# Patient Record
Sex: FEMALE | Race: WHITE | Employment: OTHER | URBAN - METROPOLITAN AREA
[De-identification: names, ages, dates, MRNs, and addresses within clinical notes are randomized per-mention and may not be internally consistent; named-entity substitution may affect disease eponyms.]

---

## 2017-03-31 ENCOUNTER — OP HISTORICAL/CONVERTED ENCOUNTER (OUTPATIENT)
Dept: OTHER | Age: 72
End: 2017-03-31

## 2019-02-07 ENCOUNTER — OP HISTORICAL/CONVERTED ENCOUNTER (OUTPATIENT)
Dept: OTHER | Age: 74
End: 2019-02-07

## 2019-05-29 ENCOUNTER — OP HISTORICAL/CONVERTED ENCOUNTER (OUTPATIENT)
Dept: OTHER | Age: 74
End: 2019-05-29

## 2021-06-30 ENCOUNTER — TRANSCRIBE ORDER (OUTPATIENT)
Dept: SCHEDULING | Age: 76
End: 2021-06-30

## 2021-06-30 DIAGNOSIS — J32.8 OTHER CHRONIC SINUSITIS: Primary | ICD-10-CM

## 2021-07-08 ENCOUNTER — HOSPITAL ENCOUNTER (OUTPATIENT)
Dept: CT IMAGING | Age: 76
Discharge: HOME | End: 2021-07-08
Payer: MEDICARE

## 2021-07-08 DIAGNOSIS — J32.8 OTHER CHRONIC SINUSITIS: ICD-10-CM

## 2021-07-08 PROCEDURE — 70486 CT MAXILLOFACIAL W/O DYE: CPT

## 2022-03-18 ENCOUNTER — HOSPITAL ENCOUNTER (OUTPATIENT)
Dept: PREADMISSION TESTING | Age: 77
Discharge: HOME | End: 2022-03-18
Payer: MEDICARE

## 2022-03-18 LAB
SARS-COV-2, XPLCVT: NOT DETECTED
SOURCE, COVRS: NORMAL

## 2022-03-18 PROCEDURE — U0005 INFEC AGEN DETEC AMPLI PROBE: HCPCS

## 2022-03-23 ENCOUNTER — ANESTHESIA (OUTPATIENT)
Dept: ENDOSCOPY | Age: 77
End: 2022-03-23
Payer: MEDICARE

## 2022-03-23 ENCOUNTER — HOSPITAL ENCOUNTER (OUTPATIENT)
Age: 77
Setting detail: OUTPATIENT SURGERY
Discharge: HOME | End: 2022-03-23
Attending: SPECIALIST | Admitting: SPECIALIST
Payer: MEDICARE

## 2022-03-23 ENCOUNTER — ANESTHESIA EVENT (OUTPATIENT)
Dept: ENDOSCOPY | Age: 77
End: 2022-03-23
Payer: MEDICARE

## 2022-03-23 VITALS
DIASTOLIC BLOOD PRESSURE: 51 MMHG | RESPIRATION RATE: 15 BRPM | BODY MASS INDEX: 33.5 KG/M2 | HEIGHT: 65 IN | WEIGHT: 201.06 LBS | SYSTOLIC BLOOD PRESSURE: 138 MMHG | OXYGEN SATURATION: 99 % | HEART RATE: 58 BPM | TEMPERATURE: 97.7 F

## 2022-03-23 LAB
GLUCOSE BLD STRIP.AUTO-MCNC: 100 MG/DL (ref 65–117)
SERVICE CMNT-IMP: NORMAL

## 2022-03-23 PROCEDURE — 2709999900 HC NON-CHARGEABLE SUPPLY: Performed by: SPECIALIST

## 2022-03-23 PROCEDURE — 88305 TISSUE EXAM BY PATHOLOGIST: CPT

## 2022-03-23 PROCEDURE — 74011250636 HC RX REV CODE- 250/636: Performed by: NURSE ANESTHETIST, CERTIFIED REGISTERED

## 2022-03-23 PROCEDURE — 77030018712 HC DEV BLLN INFL BSC -B: Performed by: SPECIALIST

## 2022-03-23 PROCEDURE — 82962 GLUCOSE BLOOD TEST: CPT

## 2022-03-23 PROCEDURE — 77030021593 HC FCPS BIOP ENDOSC BSC -A: Performed by: SPECIALIST

## 2022-03-23 PROCEDURE — C1726 CATH, BAL DIL, NON-VASCULAR: HCPCS | Performed by: SPECIALIST

## 2022-03-23 PROCEDURE — 76060000031 HC ANESTHESIA FIRST 0.5 HR: Performed by: SPECIALIST

## 2022-03-23 PROCEDURE — 74011250636 HC RX REV CODE- 250/636: Performed by: SPECIALIST

## 2022-03-23 PROCEDURE — 74011000250 HC RX REV CODE- 250: Performed by: NURSE ANESTHETIST, CERTIFIED REGISTERED

## 2022-03-23 PROCEDURE — 76040000019: Performed by: SPECIALIST

## 2022-03-23 RX ORDER — PROPOFOL 10 MG/ML
INJECTION, EMULSION INTRAVENOUS
Status: DISCONTINUED | OUTPATIENT
Start: 2022-03-23 | End: 2022-03-23 | Stop reason: HOSPADM

## 2022-03-23 RX ORDER — LIDOCAINE HYDROCHLORIDE 20 MG/ML
INJECTION, SOLUTION EPIDURAL; INFILTRATION; INTRACAUDAL; PERINEURAL AS NEEDED
Status: DISCONTINUED | OUTPATIENT
Start: 2022-03-23 | End: 2022-03-23 | Stop reason: HOSPADM

## 2022-03-23 RX ORDER — CLONIDINE HYDROCHLORIDE 0.1 MG/1
0.1 TABLET ORAL 2 TIMES DAILY
COMMUNITY

## 2022-03-23 RX ORDER — FLUMAZENIL 0.1 MG/ML
0.2 INJECTION INTRAVENOUS
Status: DISCONTINUED | OUTPATIENT
Start: 2022-03-23 | End: 2022-03-23 | Stop reason: HOSPADM

## 2022-03-23 RX ORDER — EVOLOCUMAB 140 MG/ML
140 INJECTION, SOLUTION SUBCUTANEOUS
COMMUNITY

## 2022-03-23 RX ORDER — HYDROCHLOROTHIAZIDE 25 MG/1
25 TABLET ORAL DAILY
COMMUNITY

## 2022-03-23 RX ORDER — PROPOFOL 10 MG/ML
INJECTION, EMULSION INTRAVENOUS AS NEEDED
Status: DISCONTINUED | OUTPATIENT
Start: 2022-03-23 | End: 2022-03-23 | Stop reason: HOSPADM

## 2022-03-23 RX ORDER — FLUTICASONE PROPIONATE AND SALMETEROL 500; 50 UG/1; UG/1
2 POWDER RESPIRATORY (INHALATION) EVERY 12 HOURS
COMMUNITY

## 2022-03-23 RX ORDER — MELATONIN 3 MG
6 CAPSULE ORAL
COMMUNITY

## 2022-03-23 RX ORDER — SODIUM CHLORIDE 9 MG/ML
50 INJECTION, SOLUTION INTRAVENOUS CONTINUOUS
Status: DISCONTINUED | OUTPATIENT
Start: 2022-03-23 | End: 2022-03-23 | Stop reason: HOSPADM

## 2022-03-23 RX ORDER — DEXTROMETHORPHAN/PSEUDOEPHED 2.5-7.5/.8
1.2 DROPS ORAL
Status: DISCONTINUED | OUTPATIENT
Start: 2022-03-23 | End: 2022-03-23 | Stop reason: HOSPADM

## 2022-03-23 RX ORDER — DILTIAZEM HYDROCHLORIDE 240 MG/1
240 CAPSULE, COATED, EXTENDED RELEASE ORAL DAILY
COMMUNITY

## 2022-03-23 RX ORDER — MIDAZOLAM HYDROCHLORIDE 1 MG/ML
.25-5 INJECTION, SOLUTION INTRAMUSCULAR; INTRAVENOUS AS NEEDED
Status: DISCONTINUED | OUTPATIENT
Start: 2022-03-23 | End: 2022-03-23 | Stop reason: HOSPADM

## 2022-03-23 RX ORDER — OMEPRAZOLE 20 MG/1
20 TABLET, DELAYED RELEASE ORAL 2 TIMES DAILY
COMMUNITY

## 2022-03-23 RX ORDER — FENTANYL CITRATE 50 UG/ML
25 INJECTION, SOLUTION INTRAMUSCULAR; INTRAVENOUS AS NEEDED
Status: DISCONTINUED | OUTPATIENT
Start: 2022-03-23 | End: 2022-03-23 | Stop reason: HOSPADM

## 2022-03-23 RX ORDER — NALOXONE HYDROCHLORIDE 0.4 MG/ML
0.4 INJECTION, SOLUTION INTRAMUSCULAR; INTRAVENOUS; SUBCUTANEOUS
Status: DISCONTINUED | OUTPATIENT
Start: 2022-03-23 | End: 2022-03-23 | Stop reason: HOSPADM

## 2022-03-23 RX ADMIN — LIDOCAINE HYDROCHLORIDE 40 MG: 20 INJECTION, SOLUTION INTRAVENOUS at 10:22

## 2022-03-23 RX ADMIN — PROPOFOL 140 MCG/KG/MIN: 10 INJECTION, EMULSION INTRAVENOUS at 10:23

## 2022-03-23 RX ADMIN — SODIUM CHLORIDE 50 ML/HR: 9 INJECTION, SOLUTION INTRAVENOUS at 10:13

## 2022-03-23 RX ADMIN — PROPOFOL INJECTABLE EMULSION 80 MG: 10 INJECTION, EMULSION INTRAVENOUS at 10:23

## 2022-03-23 NOTE — PROGRESS NOTES
Donte Espinoza  1945  [id number]    Situation:  Verbal report received from:   Cristóbal Gatica RN   Procedure: Procedure(s):  ESOPHAGOGASTRODUODENOSCOPY (EGD)  ESOPHAGOGASTRODUODENAL (EGD) BIOPSY  ESOPHAGEAL DILATION    Background:    Preoperative diagnosis: Dysphagia, unspecified type [R13.10]  Postoperative diagnosis: esophaheal ring. :  Dr. Moni Klein   Assistant(s): Endoscopy Technician-1: Trang Stephenson RN-1: Claudine Yanez RN    Specimens:   ID Type Source Tests Collected by Time Destination   1 : eg junction biopsy Preservative   Kera Torres MD 3/23/2022 1027 Pathology     H. Pylori  no    Assessment:      Anesthesia gave intra-procedure sedation and medications, see anesthesia flow sheet yes    Intravenous fluids: NS@ KVO     Vital signs stable   yes    Abdominal assessment: round and soft   yes    Recommendation:  Discharge patient per MD order  yes.   Return to floor  outpatient  Family or Friend   Son   Permission to share finding with family or friend yes
Endoscopy discharge instructions have been reviewed and given to patient. The patient verbalized understanding and acceptance of instructions. Dr. Lavonne Menchaca  discussed with patient procedure findings and next steps.
Unknown if ever smoked

## 2022-03-23 NOTE — INTERVAL H&P NOTE
Pre-Endoscopy H&P Update  Chief complaint/HPI/ROS:  The indication for the procedure, the patient's history and the patient's current medications are reviewed prior to the procedure and that data is reported on the H&P to which this document is attached. Any significant complaints with regard to organ systems will be noted, and if not mentioned then a review of systems is not contributory. Past Medical History:   Diagnosis Date    Asthma     Diabetes ([address]     type 2    GERD (gastroesophageal reflux disease)     Hypertension     Ill-defined condition     high cholesterol    Ill-defined condition     seasonal allergies    Ill-defined condition     gout      Past Surgical History:   Procedure Laterality Date    HX APPENDECTOMY      HX HEENT  2010    cataract     HX HYSTERECTOMY      HX LAP CHOLECYSTECTOMY      HX OOPHORECTOMY      benign ovarian tumor    HX OTHER SURGICAL      bladder tack    HX ROTATOR CUFF REPAIR  2015    right    HX TONSILLECTOMY  1964     Social   Social History     Tobacco Use    Smoking status: Former Smoker     Packs/day: 0.25     Quit date:      Years since quittin.2    Smokeless tobacco: Never Used    Tobacco comment: smoked x 1 year   Substance Use Topics    Alcohol use: No      Family History   Problem Relation Age of Onset    Cancer Mother         uterine    Heart Disease Mother         afib    Heart Disease Father 48        MI    Hypertension Father       Allergies   Allergen Reactions    Aspirin Cough    Beta Blocker [Beta-Blockers (Beta-Adrenergic Blocking Agts)] Other (comments)     Was told by her PCP to report that she has a mutation- she does not recall a reaction    Codeine Rash      Prior to Admission Medications   Prescriptions Last Dose Informant Patient Reported? Taking? Hxakchgu-Pawj-Raxdwf-Hyalur Ac [id number] mg cap 3/22/2022 at Unknown time  Yes Yes   Sig: Take  by mouth two (2) times a day.    MAGNESIUM CHLORIDE PO 3/22/2022 at Unknown time Yes Yes   Sig: Take 225 mg by mouth two (2) times a day. albuterol sulfate (PROVENTIL;VENTOLIN) 2.5 mg/0.5 mL nebu nebulizer solution 3/23/2022 at 0700  Yes Yes   Sig: by Nebulization route every four (4) hours as needed for Wheezing. atorvastatin (LIPITOR) 80 mg tablet 3/22/2022 at Unknown time  Yes Yes   Sig: Take 40 mg by mouth every evening. azilsartan medoxomiL (EDARBI) 80 mg tab tablet   Yes Yes   Sig: Take 40 mg by mouth daily. cloNIDine HCL (CATAPRES) 0.1 mg tablet 3/23/2022 at Unknown time  Yes Yes   Sig: Take 0.1 mg by mouth two (2) times a day. dilTIAZem ER (CARDIZEM CD) 240 mg capsule 3/23/2022 at Unknown time  Yes Yes   Sig: Take 240 mg by mouth daily. evolocumab (Repatha SureClick) pen injection   Yes Yes   Si mg by SubCUTAneous route every fourteen (14) days. ezetimibe (ZETIA) 10 mg tablet 3/22/2022 at Unknown time  Yes Yes   Sig: Take 10 mg by mouth every evening. fluticasone (FLONASE) 50 mcg/actuation nasal spray 3/23/2022 at Unknown time  Yes Yes   Si Sprays by Both Nostrils route daily. fluticasone propion-salmeteroL (Wixela Inhub) 500-50 mcg/dose diskus inhaler 3/23/2022 at Unknown time  Yes Yes   Sig: Take 2 Puffs by inhalation every twelve (12) hours. folic acid/multivit-min/lutein (CENTRUM SILVER PO) 3/22/2022 at Unknown time  Yes Yes   Sig: Take 1 Tablet by mouth daily. glucosamine HCl/chondroitin rico (GLUCOSAMINE-CHONDROITIN PO) 3/22/2022 at Unknown time  Yes Yes   Sig: Take 1 Capsule by mouth two (2) times a day. 1500/1000   hydroCHLOROthiazide (HYDRODIURIL) 25 mg tablet 3/22/2022 at Unknown time  Yes Yes   Sig: Take 25 mg by mouth daily. levocetirizine dihydrochloride (XYZAL PO) 3/22/2022 at Unknown time  Yes Yes   Sig: Take 90 mg by mouth daily. melatonin 3 mg cap capsule 3/22/2022 at Unknown time  Yes Yes   Sig: Take 6 mg by mouth nightly.    montelukast (SINGULAIR) 10 mg tablet 3/22/2022 at Unknown time  Yes Yes   Sig: Take 10 mg by mouth daily.   omeprazole (PriLOSEC OTC) 20 mg tablet 3/22/2022 at Unknown time  Yes Yes   Sig: Take 20 mg by mouth two (2) times a day. sitaGLIPtin-metFORMIN (JANUMET)  mg per tablet 3/22/2022 at Unknown time  Yes Yes   Sig: Take 1 Tab by mouth daily. therapeutic multivitamin (THERAGRAN) tablet 3/22/2022 at Unknown time  Yes Yes   Sig: Take 1 Tab by mouth daily. tiotropium bromide (SPIRIVA RESPIMAT) 1.25 mcg/actuation inhaler 3/23/2022 at Unknown time  Yes Yes   Sig: Take 2 Puffs by inhalation daily. Facility-Administered Medications: None       PHYSICAL EXAM:  The patient is examined immediately prior to the procedure. Visit Vitals  BP (!) 134/53   Pulse (!) 59   Temp 98.1 °F (36.7 °C)   Resp 19   Ht 5' 5\" (1.651 m)   Wt 91.2 kg (201 lb 1 oz)   SpO2 98%   Breastfeeding No   BMI 33.46 kg/m²     Gen: Appears comfortable, no distress. Pulm: comfortable respirations with no abnormal audible breath sounds  HEART: well perfused, no abnormal audible heart sounds  GI: abdomen flat. PLAN:  Informed consent discussion held, patient afforded an opportunity to ask questions and all questions answered. After being advised of the risks, benefits, and alternatives, the patient requested that we proceed and indicated so on a written consent form. Will proceed with procedure as planned.   Kathie Ovalles MD

## 2022-03-23 NOTE — PERIOP NOTES
1022  Timeout performed. Anesthesia staff at patient's bedside administering anesthesia and monitoring patients vital signs throughout procedure. See anesthesia note. Post procedure, report received from Chuck RAE. 0  Endoscope was pre-cleaned at bedside immediately following procedure by endo Jose macario. 1030  Patient tolerated procedure. Abdomen soft and patient arousable and voices no complaints. Patient transported to endoscopy recovery area. Report given to post procedure Marcia ARGUELLO.

## 2022-03-23 NOTE — DISCHARGE INSTRUCTIONS
[address] [facility] LUANA Toledo MD  [phone]      March 23, 2022     Cary Murray  YOB: 1945    ENDOSCOPY DISCHARGE INSTRUCTIONS    If there is redness at IV site you should apply warm compress to area. If redness or soreness persist contact Dr. Zena Toledo' or your primary care doctor. Gaseous discomfort may develop, but walking, belching will help relieve this. You may not operate a vehicle for 12 hours  You may not operate machinery or dangerous appliances for rest of today  You may not drink alcoholic beverages for 12 hours  Avoid making any critical decisions for 24 hours    DIET:  You may resume your normal diet, but some patients find that heavy or large meals may lead to indigestion or vomiting. I suggest a light meal as first food intake. MEDICATIONS:  The use of some over-the-counter pain medication may lead to bleeding after biopsies or other procedures you may have had done. Tylenol (also called acetaminophen) is safe to take and will not lead to bleeding. Based on the procedure you had today you may not safely take aspirin or aspirin-like products for the next ten (10) days. ACTIVITY:  You may resume your normal household activities, but it is recommended that you spend the remainder of the day resting -  avoid any strenuous activity. CALL DR. Ama Carrasquillo' OFFICE IF:  Increasing pain, nausea, vomiting  Abdominal distension (swelling)  Significant new or increased bleeding (oral or rectal)  Fever/Chills  Chest pain/shortness of breath                   Additional instructions: We found a small area of narrowing of the lower esophagus which appears due to acid reflux. We obtained biopsies to confirm my visual diagnosis and dilated/stretched that area to see if that helps improve your swallowing. It was an honor to be your doctor today.   Please let me or my office staff know if you have any feedback about today's procedure.     Yassine Perry MD

## 2022-03-23 NOTE — ANESTHESIA POSTPROCEDURE EVALUATION
Procedure(s):  ESOPHAGOGASTRODUODENOSCOPY (EGD)  ESOPHAGOGASTRODUODENAL (EGD) BIOPSY  ESOPHAGEAL DILATION. MAC    Anesthesia Post Evaluation      Multimodal analgesia: multimodal analgesia not used between 6 hours prior to anesthesia start to PACU discharge  Patient location during evaluation: PACU  Patient participation: complete - patient participated  Level of consciousness: awake  Pain management: adequate  Airway patency: patent  Anesthetic complications: no  Cardiovascular status: acceptable, blood pressure returned to baseline and hemodynamically stable  Respiratory status: acceptable  Hydration status: acceptable  Post anesthesia nausea and vomiting:  controlled      INITIAL Post-op Vital signs:   Vitals Value Taken Time   /53 03/23/22 1045   Temp     Pulse 58 03/23/22 1048   Resp 15 03/23/22 1048   SpO2 99 % 03/23/22 1048   Vitals shown include unvalidated device data.

## 2022-03-23 NOTE — PERIOP NOTES
CRE balloon dilatation of the esophagus   18 mm Balloon inflated to 3 ATMs and held for 3 seconds. 19 mm Balloon inflated to 4.5 ATMs and held for 3 seconds. 20 mm Balloon inflated to 6 ATMs and held for 20 seconds. No subcutaneous crepitus of the chest or cervical region was noted post dilatation.

## 2022-03-23 NOTE — ANESTHESIA PREPROCEDURE EVALUATION
Anesthetic History   No history of anesthetic complications            Review of Systems / Medical History  Patient summary reviewed, nursing notes reviewed and pertinent labs reviewed    Pulmonary            Asthma        Neuro/Psych             Comments: Chronic insomnia - Maintaining sleep Cardiovascular    Hypertension: well controlled              Exercise tolerance: >4 METS     GI/Hepatic/Renal     GERD: well controlled           Endo/Other    Diabetes: well controlled, type 2    Obesity     Other Findings              Physical Exam    Airway  Mallampati: II    Neck ROM: normal range of motion   Mouth opening: Normal     Cardiovascular    Rhythm: regular  Rate: normal         Dental    Dentition: Caps/crowns     Pulmonary  Breath sounds clear to auscultation               Abdominal         Other Findings            Anesthetic Plan    ASA: 2  Anesthesia type: MAC          Induction: Intravenous  Anesthetic plan and risks discussed with: Patient

## 2022-03-23 NOTE — H&P
2022 1:30 PM  Patient Name: Driss Machuca [facility]  Account #: [id number]  Gender: Female   (age): 1945 (77)  Provider:    Tammy Ireland NP     Referring Physician:    Torey Rosa. Sally Downing, [address], [place] [facility]  [phone] (phone)  [phone] (fax)    [facility]  [address] [place], [address]  [phone] (phone)  [phone] (fax)     Chief Complaint:    dysphagia, globus sensation     History of Present Illness:  76 y/o female with c/o dysphagia. She was diagnosed with asthma years ago and at that same time developed reflux. She reports having a really difficult time with her asthma over the past year and has been on steroids frequently. She takes Prilosec OTC bid which she is not sure it has helped. She feels like her food gets stuck in her throat, she does not have any trouble with liquids. She denies regurgitation. She does endorse frequent throat clearing. She does not take any blood thinners. She denies any N/V or weight loss. No nocturnal symptoms.   Past Medical History  Medical Conditions:   Asthma  Surgical Procedures:   Ovarian Cyst removed  Hysterectomy 1996  Gallbladder removed  Dx Studies:   No Prior Diagnostic Studies  Medications:   atorvastatin 80 mg Take 1 tablet by mouth once a day  clonidine HCl 0.1 mg Take 1 tablet by mouth once a day  diltiazem HCl 240 mg Take 1 capsule by mouth once a day  ezetimibe 10 mg Take 1 tablet by mouth once a day  hydrochlorothiazide 25 mg Take 1 tablet by mouth once a day  Janumet  mg Take 1 tablet by mouth once a day  montelukast 10 mg Take 1 tablet by mouth once a day  olmesartan 40 mg Take 1 tablet by mouth once a day  Prilosec OTC 20 mg Take 1 tablet by mouth once a day  Allergies:   Beta-Blockers (Beta-Adrenergic Blocking Agts)  Codeine Sulfate  morphine  Immunizations:   COVID Vaccine, 2021  Influenza, seasonal, injectable, 10/17/2021  Flu vaccine, 2020  Social History  Alcohol:   None  Tobacco:   Former smoker  Drugs:   None  Exercise:   Exercise less than 3 times a week. Caffeine:   Daily. Marital Status:         Family History   No Knowledge Of Family History  Review of Systems:  Cardiovascular: Denies chest pain, irregular heart beat, palpitations, peripheral edema, syncope, Sweats. Constitutional: Denies fatigue, fever, loss of appetite, weight gain, weight loss. ENMT: Denies nose bleeds, sore throat, hearing loss. Endocrine: Denies excessive thirst, heat intolerance. Eyes: Denies loss of vision. Gastrointestinal: Presents suffers from heartburn, dysphagia. Denies abdominal pain, abdominal swelling, change in bowel habits, constipation, diarrhea, Bloating/gas, jaundice, nausea, rectal bleeding, stomach cramps, vomiting, rectal pain, Stool incontinence, hematemesis. Genitourinary: Denies dark urine, dysuria, frequent urination, hematuria, incontinence. Hematologic/Lymphatic: Denies easy bruising, prolonged bleeding. Integumentary: Denies itching, rashes, sun sensitivity. Musculoskeletal: Denies arthritis, back pain, gout, joint pain, muscle weakness, stiffness. Neurological: Denies dizziness, fainting, frequent headaches, memory loss. Psychiatric: Denies anxiety, depression, difficulty sleeping, hallucinations, nervousness, panic attacks, paranoia. Respiratory: Denies cough, dyspnea, wheezing. Vital Signs:  BP  (mmHg)  Pulse  (bpm) Weight (lbs/oz) Height (ft/in) BMI Temp  142/67 83 197 / 6 5 / 4 33.88 98.3 (F)  Physical Exam:  Constitutional:  Appearance: No distress, appears comfortable. Communication: Understands/receives spoken information. Head/face: Inspection: Normacephalic, atraumatic. Facial strength: appears normal.  Eyes:  Conjunctivae/lids: Normal.  Pupils/irises: Pupils equal, round and normal.  Respiratory:  Effort: Normal respiratory effort, comfortable, speaks in complete sentences.   Auscultation: normal breath sounds, no rubs, wheezes or rhonchi. Cardiovascular: Auscultation: normal, S1 and S2,no gallops,no rubs or murmurs. Gastrointestinal/Abdomen:  Liver/Spleen: normal,normal size,Liver size and consistency normal, spleen is non-palpable. Abdomen Exam: normal bowel sounds,non distended, non tender. Psychiatric:  Judgment/insight: Normal,normal judgement, normal insight. Orientation: oriented to time, space and person. Lab Results:   Test 10/10/2017 Units Limits  POC GLUCOSE PROFILE     POC GLUCOSE 95 mg/dL   Impressions:   Dysphagia  Assessment:   76 y/o female with a year long history of intermittent dysphagia and globus sensation. Will schedule EGD to evaluate for reflux/stricture. Start Protonix bid until procedure. Plan:   Education handout on BMI Healthy Weight  Education handout on Hypertension  Upper Endoscopy  Protonix 40 mg Take 1 tablet by mouth twice a day  Risk & Medical Necessity:    The level of medical decision making for this visit is moderate. Beatriz Ziegler NP    Electronically signed on 2022 1:46:26 PM by Erminio Sarah, NP Althea Fling Lorre Snellen, MRN [id number],  1945 [facility] First Visit, 2022

## 2022-03-23 NOTE — PROCEDURES
[address], [place]  [phone]      2022    Esophagogastroduodenoscopy (EGD) Procedure Note  Francis Killian  : 1945  [facility] Medical Record Number: [id number]      Indications:    Dysphagia/odynophagia  Referring Physician:  Bill Horner MD  Anesthesia/Sedation:  Conscious sedation/deep sedation/monitored anesthesia -- see notes. Endoscopist:  Dr. Kathie Ovalles  Complications:  None  Estimated Blood Loss:  None    Permit:  The indications, risks, benefits and alternatives were reviewed with the patient or their decision maker who was provided an opportunity to ask questions and all questions were answered. The specific risks of esophagogastroduodenoscopy with conscious sedation were reviewed, including but not limited to anesthetic complication, bleeding, adverse drug reaction, missed lesion, infection, IV site reactions, and intestinal perforation which would lead to the need for surgical repair. Alternatives to EGD including radiographic imaging, observation without testing, or laboratory testing were reviewed as well as the limitations of those alternatives discussed. After considering the options and having all their questions answered, the patient or their decision maker provided both verbal and written consent to proceed. Procedure in Detail:  After obtaining informed consent, positioning of the patient in the left lateral decubitus position, and conduction of a pre-procedure pause or \"time out\" the endoscope was introduced into the mouth and advanced to the duodenum. A careful inspection was made, and findings or interventions are described below. Findings:   Esophagus: There is an acquired esophageal ring at the EG junction, which is biopsied with cold forceps and then dilated with 20mm balloon.   Stomach: normal   Duodenum/jejunum: normal      Specimens: See above    Impression: Esophageal ring           Recommendations:  -Await pathology. Thank you for entrusting me with this patient's care. Please do not hesitate to contact me with any questions or if I can be of assistance with any of your other patients' GI needs. Signed By: Kat Rubio MD                        March 23, 2022     Surgical assistant none. Implants none unless specified.

## 2023-03-15 ENCOUNTER — APPOINTMENT (OUTPATIENT)
Dept: GENERAL RADIOLOGY | Age: 78
DRG: 177 | End: 2023-03-15
Attending: STUDENT IN AN ORGANIZED HEALTH CARE EDUCATION/TRAINING PROGRAM
Payer: MEDICARE

## 2023-03-15 ENCOUNTER — HOSPITAL ENCOUNTER (INPATIENT)
Age: 78
LOS: 15 days | Discharge: NURSING, REHAB OR LONG-TERM CARE | DRG: 177 | End: 2023-03-31
Attending: STUDENT IN AN ORGANIZED HEALTH CARE EDUCATION/TRAINING PROGRAM | Admitting: INTERNAL MEDICINE
Payer: MEDICARE

## 2023-03-15 ENCOUNTER — APPOINTMENT (OUTPATIENT)
Dept: CT IMAGING | Age: 78
DRG: 177 | End: 2023-03-15
Attending: STUDENT IN AN ORGANIZED HEALTH CARE EDUCATION/TRAINING PROGRAM
Payer: MEDICARE

## 2023-03-15 DIAGNOSIS — I48.0 PAF (PAROXYSMAL ATRIAL FIBRILLATION) (HCC): ICD-10-CM

## 2023-03-15 DIAGNOSIS — J18.9 PNEUMONIA OF LEFT LOWER LOBE DUE TO INFECTIOUS ORGANISM: Primary | ICD-10-CM

## 2023-03-15 DIAGNOSIS — R55 SYNCOPE AND COLLAPSE: ICD-10-CM

## 2023-03-15 LAB
ALBUMIN SERPL-MCNC: 3 G/DL (ref 3.5–5)
ALBUMIN/GLOB SERPL: 0.9 (ref 1.1–2.2)
ALP SERPL-CCNC: 86 U/L (ref 45–117)
ALT SERPL-CCNC: 27 U/L (ref 12–78)
ANION GAP SERPL CALC-SCNC: 7 MMOL/L (ref 5–15)
AST SERPL-CCNC: 25 U/L (ref 15–37)
BASOPHILS # BLD: 0 K/UL (ref 0–0.1)
BASOPHILS NFR BLD: 0 % (ref 0–1)
BILIRUB SERPL-MCNC: 0.9 MG/DL (ref 0.2–1)
BUN SERPL-MCNC: 23 MG/DL (ref 6–20)
BUN/CREAT SERPL: 14 (ref 12–20)
CALCIUM SERPL-MCNC: 9.1 MG/DL (ref 8.5–10.1)
CHLORIDE SERPL-SCNC: 108 MMOL/L (ref 97–108)
CO2 SERPL-SCNC: 24 MMOL/L (ref 21–32)
COMMENT, HOLDF: NORMAL
CREAT SERPL-MCNC: 1.6 MG/DL (ref 0.55–1.02)
DIFFERENTIAL METHOD BLD: ABNORMAL
EOSINOPHIL # BLD: 0 K/UL (ref 0–0.4)
EOSINOPHIL NFR BLD: 0 % (ref 0–7)
ERYTHROCYTE [DISTWIDTH] IN BLOOD BY AUTOMATED COUNT: 14.3 % (ref 11.5–14.5)
FLUAV AG NPH QL IA: NEGATIVE
FLUBV AG NOSE QL IA: NEGATIVE
GLOBULIN SER CALC-MCNC: 3.5 G/DL (ref 2–4)
GLUCOSE BLD STRIP.AUTO-MCNC: 77 MG/DL (ref 65–117)
GLUCOSE SERPL-MCNC: 70 MG/DL (ref 65–100)
HCT VFR BLD AUTO: 40.5 % (ref 35–47)
HGB BLD-MCNC: 13.4 G/DL (ref 11.5–16)
IMM GRANULOCYTES # BLD AUTO: 0.1 K/UL (ref 0–0.04)
IMM GRANULOCYTES NFR BLD AUTO: 1 % (ref 0–0.5)
LACTATE SERPL-SCNC: 1.2 MMOL/L (ref 0.4–2)
LYMPHOCYTES # BLD: 1.6 K/UL (ref 0.8–3.5)
LYMPHOCYTES NFR BLD: 9 % (ref 12–49)
MCH RBC QN AUTO: 32.4 PG (ref 26–34)
MCHC RBC AUTO-ENTMCNC: 33.1 G/DL (ref 30–36.5)
MCV RBC AUTO: 98.1 FL (ref 80–99)
MONOCYTES # BLD: 0.7 K/UL (ref 0–1)
MONOCYTES NFR BLD: 4 % (ref 5–13)
NEUTS SEG # BLD: 15.7 K/UL (ref 1.8–8)
NEUTS SEG NFR BLD: 86 % (ref 32–75)
NRBC # BLD: 0 K/UL (ref 0–0.01)
NRBC BLD-RTO: 0 PER 100 WBC
PLATELET # BLD AUTO: 222 K/UL (ref 150–400)
PMV BLD AUTO: 8.7 FL (ref 8.9–12.9)
POTASSIUM SERPL-SCNC: 4.2 MMOL/L (ref 3.5–5.1)
PROT SERPL-MCNC: 6.5 G/DL (ref 6.4–8.2)
RBC # BLD AUTO: 4.13 M/UL (ref 3.8–5.2)
SAMPLES BEING HELD,HOLD: NORMAL
SARS-COV-2 RDRP RESP QL NAA+PROBE: NOT DETECTED
SERVICE CMNT-IMP: NORMAL
SODIUM SERPL-SCNC: 139 MMOL/L (ref 136–145)
SOURCE, COVRS: NORMAL
TROPONIN I SERPL HS-MCNC: 12 NG/L (ref 0–51)
WBC # BLD AUTO: 18.3 K/UL (ref 3.6–11)

## 2023-03-15 PROCEDURE — 74011000250 HC RX REV CODE- 250: Performed by: STUDENT IN AN ORGANIZED HEALTH CARE EDUCATION/TRAINING PROGRAM

## 2023-03-15 PROCEDURE — 71045 X-RAY EXAM CHEST 1 VIEW: CPT

## 2023-03-15 PROCEDURE — 93005 ELECTROCARDIOGRAM TRACING: CPT

## 2023-03-15 PROCEDURE — 70450 CT HEAD/BRAIN W/O DYE: CPT

## 2023-03-15 PROCEDURE — 96361 HYDRATE IV INFUSION ADD-ON: CPT

## 2023-03-15 PROCEDURE — 74011000250 HC RX REV CODE- 250: Performed by: INTERNAL MEDICINE

## 2023-03-15 PROCEDURE — 87804 INFLUENZA ASSAY W/OPTIC: CPT

## 2023-03-15 PROCEDURE — 82962 GLUCOSE BLOOD TEST: CPT

## 2023-03-15 PROCEDURE — 96365 THER/PROPH/DIAG IV INF INIT: CPT

## 2023-03-15 PROCEDURE — 74011250636 HC RX REV CODE- 250/636: Performed by: STUDENT IN AN ORGANIZED HEALTH CARE EDUCATION/TRAINING PROGRAM

## 2023-03-15 PROCEDURE — 96366 THER/PROPH/DIAG IV INF ADDON: CPT

## 2023-03-15 PROCEDURE — 80053 COMPREHEN METABOLIC PANEL: CPT

## 2023-03-15 PROCEDURE — 94640 AIRWAY INHALATION TREATMENT: CPT

## 2023-03-15 PROCEDURE — 87040 BLOOD CULTURE FOR BACTERIA: CPT

## 2023-03-15 PROCEDURE — G0378 HOSPITAL OBSERVATION PER HR: HCPCS

## 2023-03-15 PROCEDURE — 87635 SARS-COV-2 COVID-19 AMP PRB: CPT

## 2023-03-15 PROCEDURE — 83605 ASSAY OF LACTIC ACID: CPT

## 2023-03-15 PROCEDURE — 96375 TX/PRO/DX INJ NEW DRUG ADDON: CPT

## 2023-03-15 PROCEDURE — 84484 ASSAY OF TROPONIN QUANT: CPT

## 2023-03-15 PROCEDURE — 99285 EMERGENCY DEPT VISIT HI MDM: CPT

## 2023-03-15 PROCEDURE — 85025 COMPLETE CBC W/AUTO DIFF WBC: CPT

## 2023-03-15 PROCEDURE — 87150 DNA/RNA AMPLIFIED PROBE: CPT

## 2023-03-15 PROCEDURE — 74011250637 HC RX REV CODE- 250/637: Performed by: INTERNAL MEDICINE

## 2023-03-15 PROCEDURE — 36415 COLL VENOUS BLD VENIPUNCTURE: CPT

## 2023-03-15 RX ORDER — SODIUM CHLORIDE 0.9 % (FLUSH) 0.9 %
5-40 SYRINGE (ML) INJECTION AS NEEDED
Status: DISCONTINUED | OUTPATIENT
Start: 2023-03-15 | End: 2023-03-31 | Stop reason: HOSPADM

## 2023-03-15 RX ORDER — PANTOPRAZOLE SODIUM 40 MG/1
40 TABLET, DELAYED RELEASE ORAL
Status: DISCONTINUED | OUTPATIENT
Start: 2023-03-16 | End: 2023-03-31 | Stop reason: HOSPADM

## 2023-03-15 RX ORDER — LANOLIN ALCOHOL/MO/W.PET/CERES
3 CREAM (GRAM) TOPICAL
Status: DISCONTINUED | OUTPATIENT
Start: 2023-03-15 | End: 2023-03-31 | Stop reason: HOSPADM

## 2023-03-15 RX ORDER — HYDROCHLOROTHIAZIDE 25 MG/1
25 TABLET ORAL DAILY
Status: DISCONTINUED | OUTPATIENT
Start: 2023-03-16 | End: 2023-03-31 | Stop reason: HOSPADM

## 2023-03-15 RX ORDER — DEXTROSE MONOHYDRATE 100 MG/ML
0-250 INJECTION, SOLUTION INTRAVENOUS AS NEEDED
Status: DISCONTINUED | OUTPATIENT
Start: 2023-03-15 | End: 2023-03-31 | Stop reason: HOSPADM

## 2023-03-15 RX ORDER — ONDANSETRON 2 MG/ML
4 INJECTION INTRAMUSCULAR; INTRAVENOUS
Status: DISCONTINUED | OUTPATIENT
Start: 2023-03-15 | End: 2023-03-31 | Stop reason: HOSPADM

## 2023-03-15 RX ORDER — CLONIDINE HYDROCHLORIDE 0.1 MG/1
0.1 TABLET ORAL 2 TIMES DAILY
Status: DISCONTINUED | OUTPATIENT
Start: 2023-03-16 | End: 2023-03-18

## 2023-03-15 RX ORDER — IBUPROFEN 200 MG
4 TABLET ORAL AS NEEDED
Status: DISCONTINUED | OUTPATIENT
Start: 2023-03-15 | End: 2023-03-31 | Stop reason: HOSPADM

## 2023-03-15 RX ORDER — MONTELUKAST SODIUM 10 MG/1
10 TABLET ORAL DAILY
Status: DISCONTINUED | OUTPATIENT
Start: 2023-03-16 | End: 2023-03-31 | Stop reason: HOSPADM

## 2023-03-15 RX ORDER — EZETIMIBE 10 MG/1
10 TABLET ORAL EVERY EVENING
Status: DISCONTINUED | OUTPATIENT
Start: 2023-03-16 | End: 2023-03-31 | Stop reason: HOSPADM

## 2023-03-15 RX ORDER — ARFORMOTEROL TARTRATE 15 UG/2ML
15 SOLUTION RESPIRATORY (INHALATION)
Status: DISCONTINUED | OUTPATIENT
Start: 2023-03-15 | End: 2023-03-31 | Stop reason: HOSPADM

## 2023-03-15 RX ORDER — ACETAMINOPHEN 325 MG/1
650 TABLET ORAL
Status: DISCONTINUED | OUTPATIENT
Start: 2023-03-15 | End: 2023-03-31 | Stop reason: HOSPADM

## 2023-03-15 RX ORDER — IPRATROPIUM BROMIDE 0.5 MG/2.5ML
0.5 SOLUTION RESPIRATORY (INHALATION)
Status: DISCONTINUED | OUTPATIENT
Start: 2023-03-16 | End: 2023-03-16

## 2023-03-15 RX ORDER — SODIUM CHLORIDE 0.9 % (FLUSH) 0.9 %
5-40 SYRINGE (ML) INJECTION EVERY 8 HOURS
Status: DISCONTINUED | OUTPATIENT
Start: 2023-03-15 | End: 2023-03-31 | Stop reason: HOSPADM

## 2023-03-15 RX ORDER — ENOXAPARIN SODIUM 100 MG/ML
40 INJECTION SUBCUTANEOUS DAILY
Status: DISCONTINUED | OUTPATIENT
Start: 2023-03-16 | End: 2023-03-31 | Stop reason: HOSPADM

## 2023-03-15 RX ORDER — HYDRALAZINE HYDROCHLORIDE 20 MG/ML
20 INJECTION INTRAMUSCULAR; INTRAVENOUS
Status: DISCONTINUED | OUTPATIENT
Start: 2023-03-15 | End: 2023-03-19

## 2023-03-15 RX ORDER — LEVOFLOXACIN 750 MG/1
750 TABLET ORAL
Status: DISCONTINUED | OUTPATIENT
Start: 2023-03-16 | End: 2023-03-16

## 2023-03-15 RX ORDER — ATORVASTATIN CALCIUM 20 MG/1
40 TABLET, FILM COATED ORAL EVERY EVENING
Status: DISCONTINUED | OUTPATIENT
Start: 2023-03-16 | End: 2023-03-31 | Stop reason: HOSPADM

## 2023-03-15 RX ORDER — INSULIN LISPRO 100 [IU]/ML
INJECTION, SOLUTION INTRAVENOUS; SUBCUTANEOUS
Status: DISCONTINUED | OUTPATIENT
Start: 2023-03-15 | End: 2023-03-31 | Stop reason: HOSPADM

## 2023-03-15 RX ORDER — ONDANSETRON 4 MG/1
4 TABLET, ORALLY DISINTEGRATING ORAL
Status: DISCONTINUED | OUTPATIENT
Start: 2023-03-15 | End: 2023-03-31 | Stop reason: HOSPADM

## 2023-03-15 RX ORDER — ACETAMINOPHEN 650 MG/1
650 SUPPOSITORY RECTAL
Status: DISCONTINUED | OUTPATIENT
Start: 2023-03-15 | End: 2023-03-31 | Stop reason: HOSPADM

## 2023-03-15 RX ORDER — POLYETHYLENE GLYCOL 3350 17 G/17G
17 POWDER, FOR SOLUTION ORAL DAILY PRN
Status: DISCONTINUED | OUTPATIENT
Start: 2023-03-15 | End: 2023-03-31 | Stop reason: HOSPADM

## 2023-03-15 RX ORDER — IPRATROPIUM BROMIDE AND ALBUTEROL SULFATE 2.5; .5 MG/3ML; MG/3ML
3 SOLUTION RESPIRATORY (INHALATION)
Status: DISCONTINUED | OUTPATIENT
Start: 2023-03-15 | End: 2023-03-19

## 2023-03-15 RX ORDER — DILTIAZEM HYDROCHLORIDE 120 MG/1
240 CAPSULE, COATED, EXTENDED RELEASE ORAL DAILY
Status: DISCONTINUED | OUTPATIENT
Start: 2023-03-16 | End: 2023-03-22

## 2023-03-15 RX ORDER — VALSARTAN 80 MG/1
40 TABLET ORAL DAILY
Status: DISCONTINUED | OUTPATIENT
Start: 2023-03-16 | End: 2023-03-31 | Stop reason: HOSPADM

## 2023-03-15 RX ORDER — BUDESONIDE 0.5 MG/2ML
500 INHALANT ORAL
Status: DISCONTINUED | OUTPATIENT
Start: 2023-03-15 | End: 2023-03-31 | Stop reason: HOSPADM

## 2023-03-15 RX ADMIN — ARFORMOTEROL TARTRATE 15 MCG: 15 SOLUTION RESPIRATORY (INHALATION) at 22:13

## 2023-03-15 RX ADMIN — SODIUM CHLORIDE 1000 ML: 9 INJECTION, SOLUTION INTRAVENOUS at 17:38

## 2023-03-15 RX ADMIN — BUDESONIDE 500 MCG: 0.5 INHALANT RESPIRATORY (INHALATION) at 22:13

## 2023-03-15 RX ADMIN — ACETAMINOPHEN 650 MG: 325 TABLET ORAL at 22:12

## 2023-03-15 RX ADMIN — AZITHROMYCIN MONOHYDRATE 500 MG: 500 INJECTION, POWDER, LYOPHILIZED, FOR SOLUTION INTRAVENOUS at 18:25

## 2023-03-15 RX ADMIN — Medication 10 ML: at 23:35

## 2023-03-15 RX ADMIN — CEFTRIAXONE 2 G: 2 INJECTION, POWDER, FOR SOLUTION INTRAMUSCULAR; INTRAVENOUS at 18:25

## 2023-03-15 NOTE — Clinical Note
Patient Class[de-identified] OBSERVATION [104]   Type of Bed: Remote Telemetry [29]   Cardiac Monitoring Required?: Yes   Reason for Observation: vasovagal syncope   Admitting Diagnosis: Vasovagal syncope [466932]   Admitting Physician: Theron Levin   Attending Physician: Theron Marrero [[id number]]

## 2023-03-15 NOTE — ED TRIAGE NOTES
Pt arrives by ems with the c.c. of having a syncopal episode today, pt reports was seen by pcp for cough and diagnosed with pneumonia, pt reports went to bathroom and when walking into room \"everything went black\" and passed out; +LOC; does take blood thinner plavix, pt denies hitting head, pt reports left arm pain.

## 2023-03-15 NOTE — ED PROVIDER NOTES
HPI     Date of Service:  3/15/2023    Patient:  Renato Braun    Chief Complaint:  Syncope and Cough       HPI:  Renato Braun is a 69 y.o.  female with a past medical history of HTN, DM, asthma who presents for evaluation of syncope. Patient reports for the last several days she has been having URI symptoms of cough and congestion. Notes her primary care doctor started her on antibiotics yesterday. Patient reports today she was feeling worse, notes she was having lightheadedness today. She had 1 episode of emesis today. No diarrhea. No known fever. Patient reports while ambulating to the restroom earlier today she felt lightheaded and subsequently passed out. Patient is unsure if she struck her head but has had headache since the fall. She does endorse a skin tear to the left arm. Denies neck or back pain. Denies chest pain or shortness of breath. No abdominal pain. Patient endorses being up to date on tetanus.          Past Medical History:   Diagnosis Date    Asthma     Diabetes ([facility] [address]     type 2    GERD (gastroesophageal reflux disease)     Hypertension     Ill-defined condition     high cholesterol    Ill-defined condition     seasonal allergies    Ill-defined condition     gout       Past Surgical History:   Procedure Laterality Date    HX APPENDECTOMY      HX HEENT  2010    cataract     HX HYSTERECTOMY  1975    HX LAP CHOLECYSTECTOMY  1985    HX OOPHORECTOMY  1996    benign ovarian tumor    HX OTHER SURGICAL  1993    bladder tack    HX ROTATOR CUFF REPAIR  2015    right    HX TONSILLECTOMY  1964         Family History:   Problem Relation Age of Onset    Cancer Mother         uterine    Heart Disease Mother         afib    Heart Disease Father 48        MI    Hypertension Father        Social History     Socioeconomic History    Marital status:      Spouse name: Not on file    Number of children: Not on file    Years of education: Not on file    Highest education level: Not on file Occupational History    Not on file   Tobacco Use    Smoking status: Former     Packs/day: 0.25     Types: Cigarettes     Quit date: 1964     Years since quittin.2    Smokeless tobacco: Never    Tobacco comments:     smoked x 1 year   Vaping Use    Vaping Use: Never used   Substance and Sexual Activity    Alcohol use: No    Drug use: No    Sexual activity: Not on file   Other Topics Concern    Not on file   Social History Narrative    Not on file     Social Determinants of Health     Financial Resource Strain: Not on file   Food Insecurity: Not on file   Transportation Needs: Not on file   Physical Activity: Not on file   Stress: Not on file   Social Connections: Not on file   Intimate Partner Violence: Not on file   Housing Stability: Not on file         ALLERGIES: Aspirin, Beta blocker [beta-blockers (beta-adrenergic blocking agts)], and Codeine    Review of Systems   Constitutional:  Negative for chills and fever. HENT:  Negative for congestion and rhinorrhea. Eyes:  Negative for discharge and redness. Respiratory:  Positive for cough. Negative for shortness of breath. Cardiovascular:  Negative for chest pain. Gastrointestinal:  Positive for nausea and vomiting. Negative for abdominal pain and diarrhea. Neurological:  Positive for syncope, light-headedness and headaches. Negative for speech difficulty. Psychiatric/Behavioral:  Negative for agitation and confusion. Vitals:    03/15/23 1635 03/15/23 1649   BP: (!) 120/50    Pulse: (!) 107 (!) 102   Resp: 18    Temp: 99 °F (37.2 °C)    SpO2: 97%    Weight: 81.6 kg (180 lb)    Height: 5' 5\" (1.651 m)             Physical Exam  Vitals and nursing note reviewed. Constitutional:       General: She is in acute distress. Appearance: She is not toxic-appearing. HENT:      Head: Normocephalic and atraumatic. Eyes:      General: No scleral icterus. Right eye: No discharge. Left eye: No discharge.       Conjunctiva/sclera: Conjunctivae normal.   Cardiovascular:      Rate and Rhythm: Regular rhythm. Tachycardia present. Pulses: Normal pulses. Pulmonary:      Effort: Pulmonary effort is normal. No respiratory distress. Breath sounds: Wheezing and rhonchi present. Abdominal:      General: Abdomen is flat. Palpations: Abdomen is soft. Tenderness: There is no abdominal tenderness. There is no guarding or rebound. Musculoskeletal:         General: No tenderness. Normal range of motion. Comments: + left forearm skin tear, no bony tenderness. Full ROM of the shoulder, elbow and wrist    Skin:     General: Skin is warm and dry. Capillary Refill: Capillary refill takes less than 2 seconds. Neurological:      General: No focal deficit present. Mental Status: She is alert and oriented to person, place, and time. Psychiatric:         Mood and Affect: Mood normal.         Behavior: Behavior normal.        Medical Decision Making    DECISION MAKING:  Jeremias Vaughn is a 69 y.o. female who comes in as above. On arrival patient is afebrile. Vital signs notable for tachycardia with a heart rate of 107 bpm.  Oxygen saturation is normal.  On my examination she does have some scattered wheezing throughout the lung fields and rhonchi intermittently throughout that clears with cough. Differential diagnosis includes, but not limited to, orthostatic hypotension secondary to dehydration, anemia, pneumonia, COVID-19, influenza, ACS.  1 L IV fluids for hydration. Will obtain CT of the head to rule out intracranial trauma given syncope was unwitnessed, she is unsure if she hit her head and is having a headache since the syncope. ECG is negative for acute ischemic changes. Laboratory work is notable for leukocytosis with a WBC of 18.3 k. With a neutrophil predominance. No anemia. Electrolytes within normal limits.   BUN/creatinine elevated at 23 and 1.6, respectively; Creat comparison in our system from 2015 was normal at 0.8. Patient was hydrated with 1 L IV fluids. High-sensitivity troponin within normal limits at 12. Lactic acid is not elevated. COVID-19 and influenza negative. CT of the head was performed and negative for acute intracranial trauma. Chest x-ray does show left basilar airspace disease. Given her symptoms, leukocytosis and chest x-ray findings will cover for suspected community acquired pneumonia. I discussed results with patient. Discussed plan for admission for continued IV antibiotics and IV fluids for hydration. Patient is in agreement. Patient's case was discussed with the hospitalist for admission. Perfect Serve Consult for Admission  7:11 PM    ED Room Number: ER09/09  Patient Name and age:  Audrey Pulido 69 y.o.  female  Working Diagnosis: Pneumonia of left lower lobe due to infectious organism  (primary encounter diagnosis)  Syncope and collapse    COVID-19 Suspicion:  no  Sepsis present:  yes  Reassessment needed: no  Code Status:  Full Code  Readmission: no  Isolation Requirements:  no  Recommended Level of Care:  telemetry  Department: [facility] ED - [phone]  Admitting Provider: Romana Hesselbach    Other: 69 y.o.  female with a past medical history of HTN, DM, asthma presented for syncope. Sounds like hypovolemic as she has had URI symptoms for several days, just started on abx yesterday for suspected pna. Labs here notable for leukocytosis at 18.3. BUN/creatinine elevated at 23 and 1.6, respectively, IV fluids being given. High-sensitivity troponin within normal limits. COVID and flu negative. Chest x-ray does show left basilar airspace disease, she is covered for community-acquired pneumonia with IV antibiotics. Amount and/or Complexity of Data Reviewed  Labs: ordered. Radiology: ordered. ECG/medicine tests: ordered. Decision-making details documented in ED Course. Risk  Decision regarding hospitalization.       ED Course as of 03/15/23 1908   Wed Mar 15, 2023   6344 EKG 12 LEAD INITIAL  ECG at 1638, interpreted by me: Sinus tachycardia, rate 107 bpm.  Occasional PVCs. Normal axis. Normal intervals. No ST elevations. Q waves in the inferior leads. [SH]      ED Course User Index  [SH] Selam No DO       Procedures      LABS:  Recent Results (from the past 6 hour(s))   CBC WITH AUTOMATED DIFF    Collection Time: 03/15/23  4:47 PM   Result Value Ref Range    WBC 18.3 (H) 3.6 - 11.0 K/uL    RBC 4.13 3.80 - 5.20 M/uL    HGB 13.4 11.5 - 16.0 g/dL    HCT 40.5 35.0 - 47.0 %    MCV 98.1 80.0 - 99.0 FL    MCH 32.4 26.0 - 34.0 PG    MCHC 33.1 30.0 - 36.5 g/dL    RDW 14.3 11.5 - 14.5 %    PLATELET 586 917 - 733 K/uL    MPV 8.7 (L) 8.9 - 12.9 FL    NRBC 0.0 0  WBC    ABSOLUTE NRBC 0.00 0.00 - 0.01 K/uL    NEUTROPHILS 86 (H) 32 - 75 %    LYMPHOCYTES 9 (L) 12 - 49 %    MONOCYTES 4 (L) 5 - 13 %    EOSINOPHILS 0 0 - 7 %    BASOPHILS 0 0 - 1 %    IMMATURE GRANULOCYTES 1 (H) 0.0 - 0.5 %    ABS. NEUTROPHILS 15.7 (H) 1.8 - 8.0 K/UL    ABS. LYMPHOCYTES 1.6 0.8 - 3.5 K/UL    ABS. MONOCYTES 0.7 0.0 - 1.0 K/UL    ABS. EOSINOPHILS 0.0 0.0 - 0.4 K/UL    ABS. BASOPHILS 0.0 0.0 - 0.1 K/UL    ABS. IMM. GRANS. 0.1 (H) 0.00 - 0.04 K/UL    DF AUTOMATED     METABOLIC PANEL, COMPREHENSIVE    Collection Time: 03/15/23  4:47 PM   Result Value Ref Range    Sodium 139 136 - 145 mmol/L    Potassium 4.2 3.5 - 5.1 mmol/L    Chloride 108 97 - 108 mmol/L    CO2 24 21 - 32 mmol/L    Anion gap 7 5 - 15 mmol/L    Glucose 70 65 - 100 mg/dL    BUN 23 (H) 6 - 20 MG/DL    Creatinine 1.60 (H) 0.55 - 1.02 MG/DL    BUN/Creatinine ratio 14 12 - 20      eGFR 33 (L) >60 ml/min/1.73m2    Calcium 9.1 8.5 - 10.1 MG/DL    Bilirubin, total 0.9 0.2 - 1.0 MG/DL    ALT (SGPT) 27 12 - 78 U/L    AST (SGOT) 25 15 - 37 U/L    Alk.  phosphatase 86 45 - 117 U/L    Protein, total 6.5 6.4 - 8.2 g/dL    Albumin 3.0 (L) 3.5 - 5.0 g/dL    Globulin 3.5 2.0 - 4.0 g/dL    A-G Ratio 0.9 (L) 1.1 - 2.2     TROPONIN-HIGH SENSITIVITY Collection Time: 03/15/23  4:47 PM   Result Value Ref Range    Troponin-High Sensitivity 12 0 - 51 ng/L   SAMPLES BEING HELD    Collection Time: 03/15/23  4:47 PM   Result Value Ref Range    SAMPLES BEING HELD 1RED,1BLUE     COMMENT        Add-on orders for these samples will be processed based on acceptable specimen integrity and analyte stability, which may vary by analyte. INFLUENZA A+B VIRAL AGS    Collection Time: 03/15/23  5:39 PM   Result Value Ref Range    Influenza A Antigen Negative NEG      Influenza B Antigen Negative NEG     COVID-19 RAPID TEST    Collection Time: 03/15/23  5:39 PM   Result Value Ref Range    Specimen source Nasopharyngeal      COVID-19 rapid test Not detected NOTD     LACTIC ACID    Collection Time: 03/15/23  5:39 PM   Result Value Ref Range    Lactic acid 1.2 0.4 - 2.0 MMOL/L        IMAGING:  CT HEAD WO CONT   Final Result   No acute intracranial abnormalities. XR CHEST PORT   Final Result   Left basilar airspace disease. Medications During Visit:  Medications   cefTRIAXone (ROCEPHIN) 2 g in 0.9% sodium chloride 20 mL IV syringe (2 g IntraVENous Given 3/15/23 1825)   azithromycin (ZITHROMAX) 500 mg in 0.9% sodium chloride 250 mL ([id number]) (500 mg IntraVENous New Bag 3/15/23 1825)   sodium chloride 0.9 % bolus infusion 1,000 mL (1,000 mL IntraVENous New Bag 3/15/23 1738)         IMPRESSION:  1. Pneumonia of left lower lobe due to infectious organism    2.  Syncope and collapse        DISPOSITION:  Admitted      Mehreen Serrano DO

## 2023-03-15 NOTE — ED NOTES
Bedside and Verbal shift change report given to Tucker (oncoming nurse) by Ruddy Avila (offgoing nurse). Report included the following information SBAR, ED Summary, MAR and Recent Results.

## 2023-03-16 PROBLEM — A41.9 SEPSIS (HCC): Status: ACTIVE | Noted: 2023-03-16

## 2023-03-16 LAB
BASOPHILS # BLD: 0 K/UL (ref 0–0.1)
BASOPHILS NFR BLD: 0 % (ref 0–1)
DIFFERENTIAL METHOD BLD: ABNORMAL
EOSINOPHIL # BLD: 0 K/UL (ref 0–0.4)
EOSINOPHIL NFR BLD: 0 % (ref 0–7)
ERYTHROCYTE [DISTWIDTH] IN BLOOD BY AUTOMATED COUNT: 14.3 % (ref 11.5–14.5)
GLUCOSE BLD STRIP.AUTO-MCNC: 100 MG/DL (ref 65–117)
GLUCOSE BLD STRIP.AUTO-MCNC: 67 MG/DL (ref 65–117)
GLUCOSE BLD STRIP.AUTO-MCNC: 90 MG/DL (ref 65–117)
HCT VFR BLD AUTO: 41.4 % (ref 35–47)
HGB BLD-MCNC: 13.4 G/DL (ref 11.5–16)
IMM GRANULOCYTES # BLD AUTO: 0.4 K/UL (ref 0–0.04)
IMM GRANULOCYTES NFR BLD AUTO: 2 % (ref 0–0.5)
LYMPHOCYTES # BLD: 1.3 K/UL (ref 0.8–3.5)
LYMPHOCYTES NFR BLD: 6 % (ref 12–49)
MCH RBC QN AUTO: 32.1 PG (ref 26–34)
MCHC RBC AUTO-ENTMCNC: 32.4 G/DL (ref 30–36.5)
MCV RBC AUTO: 99 FL (ref 80–99)
MONOCYTES # BLD: 1 K/UL (ref 0–1)
MONOCYTES NFR BLD: 5 % (ref 5–13)
NEUTS SEG # BLD: 18.2 K/UL (ref 1.8–8)
NEUTS SEG NFR BLD: 87 % (ref 32–75)
NRBC # BLD: 0 K/UL (ref 0–0.01)
NRBC BLD-RTO: 0 PER 100 WBC
PLATELET # BLD AUTO: 199 K/UL (ref 150–400)
PMV BLD AUTO: 8.9 FL (ref 8.9–12.9)
RBC # BLD AUTO: 4.18 M/UL (ref 3.8–5.2)
SERVICE CMNT-IMP: NORMAL
WBC # BLD AUTO: 20.9 K/UL (ref 3.6–11)

## 2023-03-16 PROCEDURE — 96376 TX/PRO/DX INJ SAME DRUG ADON: CPT

## 2023-03-16 PROCEDURE — 74011250637 HC RX REV CODE- 250/637: Performed by: INTERNAL MEDICINE

## 2023-03-16 PROCEDURE — 82962 GLUCOSE BLOOD TEST: CPT

## 2023-03-16 PROCEDURE — 94640 AIRWAY INHALATION TREATMENT: CPT

## 2023-03-16 PROCEDURE — G0378 HOSPITAL OBSERVATION PER HR: HCPCS

## 2023-03-16 PROCEDURE — 74011000250 HC RX REV CODE- 250: Performed by: INTERNAL MEDICINE

## 2023-03-16 PROCEDURE — 36415 COLL VENOUS BLD VENIPUNCTURE: CPT

## 2023-03-16 PROCEDURE — 74011250636 HC RX REV CODE- 250/636: Performed by: INTERNAL MEDICINE

## 2023-03-16 PROCEDURE — 96372 THER/PROPH/DIAG INJ SC/IM: CPT

## 2023-03-16 PROCEDURE — 65270000029 HC RM PRIVATE

## 2023-03-16 PROCEDURE — 96375 TX/PRO/DX INJ NEW DRUG ADDON: CPT

## 2023-03-16 PROCEDURE — 94664 DEMO&/EVAL PT USE INHALER: CPT

## 2023-03-16 PROCEDURE — 85025 COMPLETE CBC W/AUTO DIFF WBC: CPT

## 2023-03-16 RX ORDER — IPRATROPIUM BROMIDE 0.5 MG/2.5ML
0.5 SOLUTION RESPIRATORY (INHALATION)
Status: DISCONTINUED | OUTPATIENT
Start: 2023-03-17 | End: 2023-03-19

## 2023-03-16 RX ADMIN — CLONIDINE HYDROCHLORIDE 0.1 MG: 0.1 TABLET ORAL at 09:14

## 2023-03-16 RX ADMIN — Medication 10 ML: at 23:43

## 2023-03-16 RX ADMIN — ATORVASTATIN CALCIUM 40 MG: 20 TABLET, FILM COATED ORAL at 20:23

## 2023-03-16 RX ADMIN — ACETAMINOPHEN 650 MG: 325 TABLET ORAL at 05:08

## 2023-03-16 RX ADMIN — ACETAMINOPHEN 650 MG: 325 TABLET ORAL at 14:15

## 2023-03-16 RX ADMIN — ONDANSETRON 4 MG: 2 INJECTION INTRAMUSCULAR; INTRAVENOUS at 04:56

## 2023-03-16 RX ADMIN — Medication 10 ML: at 05:32

## 2023-03-16 RX ADMIN — ARFORMOTEROL TARTRATE 15 MCG: 15 SOLUTION RESPIRATORY (INHALATION) at 20:39

## 2023-03-16 RX ADMIN — ACETAMINOPHEN 650 MG: 325 TABLET ORAL at 20:52

## 2023-03-16 RX ADMIN — BUDESONIDE 500 MCG: 0.5 INHALANT RESPIRATORY (INHALATION) at 07:11

## 2023-03-16 RX ADMIN — EZETIMIBE 10 MG: 10 TABLET ORAL at 20:52

## 2023-03-16 RX ADMIN — IPRATROPIUM BROMIDE 0.5 MG: 0.5 SOLUTION RESPIRATORY (INHALATION) at 07:00

## 2023-03-16 RX ADMIN — CLONIDINE HYDROCHLORIDE 0.1 MG: 0.1 TABLET ORAL at 20:24

## 2023-03-16 RX ADMIN — AZITHROMYCIN MONOHYDRATE 500 MG: 500 INJECTION, POWDER, LYOPHILIZED, FOR SOLUTION INTRAVENOUS at 20:24

## 2023-03-16 RX ADMIN — CEFTRIAXONE SODIUM 2 G: 2 INJECTION, POWDER, FOR SOLUTION INTRAMUSCULAR; INTRAVENOUS at 20:24

## 2023-03-16 RX ADMIN — BUDESONIDE 500 MCG: 0.5 INHALANT RESPIRATORY (INHALATION) at 20:39

## 2023-03-16 RX ADMIN — DILTIAZEM HYDROCHLORIDE 240 MG: 120 CAPSULE, COATED, EXTENDED RELEASE ORAL at 09:16

## 2023-03-16 RX ADMIN — ENOXAPARIN SODIUM 40 MG: 100 INJECTION SUBCUTANEOUS at 09:17

## 2023-03-16 RX ADMIN — Medication 10 ML: at 14:00

## 2023-03-16 RX ADMIN — HYDROCHLOROTHIAZIDE 25 MG: 25 TABLET ORAL at 09:14

## 2023-03-16 RX ADMIN — PANTOPRAZOLE SODIUM 40 MG: 40 TABLET, DELAYED RELEASE ORAL at 09:15

## 2023-03-16 RX ADMIN — IPRATROPIUM BROMIDE 0.5 MG: 0.5 SOLUTION RESPIRATORY (INHALATION) at 13:35

## 2023-03-16 RX ADMIN — ARFORMOTEROL TARTRATE 15 MCG: 15 SOLUTION RESPIRATORY (INHALATION) at 07:11

## 2023-03-16 RX ADMIN — MONTELUKAST 10 MG: 10 TABLET, FILM COATED ORAL at 09:14

## 2023-03-16 RX ADMIN — VALSARTAN 40 MG: 80 TABLET ORAL at 09:15

## 2023-03-16 RX ADMIN — IPRATROPIUM BROMIDE 0.5 MG: 0.5 SOLUTION RESPIRATORY (INHALATION) at 20:39

## 2023-03-16 RX ADMIN — AZITHROMYCIN DIHYDRATE 500 MG: 500 INJECTION, POWDER, LYOPHILIZED, FOR SOLUTION INTRAVENOUS at 08:29

## 2023-03-16 NOTE — H&P
Hospitalist Admission Note    NAME:  Yamile Ordoñez   :  1945   MRN:  [id number]     Date/Time:  3/15/2023 8:59 PM    Patient PCP: Rhianna Ellison MD  ________________________________________________________________________    Given the patient's current clinical presentation, I have a high level of concern for decompensation if discharged from the emergency department. Complex decision making was performed, which includes reviewing the patient's available past medical records, laboratory results, and x-ray films. My assessment of this patient's clinical condition and my plan of care is as follows. Assessment / Plan:    Vasovagal Syncope: The patient comes in w/ suspected brief syncopal episode which occurred after she got up from the toilet and was walking to her room    VS -   WBC 18.3, Hg 13.4  , Cr 1.6  CXR - L basilar airspace diseae  CT Head - no acute abnormality    Obs patient and cont to monitor  No further workup at this time. 2.  Suspected PNA:    The patient has leukocytosis w/o any recent Prednisone use and has  a small L basilar airspace diseae    Cont w/ PO LVQ x 5 days    3. HTN:    Cont w/ Diltiazem 240mg daily  Cont w/ Clonidine 0.1mg bid  Cont w/ HCTZ 25mg daily    4. HLD:    Cont w/ Lipitor 80mg daily  Cont w/ Zetia 10mg daily    5. DM 2:    ISS w/ BG checks    6. Asthma/COPD:    Cont w/ Duonebs prn  Cont w/ Brovana and Pulmicort  Cont w/ Spiriva    Body mass index is 29.95 kg/m².:  25.0 - 29.9:  Overweight    I have personally reviewed the radiographs, laboratory data in Epic and decisions and statements above are based partially on this personal interpretation.     Code Status: Full Code  Surrogate Decision Maker  Merline Brigham (son)  [phone]    Prophylaxis:  Lovenox SQ     Subjective:   CHIEF COMPLAINT: I passed out    HISTORY OF PRESENT ILLNESS:       The patient is a 70 y/o C F w/ PMH DM 2 who comes in w/ brief syncopal episode today after she got up from the toilet and was going to her bedroom. This syncope was unwitnessed but she did not have any post-ictal confusion. She has no chest pain, palpitations, , wheezing, dyspnea, weight gain or lower extremity edema. She has no fever or night sweats but did report of chills  On ROS she notes of non-productive cough and wheezing. The patient was recently treated for bronchitis. Past Medical History:   Diagnosis Date    Asthma     Diabetes ([address]     type 2    GERD (gastroesophageal reflux disease)     Hypertension     Ill-defined condition     high cholesterol    Ill-defined condition     seasonal allergies    Ill-defined condition     gout      Past Surgical History:   Procedure Laterality Date    HX APPENDECTOMY      HX HEENT      cataract     HX HYSTERECTOMY      HX LAP CHOLECYSTECTOMY      HX OOPHORECTOMY      benign ovarian tumor    HX OTHER SURGICAL      bladder tack    HX ROTATOR CUFF REPAIR  2015    right    HX TONSILLECTOMY  1964     Social History     Tobacco Use    Smoking status: Former     Packs/day: 0.25     Types: Cigarettes     Quit date:      Years since quittin.2    Smokeless tobacco: Never    Tobacco comments:     smoked x 1 year   Substance Use Topics    Alcohol use: No      Family History   Problem Relation Age of Onset    Cancer Mother         uterine    Heart Disease Mother         afib    Heart Disease Father 48        MI    Hypertension Father         Allergies   Allergen Reactions    Aspirin Cough    Beta Blocker [Beta-Blockers (Beta-Adrenergic Blocking Agts)] Other (comments)     Was told by her PCP to report that she has a mutation- she does not recall a reaction    Codeine Rash        Prior to Admission medications    Medication Sig Start Date End Date Taking? Authorizing Provider   omeprazole (PriLOSEC OTC) 20 mg tablet Take 20 mg by mouth two (2) times a day.     Provider, Historical   levocetirizine dihydrochloride (XYZAL PO) Take 90 mg by mouth daily. Provider, Historical   dilTIAZem ER (CARDIZEM CD) 240 mg capsule Take 240 mg by mouth daily. Provider, Historical   azilsartan medoxomiL (EDARBI) 80 mg tab tablet Take 40 mg by mouth daily. Provider, Historical   cloNIDine HCL (CATAPRES) 0.1 mg tablet Take 0.1 mg by mouth two (2) times a day. Provider, Historical   hydroCHLOROthiazide (HYDRODIURIL) 25 mg tablet Take 25 mg by mouth daily. Provider, Historical   evolocumab (Repatha SureClick) pen injection 589 mg by SubCUTAneous route every fourteen (14) days. Provider, Historical   folic acid/multivit-min/lutein (CENTRUM SILVER PO) Take 1 Tablet by mouth daily. Provider, Historical   glucosamine HCl/chondroitin rico (GLUCOSAMINE-CHONDROITIN PO) Take 1 Capsule by mouth two (2) times a day. 1500/1000    Provider, Historical   MAGNESIUM CHLORIDE PO Take 225 mg by mouth two (2) times a day. Provider, Historical   tiotropium bromide (SPIRIVA RESPIMAT) 1.25 mcg/actuation inhaler Take 2 Puffs by inhalation daily. Provider, Historical   fluticasone propion-salmeteroL (Wixela Inhub) 500-50 mcg/dose diskus inhaler Take 2 Puffs by inhalation every twelve (12) hours. Provider, Historical   melatonin 3 mg cap capsule Take 6 mg by mouth nightly. Provider, Historical   Pzlmtxiq-Wmyp-Qlauzb-Hyalur Ac 386-190-99-2 mg cap Take  by mouth two (2) times a day. Provider, Historical   therapeutic multivitamin (THERAGRAN) tablet Take 1 Tab by mouth daily. Provider, Historical   albuterol sulfate (PROVENTIL;VENTOLIN) 2.5 mg/0.5 mL nebu nebulizer solution by Nebulization route every four (4) hours as needed for Wheezing. Provider, Historical   sitaGLIPtin-metFORMIN (JANUMET)  mg per tablet Take 1 Tab by mouth daily. Provider, Historical   fluticasone (FLONASE) 50 mcg/actuation nasal spray 2 Sprays by Both Nostrils route daily. Provider, Historical   ezetimibe (ZETIA) 10 mg tablet Take 10 mg by mouth every evening.     Provider, Historical   atorvastatin (LIPITOR) 80 mg tablet Take 40 mg by mouth every evening. Provider, Historical   montelukast (SINGULAIR) 10 mg tablet Take 10 mg by mouth daily.     Provider, Historical       REVIEW OF SYSTEMS:  See HPI for details  General: negative for fever, +chills, sweats, weakness, weight loss  Eyes: negative for blurred vision, eye pain, loss of vision, diplopia  Ear Nose and Throat: negative for rhinorrhea, pharyngitis, otalgia, tinnitus, speech or swallowing difficulties  Respiratory:  negative for pleuritic pain, +cough w/o sputum production, +wheezing, SOB, CHURCH  Cardiology:  negative for chest pain, palpitations, orthopnea, PND, edema, syncope   Gastrointestinal: negative for abdominal pain, N/V, dysphagia, change in bowel habits, bleeding  Genitourinary: negative for frequency, urgency, dysuria, hematuria, incontinence  Muskuloskeletal : negative for arthralgia, myalgia  Hematology: negative for easy bruising, bleeding, lymphadenopathy  Dermatological: negative for rash, ulceration, mole change, new lesion  Endocrine: negative for hot flashes or polydipsia  Neurological: negative for headache, dizziness, confusion, focal weakness, paresthesia, memory loss, gait disturbance  Psychological: negative for anxiety, depression, agitation    Objective:   VITALS:    Visit Vitals  BP (!) 123/35   Pulse (!) 106   Temp 98.9 °F (37.2 °C)   Resp 26   Ht 5' 5\" (1.651 m)   Wt 81.6 kg (180 lb)   SpO2 93%   BMI 29.95 kg/m²     PHYSICAL EXAM:    Physical Exam:    Gen: Well-developed, well-nourished, in no acute distress  HEENT:  Pink conjunctivae, PERRL, hearing intact to voice, moist mucous membranes  Neck: Supple, without masses, thyroid non-tender  Resp: No accessory muscle use, clear breath sounds without wheezes rales or rhonchi  Card: No murmurs, normal S1, S2 without thrills, bruits or peripheral edema  Abd:  Soft, non-tender, non-distended, normoactive bowel sounds are present, no palpable organomegaly and no detectable hernias  Lymph:  No cervical or inguinal adenopathy  Musc: No cyanosis or clubbing  Skin: No rashes or ulcers, skin turgor is good  Neuro:  Cranial nerves are grossly intact, no focal motor weakness, follows commands appropriately  Psych:  Good insight, oriented to person, place and time, alert  _______________________________________________________________________  Care Plan discussed with:  Pt's condition, Imaging findings, Lab findings, Assessment, and Care Plan discussed with: Patient  _______________________________________________________________________    Probable disposition:  Home  ________________________________________________________________________    Comments   >50% of visit spent in counseling and coordination of care  Chart reviewed  Discussion with patient and/or family and questions answered     ________________________________________________________________________  Signed: Chandra Closs, MD        Procedures: see electronic medical records for all procedures/Xrays and details which were not copied into this note but were reviewed prior to creation of Plan. LAB DATA REVIEWED:    Recent Results (from the past 24 hour(s))   CBC WITH AUTOMATED DIFF    Collection Time: 03/15/23  4:47 PM   Result Value Ref Range    WBC 18.3 (H) 3.6 - 11.0 K/uL    RBC 4.13 3.80 - 5.20 M/uL    HGB 13.4 11.5 - 16.0 g/dL    HCT 40.5 35.0 - 47.0 %    MCV 98.1 80.0 - 99.0 FL    MCH 32.4 26.0 - 34.0 PG    MCHC 33.1 30.0 - 36.5 g/dL    RDW 14.3 11.5 - 14.5 %    PLATELET 091 297 - 862 K/uL    MPV 8.7 (L) 8.9 - 12.9 FL    NRBC 0.0 0  WBC    ABSOLUTE NRBC 0.00 0.00 - 0.01 K/uL    NEUTROPHILS 86 (H) 32 - 75 %    LYMPHOCYTES 9 (L) 12 - 49 %    MONOCYTES 4 (L) 5 - 13 %    EOSINOPHILS 0 0 - 7 %    BASOPHILS 0 0 - 1 %    IMMATURE GRANULOCYTES 1 (H) 0.0 - 0.5 %    ABS. NEUTROPHILS 15.7 (H) 1.8 - 8.0 K/UL    ABS. LYMPHOCYTES 1.6 0.8 - 3.5 K/UL    ABS. MONOCYTES 0.7 0.0 - 1.0 K/UL    ABS.  EOSINOPHILS 0.0 0.0 - 0.4 K/UL    ABS. BASOPHILS 0.0 0.0 - 0.1 K/UL    ABS. IMM. GRANS. 0.1 (H) 0.00 - 0.04 K/UL    DF AUTOMATED     METABOLIC PANEL, COMPREHENSIVE    Collection Time: 03/15/23  4:47 PM   Result Value Ref Range    Sodium 139 136 - 145 mmol/L    Potassium 4.2 3.5 - 5.1 mmol/L    Chloride 108 97 - 108 mmol/L    CO2 24 21 - 32 mmol/L    Anion gap 7 5 - 15 mmol/L    Glucose 70 65 - 100 mg/dL    BUN 23 (H) 6 - 20 MG/DL    Creatinine 1.60 (H) 0.55 - 1.02 MG/DL    BUN/Creatinine ratio 14 12 - 20      eGFR 33 (L) >60 ml/min/1.73m2    Calcium 9.1 8.5 - 10.1 MG/DL    Bilirubin, total 0.9 0.2 - 1.0 MG/DL    ALT (SGPT) 27 12 - 78 U/L    AST (SGOT) 25 15 - 37 U/L    Alk. phosphatase 86 45 - 117 U/L    Protein, total 6.5 6.4 - 8.2 g/dL    Albumin 3.0 (L) 3.5 - 5.0 g/dL    Globulin 3.5 2.0 - 4.0 g/dL    A-G Ratio 0.9 (L) 1.1 - 2.2     TROPONIN-HIGH SENSITIVITY    Collection Time: 03/15/23  4:47 PM   Result Value Ref Range    Troponin-High Sensitivity 12 0 - 51 ng/L   SAMPLES BEING HELD    Collection Time: 03/15/23  4:47 PM   Result Value Ref Range    SAMPLES BEING HELD 1RED,1BLUE     COMMENT        Add-on orders for these samples will be processed based on acceptable specimen integrity and analyte stability, which may vary by analyte.    INFLUENZA A+B VIRAL AGS    Collection Time: 03/15/23  5:39 PM   Result Value Ref Range    Influenza A Antigen Negative NEG      Influenza B Antigen Negative NEG     COVID-19 RAPID TEST    Collection Time: 03/15/23  5:39 PM   Result Value Ref Range    Specimen source Nasopharyngeal      COVID-19 rapid test Not detected NOTD     LACTIC ACID    Collection Time: 03/15/23  5:39 PM   Result Value Ref Range    Lactic acid 1.2 0.4 - 2.0 MMOL/L

## 2023-03-16 NOTE — ED NOTES
Pt up to bedside commode, gripper socks provided, wound care provided to left elbow-- spray cleanser/non-adherent dressing and bulky debbie applied, call bell within reach

## 2023-03-16 NOTE — PROGRESS NOTES
Medicare Outpatient Observation Notice (MOON)/ [place] Outpatient Observation Notice ([place]) provided to patient/representative with verbal explanation of the notice. Time allotted for questions regarding the notice. Patient /representative provided a completed copy of the MOON/VOON notice. Copy placed on bedside chart.   William Elliott CMS

## 2023-03-16 NOTE — PROGRESS NOTES
1915-Bedside and Verbal shift change report given to [facility], RN (oncoming nurse) by Trey RN (offgoing nurse). Report included the following information SBAR, Kardex, and ED Summary. 0- Introduced self as primary RN. Pt resting in bed with family bedside. Call bell and belongings within reach. 1950- Bedside and Verbal shift change report given to Lisa Gama (oncoming nurse) by [facility], RN (offgoing nurse). Report included the following information SBAR, Kardex, and ED Summary.

## 2023-03-16 NOTE — PROGRESS NOTES
Hospitalist Progress Note  Devang Castro MD  Answering service: [phone] from in house phone        Date of Service:  3/16/2023  NAME:  Berhane Daniel  :  1945  MRN:  [id number]      Admission Summary/HPI:   The patient is a 70 y/o C F w/ PMH DM 2 who comes in w/ brief syncopal episode today after she got up from the toilet and was going to her bedroom. This syncope was unwitnessed but she did not have any post-ictal confusion. She has no chest pain, palpitations, , wheezing, dyspnea, weight gain or lower extremity edema. She has no fever or night sweats but did report of chills  On ROS she notes of non-productive cough and wheezing. The patient was recently treated for bronchitis. Interval history / Subjective:   Patient is a 76-year-old woman admitted for vasovagal syncope thought to be due to to toileting. However, overnight her vitals were significant for tachycardia, tachypnea, fever, and worsening leukocytosis. She received p.o. Levaquin and IV fluids, but with tachycardia greater than 100, to take tachypnea greater than 20, fever greater than 100.4, and leukocytosis of 21,000 with a left basilar airspace infiltrate I am concerned for sepsis due to a left lower lobe pneumonia. I have placed patient on IV antibiotics but will be judicious with fluids due to history and currently stable blood pressure. Patient states she only feels mildly better than when she first arrived. She is fatigued, dyspneic, has wheezing, and states she feels generally poorly. Assessment & Plan:        Sepsis 2/2 left lower lobe community-acquired pneumonia, mild  Left lower lobe community-acquired pneumonia  --Exacerbated by asthma  --Worsen despite p.o.  Levaquin  --Start ceftriaxone and azithromycin  --Judicious IV fluids  --Cultures pending  --Trend CBC  --Nebulizers as needed  --No wheezing on exam, will start steroids if needed    History of asthma  --Inhalers at home  --Sees allergy  --DuoNebs and treatment of above  --Cont w/ Duonebs prn  --Cont w/ Brovana and Pulmicort  --Cont w/ Spiriva    Syncope  --Likely due to tachycardia in the setting of sepsis due to pneumonia  --Continuous telemetry  --CT head negative  --Echo pending  --No prodrome  --Orthostatics once improved from pneumonia perspective    Hypertension, POA  --Currently normotensive  --Cont w/ Diltiazem 240mg daily  --Cont w/ Clonidine 0.1mg bid  --Cont w/ HCTZ 25mg daily  --Vital signs as per unit routine    Hyperlipidemia, POA  --Check lipid panel  --Continue statin    Diabetes with hyperglycemia, POA  --Hold home meds  --Start SSI  --Long acting as needed  --diabetes nurse consult     I have independently reviewed and interpreted patient's lab and other diagnostic data. External notes were reviewed  Critical Care Time: Not applicable    Code status: Full  Prophylaxis: Heparin, SCD  Care Plan discussed with: Patient, RN, Multidisciplinary Rounds  Anticipated Disposition:      Hospital Problems  Never Reviewed            Codes Class Noted POA    Sepsis ([facility] [address] ICD-10-CM: A41.9  ICD-9-CM: 038.9, 995.91  3/16/2023 Unknown        Vasovagal syncope ICD-10-CM: R55  ICD-9-CM: 780.2  3/15/2023 Unknown               Review of Systems:   A comprehensive review of systems was negative except for that written in the HPI. Vital Signs:    Last 24hrs VS reviewed since prior progress note.  Most recent are:    Patient Vitals for the past 24 hrs:   Temp Pulse Resp BP SpO2   03/16/23 1602 99.7 °F (37.6 °C) 81 28 (!) 120/50 94 %   03/16/23 1500 -- 90 -- -- --   03/16/23 1456 (!) 101.7 °F (38.7 °C) 87 20 122/60 96 %   03/16/23 1407 (!) 102.1 °F (38.9 °C) 90 22 (!) 122/40 96 %   03/16/23 0913 99.1 °F (37.3 °C) (!) 101 24 (!) 145/42 96 %   03/16/23 0707 100 °F (37.8 °C) 100 18 (!) 112/48 94 %   03/16/23 0701 -- -- -- -- 95 %   03/16/23 0700 -- (!) 104 -- -- --   03/16/23 0634 (!) 101.2 °F (38.4 °C) -- -- -- --   03/16/23 0602 -- (!) 106 25 (!) 144/41 93 %   03/16/23 0502 (!) 101.7 °F (38.7 °C) (!) 113 18 (!) 154/57 99 %   03/16/23 0452 -- 82 20 -- 96 %   03/16/23 0329 -- 99 25 -- 97 %   03/16/23 0314 -- 98 24 -- 96 %   03/16/23 0259 -- 94 23 (!) 143/41 98 %   03/16/23 0242 -- 91 25 -- 98 %   03/16/23 0227 -- 88 26 -- 97 %   03/16/23 0212 -- 92 21 -- 98 %   03/16/23 0157 -- 92 25 (!) 130/37 97 %   03/16/23 0014 -- 97 8 -- 91 %   03/15/23 2359 100.3 °F (37.9 °C) 98 18 (!) 119/26 93 %   03/15/23 2344 -- (!) 110 18 -- 93 %   03/15/23 2329 -- (!) 101 21 -- 93 %   03/15/23 2314 -- (!) 103 20 -- 94 %   03/15/23 2259 -- (!) 102 25 (!) 106/35 93 %   03/15/23 2256 -- (!) 104 -- -- --   03/15/23 2212 (!) 102.4 °F (39.1 °C) -- -- -- --   03/15/23 2159 -- (!) 101 25 (!) 116/46 94 %   03/15/23 2144 -- (!) 102 12 (!) 117/29 93 %   03/15/23 2129 -- -- -- -- 96 %   03/15/23 2114 -- (!) 105 13 (!) 115/46 92 %   03/15/23 2059 -- (!) 107 30 (!) 125/38 93 %   03/15/23 2044 -- (!) 103 29 (!) 110/27 94 %   03/15/23 2029 -- (!) 109 21 (!) 134/35 95 %   03/15/23 2014 -- (!) 107 18 (!) 138/38 96 %   03/15/23 1959 -- (!) 107 22 (!) 114/39 94 %   03/15/23 1944 -- (!) 107 16 (!) 121/28 93 %   03/15/23 1935 -- (!) 106 26 (!) 123/35 93 %   03/15/23 1920 -- (!) 107 30 (!) 128/36 96 %   03/15/23 1905 -- (!) 106 24 (!) 120/37 95 %   03/15/23 1850 -- (!) 107 27 (!) 143/46 97 %   03/15/23 1835 -- (!) 106 21 (!) 144/36 96 %   03/15/23 1829 98.9 °F (37.2 °C) (!) 105 22 (!) 126/39 96 %   03/15/23 1825 -- (!) 107 25 -- 95 %   03/15/23 1748 -- (!) 108 21 (!) 127/38 95 %        No intake or output data in the 24 hours ending 03/16/23 1703       Physical Examination:     I had a face to face encounter with this patient and independently examined them on 3/16/2023 as outlined below:          General : alert x 3, awake, no acute distress,   HEENT: PEERL, EOMI, moist mucus membrane, TM clear  Neck: supple, no JVD, no meningeal signs  Chest: Clear to auscultation bilaterally   CVS: S1 S2 heard, Capillary refill less than 2 seconds  Abd: soft/ non tender, non distended, BS physiological,   Ext: no clubbing, no cyanosis, no edema, brisk 2+ DP pulses  Neuro/Psych: pleasant mood and affect, CN 2-12 grossly intact, sensory grossly within normal limit, Strength 5/5 in all extremities, DTR 1+ x 4  Skin: warm            Data Review:    Review and/or order of clinical lab test  Review and/or order of tests in the radiology section of CPT  Review and/or order of tests in the medicine section of CPT    I have independently reviewed and interpreted patient's lab and all other diagnostic data    Notes reviewed from all clinical/nonclinical/nursing services involved in patient's clinical care. Care coordination discussions were held with appropriate clinical/nonclinical/ nursing providers based on care coordination needs. CT HEAD WO CONT    Result Date: 3/15/2023  No acute intracranial abnormalities. XR CHEST PORT    Result Date: 3/15/2023  Left basilar airspace disease. Labs:     Recent Labs     03/16/23  0516 03/15/23  1647   WBC 20.9* 18.3*   HGB 13.4 13.4   HCT 41.4 40.5    222     Recent Labs     03/15/23  1647      K 4.2      CO2 24   BUN 23*   CREA 1.60*   GLU 70   CA 9.1     Recent Labs     03/15/23  1647   ALT 27   AP 86   TBILI 0.9   TP 6.5   ALB 3.0*   GLOB 3.5     No results for input(s): INR, PTP, APTT, INREXT in the last 72 hours. No results for input(s): FE, TIBC, PSAT, FERR in the last 72 hours. No results found for: FOL, RBCF   No results for input(s): PH, PCO2, PO2 in the last 72 hours. No results for input(s): CPK, CKNDX, TROIQ in the last 72 hours.     No lab exists for component: CPKMB  No results found for: CHOL, CHOLX, CHLST, CHOLV, HDL, HDLP, LDL, LDLC, DLDLP, TGLX, TRIGL, TRIGP, CHHD, CHHDX  Lab Results   Component Value Date/Time    Glucose (POC) 90 03/16/2023 11:53 AM Glucose (POC) 67 03/16/2023 07:24 AM    Glucose (POC) 77 03/15/2023 10:07 PM    Glucose (POC) 100 03/23/2022 10:12 AM    Glucose (POC) 122 (H) 05/19/2015 10:40 AM     Lab Results   Component Value Date/Time    Color YELLOW/STRAW 07/19/2016 12:08 PM    Appearance CLEAR 07/19/2016 12:08 PM    Specific gravity 1.020 07/19/2016 12:08 PM    pH (UA) 5.5 07/19/2016 12:08 PM    Protein NEGATIVE 07/19/2016 12:08 PM    Glucose NEGATIVE 07/19/2016 12:08 PM    Ketone NEGATIVE 07/19/2016 12:08 PM    Bilirubin NEGATIVE 07/19/2016 12:08 PM    Urobilinogen 0.2 07/19/2016 12:08 PM    Nitrites NEGATIVE 07/19/2016 12:08 PM    Leukocyte Esterase NEGATIVE 07/19/2016 12:08 PM    Epithelial cells FEW 07/19/2016 12:08 PM    Bacteria NEGATIVE 07/19/2016 12:08 PM    WBC 0-4 07/19/2016 12:08 PM    RBC 5-10 07/19/2016 12:08 PM         Medications Reviewed:     Current Facility-Administered Medications   Medication Dose Route Frequency    cefTRIAXone (ROCEPHIN) 2 g in 0.9% sodium chloride 20 mL IV syringe  2 g IntraVENous Q24H    azithromycin (ZITHROMAX) 500 mg in 0.9% sodium chloride 250 mL ([id number])  500 mg IntraVENous Q24H    atorvastatin (LIPITOR) tablet 40 mg  40 mg Oral QPM    valsartan (DIOVAN) tablet 40 mg  40 mg Oral DAILY    cloNIDine HCL (CATAPRES) tablet 0.1 mg  0.1 mg Oral BID    dilTIAZem ER (CARDIZEM CD) capsule 240 mg  240 mg Oral DAILY    ezetimibe (ZETIA) tablet 10 mg  10 mg Oral QPM    arformoteroL (BROVANA) neb solution 15 mcg  15 mcg Nebulization BID RT    And    budesonide (PULMICORT) 500 mcg/2 ml nebulizer suspension  500 mcg Nebulization BID RT    hydroCHLOROthiazide (HYDRODIURIL) tablet 25 mg  25 mg Oral DAILY    melatonin tablet 3 mg  3 mg Oral QHS PRN    montelukast (SINGULAIR) tablet 10 mg  10 mg Oral DAILY    pantoprazole (PROTONIX) tablet 40 mg  40 mg Oral ACB    albuterol-ipratropium (DUO-NEB) 2.5 MG-0.5 MG/3 ML  3 mL Nebulization Q4H PRN    insulin lispro (HUMALOG) injection   SubCUTAneous AC&HS    glucose chewable tablet 16 g  4 Tablet Oral PRN    glucagon (GLUCAGEN) injection 1 mg  1 mg IntraMUSCular PRN    dextrose 10% infusion 0-250 mL  0-250 mL IntraVENous PRN    hydrALAZINE (APRESOLINE) 20 mg/mL injection 20 mg  20 mg IntraVENous Q4H PRN    sodium chloride (NS) flush 5-40 mL  5-40 mL IntraVENous Q8H    sodium chloride (NS) flush 5-40 mL  5-40 mL IntraVENous PRN    acetaminophen (TYLENOL) tablet 650 mg  650 mg Oral Q6H PRN    Or    acetaminophen (TYLENOL) suppository 650 mg  650 mg Rectal Q6H PRN    polyethylene glycol (MIRALAX) packet 17 g  17 g Oral DAILY PRN    ondansetron (ZOFRAN ODT) tablet 4 mg  4 mg Oral Q8H PRN    Or    ondansetron (ZOFRAN) injection 4 mg  4 mg IntraVENous Q6H PRN    enoxaparin (LOVENOX) injection 40 mg  40 mg SubCUTAneous DAILY    ipratropium (ATROVENT) 0.02 % nebulizer solution 0.5 mg  0.5 mg Nebulization Q6H RT     Current Outpatient Medications   Medication Sig    omeprazole (PriLOSEC OTC) 20 mg tablet Take 20 mg by mouth two (2) times a day. levocetirizine dihydrochloride (XYZAL PO) Take 90 mg by mouth daily. dilTIAZem ER (CARDIZEM CD) 240 mg capsule Take 240 mg by mouth daily. azilsartan medoxomiL (EDARBI) 80 mg tab tablet Take 40 mg by mouth daily. cloNIDine HCL (CATAPRES) 0.1 mg tablet Take 0.1 mg by mouth two (2) times a day. hydroCHLOROthiazide (HYDRODIURIL) 25 mg tablet Take 25 mg by mouth daily. evolocumab (Repatha SureClick) pen injection 828 mg by SubCUTAneous route every fourteen (14) days. folic acid/multivit-min/lutein (CENTRUM SILVER PO) Take 1 Tablet by mouth daily. glucosamine HCl/chondroitin rico (GLUCOSAMINE-CHONDROITIN PO) Take 1 Capsule by mouth two (2) times a day. 1500/1000    MAGNESIUM CHLORIDE PO Take 225 mg by mouth two (2) times a day. tiotropium bromide (SPIRIVA RESPIMAT) 1.25 mcg/actuation inhaler Take 2 Puffs by inhalation daily.     fluticasone propion-salmeteroL (Wixela Inhub) 500-50 mcg/dose diskus inhaler Take 2 Puffs by inhalation every twelve (12) hours. melatonin 3 mg cap capsule Take 6 mg by mouth nightly. Hdqifrup-Cede-Omsjvw-Hyalur Ac [id number] mg cap Take  by mouth two (2) times a day. therapeutic multivitamin (THERAGRAN) tablet Take 1 Tab by mouth daily. albuterol sulfate (PROVENTIL;VENTOLIN) 2.5 mg/0.5 mL nebu nebulizer solution by Nebulization route every four (4) hours as needed for Wheezing. sitaGLIPtin-metFORMIN (JANUMET)  mg per tablet Take 1 Tab by mouth daily. fluticasone (FLONASE) 50 mcg/actuation nasal spray 2 Sprays by Both Nostrils route daily. ezetimibe (ZETIA) 10 mg tablet Take 10 mg by mouth every evening. atorvastatin (LIPITOR) 80 mg tablet Take 40 mg by mouth every evening.    montelukast (SINGULAIR) 10 mg tablet Take 10 mg by mouth daily.      ______________________________________________________________________  EXPECTED LENGTH OF STAY: - - -  ACTUAL LENGTH OF STAY:          0                 Manuela Barrera MD

## 2023-03-17 ENCOUNTER — APPOINTMENT (OUTPATIENT)
Dept: NON INVASIVE DIAGNOSTICS | Age: 78
DRG: 177 | End: 2023-03-17
Attending: INTERNAL MEDICINE
Payer: MEDICARE

## 2023-03-17 LAB
ACC. NO. FROM MICRO ORDER, ACCP: ABNORMAL
ACINETOBACTER CALCOACETICUS-BAUMANII COMPLEX, ACBCX: NOT DETECTED
ALBUMIN SERPL-MCNC: 2.2 G/DL (ref 3.5–5)
ALBUMIN/GLOB SERPL: 0.7 (ref 1.1–2.2)
ALP SERPL-CCNC: 84 U/L (ref 45–117)
ALT SERPL-CCNC: 21 U/L (ref 12–78)
ANION GAP SERPL CALC-SCNC: 5 MMOL/L (ref 5–15)
AST SERPL-CCNC: 26 U/L (ref 15–37)
B FRAGILIS DNA BLD POS QL NAA+NON-PROBE: NOT DETECTED
BASOPHILS # BLD: 0 K/UL (ref 0–0.1)
BASOPHILS NFR BLD: 0 % (ref 0–1)
BILIRUB SERPL-MCNC: 0.3 MG/DL (ref 0.2–1)
BIOFIRE COMMENT, BCIDPF: ABNORMAL
BUN SERPL-MCNC: 25 MG/DL (ref 6–20)
BUN/CREAT SERPL: 20 (ref 12–20)
C ALBICANS DNA BLD POS QL NAA+NON-PROBE: NOT DETECTED
C AURIS DNA BLD POS QL NAA+NON-PROBE: NOT DETECTED
C GATTII+NEOFOR DNA BLD POS QL NAA+N-PRB: NOT DETECTED
C GLABRATA DNA BLD POS QL NAA+NON-PROBE: NOT DETECTED
C KRUSEI DNA BLD POS QL NAA+NON-PROBE: NOT DETECTED
C PARAP DNA BLD POS QL NAA+NON-PROBE: NOT DETECTED
C TROPICLS DNA BLD POS QL NAA+NON-PROBE: NOT DETECTED
CALCIUM SERPL-MCNC: 8.5 MG/DL (ref 8.5–10.1)
CHLORIDE SERPL-SCNC: 109 MMOL/L (ref 97–108)
CO2 SERPL-SCNC: 22 MMOL/L (ref 21–32)
CREAT SERPL-MCNC: 1.27 MG/DL (ref 0.55–1.02)
DIFFERENTIAL METHOD BLD: ABNORMAL
E CLOAC COMP DNA BLD POS NAA+NON-PROBE: NOT DETECTED
E COLI DNA BLD POS QL NAA+NON-PROBE: NOT DETECTED
E FAECALIS DNA BLD POS QL NAA+NON-PROBE: NOT DETECTED
E FAECIUM DNA BLD POS QL NAA+NON-PROBE: NOT DETECTED
ECHO AV AREA PEAK VELOCITY: 2 CM2
ECHO AV AREA VTI: 2.1 CM2
ECHO AV AREA/BSA PEAK VELOCITY: 1.1 CM2/M2
ECHO AV AREA/BSA VTI: 1.1 CM2/M2
ECHO AV MEAN GRADIENT: 8 MMHG
ECHO AV MEAN VELOCITY: 1.4 M/S
ECHO AV PEAK GRADIENT: 15 MMHG
ECHO AV PEAK VELOCITY: 1.9 M/S
ECHO AV VELOCITY RATIO: 0.68
ECHO AV VTI: 35.8 CM
ECHO LA DIAMETER INDEX: 2.12 CM/M2
ECHO LA DIAMETER: 4 CM
ECHO LA VOL 2C: 111 ML (ref 22–52)
ECHO LA VOL 4C: 65 ML (ref 22–52)
ECHO LA VOL BP: 78 ML (ref 22–52)
ECHO LA VOL/BSA BIPLANE: 41 ML/M2 (ref 16–34)
ECHO LA VOLUME AREA LENGTH: 92 ML
ECHO LA VOLUME INDEX A2C: 59 ML/M2 (ref 16–34)
ECHO LA VOLUME INDEX A4C: 34 ML/M2 (ref 16–34)
ECHO LA VOLUME INDEX AREA LENGTH: 49 ML/M2 (ref 16–34)
ECHO LV E' LATERAL VELOCITY: 8 CM/S
ECHO LV E' SEPTAL VELOCITY: 8 CM/S
ECHO LV EDV A2C: 136 ML
ECHO LV EDV A4C: 117 ML
ECHO LV EDV BP: 132 ML (ref 56–104)
ECHO LV EDV INDEX A4C: 62 ML/M2
ECHO LV EDV INDEX BP: 70 ML/M2
ECHO LV EDV NDEX A2C: 72 ML/M2
ECHO LV EJECTION FRACTION A2C: 53 %
ECHO LV EJECTION FRACTION A4C: 54 %
ECHO LV EJECTION FRACTION BIPLANE: 55 % (ref 55–100)
ECHO LV ESV A2C: 65 ML
ECHO LV ESV A4C: 53 ML
ECHO LV ESV BP: 59 ML (ref 19–49)
ECHO LV ESV INDEX A2C: 34 ML/M2
ECHO LV ESV INDEX A4C: 28 ML/M2
ECHO LV ESV INDEX BP: 31 ML/M2
ECHO LV FRACTIONAL SHORTENING: 33 % (ref 28–44)
ECHO LV INTERNAL DIMENSION DIASTOLE INDEX: 3.02 CM/M2
ECHO LV INTERNAL DIMENSION DIASTOLIC: 5.7 CM (ref 3.9–5.3)
ECHO LV INTERNAL DIMENSION SYSTOLIC INDEX: 2.01 CM/M2
ECHO LV INTERNAL DIMENSION SYSTOLIC: 3.8 CM
ECHO LV IVSD: 0.8 CM (ref 0.6–0.9)
ECHO LV MASS 2D: 183.7 G (ref 67–162)
ECHO LV MASS INDEX 2D: 97.2 G/M2 (ref 43–95)
ECHO LV POSTERIOR WALL DIASTOLIC: 0.9 CM (ref 0.6–0.9)
ECHO LV RELATIVE WALL THICKNESS RATIO: 0.32
ECHO LVOT AREA: 3.1 CM2
ECHO LVOT AV VTI INDEX: 0.69
ECHO LVOT DIAM: 2 CM
ECHO LVOT MEAN GRADIENT: 3 MMHG
ECHO LVOT PEAK GRADIENT: 6 MMHG
ECHO LVOT PEAK VELOCITY: 1.3 M/S
ECHO LVOT STROKE VOLUME INDEX: 41 ML/M2
ECHO LVOT SV: 77.6 ML
ECHO LVOT VTI: 24.7 CM
ECHO MV A VELOCITY: 1.31 M/S
ECHO MV E DECELERATION TIME (DT): 168.6 MS
ECHO MV E VELOCITY: 1.03 M/S
ECHO MV E/A RATIO: 0.79
ECHO MV E/E' LATERAL: 12.88
ECHO MV E/E' RATIO (AVERAGED): 12.88
ECHO MV E/E' SEPTAL: 12.88
ECHO PV MAX VELOCITY: 1.2 M/S
ECHO PV PEAK GRADIENT: 6 MMHG
ECHO RV INTERNAL DIMENSION: 3.6 CM
ECHO RV TAPSE: 2.1 CM (ref 1.7–?)
ECHO RVOT MEAN GRADIENT: 2 MMHG
ECHO RVOT PEAK GRADIENT: 3 MMHG
ECHO RVOT PEAK VELOCITY: 0.9 M/S
ECHO RVOT VTI: 17 CM
ECHO TV REGURGITANT MAX VELOCITY: 2.17 M/S
ECHO TV REGURGITANT PEAK GRADIENT: 19 MMHG
ENTEROBACTERALES DNA BLD POS NAA+N-PRB: NOT DETECTED
EOSINOPHIL # BLD: 0.1 K/UL (ref 0–0.4)
EOSINOPHIL NFR BLD: 1 % (ref 0–7)
ERYTHROCYTE [DISTWIDTH] IN BLOOD BY AUTOMATED COUNT: 14.1 % (ref 11.5–14.5)
GLOBULIN SER CALC-MCNC: 3.1 G/DL (ref 2–4)
GLUCOSE BLD STRIP.AUTO-MCNC: 105 MG/DL (ref 65–117)
GLUCOSE BLD STRIP.AUTO-MCNC: 107 MG/DL (ref 65–117)
GLUCOSE BLD STRIP.AUTO-MCNC: 107 MG/DL (ref 65–117)
GLUCOSE BLD STRIP.AUTO-MCNC: 151 MG/DL (ref 65–117)
GLUCOSE SERPL-MCNC: 104 MG/DL (ref 65–100)
GP B STREP DNA BLD POS QL NAA+NON-PROBE: NOT DETECTED
HAEM INFLU DNA BLD POS QL NAA+NON-PROBE: NOT DETECTED
HCT VFR BLD AUTO: 34.4 % (ref 35–47)
HGB BLD-MCNC: 11.2 G/DL (ref 11.5–16)
IMM GRANULOCYTES # BLD AUTO: 0.1 K/UL (ref 0–0.04)
IMM GRANULOCYTES NFR BLD AUTO: 1 % (ref 0–0.5)
K OXYTOCA DNA BLD POS QL NAA+NON-PROBE: NOT DETECTED
KLEBSIELLA SP DNA BLD POS QL NAA+NON-PRB: NOT DETECTED
KLEBSIELLA SP DNA BLD POS QL NAA+NON-PRB: NOT DETECTED
L MONOCYTOG DNA BLD POS QL NAA+NON-PROBE: NOT DETECTED
LYMPHOCYTES # BLD: 0.9 K/UL (ref 0.8–3.5)
LYMPHOCYTES NFR BLD: 7 % (ref 12–49)
MCH RBC QN AUTO: 31.8 PG (ref 26–34)
MCHC RBC AUTO-ENTMCNC: 32.6 G/DL (ref 30–36.5)
MCV RBC AUTO: 97.7 FL (ref 80–99)
MONOCYTES # BLD: 0.7 K/UL (ref 0–1)
MONOCYTES NFR BLD: 6 % (ref 5–13)
N MEN DNA BLD POS QL NAA+NON-PROBE: NOT DETECTED
NEUTS SEG # BLD: 11 K/UL (ref 1.8–8)
NEUTS SEG NFR BLD: 85 % (ref 32–75)
NRBC # BLD: 0 K/UL (ref 0–0.01)
NRBC BLD-RTO: 0 PER 100 WBC
P AERUGINOSA DNA BLD POS NAA+NON-PROBE: NOT DETECTED
PLATELET # BLD AUTO: 153 K/UL (ref 150–400)
PMV BLD AUTO: 9.2 FL (ref 8.9–12.9)
POTASSIUM SERPL-SCNC: 4 MMOL/L (ref 3.5–5.1)
PROT SERPL-MCNC: 5.3 G/DL (ref 6.4–8.2)
PROTEUS SP DNA BLD POS QL NAA+NON-PROBE: NOT DETECTED
RBC # BLD AUTO: 3.52 M/UL (ref 3.8–5.2)
RESISTANT GENE SPACE, REGENE: ABNORMAL
S AUREUS DNA BLD POS QL NAA+NON-PROBE: NOT DETECTED
S AUREUS+CONS DNA BLD POS NAA+NON-PROBE: DETECTED
S EPIDERMIDIS DNA BLD POS QL NAA+NON-PRB: NOT DETECTED
S LUGDUNENSIS DNA BLD POS QL NAA+NON-PRB: NOT DETECTED
S MALTOPHILIA DNA BLD POS QL NAA+NON-PRB: NOT DETECTED
S MARCESCENS DNA BLD POS NAA+NON-PROBE: NOT DETECTED
S PNEUM DNA BLD POS QL NAA+NON-PROBE: NOT DETECTED
S PYO DNA BLD POS QL NAA+NON-PROBE: NOT DETECTED
SALMONELLA DNA BLD POS QL NAA+NON-PROBE: NOT DETECTED
SERVICE CMNT-IMP: ABNORMAL
SERVICE CMNT-IMP: NORMAL
SODIUM SERPL-SCNC: 136 MMOL/L (ref 136–145)
STREPTOCOCCUS DNA BLD POS NAA+NON-PROBE: NOT DETECTED
WBC # BLD AUTO: 12.9 K/UL (ref 3.6–11)

## 2023-03-17 PROCEDURE — 97161 PT EVAL LOW COMPLEX 20 MIN: CPT

## 2023-03-17 PROCEDURE — 80053 COMPREHEN METABOLIC PANEL: CPT

## 2023-03-17 PROCEDURE — 82962 GLUCOSE BLOOD TEST: CPT

## 2023-03-17 PROCEDURE — 74011250637 HC RX REV CODE- 250/637: Performed by: INTERNAL MEDICINE

## 2023-03-17 PROCEDURE — 93306 TTE W/DOPPLER COMPLETE: CPT | Performed by: STUDENT IN AN ORGANIZED HEALTH CARE EDUCATION/TRAINING PROGRAM

## 2023-03-17 PROCEDURE — 65270000029 HC RM PRIVATE

## 2023-03-17 PROCEDURE — 74011250636 HC RX REV CODE- 250/636: Performed by: NURSE PRACTITIONER

## 2023-03-17 PROCEDURE — 85025 COMPLETE CBC W/AUTO DIFF WBC: CPT

## 2023-03-17 PROCEDURE — 94640 AIRWAY INHALATION TREATMENT: CPT

## 2023-03-17 PROCEDURE — 74011000250 HC RX REV CODE- 250: Performed by: INTERNAL MEDICINE

## 2023-03-17 PROCEDURE — 97116 GAIT TRAINING THERAPY: CPT

## 2023-03-17 PROCEDURE — 74011250636 HC RX REV CODE- 250/636: Performed by: INTERNAL MEDICINE

## 2023-03-17 PROCEDURE — 36415 COLL VENOUS BLD VENIPUNCTURE: CPT

## 2023-03-17 PROCEDURE — 93306 TTE W/DOPPLER COMPLETE: CPT

## 2023-03-17 RX ADMIN — VALSARTAN 40 MG: 80 TABLET ORAL at 09:18

## 2023-03-17 RX ADMIN — ARFORMOTEROL TARTRATE 15 MCG: 15 SOLUTION RESPIRATORY (INHALATION) at 19:43

## 2023-03-17 RX ADMIN — BUDESONIDE 500 MCG: 0.5 INHALANT RESPIRATORY (INHALATION) at 19:43

## 2023-03-17 RX ADMIN — EZETIMIBE 10 MG: 10 TABLET ORAL at 17:39

## 2023-03-17 RX ADMIN — AZITHROMYCIN MONOHYDRATE 500 MG: 500 INJECTION, POWDER, LYOPHILIZED, FOR SOLUTION INTRAVENOUS at 17:41

## 2023-03-17 RX ADMIN — MONTELUKAST 10 MG: 10 TABLET, FILM COATED ORAL at 09:18

## 2023-03-17 RX ADMIN — PANTOPRAZOLE SODIUM 40 MG: 40 TABLET, DELAYED RELEASE ORAL at 07:02

## 2023-03-17 RX ADMIN — ENOXAPARIN SODIUM 40 MG: 100 INJECTION SUBCUTANEOUS at 09:18

## 2023-03-17 RX ADMIN — IPRATROPIUM BROMIDE 0.5 MG: 0.5 SOLUTION RESPIRATORY (INHALATION) at 08:08

## 2023-03-17 RX ADMIN — DILTIAZEM HYDROCHLORIDE 240 MG: 120 CAPSULE, COATED, EXTENDED RELEASE ORAL at 09:18

## 2023-03-17 RX ADMIN — ATORVASTATIN CALCIUM 40 MG: 20 TABLET, FILM COATED ORAL at 17:40

## 2023-03-17 RX ADMIN — BUDESONIDE 500 MCG: 0.5 INHALANT RESPIRATORY (INHALATION) at 08:14

## 2023-03-17 RX ADMIN — Medication 10 ML: at 21:32

## 2023-03-17 RX ADMIN — IPRATROPIUM BROMIDE 0.5 MG: 0.5 SOLUTION RESPIRATORY (INHALATION) at 19:43

## 2023-03-17 RX ADMIN — SODIUM CHLORIDE 500 ML: 9 INJECTION, SOLUTION INTRAVENOUS at 00:28

## 2023-03-17 RX ADMIN — Medication 10 ML: at 07:02

## 2023-03-17 RX ADMIN — ARFORMOTEROL TARTRATE 15 MCG: 15 SOLUTION RESPIRATORY (INHALATION) at 08:14

## 2023-03-17 RX ADMIN — METHYLPREDNISOLONE SODIUM SUCCINATE 40 MG: 40 INJECTION, POWDER, FOR SOLUTION INTRAMUSCULAR; INTRAVENOUS at 18:53

## 2023-03-17 RX ADMIN — CEFTRIAXONE SODIUM 2 G: 2 INJECTION, POWDER, FOR SOLUTION INTRAMUSCULAR; INTRAVENOUS at 17:41

## 2023-03-17 NOTE — PROGRESS NOTES
Hospitalist Progress Note  Josué DO Matthew  Answering service: [phone] from in house phone        Date of Service:  3/17/2023  NAME:  Antonieta Garcia  :  1945  MRN:  [id number]      Admission Summary/HPI:   The patient is a 68 y/o C F w/ PMH DM 2 who comes in w/ brief syncopal episode today after she got up from the toilet and was going to her bedroom. This syncope was unwitnessed but she did not have any post-ictal confusion. She has no chest pain, palpitations, , wheezing, dyspnea, weight gain or lower extremity edema. She has no fever or night sweats but did report of chills  On ROS she notes of non-productive cough and wheezing. The patient was recently treated for bronchitis. Interval history / Subjective: Fu pna, syncope. Has some dyspnea, f/c improving, feels fatigue. Assessment & Plan:        Sepsis 2/2 left lower lobe community-acquired pneumonia, mild  Left lower lobe community-acquired pneumonia  worsened despite p.o.  Levaquin; continue ceftriaxone and azithromycin  -s/p IV fluids  -obtain pna cx and RVP  -monitor     History of asthma  --does appear in mild exacerbation  --start IV steroids    --Inhalers at home  --Cont w/ Duonebs prn  --Cont w/ Brovana and Pulmicort  --Cont w/ Spiriva    Syncope  --Likely due to tachycardia in the setting of sepsis due to pneumonia  --Continuous telemetry  --CT head negative  --Echo pending  ---check d-dimer   --No prodrome  --Orthostatics once improved from pneumonia perspective    Hypertension, POA  --Currently normotensive  --Cont w/ Diltiazem 240mg daily  --Cont w/ Clonidine 0.1mg bid  --hold w/ HCTZ 25mg daily  --Vital signs as per unit routine    Hyperlipidemia, POA  --Check lipid panel  --Continue statin    Diabetes with hyperglycemia, POA  --Hold home meds  --Start SSI  --Long acting as needed  --diabetes nurse consult     I have independently reviewed and interpreted patient's lab and other diagnostic data. External notes were reviewed  Critical Care Time: Not applicable    Code status: Full  Prophylaxis: Heparin, SCD  Care Plan discussed with: Patient, RN, Multidisciplinary Rounds  Anticipated Disposition:      Hospital Problems  Never Reviewed            Codes Class Noted POA    Sepsis ([facility] [address] ICD-10-CM: A41.9  ICD-9-CM: 038.9, 995.91  3/16/2023 Unknown        Vasovagal syncope ICD-10-CM: R55  ICD-9-CM: 780.2  3/15/2023 Unknown             Review of Systems:   A comprehensive review of systems was negative except for that written in the HPI. Vital Signs:    Last 24hrs VS reviewed since prior progress note.  Most recent are:    Patient Vitals for the past 24 hrs:   Temp Pulse Resp BP SpO2   03/17/23 1201 -- -- -- 136/82 --   03/17/23 1146 98.7 °F (37.1 °C) 76 16 136/62 94 %   03/17/23 0814 -- -- -- -- 94 %   03/17/23 0808 -- -- -- -- 94 %   03/17/23 0800 97.9 °F (36.6 °C) 66 18 134/60 95 %   03/17/23 0700 -- 67 -- -- --   03/17/23 0354 98.2 °F (36.8 °C) 69 21 130/60 97 %   03/16/23 2348 97.6 °F (36.4 °C) 67 22 (!) 81/61 94 %   03/16/23 2150 99.7 °F (37.6 °C) 83 20 (!) 108/52 92 %   03/16/23 2055 (!) 101 °F (38.3 °C) 89 22 (!) 131/40 95 %   03/16/23 2039 -- -- -- -- 96 %   03/16/23 2027 (!) 101 °F (38.3 °C) -- -- -- --   03/16/23 2002 -- 82 20 (!) 128/25 95 %   03/16/23 1602 99.7 °F (37.6 °C) 81 28 (!) 120/50 94 %   03/16/23 1500 -- 90 -- -- --   03/16/23 1456 (!) 101.7 °F (38.7 °C) 87 20 122/60 96 %   03/16/23 1407 (!) 102.1 °F (38.9 °C) 90 22 (!) 122/40 96 %            Intake/Output Summary (Last 24 hours) at 3/17/2023 1241  Last data filed at 3/17/2023 0354  Gross per 24 hour   Intake 680 ml   Output --   Net 680 ml            Physical Examination:     I had a face to face encounter with this patient and independently examined them on 3/17/2023 as outlined below:          General : alert x 3, awake, no acute distress,   HEENT: PEERL, EOMI, moist mucus membrane, TM clear  Neck: supple, no JVD, no meningeal signs  Chest: wheeze b/l   CVS: S1 S2 heard, Capillary refill less than 2 seconds  Abd: soft/ non tender, non distended, BS physiological,   Ext: no clubbing, no cyanosis, no edema, brisk 2+ DP pulses  Neuro/Psych: pleasant mood and affect, CN 2-12 grossly intact, sensory grossly within normal limit, diffuse weakness  Skin: warm            Data Review:    Review and/or order of clinical lab test  Review and/or order of tests in the radiology section of CPT  Review and/or order of tests in the medicine section of CPT    I have independently reviewed and interpreted patient's lab and all other diagnostic data    Notes reviewed from all clinical/nonclinical/nursing services involved in patient's clinical care. Care coordination discussions were held with appropriate clinical/nonclinical/ nursing providers based on care coordination needs. No results found. Labs:     Recent Labs     03/17/23  0150 03/16/23  0516   WBC 12.9* 20.9*   HGB 11.2* 13.4   HCT 34.4* 41.4    199       Recent Labs     03/17/23  0150 03/15/23  1647    139   K 4.0 4.2   * 108   CO2 22 24   BUN 25* 23*   CREA 1.27* 1.60*   * 70   CA 8.5 9.1       Recent Labs     03/17/23  0150 03/15/23  1647   ALT 21 27   AP 84 86   TBILI 0.3 0.9   TP 5.3* 6.5   ALB 2.2* 3.0*   GLOB 3.1 3.5       No results for input(s): INR, PTP, APTT, INREXT, INREXT in the last 72 hours. No results for input(s): FE, TIBC, PSAT, FERR in the last 72 hours. No results found for: FOL, RBCF   No results for input(s): PH, PCO2, PO2 in the last 72 hours. No results for input(s): CPK, CKNDX, TROIQ in the last 72 hours.     No lab exists for component: CPKMB  No results found for: CHOL, CHOLX, CHLST, CHOLV, HDL, HDLP, LDL, LDLC, DLDLP, TGLX, TRIGL, TRIGP, CHHD, CHHDX  Lab Results   Component Value Date/Time    Glucose (POC) 105 03/17/2023 11:49 AM    Glucose (POC) 107 03/17/2023 09:25 AM    Glucose (POC) 100 03/16/2023 11:48 PM    Glucose (POC) 90 03/16/2023 11:53 AM    Glucose (POC) 67 03/16/2023 07:24 AM     Lab Results   Component Value Date/Time    Color YELLOW/STRAW 07/19/2016 12:08 PM    Appearance CLEAR 07/19/2016 12:08 PM    Specific gravity 1.020 07/19/2016 12:08 PM    pH (UA) 5.5 07/19/2016 12:08 PM    Protein NEGATIVE 07/19/2016 12:08 PM    Glucose NEGATIVE 07/19/2016 12:08 PM    Ketone NEGATIVE 07/19/2016 12:08 PM    Bilirubin NEGATIVE 07/19/2016 12:08 PM    Urobilinogen 0.2 07/19/2016 12:08 PM    Nitrites NEGATIVE 07/19/2016 12:08 PM    Leukocyte Esterase NEGATIVE 07/19/2016 12:08 PM    Epithelial cells FEW 07/19/2016 12:08 PM    Bacteria NEGATIVE 07/19/2016 12:08 PM    WBC 0-4 07/19/2016 12:08 PM    RBC 5-10 07/19/2016 12:08 PM         Medications Reviewed:     Current Facility-Administered Medications   Medication Dose Route Frequency    methylPREDNISolone (PF) (SOLU-MEDROL) injection 30 mg  30 mg IntraVENous Q12H    cefTRIAXone (ROCEPHIN) 2 g in 0.9% sodium chloride 20 mL IV syringe  2 g IntraVENous Q24H    azithromycin (ZITHROMAX) 500 mg in 0.9% sodium chloride 250 mL ([id number])  500 mg IntraVENous Q24H    ipratropium (ATROVENT) 0.02 % nebulizer solution 0.5 mg  0.5 mg Nebulization Q6HWA RT    atorvastatin (LIPITOR) tablet 40 mg  40 mg Oral QPM    valsartan (DIOVAN) tablet 40 mg  40 mg Oral DAILY    [Held by provider] cloNIDine HCL (CATAPRES) tablet 0.1 mg  0.1 mg Oral BID    dilTIAZem ER (CARDIZEM CD) capsule 240 mg  240 mg Oral DAILY    ezetimibe (ZETIA) tablet 10 mg  10 mg Oral QPM    arformoteroL (BROVANA) neb solution 15 mcg  15 mcg Nebulization BID RT    And    budesonide (PULMICORT) 500 mcg/2 ml nebulizer suspension  500 mcg Nebulization BID RT    [Held by provider] hydroCHLOROthiazide (HYDRODIURIL) tablet 25 mg  25 mg Oral DAILY    melatonin tablet 3 mg  3 mg Oral QHS PRN    montelukast (SINGULAIR) tablet 10 mg  10 mg Oral DAILY    pantoprazole (PROTONIX) tablet 40 mg  40 mg Oral ACB    albuterol-ipratropium (DUO-NEB) 2.5 MG-0.5 MG/3 ML  3 mL Nebulization Q4H PRN    insulin lispro (HUMALOG) injection   SubCUTAneous AC&HS    glucose chewable tablet 16 g  4 Tablet Oral PRN    glucagon (GLUCAGEN) injection 1 mg  1 mg IntraMUSCular PRN    dextrose 10% infusion 0-250 mL  0-250 mL IntraVENous PRN    hydrALAZINE (APRESOLINE) 20 mg/mL injection 20 mg  20 mg IntraVENous Q4H PRN    sodium chloride (NS) flush 5-40 mL  5-40 mL IntraVENous Q8H    sodium chloride (NS) flush 5-40 mL  5-40 mL IntraVENous PRN    acetaminophen (TYLENOL) tablet 650 mg  650 mg Oral Q6H PRN    Or    acetaminophen (TYLENOL) suppository 650 mg  650 mg Rectal Q6H PRN    polyethylene glycol (MIRALAX) packet 17 g  17 g Oral DAILY PRN    ondansetron (ZOFRAN ODT) tablet 4 mg  4 mg Oral Q8H PRN    Or    ondansetron (ZOFRAN) injection 4 mg  4 mg IntraVENous Q6H PRN    enoxaparin (LOVENOX) injection 40 mg  40 mg SubCUTAneous DAILY     ______________________________________________________________________  EXPECTED LENGTH OF STAY: - - -  ACTUAL LENGTH OF STAY:          [address], DO

## 2023-03-17 NOTE — PROGRESS NOTES
RRT evaluation:pt is resting, no CP, no HA, no SOB, no dizziness. Talked with primary RN, keep current POC. MEWS 3  Sepsis Score 9  Deterioration Index 48    Spoke with primary RN regarding pt status. Pt resting comfortably in bed with no s/s of distress. Denies CP or SOB. No further interventions at this time.      Ever Vasquez, RN

## 2023-03-17 NOTE — PROGRESS NOTES
Problem: Patient Education: Go to Patient Education Activity  Goal: Patient/Family Education  Outcome: Progressing Towards Goal     Problem: Falls - Risk of  Goal: *Absence of Falls  Description: Document Anjelica Kennedy Fall Risk and appropriate interventions in the flowsheet.   Outcome: Progressing Towards Goal  Note: Fall Risk Interventions:

## 2023-03-17 NOTE — ED NOTES
TRANSFER - OUT REPORT:    Verbal report given to Rula BOLAND(name) on Willowbeverly Young  being transferred to Med Surg(unit) for routine progression of care       Report consisted of patients Situation, Background, Assessment and   Recommendations(SBAR). Information from the following report(s) SBAR, Kardex, ED Summary, and MAR was reviewed with the receiving nurse. Lines:   Peripheral IV 03/16/23 Left Antecubital (Active)        Opportunity for questions and clarification was provided.       Patient transported with:   [email or web address]

## 2023-03-17 NOTE — PROGRESS NOTES
Problem: Mobility Impaired (Adult and Pediatric)  Goal: *Acute Goals and Plan of Care (Insert Text)  Description: FUNCTIONAL STATUS PRIOR TO ADMISSION: Patient was independent and active without use of DME. Assists with laundry and cooking for son and his family. HOME SUPPORT PRIOR TO ADMISSION: The patient lived with son and his family; . Physical Therapy Goals  Initiated 3/17/2023  1. Patient will move from supine to sit and sit to supine  in bed with independence within 7 day(s). 2.  Patient will transfer from bed to chair and chair to bed with independence using the least restrictive device within 7 day(s). 3.  Patient will perform sit to stand with independence within 7 day(s). 4.  Patient will ambulate with independence for 150 feet with the least restrictive device within 7 day(s). 5.  Patient will ascend/descend 13 stairs with single handrail(s) with modified independence within 7 day(s). Outcome: Not Met   PHYSICAL THERAPY EVALUATION  Patient: Yasmine Sierra (9 y.o. female)  Date: 3/17/2023  Primary Diagnosis: Vasovagal syncope [R55]  Sepsis ([address] [A41.9]       Precautions:        ASSESSMENT  Based on the objective data described below, the patient presents with decreased activity tolerance, mild balance/ gait deficits following admit with sepsis/ PNA and syncopal episode at home. Pt received with supportive son present. Reports up to restroom with assist, c/o general weakness as compared to baseline. Orthostatic check completed with positive findings, no associated symptoms, BP recovery with ambulation (see below). Pt tolerated amb to/ from restroom with CGA, no device, mild instability. Transferred sit to stand from bed and toilet height with supervision. Declined up to chair, but returned to bed with HOB elevated. Discussed benefit of continued mobilization as tolerated with assist. Will benefit from gait progression with acute PT to include stair training.  Will con't to assess follow up need for [facility] PT v none. Vitals:    03/17/23 1408 03/17/23 1412 03/17/23 1417 03/17/23 1421   BP: 139/61 128/72 115/61 131/68   BP 1 Location: Right upper arm Right upper arm Right upper arm Right upper arm   BP Patient Position: Semi fowlers Sitting Standing Standing  Comment: after amb   Pulse: 74 74 79 74   Temp:       Resp:       Height:       Weight:       SpO2:            Current Level of Function Impacting Discharge (mobility/balance): CGA short distance amb without device    Other factors to consider for discharge: 2 level home environment     Patient will benefit from skilled therapy intervention to address the above noted impairments. PLAN :  Recommendations and Planned Interventions: bed mobility training, transfer training, gait training, therapeutic exercises, patient and family training/education, and therapeutic activities      Frequency/Duration: Patient will be followed by physical therapy:  5 times a week to address goals. Recommendation for discharge: (in order for the patient to meet his/her long term goals)  To be determined: likely no follow up needs    This discharge recommendation:  Has not yet been discussed the attending provider and/or case management    IF patient discharges home will need the following DME: to be determined (TBD)         SUBJECTIVE:   Patient stated I'm feeling better than I was, but I'm still weak.     OBJECTIVE DATA SUMMARY:   HISTORY:    Past Medical History:   Diagnosis Date    Asthma     Diabetes ([address]     type 2    GERD (gastroesophageal reflux disease)     Hypertension     Ill-defined condition     high cholesterol    Ill-defined condition     seasonal allergies    Ill-defined condition     gout     Past Surgical History:   Procedure Laterality Date    HX APPENDECTOMY      HX HEENT  2010    cataract     HX HYSTERECTOMY  1975    HX LAP CHOLECYSTECTOMY  1985    HX OOPHORECTOMY  1996    benign ovarian tumor    Rosalio bladder tack    HX ROTATOR CUFF REPAIR  2015    right    HX TONSILLECTOMY  1964       Personal factors and/or comorbidities impacting plan of care: h/o asthma    Home Situation  Home Environment: Private residence  One/Two Story Residence: Two story  # of Interior Steps: 13  Living Alone: No  Support Systems: Child(dwain) (lives with son)  Patient Expects to be Discharged to[de-identified] Home  Current DME Used/Available at Home: Grab bars, Shower chair  Tub or Shower Type: Shower    EXAMINATION/PRESENTATION/DECISION MAKING:   Critical Behavior:  Neurologic State: Alert  Orientation Level: Oriented X4  Cognition: Follows commands     Hearing: Auditory  Auditory Impairment: None  Range Of Motion:  AROM: Within functional limits                       Strength:    Strength:  Within functional limits                    Tone & Sensation:   Tone: Normal              Sensation: Intact               Coordination:  Coordination: Within functional limits       Functional Mobility:  Bed Mobility:     Supine to Sit: Supervision  Sit to Supine: Supervision     Transfers:  Sit to Stand: Supervision  Stand to Sit: Supervision                       Balance:   Sitting: Intact  Standing: Intact  Ambulation/Gait Training:  Distance (ft): 15 Feet (ft) (x 2)     Ambulation - Level of Assistance: Contact guard assistance        Gait Abnormalities: Decreased step clearance              Speed/Prema: Pace decreased (<100 feet/min)        Physical Therapy Evaluation Charge Determination   History Examination Presentation Decision-Making   MEDIUM  Complexity : 1-2 comorbidities / personal factors will impact the outcome/ POC  MEDIUM Complexity : 3 Standardized tests and measures addressing body structure, function, activity limitation and / or participation in recreation  LOW Complexity : Stable, uncomplicated  LOW Complexity : FOTO score of       Based on the above components, the patient evaluation is determined to be of the following complexity level: LOW     Pain Rating:  No c/o pain    Activity Tolerance:   Good and SpO2 stable on RA    After treatment patient left in no apparent distress:   Supine in bed, Call bell within reach, Caregiver / family present, and Side rails x 3    COMMUNICATION/EDUCATION:   The patients plan of care was discussed with: Registered nurse. Fall prevention education was provided and the patient/caregiver indicated understanding., Patient/family have participated as able in goal setting and plan of care. , and Patient/family agree to work toward stated goals and plan of care.     Thank you for this referral.  Rashid Villarreal, PT   Time Calculation: 28 mins

## 2023-03-17 NOTE — PROGRESS NOTES
Spiritual Care Partner Volunteer visited patient at [facility] in [address] [place] [address] 1 on 3/17/2023   Documented by:  Jarrett Briggs., MS., [facility]  Page a  [phone]- [phone] ([phone])

## 2023-03-17 NOTE — PROGRESS NOTES
3/17/2023  3:03 PM  CM completed assessment w/ pt and son in person. Charted demographics verified, pt lives w/ her son Brittany Norris and his family in a 2 story home, there is a ramp in, she has a 1st floor bedroom. At baseline pt is ambulatory, independent w/ her ADLs and drives.   Pt fell at home leading to this admission but denies frequent falls    Reason for Admission:  Emergent for Sepsis, Vasovagal syncope  Pt is a 70 yo female who presents to [facility] c/o syncopal episode getting up from toilet       Past Medical History:   Diagnosis Date    Asthma      Diabetes [address] [address]       type 2    GERD (gastroesophageal reflux disease)      Hypertension      Ill-defined condition       high cholesterol    Ill-defined condition       seasonal allergies    Ill-defined condition       gout                      RUR Score:     9 % Low Risk of readmission/Green                  Plan for utilizing home health:    PT eval completed, likely no skilled services ar DC , pt as not had [facility] or Rehab in the past  Rx: [facility], pt uses [employer] and is usually covered for her medications  DME: None, No Home O2     PCP: First and Last name:  Rosa Narayan MD     Name of Practice:    Are you a current patient: Yes/No: yes    Approximate date of last visit: 3/14/23   Can you participate in a virtual visit with your PCP: NO                    Current Advanced Directive/Advance Care Plan: Full Code  HCDM/NOK regis Lundy (CHANO) [place]:   Click here to complete [address] including selection of the Healthcare Decision Maker Relationship (ie \"Primary\")                             Transition of Care Plan:      RUR 9 % Low Risk of Readmission/Green  LOS 1 Day           Hospital admission for medical management  DM management consult  PT eval completed likely no skilled needs at DC   CM to follow through for treatment/response  DC when stable to home w/ family assistance  Outpatient follow up PCP, specialists  Family will transport at [facility] 12 Management Interventions  PCP Verified by CM:  Yes Agustin Rosales MD)  Palliative Care Criteria Met (RRAT>21 & CHF Dx)?: No  Mode of Transport at Discharge: Self (Family)  Physical Therapy Consult: Yes  Support Systems: Child(dwain)  Confirm Follow Up Transport: Family  Discharge Location  Patient Expects to be Discharged to[de-identified] Home with family assistance  TALON Watkins

## 2023-03-18 ENCOUNTER — APPOINTMENT (OUTPATIENT)
Dept: VASCULAR SURGERY | Age: 78
DRG: 177 | End: 2023-03-18
Attending: INTERNAL MEDICINE
Payer: MEDICARE

## 2023-03-18 LAB
ALBUMIN SERPL-MCNC: 2.4 G/DL (ref 3.5–5)
ALBUMIN/GLOB SERPL: 0.6 (ref 1.1–2.2)
ALP SERPL-CCNC: 113 U/L (ref 45–117)
ALT SERPL-CCNC: 34 U/L (ref 12–78)
ANION GAP SERPL CALC-SCNC: 5 MMOL/L (ref 5–15)
AST SERPL-CCNC: 43 U/L (ref 15–37)
B PERT DNA SPEC QL NAA+PROBE: NOT DETECTED
BACTERIA SPEC CULT: ABNORMAL
BACTERIA SPEC CULT: ABNORMAL
BASOPHILS # BLD: 0 K/UL (ref 0–0.1)
BASOPHILS NFR BLD: 0 % (ref 0–1)
BILIRUB SERPL-MCNC: 0.7 MG/DL (ref 0.2–1)
BORDETELLA PARAPERTUSSIS PCR, BORPAR: NOT DETECTED
BUN SERPL-MCNC: 22 MG/DL (ref 6–20)
BUN/CREAT SERPL: 21 (ref 12–20)
C PNEUM DNA SPEC QL NAA+PROBE: NOT DETECTED
CALCIUM SERPL-MCNC: 9.2 MG/DL (ref 8.5–10.1)
CHLORIDE SERPL-SCNC: 108 MMOL/L (ref 97–108)
CO2 SERPL-SCNC: 22 MMOL/L (ref 21–32)
CREAT SERPL-MCNC: 1.05 MG/DL (ref 0.55–1.02)
D DIMER PPP FEU-MCNC: 4.08 MG/L FEU (ref 0–0.65)
DIFFERENTIAL METHOD BLD: ABNORMAL
EOSINOPHIL # BLD: 0 K/UL (ref 0–0.4)
EOSINOPHIL NFR BLD: 0 % (ref 0–7)
ERYTHROCYTE [DISTWIDTH] IN BLOOD BY AUTOMATED COUNT: 13.4 % (ref 11.5–14.5)
FLUAV SUBTYP SPEC NAA+PROBE: NOT DETECTED
FLUBV RNA SPEC QL NAA+PROBE: NOT DETECTED
GLOBULIN SER CALC-MCNC: 3.8 G/DL (ref 2–4)
GLUCOSE BLD STRIP.AUTO-MCNC: 121 MG/DL (ref 65–117)
GLUCOSE BLD STRIP.AUTO-MCNC: 143 MG/DL (ref 65–117)
GLUCOSE BLD STRIP.AUTO-MCNC: 143 MG/DL (ref 65–117)
GLUCOSE BLD STRIP.AUTO-MCNC: 145 MG/DL (ref 65–117)
GLUCOSE SERPL-MCNC: 191 MG/DL (ref 65–100)
HADV DNA SPEC QL NAA+PROBE: NOT DETECTED
HCOV 229E RNA SPEC QL NAA+PROBE: NOT DETECTED
HCOV HKU1 RNA SPEC QL NAA+PROBE: NOT DETECTED
HCOV NL63 RNA SPEC QL NAA+PROBE: NOT DETECTED
HCOV OC43 RNA SPEC QL NAA+PROBE: NOT DETECTED
HCT VFR BLD AUTO: 34.9 % (ref 35–47)
HGB BLD-MCNC: 11.9 G/DL (ref 11.5–16)
HMPV RNA SPEC QL NAA+PROBE: NOT DETECTED
HPIV1 RNA SPEC QL NAA+PROBE: NOT DETECTED
HPIV2 RNA SPEC QL NAA+PROBE: NOT DETECTED
HPIV3 RNA SPEC QL NAA+PROBE: NOT DETECTED
HPIV4 RNA SPEC QL NAA+PROBE: NOT DETECTED
IMM GRANULOCYTES # BLD AUTO: 0.1 K/UL (ref 0–0.04)
IMM GRANULOCYTES NFR BLD AUTO: 1 % (ref 0–0.5)
LYMPHOCYTES # BLD: 0.3 K/UL (ref 0.8–3.5)
LYMPHOCYTES NFR BLD: 5 % (ref 12–49)
M PNEUMO DNA SPEC QL NAA+PROBE: NOT DETECTED
MCH RBC QN AUTO: 32 PG (ref 26–34)
MCHC RBC AUTO-ENTMCNC: 34.1 G/DL (ref 30–36.5)
MCV RBC AUTO: 93.8 FL (ref 80–99)
MONOCYTES # BLD: 0.1 K/UL (ref 0–1)
MONOCYTES NFR BLD: 2 % (ref 5–13)
NEUTS SEG # BLD: 4.6 K/UL (ref 1.8–8)
NEUTS SEG NFR BLD: 92 % (ref 32–75)
NRBC # BLD: 0 K/UL (ref 0–0.01)
NRBC BLD-RTO: 0 PER 100 WBC
PLATELET # BLD AUTO: 167 K/UL (ref 150–400)
PMV BLD AUTO: 9.6 FL (ref 8.9–12.9)
POTASSIUM SERPL-SCNC: 4 MMOL/L (ref 3.5–5.1)
PROT SERPL-MCNC: 6.2 G/DL (ref 6.4–8.2)
RBC # BLD AUTO: 3.72 M/UL (ref 3.8–5.2)
RBC MORPH BLD: ABNORMAL
RSV RNA SPEC QL NAA+PROBE: NOT DETECTED
RV+EV RNA SPEC QL NAA+PROBE: NOT DETECTED
SARS-COV-2 RNA RESP QL NAA+PROBE: NOT DETECTED
SERVICE CMNT-IMP: ABNORMAL
SODIUM SERPL-SCNC: 135 MMOL/L (ref 136–145)
WBC # BLD AUTO: 5.1 K/UL (ref 3.6–11)

## 2023-03-18 PROCEDURE — 87040 BLOOD CULTURE FOR BACTERIA: CPT

## 2023-03-18 PROCEDURE — 74011250636 HC RX REV CODE- 250/636: Performed by: INTERNAL MEDICINE

## 2023-03-18 PROCEDURE — 74011250637 HC RX REV CODE- 250/637: Performed by: INTERNAL MEDICINE

## 2023-03-18 PROCEDURE — 74011000250 HC RX REV CODE- 250: Performed by: INTERNAL MEDICINE

## 2023-03-18 PROCEDURE — 0202U NFCT DS 22 TRGT SARS-COV-2: CPT

## 2023-03-18 PROCEDURE — 36415 COLL VENOUS BLD VENIPUNCTURE: CPT

## 2023-03-18 PROCEDURE — 86738 MYCOPLASMA ANTIBODY: CPT

## 2023-03-18 PROCEDURE — 82962 GLUCOSE BLOOD TEST: CPT

## 2023-03-18 PROCEDURE — 85025 COMPLETE CBC W/AUTO DIFF WBC: CPT

## 2023-03-18 PROCEDURE — 74011250637 HC RX REV CODE- 250/637

## 2023-03-18 PROCEDURE — 85379 FIBRIN DEGRADATION QUANT: CPT

## 2023-03-18 PROCEDURE — 80053 COMPREHEN METABOLIC PANEL: CPT

## 2023-03-18 PROCEDURE — 74011636637 HC RX REV CODE- 636/637: Performed by: INTERNAL MEDICINE

## 2023-03-18 PROCEDURE — 94761 N-INVAS EAR/PLS OXIMETRY MLT: CPT

## 2023-03-18 PROCEDURE — 65270000029 HC RM PRIVATE

## 2023-03-18 PROCEDURE — 94640 AIRWAY INHALATION TREATMENT: CPT

## 2023-03-18 RX ORDER — CLONIDINE HYDROCHLORIDE 0.1 MG/1
0.1 TABLET ORAL 2 TIMES DAILY
Status: DISCONTINUED | OUTPATIENT
Start: 2023-03-18 | End: 2023-03-31 | Stop reason: HOSPADM

## 2023-03-18 RX ORDER — GUAIFENESIN 600 MG/1
600 TABLET, EXTENDED RELEASE ORAL ONCE
Status: COMPLETED | OUTPATIENT
Start: 2023-03-18 | End: 2023-03-18

## 2023-03-18 RX ADMIN — Medication 10 ML: at 15:27

## 2023-03-18 RX ADMIN — AZITHROMYCIN DIHYDRATE 500 MG: 500 INJECTION, POWDER, LYOPHILIZED, FOR SOLUTION INTRAVENOUS at 17:36

## 2023-03-18 RX ADMIN — Medication 2 UNITS: at 17:33

## 2023-03-18 RX ADMIN — CLONIDINE HYDROCHLORIDE 0.1 MG: 0.1 TABLET ORAL at 17:37

## 2023-03-18 RX ADMIN — ARFORMOTEROL TARTRATE 15 MCG: 15 SOLUTION RESPIRATORY (INHALATION) at 19:29

## 2023-03-18 RX ADMIN — CLONIDINE HYDROCHLORIDE 0.1 MG: 0.1 TABLET ORAL at 10:28

## 2023-03-18 RX ADMIN — EZETIMIBE 10 MG: 10 TABLET ORAL at 17:36

## 2023-03-18 RX ADMIN — ENOXAPARIN SODIUM 40 MG: 100 INJECTION SUBCUTANEOUS at 08:28

## 2023-03-18 RX ADMIN — METHYLPREDNISOLONE SODIUM SUCCINATE 30 MG: 40 INJECTION, POWDER, FOR SOLUTION INTRAMUSCULAR; INTRAVENOUS at 22:43

## 2023-03-18 RX ADMIN — VALSARTAN 40 MG: 80 TABLET ORAL at 09:09

## 2023-03-18 RX ADMIN — ATORVASTATIN CALCIUM 40 MG: 20 TABLET, FILM COATED ORAL at 17:37

## 2023-03-18 RX ADMIN — BUDESONIDE 500 MCG: 0.5 INHALANT RESPIRATORY (INHALATION) at 19:29

## 2023-03-18 RX ADMIN — Medication 10 ML: at 22:45

## 2023-03-18 RX ADMIN — PANTOPRAZOLE SODIUM 40 MG: 40 TABLET, DELAYED RELEASE ORAL at 06:18

## 2023-03-18 RX ADMIN — BUDESONIDE 500 MCG: 0.5 INHALANT RESPIRATORY (INHALATION) at 07:21

## 2023-03-18 RX ADMIN — Medication 2 UNITS: at 08:28

## 2023-03-18 RX ADMIN — IPRATROPIUM BROMIDE 0.5 MG: 0.5 SOLUTION RESPIRATORY (INHALATION) at 19:24

## 2023-03-18 RX ADMIN — PANTOPRAZOLE SODIUM 40 MG: 40 TABLET, DELAYED RELEASE ORAL at 09:09

## 2023-03-18 RX ADMIN — METHYLPREDNISOLONE SODIUM SUCCINATE 40 MG: 40 INJECTION, POWDER, FOR SOLUTION INTRAMUSCULAR; INTRAVENOUS at 08:28

## 2023-03-18 RX ADMIN — CEFTRIAXONE SODIUM 2 G: 2 INJECTION, POWDER, FOR SOLUTION INTRAMUSCULAR; INTRAVENOUS at 17:36

## 2023-03-18 RX ADMIN — Medication 10 ML: at 06:18

## 2023-03-18 RX ADMIN — IPRATROPIUM BROMIDE 0.5 MG: 0.5 SOLUTION RESPIRATORY (INHALATION) at 07:15

## 2023-03-18 RX ADMIN — GUAIFENESIN 600 MG: 600 TABLET ORAL at 22:45

## 2023-03-18 RX ADMIN — MONTELUKAST 10 MG: 10 TABLET, FILM COATED ORAL at 08:28

## 2023-03-18 RX ADMIN — DILTIAZEM HYDROCHLORIDE 240 MG: 120 CAPSULE, COATED, EXTENDED RELEASE ORAL at 08:28

## 2023-03-18 RX ADMIN — ARFORMOTEROL TARTRATE 15 MCG: 15 SOLUTION RESPIRATORY (INHALATION) at 07:21

## 2023-03-18 NOTE — PROGRESS NOTES
Bedside and Verbal shift change report given to Beka Mitchell RN (oncoming nurse) by Octavio Rodriguez RN (offgoing nurse). Report included the following information SBAR, Kardex, Intake/Output, MAR, and Recent Results.

## 2023-03-18 NOTE — PROGRESS NOTES
Hospitalist Progress Note  Jesse Rivera DO  Answering service: [phone] OR [phone] from in house phone        Date of Service:  3/18/2023  NAME:  Celeste Escoto  :  1945  MRN:  [id number]      Admission Summary/HPI:   The patient is a 70 y/o C F w/ PMH DM 2 who comes in w/ brief syncopal episode today after she got up from the toilet and was going to her bedroom. This syncope was unwitnessed but she did not have any post-ictal confusion. She has no chest pain, palpitations, , wheezing, dyspnea, weight gain or lower extremity edema. She has no fever or night sweats but did report of chills  On ROS she notes of non-productive cough and wheezing. The patient was recently treated for bronchitis. Interval history / Subjective: Fu pna, syncope. Improved dyspnea, cough, fatigue. Discussed with family at bedside. Assessment & Plan:        Sepsis 2/2 left lower lobe community-acquired pneumonia, mild/ Staph Bacteremia/ Left lower lobe community-acquired pneumonia  -worsened despite p.o.  Levaquin; continue ceftriaxone and azithromycin  -blood cx 3/15 with staph bacteremia - suspect contamination; repeat cx    -follow pna cx and RVP  --Continue high dose IV ceftriaxone; low threshold to escalate   -s/p IV fluids  -monitor     History of asthma  --does appear in mild exacerbation  - wean IV steroids    --Cont w/ Duonebs prn  --Cont w/ Brovana and Pulmicort  --Cont w/ Spiriva  -continue singulair     Syncope  --Likely due to tachycardia in the setting of sepsis due to pneumonia  --Continuous telemetry  --CT head negative  --Echo w/ some diastolic dysfunction   ---check d-dimer   --No prodrome  --Orthostatics once improved from pneumonia perspective    Hypertension, POA  --Currently slight hypertensive; likely due to steroids  --Cont w/ Diltiazem 240mg daily  --Cont w/ Clonidine 0.1mg bid  --hold w/ HCTZ 25mg daily  --Vital signs as per unit routine    Hyperlipidemia, POA  --Continue statin and zeita     Diabetes with hyperglycemia, POA  --Hold home meds  --SSI  --Long acting as needed  --diabetes nurse consult     I have independently reviewed and interpreted patient's lab and other diagnostic data. External notes were reviewed  Critical Care Time: Not applicable    Code status: Full  Prophylaxis: lovenox, SCD  Care Plan discussed with: Patient, RN, Family   Anticipated Disposition:      Hospital Problems  Never Reviewed            Codes Class Noted POA    Sepsis ([facility] [address] ICD-10-CM: A41.9  ICD-9-CM: 038.9, 995.91  3/16/2023 Unknown        Vasovagal syncope ICD-10-CM: R55  ICD-9-CM: 780.2  3/15/2023 Unknown           Review of Systems:   A comprehensive review of systems was negative except for that written above         Vital Signs:    Last 24hrs VS reviewed since prior progress note.  Most recent are:    Patient Vitals for the past 24 hrs:   Temp Pulse Resp BP SpO2   03/18/23 0807 -- -- -- (!) 159/71 --   03/18/23 0804 -- -- -- (!) 188/83 --   03/18/23 0756 98.1 °F (36.7 °C) (!) 102 19 (!) 184/102 94 %   03/18/23 0715 -- -- -- -- 95 %   03/18/23 0400 98.8 °F (37.1 °C) 82 -- 118/61 96 %   03/18/23 0014 99.1 °F (37.3 °C) 79 -- 122/70 95 %   03/17/23 2241 -- 90 -- -- --   03/17/23 2141 -- -- -- 130/71 --   03/17/23 2000 -- -- -- -- 94 %   03/17/23 1952 98.5 °F (36.9 °C) 79 18 (!) 148/67 94 %   03/17/23 1944 -- -- -- -- 93 %   03/17/23 1524 98.8 °F (37.1 °C) 73 18 (!) 133/55 96 %   03/17/23 1500 -- 79 -- -- --   03/17/23 1421 -- 74 -- 131/68 --   03/17/23 1417 -- 79 -- 115/61 --   03/17/23 1412 -- 74 -- 128/72 --   03/17/23 1408 -- 74 -- 139/61 --   03/17/23 1201 -- -- -- 136/82 --   03/17/23 1146 98.7 °F (37.1 °C) 76 16 136/62 94 %            Intake/Output Summary (Last 24 hours) at 3/18/2023 1052  Last data filed at 3/18/2023 0801  Gross per 24 hour   Intake 330 ml   Output --   Net 330 ml            Physical Examination: I had a face to face encounter with this patient and independently examined them on 3/18/2023 as outlined below:          General : alert x 3, awake, no acute distress,   HEENT: PEERL, EOMI, moist mucus membrane, TM clear  Neck: supple, no JVD, no meningeal signs  Chest: wheeze b/l   CVS: S1 S2 heard, Capillary refill less than 2 seconds  Abd: soft/ non tender, non distended, BS physiological,   Ext: no clubbing, no cyanosis, no edema, brisk 2+ DP pulses  Neuro/Psych: pleasant mood and affect, CN 2-12 grossly intact, sensory grossly within normal limit, diffuse weakness  Skin: warm            Data Review:    Review and/or order of clinical lab test  Review and/or order of tests in the radiology section of CPT  Review and/or order of tests in the medicine section of CPT    I have independently reviewed and interpreted patient's lab and all other diagnostic data    Notes reviewed from all clinical/nonclinical/nursing services involved in patient's clinical care. Care coordination discussions were held with appropriate clinical/nonclinical/ nursing providers based on care coordination needs. No results found. Labs:     Recent Labs     03/18/23 0156 03/17/23 0150   WBC 5.1 12.9*   HGB 11.9 11.2*   HCT 34.9* 34.4*    153       Recent Labs     03/18/23  0156 03/17/23  0150 03/15/23  1647   * 136 139   K 4.0 4.0 4.2    109* 108   CO2 22 22 24   BUN 22* 25* 23*   CREA 1.05* 1.27* 1.60*   * 104* 70   CA 9.2 8.5 9.1       Recent Labs     03/18/23  0156 03/17/23  0150 03/15/23  1647   ALT 34 21 27    84 86   TBILI 0.7 0.3 0.9   TP 6.2* 5.3* 6.5   ALB 2.4* 2.2* 3.0*   GLOB 3.8 3.1 3.5       No results for input(s): INR, PTP, APTT, INREXT, INREXT in the last 72 hours. No results for input(s): FE, TIBC, PSAT, FERR in the last 72 hours. No results found for: FOL, RBCF   No results for input(s): PH, PCO2, PO2 in the last 72 hours.   No results for input(s): CPK, CKNDX, TROIQ in the last 72 hours.     No lab exists for component: CPKMB  No results found for: CHOL, CHOLX, CHLST, CHOLV, HDL, HDLP, LDL, LDLC, DLDLP, TGLX, TRIGL, TRIGP, CHHD, CHHDX  Lab Results   Component Value Date/Time    Glucose (POC) 145 (H) 03/18/2023 08:00 AM    Glucose (POC) 151 (H) 03/17/2023 09:35 PM    Glucose (POC) 107 03/17/2023 04:21 PM    Glucose (POC) 105 03/17/2023 11:49 AM    Glucose (POC) 107 03/17/2023 09:25 AM     Lab Results   Component Value Date/Time    Color YELLOW/STRAW 07/19/2016 12:08 PM    Appearance CLEAR 07/19/2016 12:08 PM    Specific gravity 1.020 07/19/2016 12:08 PM    pH (UA) 5.5 07/19/2016 12:08 PM    Protein NEGATIVE 07/19/2016 12:08 PM    Glucose NEGATIVE 07/19/2016 12:08 PM    Ketone NEGATIVE 07/19/2016 12:08 PM    Bilirubin NEGATIVE 07/19/2016 12:08 PM    Urobilinogen 0.2 07/19/2016 12:08 PM    Nitrites NEGATIVE 07/19/2016 12:08 PM    Leukocyte Esterase NEGATIVE 07/19/2016 12:08 PM    Epithelial cells FEW 07/19/2016 12:08 PM    Bacteria NEGATIVE 07/19/2016 12:08 PM    WBC 0-4 07/19/2016 12:08 PM    RBC 5-10 07/19/2016 12:08 PM         Medications Reviewed:     Current Facility-Administered Medications   Medication Dose Route Frequency    methylPREDNISolone (PF) (SOLU-MEDROL) injection 30 mg  30 mg IntraVENous Q12H    cloNIDine HCL (CATAPRES) tablet 0.1 mg  0.1 mg Oral BID    azithromycin (ZITHROMAX) 500 mg in 0.9% sodium chloride 250 mL ([id number])  500 mg IntraVENous Q24H    cefTRIAXone (ROCEPHIN) 2 g in 0.9% sodium chloride 20 mL IV syringe  2 g IntraVENous Q24H    ipratropium (ATROVENT) 0.02 % nebulizer solution 0.5 mg  0.5 mg Nebulization Q6HWA RT    atorvastatin (LIPITOR) tablet 40 mg  40 mg Oral QPM    valsartan (DIOVAN) tablet 40 mg  40 mg Oral DAILY    dilTIAZem ER (CARDIZEM CD) capsule 240 mg  240 mg Oral DAILY    ezetimibe (ZETIA) tablet 10 mg  10 mg Oral QPM    arformoteroL (BROVANA) neb solution 15 mcg  15 mcg Nebulization BID RT    And    budesonide (PULMICORT) 500 mcg/2 ml nebulizer suspension  500 mcg Nebulization BID RT    [Held by provider] hydroCHLOROthiazide (HYDRODIURIL) tablet 25 mg  25 mg Oral DAILY    melatonin tablet 3 mg  3 mg Oral QHS PRN    montelukast (SINGULAIR) tablet 10 mg  10 mg Oral DAILY    pantoprazole (PROTONIX) tablet 40 mg  40 mg Oral ACB    albuterol-ipratropium (DUO-NEB) 2.5 MG-0.5 MG/3 ML  3 mL Nebulization Q4H PRN    insulin lispro (HUMALOG) injection   SubCUTAneous AC&HS    glucose chewable tablet 16 g  4 Tablet Oral PRN    glucagon (GLUCAGEN) injection 1 mg  1 mg IntraMUSCular PRN    dextrose 10% infusion 0-250 mL  0-250 mL IntraVENous PRN    hydrALAZINE (APRESOLINE) 20 mg/mL injection 20 mg  20 mg IntraVENous Q4H PRN    sodium chloride (NS) flush 5-40 mL  5-40 mL IntraVENous Q8H    sodium chloride (NS) flush 5-40 mL  5-40 mL IntraVENous PRN    acetaminophen (TYLENOL) tablet 650 mg  650 mg Oral Q6H PRN    Or    acetaminophen (TYLENOL) suppository 650 mg  650 mg Rectal Q6H PRN    polyethylene glycol (MIRALAX) packet 17 g  17 g Oral DAILY PRN    ondansetron (ZOFRAN ODT) tablet 4 mg  4 mg Oral Q8H PRN    Or    ondansetron (ZOFRAN) injection 4 mg  4 mg IntraVENous Q6H PRN    enoxaparin (LOVENOX) injection 40 mg  40 mg SubCUTAneous DAILY     ______________________________________________________________________  EXPECTED LENGTH OF STAY: 5d 0h  ACTUAL LENGTH OF STAY:          2                 Raza Montero DO

## 2023-03-18 NOTE — PROGRESS NOTES
[facility]'s lab called to report gram + cocci and clusters from blood culture drawn 3-15. On call MD notified.  Continue to monitor per MD

## 2023-03-18 NOTE — PROGRESS NOTES
Problem: Falls - Risk of  Goal: *Absence of Falls  Description: Document Bessie Altamirano Fall Risk and appropriate interventions in the flowsheet.   Outcome: Progressing Towards Goal  Note: Fall Risk Interventions:

## 2023-03-19 ENCOUNTER — APPOINTMENT (OUTPATIENT)
Dept: GENERAL RADIOLOGY | Age: 78
DRG: 177 | End: 2023-03-19
Attending: INTERNAL MEDICINE
Payer: MEDICARE

## 2023-03-19 ENCOUNTER — APPOINTMENT (OUTPATIENT)
Dept: VASCULAR SURGERY | Age: 78
DRG: 177 | End: 2023-03-19
Attending: INTERNAL MEDICINE
Payer: MEDICARE

## 2023-03-19 LAB
ALBUMIN SERPL-MCNC: 2.5 G/DL (ref 3.5–5)
ALBUMIN/GLOB SERPL: 0.7 (ref 1.1–2.2)
ALP SERPL-CCNC: 78 U/L (ref 45–117)
ALT SERPL-CCNC: 71 U/L (ref 12–78)
ANION GAP SERPL CALC-SCNC: 5 MMOL/L (ref 5–15)
AST SERPL-CCNC: 109 U/L (ref 15–37)
ATRIAL RATE: 107 BPM
BACTERIA SPEC CULT: NORMAL
BACTERIA SPEC CULT: NORMAL
BASOPHILS # BLD: 0 K/UL (ref 0–0.1)
BASOPHILS NFR BLD: 0 % (ref 0–1)
BILIRUB SERPL-MCNC: 0.4 MG/DL (ref 0.2–1)
BUN SERPL-MCNC: 33 MG/DL (ref 6–20)
BUN/CREAT SERPL: 27 (ref 12–20)
CALCIUM SERPL-MCNC: 9.2 MG/DL (ref 8.5–10.1)
CALCULATED P AXIS, ECG09: 21 DEGREES
CALCULATED R AXIS, ECG10: -28 DEGREES
CALCULATED T AXIS, ECG11: 45 DEGREES
CHLORIDE SERPL-SCNC: 109 MMOL/L (ref 97–108)
CO2 SERPL-SCNC: 22 MMOL/L (ref 21–32)
CREAT SERPL-MCNC: 1.23 MG/DL (ref 0.55–1.02)
DIAGNOSIS, 93000: NORMAL
DIFFERENTIAL METHOD BLD: ABNORMAL
EOSINOPHIL # BLD: 0 K/UL (ref 0–0.4)
EOSINOPHIL NFR BLD: 0 % (ref 0–7)
ERYTHROCYTE [DISTWIDTH] IN BLOOD BY AUTOMATED COUNT: 13.5 % (ref 11.5–14.5)
GLOBULIN SER CALC-MCNC: 3.7 G/DL (ref 2–4)
GLUCOSE BLD STRIP.AUTO-MCNC: 130 MG/DL (ref 65–117)
GLUCOSE BLD STRIP.AUTO-MCNC: 133 MG/DL (ref 65–117)
GLUCOSE BLD STRIP.AUTO-MCNC: 134 MG/DL (ref 65–117)
GLUCOSE BLD STRIP.AUTO-MCNC: 179 MG/DL (ref 65–117)
GLUCOSE SERPL-MCNC: 163 MG/DL (ref 65–100)
HCT VFR BLD AUTO: 32.9 % (ref 35–47)
HGB BLD-MCNC: 11.1 G/DL (ref 11.5–16)
IMM GRANULOCYTES # BLD AUTO: 0.1 K/UL (ref 0–0.04)
IMM GRANULOCYTES NFR BLD AUTO: 1 % (ref 0–0.5)
LYMPHOCYTES # BLD: 0.4 K/UL (ref 0.8–3.5)
LYMPHOCYTES NFR BLD: 5 % (ref 12–49)
MCH RBC QN AUTO: 32.1 PG (ref 26–34)
MCHC RBC AUTO-ENTMCNC: 33.7 G/DL (ref 30–36.5)
MCV RBC AUTO: 95.1 FL (ref 80–99)
MONOCYTES # BLD: 0.4 K/UL (ref 0–1)
MONOCYTES NFR BLD: 5 % (ref 5–13)
NEUTS SEG # BLD: 7.8 K/UL (ref 1.8–8)
NEUTS SEG NFR BLD: 90 % (ref 32–75)
NRBC # BLD: 0 K/UL (ref 0–0.01)
NRBC BLD-RTO: 0 PER 100 WBC
P-R INTERVAL, ECG05: 190 MS
PLATELET # BLD AUTO: 204 K/UL (ref 150–400)
PMV BLD AUTO: 9.4 FL (ref 8.9–12.9)
POTASSIUM SERPL-SCNC: 3.9 MMOL/L (ref 3.5–5.1)
PROT SERPL-MCNC: 6.2 G/DL (ref 6.4–8.2)
Q-T INTERVAL, ECG07: 326 MS
QRS DURATION, ECG06: 94 MS
QTC CALCULATION (BEZET), ECG08: 435 MS
RBC # BLD AUTO: 3.46 M/UL (ref 3.8–5.2)
SERVICE CMNT-IMP: ABNORMAL
SERVICE CMNT-IMP: NORMAL
SODIUM SERPL-SCNC: 136 MMOL/L (ref 136–145)
VENTRICULAR RATE, ECG03: 107 BPM
WBC # BLD AUTO: 8.7 K/UL (ref 3.6–11)

## 2023-03-19 PROCEDURE — 74011250636 HC RX REV CODE- 250/636: Performed by: INTERNAL MEDICINE

## 2023-03-19 PROCEDURE — 74011250637 HC RX REV CODE- 250/637: Performed by: INTERNAL MEDICINE

## 2023-03-19 PROCEDURE — 85025 COMPLETE CBC W/AUTO DIFF WBC: CPT

## 2023-03-19 PROCEDURE — 93970 EXTREMITY STUDY: CPT

## 2023-03-19 PROCEDURE — 71045 X-RAY EXAM CHEST 1 VIEW: CPT

## 2023-03-19 PROCEDURE — 36415 COLL VENOUS BLD VENIPUNCTURE: CPT

## 2023-03-19 PROCEDURE — 87899 AGENT NOS ASSAY W/OPTIC: CPT

## 2023-03-19 PROCEDURE — 74011250636 HC RX REV CODE- 250/636: Performed by: NURSE PRACTITIONER

## 2023-03-19 PROCEDURE — 82962 GLUCOSE BLOOD TEST: CPT

## 2023-03-19 PROCEDURE — 80053 COMPREHEN METABOLIC PANEL: CPT

## 2023-03-19 PROCEDURE — 65270000029 HC RM PRIVATE

## 2023-03-19 PROCEDURE — 74011000250 HC RX REV CODE- 250: Performed by: INTERNAL MEDICINE

## 2023-03-19 PROCEDURE — 94761 N-INVAS EAR/PLS OXIMETRY MLT: CPT

## 2023-03-19 PROCEDURE — 87449 NOS EACH ORGANISM AG IA: CPT

## 2023-03-19 PROCEDURE — 94640 AIRWAY INHALATION TREATMENT: CPT

## 2023-03-19 RX ORDER — HYDRALAZINE HYDROCHLORIDE 20 MG/ML
10 INJECTION INTRAMUSCULAR; INTRAVENOUS
Status: DISCONTINUED | OUTPATIENT
Start: 2023-03-19 | End: 2023-03-31 | Stop reason: HOSPADM

## 2023-03-19 RX ORDER — IPRATROPIUM BROMIDE AND ALBUTEROL SULFATE 2.5; .5 MG/3ML; MG/3ML
3 SOLUTION RESPIRATORY (INHALATION) ONCE
Status: DISPENSED | OUTPATIENT
Start: 2023-03-19 | End: 2023-03-20

## 2023-03-19 RX ORDER — GUAIFENESIN 600 MG/1
1200 TABLET, EXTENDED RELEASE ORAL EVERY 12 HOURS
Status: DISCONTINUED | OUTPATIENT
Start: 2023-03-19 | End: 2023-03-31 | Stop reason: HOSPADM

## 2023-03-19 RX ORDER — IPRATROPIUM BROMIDE AND ALBUTEROL SULFATE 2.5; .5 MG/3ML; MG/3ML
3 SOLUTION RESPIRATORY (INHALATION)
Status: DISCONTINUED | OUTPATIENT
Start: 2023-03-19 | End: 2023-03-22

## 2023-03-19 RX ADMIN — ARFORMOTEROL TARTRATE 15 MCG: 15 SOLUTION RESPIRATORY (INHALATION) at 07:19

## 2023-03-19 RX ADMIN — METHYLPREDNISOLONE SODIUM SUCCINATE 60 MG: 125 INJECTION, POWDER, FOR SOLUTION INTRAMUSCULAR; INTRAVENOUS at 17:37

## 2023-03-19 RX ADMIN — ENOXAPARIN SODIUM 40 MG: 100 INJECTION SUBCUTANEOUS at 08:57

## 2023-03-19 RX ADMIN — IPRATROPIUM BROMIDE AND ALBUTEROL SULFATE 3 ML: .5; 3 SOLUTION RESPIRATORY (INHALATION) at 13:16

## 2023-03-19 RX ADMIN — CEFTRIAXONE SODIUM 2 G: 2 INJECTION, POWDER, FOR SOLUTION INTRAMUSCULAR; INTRAVENOUS at 17:38

## 2023-03-19 RX ADMIN — Medication 10 ML: at 06:23

## 2023-03-19 RX ADMIN — BUDESONIDE 500 MCG: 0.5 INHALANT RESPIRATORY (INHALATION) at 19:41

## 2023-03-19 RX ADMIN — AZITHROMYCIN DIHYDRATE 500 MG: 500 INJECTION, POWDER, LYOPHILIZED, FOR SOLUTION INTRAVENOUS at 17:38

## 2023-03-19 RX ADMIN — METHYLPREDNISOLONE SODIUM SUCCINATE 30 MG: 40 INJECTION, POWDER, FOR SOLUTION INTRAMUSCULAR; INTRAVENOUS at 08:58

## 2023-03-19 RX ADMIN — GUAIFENESIN 1200 MG: 600 TABLET ORAL at 20:42

## 2023-03-19 RX ADMIN — IPRATROPIUM BROMIDE 0.5 MG: 0.5 SOLUTION RESPIRATORY (INHALATION) at 07:14

## 2023-03-19 RX ADMIN — CLONIDINE HYDROCHLORIDE 0.1 MG: 0.1 TABLET ORAL at 17:38

## 2023-03-19 RX ADMIN — BUDESONIDE 500 MCG: 0.5 INHALANT RESPIRATORY (INHALATION) at 07:20

## 2023-03-19 RX ADMIN — DILTIAZEM HYDROCHLORIDE 240 MG: 120 CAPSULE, COATED, EXTENDED RELEASE ORAL at 08:57

## 2023-03-19 RX ADMIN — MONTELUKAST 10 MG: 10 TABLET, FILM COATED ORAL at 08:58

## 2023-03-19 RX ADMIN — CLONIDINE HYDROCHLORIDE 0.1 MG: 0.1 TABLET ORAL at 08:57

## 2023-03-19 RX ADMIN — PANTOPRAZOLE SODIUM 40 MG: 40 TABLET, DELAYED RELEASE ORAL at 06:24

## 2023-03-19 RX ADMIN — IPRATROPIUM BROMIDE AND ALBUTEROL SULFATE 3 ML: .5; 3 SOLUTION RESPIRATORY (INHALATION) at 19:36

## 2023-03-19 RX ADMIN — ARFORMOTEROL TARTRATE 15 MCG: 15 SOLUTION RESPIRATORY (INHALATION) at 19:41

## 2023-03-19 RX ADMIN — GUAIFENESIN 1200 MG: 600 TABLET ORAL at 11:59

## 2023-03-19 RX ADMIN — Medication 10 ML: at 13:40

## 2023-03-19 RX ADMIN — VALSARTAN 40 MG: 80 TABLET ORAL at 08:57

## 2023-03-19 NOTE — PROGRESS NOTES
Hospitalist Progress Note  Leopoldociera Butts   Answering service: [phone] OR [phone] from in house phone        Date of Service:  3/19/2023  NAME:  Audrey Pulido  :  1945  MRN:  [id number]      Admission Summary/HPI:   The patient is a 70 y/o C F w/ PMH DM 2 who comes in w/ brief syncopal episode today after she got up from the toilet and was going to her bedroom. This syncope was unwitnessed but she did not have any post-ictal confusion. She has no chest pain, palpitations, , wheezing, dyspnea, weight gain or lower extremity edema. She has no fever or night sweats but did report of chills  On ROS she notes of non-productive cough and wheezing. The patient was recently treated for bronchitis. Interval history / Subjective: Fu pna, syncope. Borderline hypoxia this am. Continues to report dyspnea, cough, fatigue. Assessment & Plan:        Sepsis 2/2 left lower lobe community-acquired pneumonia, mild/ Staph Bacteremia/ Left lower lobe community-acquired pneumonia  -worsened despite p.o.  Levaquin; continue ceftriaxone and azithromycin  -blood cx 3/15 with staph bacteremia - suspect contamination; blood repeat blood cx    --repeat CXR this am   -follow pna cx; RVP neg   --Continue high dose IV ceftriaxone; low threshold to escalate   -s/p IV fluids  -monitor     History of asthma  --does appear in mild exacerbation  --continue current IV steroids dose   --Change Duonebs to clay   --Cont w/ Brovana and Pulmicort  --Cont singulair, clay mucinex   --IS    --consult pulm     Syncope  --Likely due to tachycardia in the setting of sepsis due to pneumonia  --Continuous telemetry  --CT head negative  --Echo w/ some diastolic dysfunction   --- d-dimer elevated; check LE dopplers and v/q scan   --No prodrome  --Orthostatics once improved from pneumonia perspective    DIONISIO or CKD  -not sure of baseline Cr; states her PCP has mentioned Cr fluctuates   -hold ARB for now;continue to hold HCTZ   -monitor     Hypertension, POA  --Currently slight hypertensive; likely due to steroids  --Cont w/ Diltiazem 240mg daily  --Cont w/ Clonidine 0.1mg bid  --hold w/ HCTZ 25mg daily/ hold diovan for now  --IV hydralazine prn     Hyperlipidemia, POA  --Continue statin and zeita     Diabetes with hyperglycemia, POA  --Hold home meds  --SSI  --Long acting as needed  --diabetes nurse consult     I have independently reviewed and interpreted patient's lab and other diagnostic data. External notes were reviewed  Critical Care Time: Not applicable    Code status: Full  Prophylaxis: lovenox, SCD  Care Plan discussed with: Patient, RN, Family   Anticipated Disposition:      Hospital Problems  Never Reviewed            Codes Class Noted POA    Sepsis ([facility] [address] ICD-10-CM: A41.9  ICD-9-CM: 038.9, 995.91  3/16/2023 Unknown        Vasovagal syncope ICD-10-CM: R55  ICD-9-CM: 780.2  3/15/2023 Unknown         Review of Systems:   A comprehensive review of systems was negative except for that written above         Vital Signs:    Last 24hrs VS reviewed since prior progress note.  Most recent are:    Patient Vitals for the past 24 hrs:   Temp Pulse Resp BP SpO2   03/19/23 0854 97.6 °F (36.4 °C) 93 19 (!) 154/74 90 %   03/19/23 0715 -- -- -- -- 96 %   03/19/23 0700 -- 71 -- -- --   03/19/23 0427 97.9 °F (36.6 °C) 64 16 (!) 147/77 96 %   03/19/23 0021 97.6 °F (36.4 °C) 79 16 (!) 158/74 90 %   03/18/23 2226 -- 84 -- -- --   03/18/23 2029 98.4 °F (36.9 °C) 93 16 (!) 152/69 92 %   03/18/23 1929 -- -- -- -- 93 %   03/18/23 1638 97.8 °F (36.6 °C) 97 16 (!) 153/65 92 %   03/18/23 1500 -- 85 -- -- --   03/18/23 1055 97.9 °F (36.6 °C) 99 15 (!) 156/77 91 %            Intake/Output Summary (Last 24 hours) at 3/19/2023 0941  Last data filed at 3/19/2023 0427  Gross per 24 hour   Intake 1005 ml   Output 0 ml   Net 1005 ml            Physical Examination:     I had a face to face encounter with this patient and independently examined them on 3/19/2023 as outlined below:          General : alert x 3, awake, no acute distress,   HEENT: PEERL, EOMI, moist mucus membrane, TM clear  Neck: supple, no JVD, no meningeal signs  Chest: wheeze b/l, rhonchi b/l   CVS: S1 S2 heard, Capillary refill less than 2 seconds  Abd: soft/ non tender, non distended, BS physiological,   Ext: no clubbing, no cyanosis, no edema, brisk 2+ DP pulses  Neuro/Psych: pleasant mood and affect, CN 2-12 grossly intact, sensory grossly within normal limit, diffuse weakness  Skin: warm            Data Review:    Review and/or order of clinical lab test  Review and/or order of tests in the radiology section of CPT  Review and/or order of tests in the medicine section of CPT    I have independently reviewed and interpreted patient's lab and all other diagnostic data    Notes reviewed from all clinical/nonclinical/nursing services involved in patient's clinical care. Care coordination discussions were held with appropriate clinical/nonclinical/ nursing providers based on care coordination needs. No results found. Labs:     Recent Labs     03/19/23 0400 03/18/23 0156   WBC 8.7 5.1   HGB 11.1* 11.9   HCT 32.9* 34.9*    167       Recent Labs     03/19/23 0400 03/18/23 0156 03/17/23 0150    135* 136   K 3.9 4.0 4.0   * 108 109*   CO2 22 22 22   BUN 33* 22* 25*   CREA 1.23* 1.05* 1.27*   * 191* 104*   CA 9.2 9.2 8.5       Recent Labs     03/19/23 0400 03/18/23 0156 03/17/23  0150   ALT 71 34 21   AP 78 113 84   TBILI 0.4 0.7 0.3   TP 6.2* 6.2* 5.3*   ALB 2.5* 2.4* 2.2*   GLOB 3.7 3.8 3.1       No results for input(s): INR, PTP, APTT, INREXT, INREXT in the last 72 hours. No results for input(s): FE, TIBC, PSAT, FERR in the last 72 hours. No results found for: FOL, RBCF   No results for input(s): PH, PCO2, PO2 in the last 72 hours.   No results for input(s): CPK, CKNDX, TROIQ in the last 72 hours.     No lab exists for component: CPKMB  No results found for: CHOL, CHOLX, CHLST, CHOLV, HDL, HDLP, LDL, LDLC, DLDLP, TGLX, TRIGL, TRIGP, CHHD, CHHDX  Lab Results   Component Value Date/Time    Glucose (POC) 134 (H) 03/19/2023 07:12 AM    Glucose (POC) 143 (H) 03/18/2023 10:50 PM    Glucose (POC) 143 (H) 03/18/2023 04:35 PM    Glucose (POC) 121 (H) 03/18/2023 10:59 AM    Glucose (POC) 145 (H) 03/18/2023 08:00 AM     Lab Results   Component Value Date/Time    Color YELLOW/STRAW 07/19/2016 12:08 PM    Appearance CLEAR 07/19/2016 12:08 PM    Specific gravity 1.020 07/19/2016 12:08 PM    pH (UA) 5.5 07/19/2016 12:08 PM    Protein NEGATIVE 07/19/2016 12:08 PM    Glucose NEGATIVE 07/19/2016 12:08 PM    Ketone NEGATIVE 07/19/2016 12:08 PM    Bilirubin NEGATIVE 07/19/2016 12:08 PM    Urobilinogen 0.2 07/19/2016 12:08 PM    Nitrites NEGATIVE 07/19/2016 12:08 PM    Leukocyte Esterase NEGATIVE 07/19/2016 12:08 PM    Epithelial cells FEW 07/19/2016 12:08 PM    Bacteria NEGATIVE 07/19/2016 12:08 PM    WBC 0-4 07/19/2016 12:08 PM    RBC 5-10 07/19/2016 12:08 PM         Medications Reviewed:     Current Facility-Administered Medications   Medication Dose Route Frequency    hydrALAZINE (APRESOLINE) 20 mg/mL injection 10 mg  10 mg IntraVENous Q4H PRN    albuterol-ipratropium (DUO-NEB) 2.5 MG-0.5 MG/3 ML  3 mL Nebulization Q6H RT    cefTRIAXone (ROCEPHIN) 2 g in 0.9% sodium chloride 20 mL IV syringe  2 g IntraVENous Q24H    methylPREDNISolone (PF) (SOLU-MEDROL) injection 30 mg  30 mg IntraVENous Q12H    cloNIDine HCL (CATAPRES) tablet 0.1 mg  0.1 mg Oral BID    azithromycin (ZITHROMAX) 500 mg in 0.9% sodium chloride 250 mL ([id number])  500 mg IntraVENous Q24H    [Held by provider] atorvastatin (LIPITOR) tablet 40 mg  40 mg Oral QPM    [Held by provider] valsartan (DIOVAN) tablet 40 mg  40 mg Oral DAILY    dilTIAZem ER (CARDIZEM CD) capsule 240 mg  240 mg Oral DAILY    [Held by provider] ezetimibe (ZETIA) tablet 10 mg  10 mg Oral QPM    arformoteroL (BROVANA) neb solution 15 mcg  15 mcg Nebulization BID RT    And    budesonide (PULMICORT) 500 mcg/2 ml nebulizer suspension  500 mcg Nebulization BID RT    [Held by provider] hydroCHLOROthiazide (HYDRODIURIL) tablet 25 mg  25 mg Oral DAILY    melatonin tablet 3 mg  3 mg Oral QHS PRN    montelukast (SINGULAIR) tablet 10 mg  10 mg Oral DAILY    pantoprazole (PROTONIX) tablet 40 mg  40 mg Oral ACB    insulin lispro (HUMALOG) injection   SubCUTAneous AC&HS    glucose chewable tablet 16 g  4 Tablet Oral PRN    glucagon (GLUCAGEN) injection 1 mg  1 mg IntraMUSCular PRN    dextrose 10% infusion 0-250 mL  0-250 mL IntraVENous PRN    sodium chloride (NS) flush 5-40 mL  5-40 mL IntraVENous Q8H    sodium chloride (NS) flush 5-40 mL  5-40 mL IntraVENous PRN    acetaminophen (TYLENOL) tablet 650 mg  650 mg Oral Q6H PRN    Or    acetaminophen (TYLENOL) suppository 650 mg  650 mg Rectal Q6H PRN    polyethylene glycol (MIRALAX) packet 17 g  17 g Oral DAILY PRN    ondansetron (ZOFRAN ODT) tablet 4 mg  4 mg Oral Q8H PRN    Or    ondansetron (ZOFRAN) injection 4 mg  4 mg IntraVENous Q6H PRN    enoxaparin (LOVENOX) injection 40 mg  40 mg SubCUTAneous DAILY     ______________________________________________________________________  EXPECTED LENGTH OF STAY: 5d 0h  ACTUAL LENGTH OF STAY:          3                 Geovanna Eng DO

## 2023-03-19 NOTE — PROGRESS NOTES
Bedside and Verbal shift change report given to Pablo Brady RN (oncoming nurse) by Nellie Alfonso RN  (offgoing nurse). Report included the following information SBAR, Kardex, Intake/Output, MAR, and Recent Results.

## 2023-03-19 NOTE — CONSULTS
Name: June Brannon: [address]   : 1945 Admit Date: 3/15/2023   Phone:   Room: 511/01   PCP: Diego Monson MD  MRN: [id number]   Date: 3/19/2023  Code: Full Code          Chart and notes reviewed. Data reviewed. I review the patient's current medications in the medical record at each encounter. I have evaluated and examined the patient. HPI:    10:47 AM       History was obtained from patient. I was asked by Caroline Camacho MD to see Yasmine Sierra in consultation for a chief complaint of PNA. History of Present Illness:    69 y.o. F with history of asthma, seasonal/environmental allergies, GERD, HTN, pseudomonas PNA, JANET, and DM. Followed outpatient by Dr. Conor Bales. Has not been seen in about a year. She is on Advair and Spiriva as maintenance. She tends to develop bronchitis at the start of allergy season. Per daughter, she had been treated outpatient for bronchitis. She was placed on Levaquin and despite treatment, was worsening. She had a syncopal episode on the toilet and was brought to ED for further evaluation. She feels that cough is not as productive now, but more dry and painful. Cough causes some SOB now. Wheezing as well. Afebrile  BP slightly elevated  HR stable  Oxygen saturations stable on RA  WBC 8.7  D-dimer 4.08 (3/18/23)  Creatinine 1.23  BC 3/15/23: positive 1/4 bottles staph  Repeat BC 3/18/23: pending  RVP negative  Mycoplasma, legionella, strep pneumo pending    Images reviewed:    LE Dopplers 3/19/23: negative. ECHO 3/17/23: EF 55-60%; LV mildly dilated; diastolic dysfunction present with increased LAP; mild sclerosis of the aortic valve cusp; mildly thickened leaflet of mitral valve, at the posterior leaflet; RA mildly dilated. CXR 3/15/23: left basilar airspace disease. PFT's 2021: normal spirometry.      Past Medical History:   Diagnosis Date    Asthma     Diabetes ([facility] [address]     type 2    GERD (gastroesophageal reflux disease)     Hypertension     Ill-defined condition     high cholesterol    Ill-defined condition     seasonal allergies    Ill-defined condition     gout       Past Surgical History:   Procedure Laterality Date    HX APPENDECTOMY      HX HEENT  2010    cataract     HX HYSTERECTOMY      HX LAP CHOLECYSTECTOMY      HX OOPHORECTOMY      benign ovarian tumor    HX OTHER SURGICAL  1993    bladder tack    HX ROTATOR CUFF REPAIR  2015    right    HX TONSILLECTOMY  1964       Family History   Problem Relation Age of Onset    Cancer Mother         uterine    Heart Disease Mother         afib    Heart Disease Father 48        MI    Hypertension Father        Social History     Tobacco Use    Smoking status: Former     Packs/day: 0.25     Types: Cigarettes     Quit date:      Years since quittin.2    Smokeless tobacco: Never    Tobacco comments:     smoked x 1 year   Substance Use Topics    Alcohol use: No       Allergies   Allergen Reactions    Aspirin Cough    Beta Blocker [Beta-Blockers (Beta-Adrenergic Blocking Agts)] Other (comments)     Was told by her PCP to report that she has a mutation- she does not recall a reaction    Codeine Rash       Current Facility-Administered Medications   Medication Dose Route Frequency    hydrALAZINE (APRESOLINE) 20 mg/mL injection 10 mg  10 mg IntraVENous Q4H PRN    albuterol-ipratropium (DUO-NEB) 2.5 MG-0.5 MG/3 ML  3 mL Nebulization Q6H RT    cefTRIAXone (ROCEPHIN) 2 g in 0.9% sodium chloride 20 mL IV syringe  2 g IntraVENous Q24H    guaiFENesin ER (MUCINEX) tablet 1,200 mg  1,200 mg Oral Q12H    methylPREDNISolone (PF) (SOLU-MEDROL) injection 30 mg  30 mg IntraVENous Q12H    cloNIDine HCL (CATAPRES) tablet 0.1 mg  0.1 mg Oral BID    azithromycin (ZITHROMAX) 500 mg in 0.9% sodium chloride 250 mL ([id number])  500 mg IntraVENous Q24H    [Held by provider] atorvastatin (LIPITOR) tablet 40 mg  40 mg Oral QPM    [Held by provider] valsartan (DIOVAN) tablet 40 mg 40 mg Oral DAILY    dilTIAZem ER (CARDIZEM CD) capsule 240 mg  240 mg Oral DAILY    [Held by provider] ezetimibe (ZETIA) tablet 10 mg  10 mg Oral QPM    arformoteroL (BROVANA) neb solution 15 mcg  15 mcg Nebulization BID RT    And    budesonide (PULMICORT) 500 mcg/2 ml nebulizer suspension  500 mcg Nebulization BID RT    [Held by provider] hydroCHLOROthiazide (HYDRODIURIL) tablet 25 mg  25 mg Oral DAILY    melatonin tablet 3 mg  3 mg Oral QHS PRN    montelukast (SINGULAIR) tablet 10 mg  10 mg Oral DAILY    pantoprazole (PROTONIX) tablet 40 mg  40 mg Oral ACB    insulin lispro (HUMALOG) injection   SubCUTAneous AC&HS    glucose chewable tablet 16 g  4 Tablet Oral PRN    glucagon (GLUCAGEN) injection 1 mg  1 mg IntraMUSCular PRN    dextrose 10% infusion 0-250 mL  0-250 mL IntraVENous PRN    sodium chloride (NS) flush 5-40 mL  5-40 mL IntraVENous Q8H    sodium chloride (NS) flush 5-40 mL  5-40 mL IntraVENous PRN    acetaminophen (TYLENOL) tablet 650 mg  650 mg Oral Q6H PRN    Or    acetaminophen (TYLENOL) suppository 650 mg  650 mg Rectal Q6H PRN    polyethylene glycol (MIRALAX) packet 17 g  17 g Oral DAILY PRN    ondansetron (ZOFRAN ODT) tablet 4 mg  4 mg Oral Q8H PRN    Or    ondansetron (ZOFRAN) injection 4 mg  4 mg IntraVENous Q6H PRN    enoxaparin (LOVENOX) injection 40 mg  40 mg SubCUTAneous DAILY         REVIEW OF SYSTEMS   12 point ROS negative except as stated in the HPI. Physical Exam:   Visit Vitals  BP (!) 154/74   Pulse 93   Temp 97.6 °F (36.4 °C)   Resp 19   Ht 5' 5\" (1.651 m)   Wt 82 kg (180 lb 12.4 oz)   SpO2 90%   BMI 30.08 kg/m²       General:  Alert, cooperative, no distress, appears stated age. Head:  Normocephalic, without obvious abnormality, atraumatic. Eyes:  Conjunctivae/corneas clear. Nose: Nares normal. Septum midline. Mucosa normal.    Throat: Lips, mucosa, and tongue normal.    Neck: Supple, symmetrical, trachea midline, no adenopathy.    Lungs:   Clear to auscultation bilaterally. Chest wall:  No tenderness or deformity. Heart:  Regular rate and rhythm, S1, S2 normal, no murmur, click, rub or gallop. Abdomen:   Soft, non-tender. Bowel sounds normal. No masses,  No organomegaly. Extremities: Extremities normal, atraumatic, no cyanosis or edema. Pulses: 2+ and symmetric all extremities. Skin: Skin color, texture, turgor normal. No rashes or lesions   Lymph nodes: Cervical, supraclavicular nodes normal.   Neurologic: Grossly nonfocal       Lab Results   Component Value Date/Time    Sodium 136 03/19/2023 04:00 AM    Potassium 3.9 03/19/2023 04:00 AM    Chloride 109 (H) 03/19/2023 04:00 AM    CO2 22 03/19/2023 04:00 AM    BUN 33 (H) 03/19/2023 04:00 AM    Creatinine 1.23 (H) 03/19/2023 04:00 AM    Glucose 163 (H) 03/19/2023 04:00 AM    Calcium 9.2 03/19/2023 04:00 AM    Lactic acid 1.2 03/15/2023 05:39 PM       Lab Results   Component Value Date/Time    WBC 8.7 03/19/2023 04:00 AM    HGB 11.1 (L) 03/19/2023 04:00 AM    PLATELET 093 54/88/0454 04:00 AM    MCV 95.1 03/19/2023 04:00 AM       Lab Results   Component Value Date/Time    Alk.  phosphatase 78 03/19/2023 04:00 AM    Protein, total 6.2 (L) 03/19/2023 04:00 AM    Albumin 2.5 (L) 03/19/2023 04:00 AM    Globulin 3.7 03/19/2023 04:00 AM       No results found for: IRON, FE, TIBC, IBCT, PSAT, FERR    No results found for: SR, CRP, JOAQUINA, ANAIGG, RA, RPR, RPRT, VDRLT, VDRLS, TSH, TSHEXT     No results found for: PH, PHI, PCO2, PCO2I, PO2, PO2I, HCO3, HCO3I, FIO2, FIO2I    No results found for: CPK, RCK1, RCK2, RCK3, RCK4, CKNDX, CKND1, TROPT, TROIQ, BNPP, BNP     Lab Results   Component Value Date/Time    Culture result: NO GROWTH AFTER 21 HOURS 03/18/2023 10:02 AM    Culture result: (A) 03/15/2023 05:39 PM     STAPHYLOCOCCUS SPECIES, COAGULASE NEGATIVE GROWING IN 1 OF 4 BOTTLES DRAWN  SITE = LAC    Culture result:  03/15/2023 05:39 PM     (NOTE) PRELIMINARY REPORT OF [facility] CALLED TO AND READ BACK BY KRISTYN OBANDO RN AT 2047 ON 3/17/23. CH. No results found for: TOXA1, RPR, HBCM, HBSAG, HAAB, HCAB1, HAAT, G6PD, CRYAC, HIVGT, HIVR, HIV1, HIV12, HIVPC, HIVRPI    No results found for: VANCT, CPK    Lab Results   Component Value Date/Time    Color YELLOW/STRAW 07/19/2016 12:08 PM    Appearance CLEAR 07/19/2016 12:08 PM    pH (UA) 5.5 07/19/2016 12:08 PM    Protein NEGATIVE 07/19/2016 12:08 PM    Glucose NEGATIVE 07/19/2016 12:08 PM    Ketone NEGATIVE 07/19/2016 12:08 PM    Bilirubin NEGATIVE 07/19/2016 12:08 PM    Blood NEGATIVE 07/19/2016 12:08 PM    Urobilinogen 0.2 07/19/2016 12:08 PM    Nitrites NEGATIVE 07/19/2016 12:08 PM    Leukocyte Esterase NEGATIVE 07/19/2016 12:08 PM    WBC 0-4 07/19/2016 12:08 PM    RBC 5-10 07/19/2016 12:08 PM    Bacteria NEGATIVE 07/19/2016 12:08 PM       IMPRESSION  CAP  Asthma Exacerbation  Syncope  DIONISIO  HTN  DM  JANET  Hx of pseudomonas PNA (2021)    PLAN  Maintain oxygen saturations > 90%; currently on RA  PNA workup; sputum culture ordered  Pulmicort/Brovana nebs; DuoNebs Q6  Repeat CXR today  Increase IVCS today to 60 mg Q8; may be able to wean tomorrow  Azithro and Ceftriaxone; failed outpatient Levaquin  Continue Singulair and Mucinex  V/Q scan ordered and LE dopplers  PT  Encourage IS and OOB as much as possible  DVT prophylaxis: Lovenox  GI prophylaxis: Protonix    Thank you for allowing us to participate in the care of this patient. We will be happy to follow along in her progress with you. Dr. Sade Pascual immediately available for consultation.      Nohelia Spine, NP  Case reviewed with NP, 70 yo with pneumonia, asthma exacerbation, I agree with documentation and plan of care

## 2023-03-19 NOTE — PROGRESS NOTES
Problem: Falls - Risk of  Goal: *Absence of Falls  Description: Document Prentiss Bolognese Fall Risk and appropriate interventions in the flowsheet.   Outcome: Progressing Towards Goal  Note: Fall Risk Interventions:

## 2023-03-20 ENCOUNTER — APPOINTMENT (OUTPATIENT)
Dept: NUCLEAR MEDICINE | Age: 78
DRG: 177 | End: 2023-03-20
Attending: INTERNAL MEDICINE
Payer: MEDICARE

## 2023-03-20 LAB
ALBUMIN SERPL-MCNC: 2.4 G/DL (ref 3.5–5)
ALBUMIN/GLOB SERPL: 0.7 (ref 1.1–2.2)
ALP SERPL-CCNC: 70 U/L (ref 45–117)
ALT SERPL-CCNC: 98 U/L (ref 12–78)
ANION GAP SERPL CALC-SCNC: 7 MMOL/L (ref 5–15)
AST SERPL-CCNC: 116 U/L (ref 15–37)
BASOPHILS # BLD: 0 K/UL (ref 0–0.1)
BASOPHILS NFR BLD: 0 % (ref 0–1)
BILIRUB SERPL-MCNC: 0.4 MG/DL (ref 0.2–1)
BUN SERPL-MCNC: 41 MG/DL (ref 6–20)
BUN/CREAT SERPL: 33 (ref 12–20)
CALCIUM SERPL-MCNC: 9.3 MG/DL (ref 8.5–10.1)
CHLORIDE SERPL-SCNC: 108 MMOL/L (ref 97–108)
CO2 SERPL-SCNC: 21 MMOL/L (ref 21–32)
CREAT SERPL-MCNC: 1.25 MG/DL (ref 0.55–1.02)
DIFFERENTIAL METHOD BLD: ABNORMAL
EOSINOPHIL # BLD: 0 K/UL (ref 0–0.4)
EOSINOPHIL NFR BLD: 0 % (ref 0–7)
ERYTHROCYTE [DISTWIDTH] IN BLOOD BY AUTOMATED COUNT: 13.7 % (ref 11.5–14.5)
GLOBULIN SER CALC-MCNC: 3.5 G/DL (ref 2–4)
GLUCOSE BLD STRIP.AUTO-MCNC: 115 MG/DL (ref 65–117)
GLUCOSE BLD STRIP.AUTO-MCNC: 143 MG/DL (ref 65–117)
GLUCOSE BLD STRIP.AUTO-MCNC: 148 MG/DL (ref 65–117)
GLUCOSE BLD STRIP.AUTO-MCNC: 151 MG/DL (ref 65–117)
GLUCOSE SERPL-MCNC: 157 MG/DL (ref 65–100)
HCT VFR BLD AUTO: 32.1 % (ref 35–47)
HGB BLD-MCNC: 11 G/DL (ref 11.5–16)
IMM GRANULOCYTES # BLD AUTO: 0.1 K/UL (ref 0–0.04)
IMM GRANULOCYTES NFR BLD AUTO: 1 % (ref 0–0.5)
LYMPHOCYTES # BLD: 0.5 K/UL (ref 0.8–3.5)
LYMPHOCYTES NFR BLD: 6 % (ref 12–49)
MCH RBC QN AUTO: 32.5 PG (ref 26–34)
MCHC RBC AUTO-ENTMCNC: 34.3 G/DL (ref 30–36.5)
MCV RBC AUTO: 95 FL (ref 80–99)
MONOCYTES # BLD: 0.6 K/UL (ref 0–1)
MONOCYTES NFR BLD: 7 % (ref 5–13)
NEUTS SEG # BLD: 6.7 K/UL (ref 1.8–8)
NEUTS SEG NFR BLD: 86 % (ref 32–75)
NRBC # BLD: 0 K/UL (ref 0–0.01)
NRBC BLD-RTO: 0 PER 100 WBC
PLATELET # BLD AUTO: 217 K/UL (ref 150–400)
PMV BLD AUTO: 9.7 FL (ref 8.9–12.9)
POTASSIUM SERPL-SCNC: 3.8 MMOL/L (ref 3.5–5.1)
PROT SERPL-MCNC: 5.9 G/DL (ref 6.4–8.2)
RBC # BLD AUTO: 3.38 M/UL (ref 3.8–5.2)
SERVICE CMNT-IMP: ABNORMAL
SERVICE CMNT-IMP: NORMAL
SODIUM SERPL-SCNC: 136 MMOL/L (ref 136–145)
WBC # BLD AUTO: 7.8 K/UL (ref 3.6–11)

## 2023-03-20 PROCEDURE — 85025 COMPLETE CBC W/AUTO DIFF WBC: CPT

## 2023-03-20 PROCEDURE — 65270000029 HC RM PRIVATE

## 2023-03-20 PROCEDURE — 74011250637 HC RX REV CODE- 250/637: Performed by: INTERNAL MEDICINE

## 2023-03-20 PROCEDURE — 74011000250 HC RX REV CODE- 250: Performed by: INTERNAL MEDICINE

## 2023-03-20 PROCEDURE — 94640 AIRWAY INHALATION TREATMENT: CPT

## 2023-03-20 PROCEDURE — 74011250636 HC RX REV CODE- 250/636: Performed by: INTERNAL MEDICINE

## 2023-03-20 PROCEDURE — 36415 COLL VENOUS BLD VENIPUNCTURE: CPT

## 2023-03-20 PROCEDURE — 78582 LUNG VENTILAT&PERFUS IMAGING: CPT

## 2023-03-20 PROCEDURE — 94664 DEMO&/EVAL PT USE INHALER: CPT

## 2023-03-20 PROCEDURE — 74011250636 HC RX REV CODE- 250/636: Performed by: NURSE PRACTITIONER

## 2023-03-20 PROCEDURE — 97116 GAIT TRAINING THERAPY: CPT

## 2023-03-20 PROCEDURE — 82962 GLUCOSE BLOOD TEST: CPT

## 2023-03-20 PROCEDURE — 80053 COMPREHEN METABOLIC PANEL: CPT

## 2023-03-20 PROCEDURE — 77010033678 HC OXYGEN DAILY

## 2023-03-20 PROCEDURE — 94761 N-INVAS EAR/PLS OXIMETRY MLT: CPT

## 2023-03-20 PROCEDURE — 74011636637 HC RX REV CODE- 636/637: Performed by: INTERNAL MEDICINE

## 2023-03-20 RX ORDER — BENZONATATE 100 MG/1
100 CAPSULE ORAL 3 TIMES DAILY
Status: DISCONTINUED | OUTPATIENT
Start: 2023-03-20 | End: 2023-03-31 | Stop reason: HOSPADM

## 2023-03-20 RX ADMIN — METHYLPREDNISOLONE SODIUM SUCCINATE 60 MG: 125 INJECTION, POWDER, FOR SOLUTION INTRAMUSCULAR; INTRAVENOUS at 17:07

## 2023-03-20 RX ADMIN — IPRATROPIUM BROMIDE AND ALBUTEROL SULFATE 3 ML: .5; 3 SOLUTION RESPIRATORY (INHALATION) at 07:41

## 2023-03-20 RX ADMIN — GUAIFENESIN 1200 MG: 600 TABLET ORAL at 08:48

## 2023-03-20 RX ADMIN — IPRATROPIUM BROMIDE AND ALBUTEROL SULFATE 3 ML: .5; 3 SOLUTION RESPIRATORY (INHALATION) at 19:20

## 2023-03-20 RX ADMIN — IPRATROPIUM BROMIDE AND ALBUTEROL SULFATE 3 ML: .5; 3 SOLUTION RESPIRATORY (INHALATION) at 14:11

## 2023-03-20 RX ADMIN — BENZONATATE 100 MG: 100 CAPSULE ORAL at 22:02

## 2023-03-20 RX ADMIN — Medication 2 UNITS: at 17:06

## 2023-03-20 RX ADMIN — PANTOPRAZOLE SODIUM 40 MG: 40 TABLET, DELAYED RELEASE ORAL at 05:33

## 2023-03-20 RX ADMIN — Medication 10 ML: at 03:01

## 2023-03-20 RX ADMIN — Medication 10 ML: at 22:02

## 2023-03-20 RX ADMIN — CLONIDINE HYDROCHLORIDE 0.1 MG: 0.1 TABLET ORAL at 17:07

## 2023-03-20 RX ADMIN — DILTIAZEM HYDROCHLORIDE 240 MG: 120 CAPSULE, COATED, EXTENDED RELEASE ORAL at 08:48

## 2023-03-20 RX ADMIN — METHYLPREDNISOLONE SODIUM SUCCINATE 60 MG: 125 INJECTION, POWDER, FOR SOLUTION INTRAMUSCULAR; INTRAVENOUS at 08:49

## 2023-03-20 RX ADMIN — ENOXAPARIN SODIUM 40 MG: 100 INJECTION SUBCUTANEOUS at 08:48

## 2023-03-20 RX ADMIN — Medication 10 ML: at 14:40

## 2023-03-20 RX ADMIN — MONTELUKAST 10 MG: 10 TABLET, FILM COATED ORAL at 08:48

## 2023-03-20 RX ADMIN — GUAIFENESIN 1200 MG: 600 TABLET ORAL at 20:00

## 2023-03-20 RX ADMIN — BUDESONIDE 500 MCG: 0.5 INHALANT RESPIRATORY (INHALATION) at 07:36

## 2023-03-20 RX ADMIN — BENZONATATE 100 MG: 100 CAPSULE ORAL at 14:40

## 2023-03-20 RX ADMIN — AZITHROMYCIN DIHYDRATE 500 MG: 500 INJECTION, POWDER, LYOPHILIZED, FOR SOLUTION INTRAVENOUS at 17:14

## 2023-03-20 RX ADMIN — METHYLPREDNISOLONE SODIUM SUCCINATE 60 MG: 125 INJECTION, POWDER, FOR SOLUTION INTRAMUSCULAR; INTRAVENOUS at 03:00

## 2023-03-20 RX ADMIN — IPRATROPIUM BROMIDE AND ALBUTEROL SULFATE 3 ML: .5; 3 SOLUTION RESPIRATORY (INHALATION) at 01:27

## 2023-03-20 RX ADMIN — Medication 2 UNITS: at 08:48

## 2023-03-20 RX ADMIN — CLONIDINE HYDROCHLORIDE 0.1 MG: 0.1 TABLET ORAL at 08:48

## 2023-03-20 RX ADMIN — Medication 10 ML: at 05:35

## 2023-03-20 RX ADMIN — ARFORMOTEROL TARTRATE 15 MCG: 15 SOLUTION RESPIRATORY (INHALATION) at 07:36

## 2023-03-20 RX ADMIN — ARFORMOTEROL TARTRATE 15 MCG: 15 SOLUTION RESPIRATORY (INHALATION) at 19:24

## 2023-03-20 RX ADMIN — CEFTRIAXONE SODIUM 2 G: 2 INJECTION, POWDER, FOR SOLUTION INTRAMUSCULAR; INTRAVENOUS at 17:07

## 2023-03-20 RX ADMIN — BUDESONIDE 500 MCG: 0.5 INHALANT RESPIRATORY (INHALATION) at 19:24

## 2023-03-20 NOTE — PROGRESS NOTES
Problem: Falls - Risk of  Goal: *Absence of Falls  Description: Document Janina Singer Fall Risk and appropriate interventions in the flowsheet. 3/20/2023 0435 by Jeannie Sever, RN  Outcome: Progressing Towards Goal  Note: Fall Risk Interventions:                             3/20/2023 0434 by Jeannie Sever, RN  Outcome: Progressing Towards Goal  Note: Fall Risk Interventions:                                Problem:  Activity Intolerance  Goal: *Oxygen saturation during activity within specified parameters  Outcome: Progressing Towards Goal

## 2023-03-20 NOTE — WOUND CARE
Wound Consult:  new consult Visit. Chart reviewed. Consulted for left arm skin tear. Spoke with patients nurse,  Kateryna Townsend RN. Patient is resting on a corey bed with GUILLERMO mattress. .  Patient is awake, alert, oriented; Moves self in bed easily  Kee score 21. Assessment:  Left posterior elbow- skin [id number]- pink, 50% flap intact, no drainage noted, no surrounding redness. Bilateral heels, buttocks, sacrum- no redness noted  Treatment:  Left arm skin tear- cleansed with NSS, mepitle1 and small sacral foam.  Wound Recommendations:  Left arm- cleanse with NSS, mepitle1 and small sacral foam,change every 3 days    Plan:  Spoke with Dr. Khurram Gasca regarding findings and proposed orders for treatment. Please re-consult should concerns arise despite continued skin/PI prevention measures.   [address] [place], Wound / [address] [facility] Healing Office [phone]

## 2023-03-20 NOTE — PROGRESS NOTES
Bedside shift change report given to [address] [facility] Radha (oncoming nurse) by Ruperto Saldivar (offgoing nurse). Report included the following information SBAR, Kardex, and MAR.

## 2023-03-20 NOTE — PROGRESS NOTES
Problem: Falls - Risk of  Goal: *Absence of Falls  Description: Document Terri Saleem Fall Risk and appropriate interventions in the flowsheet.   Outcome: Progressing Towards Goal  Note: Fall Risk Interventions:                                Problem: Patient Education: Go to Patient Education Activity  Goal: Patient/Family Education  Outcome: Progressing Towards Goal

## 2023-03-20 NOTE — PROGRESS NOTES
Name: June Brannon: [facility]   : 1945 Admit Date: 3/15/2023   Phone:   Room: 511/01   PCP: Diego Monson MD  MRN: [id number]   Date: 3/20/2023  Code: Full Code          Chart and notes reviewed. Data reviewed. I review the patient's current medications in the medical record at each encounter. I have evaluated and examined the patient. HPI:    10:47 AM       History was obtained from patient. I was asked by Caroline Camacho MD to see Yasmine Sierra in consultation for a chief complaint of PNA. History of Present Illness:    69 y.o. F with history of asthma, seasonal/environmental allergies, GERD, HTN, pseudomonas PNA, JANET, and DM. Followed outpatient by Dr. Conor Bales. Has not been seen in about a year. She is on Advair and Spiriva as maintenance. She tends to develop bronchitis at the start of allergy season. Per daughter, she had been treated outpatient for bronchitis. She was placed on Levaquin and despite treatment, was worsening. She had a syncopal episode on the toilet and was brought to ED for further evaluation. She feels that cough is not as productive now, but more dry and painful. Cough causes some SOB now. Wheezing as well. Afebrile  BP stable  HR stable  Oxygen saturations stable on 1L NC  WBC 8.7  D-dimer 4.08 (3/18/23)  Creatinine 1.25  BC 3/15/23: positive 1/4 bottles coat negative staph  Repeat BC 3/18/23: ngtd  RVP negative  Mycoplasma, legionella, strep pneumo pending  ECHO: EF 55-60%, RA mildly dilated  Images reviewed:    LE Dopplers 3/19/23: negative. ECHO 3/17/23: EF 55-60%; LV mildly dilated; diastolic dysfunction present with increased LAP; mild sclerosis of the aortic valve cusp; mildly thickened leaflet of mitral valve, at the posterior leaflet; RA mildly dilated. CXR 3/15/23: left basilar airspace disease. PFT's 2021: normal spirometry. ROS:  Feeling about the same. Still very SOB with exertion.     Past Medical History:   Diagnosis Date    Asthma     Diabetes ([facility] [address]     type 2    GERD (gastroesophageal reflux disease)     Hypertension     Ill-defined condition     high cholesterol    Ill-defined condition     seasonal allergies    Ill-defined condition     gout       Past Surgical History:   Procedure Laterality Date    HX APPENDECTOMY      HX HEENT  2010    cataract     HX HYSTERECTOMY  1975    HX LAP CHOLECYSTECTOMY  1985    HX OOPHORECTOMY      benign ovarian tumor    HX OTHER SURGICAL  1993    bladder tack    HX ROTATOR CUFF REPAIR  2015    right    HX TONSILLECTOMY  1964       Family History   Problem Relation Age of Onset    Cancer Mother         uterine    Heart Disease Mother         afib    Heart Disease Father 48        MI    Hypertension Father        Social History     Tobacco Use    Smoking status: Former     Packs/day: 0.25     Types: Cigarettes     Quit date:      Years since quittin.2    Smokeless tobacco: Never    Tobacco comments:     smoked x 1 year   Substance Use Topics    Alcohol use: No       Allergies   Allergen Reactions    Aspirin Cough    Beta Blocker [Beta-Blockers (Beta-Adrenergic Blocking Agts)] Other (comments)     Was told by her PCP to report that she has a mutation- she does not recall a reaction    Codeine Rash       Current Facility-Administered Medications   Medication Dose Route Frequency    hydrALAZINE (APRESOLINE) 20 mg/mL injection 10 mg  10 mg IntraVENous Q4H PRN    albuterol-ipratropium (DUO-NEB) 2.5 MG-0.5 MG/3 ML  3 mL Nebulization Q6H RT    cefTRIAXone (ROCEPHIN) 2 g in 0.9% sodium chloride 20 mL IV syringe  2 g IntraVENous Q24H    guaiFENesin ER (MUCINEX) tablet 1,200 mg  1,200 mg Oral Q12H    methylPREDNISolone (PF) (SOLU-MEDROL) injection 60 mg  60 mg IntraVENous Q8H    albuterol-ipratropium (DUO-NEB) 2.5 MG-0.5 MG/3 ML  3 mL Nebulization ONCE    cloNIDine HCL (CATAPRES) tablet 0.1 mg  0.1 mg Oral BID    azithromycin (ZITHROMAX) 500 mg in 0.9% sodium chloride 250 mL ([id number])  500 mg IntraVENous Q24H    [Held by provider] atorvastatin (LIPITOR) tablet 40 mg  40 mg Oral QPM    [Held by provider] valsartan (DIOVAN) tablet 40 mg  40 mg Oral DAILY    dilTIAZem ER (CARDIZEM CD) capsule 240 mg  240 mg Oral DAILY    [Held by provider] ezetimibe (ZETIA) tablet 10 mg  10 mg Oral QPM    arformoteroL (BROVANA) neb solution 15 mcg  15 mcg Nebulization BID RT    And    budesonide (PULMICORT) 500 mcg/2 ml nebulizer suspension  500 mcg Nebulization BID RT    [Held by provider] hydroCHLOROthiazide (HYDRODIURIL) tablet 25 mg  25 mg Oral DAILY    melatonin tablet 3 mg  3 mg Oral QHS PRN    montelukast (SINGULAIR) tablet 10 mg  10 mg Oral DAILY    pantoprazole (PROTONIX) tablet 40 mg  40 mg Oral ACB    insulin lispro (HUMALOG) injection   SubCUTAneous AC&HS    glucose chewable tablet 16 g  4 Tablet Oral PRN    glucagon (GLUCAGEN) injection 1 mg  1 mg IntraMUSCular PRN    dextrose 10% infusion 0-250 mL  0-250 mL IntraVENous PRN    sodium chloride (NS) flush 5-40 mL  5-40 mL IntraVENous Q8H    sodium chloride (NS) flush 5-40 mL  5-40 mL IntraVENous PRN    acetaminophen (TYLENOL) tablet 650 mg  650 mg Oral Q6H PRN    Or    acetaminophen (TYLENOL) suppository 650 mg  650 mg Rectal Q6H PRN    polyethylene glycol (MIRALAX) packet 17 g  17 g Oral DAILY PRN    ondansetron (ZOFRAN ODT) tablet 4 mg  4 mg Oral Q8H PRN    Or    ondansetron (ZOFRAN) injection 4 mg  4 mg IntraVENous Q6H PRN    enoxaparin (LOVENOX) injection 40 mg  40 mg SubCUTAneous DAILY         REVIEW OF SYSTEMS   12 point ROS negative except as stated in the HPI. Physical Exam:   Visit Vitals  BP (!) 149/89 (BP 1 Location: Left upper arm, BP Patient Position: At rest)   Pulse 86   Temp 97.5 °F (36.4 °C)   Resp 18   Ht 5' 5\" (1.651 m)   Wt 82 kg (180 lb 12.4 oz)   SpO2 94%   BMI 30.08 kg/m²       General:  Alert, cooperative, no distress, appears stated age.    Head:  Normocephalic, without obvious abnormality, atraumatic. Eyes:  Conjunctivae/corneas clear. Nose: Nares normal. Septum midline. Mucosa normal.    Throat: Lips, mucosa, and tongue normal.    Neck: Supple, symmetrical, trachea midline, no adenopathy. Lungs:   Diminished bases   Chest wall:  No tenderness or deformity. Heart:  Regular rate and rhythm, S1, S2 normal, no murmur, click, rub or gallop. Abdomen:   Soft, non-tender. Bowel sounds normal. No masses,  No organomegaly. Extremities: Extremities normal, atraumatic, no cyanosis or edema. Pulses: 2+ and symmetric all extremities. Skin: Skin color, texture, turgor normal. No rashes or lesions   Lymph nodes: Cervical, supraclavicular nodes normal.   Neurologic: Grossly nonfocal       Lab Results   Component Value Date/Time    Sodium 136 03/20/2023 02:03 AM    Potassium 3.8 03/20/2023 02:03 AM    Chloride 108 03/20/2023 02:03 AM    CO2 21 03/20/2023 02:03 AM    BUN 41 (H) 03/20/2023 02:03 AM    Creatinine 1.25 (H) 03/20/2023 02:03 AM    Glucose 157 (H) 03/20/2023 02:03 AM    Calcium 9.3 03/20/2023 02:03 AM    Lactic acid 1.2 03/15/2023 05:39 PM       Lab Results   Component Value Date/Time    WBC 7.8 03/20/2023 02:03 AM    HGB 11.0 (L) 03/20/2023 02:03 AM    PLATELET 082 63/08/2742 02:03 AM    MCV 95.0 03/20/2023 02:03 AM       Lab Results   Component Value Date/Time    Alk.  phosphatase 70 03/20/2023 02:03 AM    Protein, total 5.9 (L) 03/20/2023 02:03 AM    Albumin 2.4 (L) 03/20/2023 02:03 AM    Globulin 3.5 03/20/2023 02:03 AM       No results found for: IRON, FE, TIBC, IBCT, PSAT, FERR    No results found for: SR, CRP, JOAQUINA, ANAIGG, RA, RPR, RPRT, VDRLT, VDRLS, TSH, TSHEXT, TSHEXT     No results found for: PH, PHI, PCO2, PCO2I, PO2, PO2I, HCO3, HCO3I, FIO2, FIO2I    No results found for: CPK, RCK1, RCK2, RCK3, RCK4, CKNDX, CKND1, TROPT, TROIQ, BNPP, BNP     Lab Results   Component Value Date/Time    Culture result: NO GROWTH 2 DAYS 03/18/2023 10:02 AM    Culture result: MRSA NOT PRESENT 03/18/2023 09:50 AM    Culture result:  03/18/2023 09:50 AM     Screening of patient nares for MRSA is for surveillance purposes and, if positive, to facilitate isolation considerations in high risk settings. It is not intended for automatic decolonization interventions per se as regimens are not sufficiently effective to warrant routine use.          No results found for: TOXA1, RPR, HBCM, HBSAG, HAAB, HCAB1, HAAT, G6PD, CRYAC, HIVGT, HIVR, HIV1, HIV12, HIVPC, HIVRPI    No results found for: VANCT, CPK    Lab Results   Component Value Date/Time    Color YELLOW/STRAW 07/19/2016 12:08 PM    Appearance CLEAR 07/19/2016 12:08 PM    pH (UA) 5.5 07/19/2016 12:08 PM    Protein NEGATIVE 07/19/2016 12:08 PM    Glucose NEGATIVE 07/19/2016 12:08 PM    Ketone NEGATIVE 07/19/2016 12:08 PM    Bilirubin NEGATIVE 07/19/2016 12:08 PM    Blood NEGATIVE 07/19/2016 12:08 PM    Urobilinogen 0.2 07/19/2016 12:08 PM    Nitrites NEGATIVE 07/19/2016 12:08 PM    Leukocyte Esterase NEGATIVE 07/19/2016 12:08 PM    WBC 0-4 07/19/2016 12:08 PM    RBC 5-10 07/19/2016 12:08 PM    Bacteria NEGATIVE 07/19/2016 12:08 PM       IMPRESSION  CAP  Asthma Exacerbation  Syncope  DIONISIO  HTN  DM  JANET  Hx of pseudomonas PNA (2021)    PLAN  Maintain oxygen saturations > 90%; currently on RA  Follow-up PNA workup; sputum culture ordered  Pulmicort/Brovana nebs; DuoNebs Q6  Continue IVCS today to 60 mg Q8; may be able to wean tomorrow  Azithro and Ceftriaxone; failed outpatient Levaquin  Continue Singulair and Mucinex  V/Q scan ordered and LE dopplers  PT  Encourage IS and OOB as much as possible  DVT prophylaxis: Lovenox  GI prophylaxis: Protonix      Tommy Conte NP

## 2023-03-20 NOTE — PROGRESS NOTES
Hospitalist Progress Note  Sherine Jonathanjessica,   Answering service: [phone] from in house phone        Date of Service:  3/20/2023  NAME:  Caitlin Ibarra  :  1945  MRN:  [id number]      Admission Summary/HPI:   The patient is a 70 y/o C F w/ PMH DM 2 who comes in w/ brief syncopal episode today after she got up from the toilet and was going to her bedroom. This syncope was unwitnessed but she did not have any post-ictal confusion. She has no chest pain, palpitations, , wheezing, dyspnea, weight gain or lower extremity edema. She has no fever or night sweats but did report of chills  On ROS she notes of non-productive cough and wheezing. The patient was recently treated for bronchitis. Interval history / Subjective: Fu pna, syncope. Now with mild hypoxia; 02 sat < 91% on RA. Continues to report dyspnea, fatigue. Family at bedside. Assessment & Plan:        Sepsis 2/2 left lower lobe community-acquired pneumonia, mild/ Staph Bacteremia/ Left lower lobe community-acquired pneumonia  -worsened despite p.o.  Levaquin; continue ceftriaxone and azithromycin  -blood cx 3/15 with staph bacteremia - suspect contamination; repeat blood cx NTD    --repeat CXR 3/19 with decreased left base pna   -follow pna cx; RVP neg   --Continue high dose IV ceftriaxone and azithro   -s/p IV fluids  -monitor     History of asthma  --does appear in exacerbation; worsening   --continue IV steroids dose   --Change Duonebs to clay   --Cont w/ Brovana and Pulmicort  --Cont singulair, clay mucinex   --IS    --pulm following     Syncope  --Likely due to tachycardia in the setting of sepsis due to pneumonia  --Continuous telemetry  --CT head negative  --Echo w/ some diastolic dysfunction   --- d-dimer elevated;  LE dopplers and v/q scan neg   --No prodrome  --Orthostatics once improved from pneumonia perspective    DIONISIO or CKD  -not sure of baseline Cr; states her PCP has mentioned Cr fluctuates   -hold ARB for now;continue to hold HCTZ   -monitor     Hypertension, POA  --Currently slight hypertensive; likely due to steroids  --Cont w/ Diltiazem 240mg daily  --Cont w/ Clonidine 0.1mg bid  --hold w/ HCTZ 25mg daily/ hold diovan for now  --IV hydralazine prn     Hyperlipidemia, POA  --Continue statin and zeita     Diabetes with hyperglycemia, POA  --Hold home meds  --SSI  --Long acting as needed  --diabetes nurse consult     I have independently reviewed and interpreted patient's lab and other diagnostic data. External notes were reviewed  Critical Care Time: Not applicable    Code status: Full  Prophylaxis: lovenox, SCD  Care Plan discussed with: Patient, RN, Family   Anticipated Disposition:      Hospital Problems  Never Reviewed            Codes Class Noted POA    Sepsis ([facility] [address] ICD-10-CM: A41.9  ICD-9-CM: 038.9, 995.91  3/16/2023 Unknown        Vasovagal syncope ICD-10-CM: R55  ICD-9-CM: 780.2  3/15/2023 Unknown         Review of Systems:   A comprehensive review of systems was negative except for that written above         Vital Signs:    Last 24hrs VS reviewed since prior progress note.  Most recent are:    Patient Vitals for the past 24 hrs:   Temp Pulse Resp BP SpO2   03/20/23 1445 -- 84 -- -- --   03/20/23 1412 -- -- -- -- 93 %   03/20/23 1133 98.4 °F (36.9 °C) 82 18 139/73 94 %   03/20/23 0818 97.5 °F (36.4 °C) 86 18 (!) 149/89 94 %   03/20/23 0740 -- -- -- -- 94 %   03/20/23 0738 -- -- -- -- 94 %   03/20/23 0700 -- 80 -- -- --   03/20/23 0452 98.3 °F (36.8 °C) 73 16 (!) 155/71 91 %   03/20/23 0127 -- -- -- -- 96 %   03/20/23 0052 97.6 °F (36.4 °C) 71 16 (!) 146/73 94 %   03/19/23 2328 -- 74 -- -- --   03/19/23 2221 -- -- -- -- 91 %   03/19/23 2038 98 °F (36.7 °C) 89 16 121/74 90 %   03/19/23 1936 -- -- -- -- 93 %   03/19/23 1548 98 °F (36.7 °C) 89 16 (!) 141/68 92 %            Intake/Output Summary (Last 24 hours) at 3/20/2023 1579 [facility] filed at 3/20/2023 3058  Gross per 24 hour   Intake 290 ml   Output 0 ml   Net 290 ml            Physical Examination:     I had a face to face encounter with this patient and independently examined them on 3/20/2023 as outlined below:          General : alert x 3, awake, no acute distress,   HEENT: PEERL, EOMI, moist mucus membrane, TM clear  Neck: supple, no JVD, no meningeal signs  Chest: diminished b/l   CVS: S1 S2 heard, Capillary refill less than 2 seconds  Abd: soft/ non tender, non distended, BS physiological,   Ext: no clubbing, no cyanosis, no edema  Neuro/Psych: pleasant mood and affect, CN 2-12 grossly intact, sensory grossly within normal limit, diffuse weakness  Skin: warm            Data Review:    Review and/or order of clinical lab test  Review and/or order of tests in the radiology section of CPT  Review and/or order of tests in the medicine section of CPT    I have independently reviewed and interpreted patient's lab and all other diagnostic data    Notes reviewed from all clinical/nonclinical/nursing services involved in patient's clinical care. Care coordination discussions were held with appropriate clinical/nonclinical/ nursing providers based on care coordination needs. NM LUNG VENT/PERF    Result Date: 3/20/2023  1. Very low probability perfusion scan. Labs:     Recent Labs     03/20/23 0203 03/19/23  0400   WBC 7.8 8.7   HGB 11.0* 11.1*   HCT 32.1* 32.9*    204       Recent Labs     03/20/23 0203 03/19/23  0400 03/18/23  0156    136 135*   K 3.8 3.9 4.0    109* 108   CO2 21 22 22   BUN 41* 33* 22*   CREA 1.25* 1.23* 1.05*   * 163* 191*   CA 9.3 9.2 9.2       Recent Labs     03/20/23 0203 03/19/23  0400 03/18/23  0156   ALT 98* 71 34   AP 70 78 113   TBILI 0.4 0.4 0.7   TP 5.9* 6.2* 6.2*   ALB 2.4* 2.5* 2.4*   GLOB 3.5 3.7 3.8       No results for input(s): INR, PTP, APTT, INREXT, INREXT in the last 72 hours.    No results for input(s): FE, TIBC, PSAT, FERR in the last 72 hours. No results found for: FOL, RBCF   No results for input(s): PH, PCO2, PO2 in the last 72 hours. No results for input(s): CPK, CKNDX, TROIQ in the last 72 hours.     No lab exists for component: CPKMB  No results found for: CHOL, CHOLX, CHLST, CHOLV, HDL, HDLP, LDL, LDLC, DLDLP, TGLX, Nelson Dress, CHHD, CHHDX  Lab Results   Component Value Date/Time    Glucose (POC) 115 03/20/2023 11:32 AM    Glucose (POC) 143 (H) 03/20/2023 08:17 AM    Glucose (POC) 179 (H) 03/19/2023 10:14 PM    Glucose (POC) 133 (H) 03/19/2023 03:58 PM    Glucose (POC) 130 (H) 03/19/2023 11:59 AM     Lab Results   Component Value Date/Time    Color YELLOW/STRAW 07/19/2016 12:08 PM    Appearance CLEAR 07/19/2016 12:08 PM    Specific gravity 1.020 07/19/2016 12:08 PM    pH (UA) 5.5 07/19/2016 12:08 PM    Protein NEGATIVE 07/19/2016 12:08 PM    Glucose NEGATIVE 07/19/2016 12:08 PM    Ketone NEGATIVE 07/19/2016 12:08 PM    Bilirubin NEGATIVE 07/19/2016 12:08 PM    Urobilinogen 0.2 07/19/2016 12:08 PM    Nitrites NEGATIVE 07/19/2016 12:08 PM    Leukocyte Esterase NEGATIVE 07/19/2016 12:08 PM    Epithelial cells FEW 07/19/2016 12:08 PM    Bacteria NEGATIVE 07/19/2016 12:08 PM    WBC 0-4 07/19/2016 12:08 PM    RBC 5-10 07/19/2016 12:08 PM         Medications Reviewed:     Current Facility-Administered Medications   Medication Dose Route Frequency    benzonatate (TESSALON) capsule 100 mg  100 mg Oral TID    hydrALAZINE (APRESOLINE) 20 mg/mL injection 10 mg  10 mg IntraVENous Q4H PRN    albuterol-ipratropium (DUO-NEB) 2.5 MG-0.5 MG/3 ML  3 mL Nebulization Q6H RT    cefTRIAXone (ROCEPHIN) 2 g in 0.9% sodium chloride 20 mL IV syringe  2 g IntraVENous Q24H    guaiFENesin ER (MUCINEX) tablet 1,200 mg  1,200 mg Oral Q12H    methylPREDNISolone (PF) (SOLU-MEDROL) injection 60 mg  60 mg IntraVENous Q8H    cloNIDine HCL (CATAPRES) tablet 0.1 mg  0.1 mg Oral BID    azithromycin (ZITHROMAX) 500 mg in 0.9% sodium chloride 250 mL ([id number])  500 mg IntraVENous Q24H    [Held by provider] atorvastatin (LIPITOR) tablet 40 mg  40 mg Oral QPM    [Held by provider] valsartan (DIOVAN) tablet 40 mg  40 mg Oral DAILY    dilTIAZem ER (CARDIZEM CD) capsule 240 mg  240 mg Oral DAILY    [Held by provider] ezetimibe (ZETIA) tablet 10 mg  10 mg Oral QPM    arformoteroL (BROVANA) neb solution 15 mcg  15 mcg Nebulization BID RT    And    budesonide (PULMICORT) 500 mcg/2 ml nebulizer suspension  500 mcg Nebulization BID RT    [Held by provider] hydroCHLOROthiazide (HYDRODIURIL) tablet 25 mg  25 mg Oral DAILY    melatonin tablet 3 mg  3 mg Oral QHS PRN    montelukast (SINGULAIR) tablet 10 mg  10 mg Oral DAILY    pantoprazole (PROTONIX) tablet 40 mg  40 mg Oral ACB    insulin lispro (HUMALOG) injection   SubCUTAneous AC&HS    glucose chewable tablet 16 g  4 Tablet Oral PRN    glucagon (GLUCAGEN) injection 1 mg  1 mg IntraMUSCular PRN    dextrose 10% infusion 0-250 mL  0-250 mL IntraVENous PRN    sodium chloride (NS) flush 5-40 mL  5-40 mL IntraVENous Q8H    sodium chloride (NS) flush 5-40 mL  5-40 mL IntraVENous PRN    acetaminophen (TYLENOL) tablet 650 mg  650 mg Oral Q6H PRN    Or    acetaminophen (TYLENOL) suppository 650 mg  650 mg Rectal Q6H PRN    polyethylene glycol (MIRALAX) packet 17 g  17 g Oral DAILY PRN    ondansetron (ZOFRAN ODT) tablet 4 mg  4 mg Oral Q8H PRN    Or    ondansetron (ZOFRAN) injection 4 mg  4 mg IntraVENous Q6H PRN    enoxaparin (LOVENOX) injection 40 mg  40 mg SubCUTAneous DAILY     ______________________________________________________________________  EXPECTED LENGTH OF STAY: 5d 0h  ACTUAL LENGTH OF STAY:          [facility] Fitzgerald DO

## 2023-03-20 NOTE — PROGRESS NOTES
Bedside shift change report given to CARMELA Stewart  (oncoming nurse) by Joaquina Byrnes  (offgoing nurse). Report included the following information SBAR, Kardex, ED Summary, MAR, Accordion, and Recent Results.

## 2023-03-20 NOTE — PROGRESS NOTES
3/20/2023  Case Management Progress Note    11:53 AM  Patient is 69year old female admitted 3/16 with vasovagal syncope  Patient's RUR is 10% green/low risk for readmission  Covid test: negative 3/18   Chart reviewed--patient discussed at interdisciplinary rounds  Per rounds patient is now having more trouble with her asthma exacerbation than anything else--steroids increased yesterday and maintained today. She is currently on 1L of oxygen and hopeful to be weaned prior to discharge--per attending will need a home O2 assessment tomorrow. PT's last note indicates likely no home needs--at baseline patient is independent and does have family support at home. Will continue to follow and assist with discharge planning as needed.      Transition of Care Plan   Continue medical management/treatment  Home with family unless otherwise indicated  Home O2 assessment prior to dc  Family will transport at discharge  Follow up outpatient  CM will continue to follow    KIRSTIN Ulloa

## 2023-03-20 NOTE — PROGRESS NOTES
Problem: Mobility Impaired (Adult and Pediatric)  Goal: *Acute Goals and Plan of Care (Insert Text)  Description: FUNCTIONAL STATUS PRIOR TO ADMISSION: Patient was independent and active without use of DME. Assists with laundry and cooking for son and his family. HOME SUPPORT PRIOR TO ADMISSION: The patient lived with son and his family; . Physical Therapy Goals  Initiated 3/17/2023  1. Patient will move from supine to sit and sit to supine  in bed with independence within 7 day(s). 2.  Patient will transfer from bed to chair and chair to bed with independence using the least restrictive device within 7 day(s). 3.  Patient will perform sit to stand with independence within 7 day(s). 4.  Patient will ambulate with independence for 150 feet with the least restrictive device within 7 day(s). 5.  Patient will ascend/descend 13 stairs with single handrail(s) with modified independence within 7 day(s). Outcome: Progressing Towards Goal  Note:   PHYSICAL THERAPY TREATMENT  Patient: Asaf Johns (45 y.o. female)  Date: 3/20/2023  Diagnosis: Vasovagal syncope [R55]  [facility] [A41.9] <principal problem not specified>      Precautions:    Chart, physical therapy assessment, plan of care and goals were reviewed. ASSESSMENT  Patient continues with skilled PT services and  progressing towards goals. Pt denies SOB at rest.  Denies dizziness, lightheadedness with positional changes. Dyspneic with limited  activity O2 sat 85% on room air. Replaced 1L with seated rest recovered to 93%. Demonstrates increased trunk sway without AD Pt fatigued and requested to return to bed. Current Level of Function Impacting Discharge (mobility/balance): CGA for OOB activity    Other factors to consider for discharge:          PLAN :  Patient continues to benefit from skilled intervention to address the above impairments. Continue treatment per established plan of care. to address goals.     Recommendation for discharge: (in order for the patient to meet his/her long term goals)  Physical therapy at least 2 days/week in the home     This discharge recommendation:  Has been made in collaboration with the attending provider and/or case management    IF patient discharges home will need the following DME: to be determined (TBD)       SUBJECTIVE:   Patient stated get winded when I get up.     OBJECTIVE DATA SUMMARY:   Critical Behavior:  Neurologic State: Alert, Appropriate for age  Orientation Level: Oriented X4  Cognition: Appropriate decision making     Functional Mobility Training:  Bed Mobility:     Supine to Sit: Modified independent  Sit to Supine: Modified independent           Transfers:  Sit to Stand: Supervision  Stand to Sit: Supervision                             Balance:  Sitting: Intact  Standing: Intact  Ambulation/Gait Training:  Distance (ft): 40 Feet (ft)     Ambulation - Level of Assistance: Contact guard assistance        Gait Abnormalities: Trunk sway increased              Speed/Prema: Pace decreased (<100 feet/min)                       Stairs: Therapeutic Exercises:     Pain Rating:  Denies pain    Activity Tolerance:   Good, desaturates with exertion and requires oxygen, and observed SOB with activity    After treatment patient left in no apparent distress:   Supine in bed, Call bell within reach, and Bed / chair alarm activated    COMMUNICATION/COLLABORATION:   The patients plan of care was discussed with: Registered nurse.      Olinda Espino   Time Calculation: 21 mins

## 2023-03-21 ENCOUNTER — APPOINTMENT (OUTPATIENT)
Dept: ULTRASOUND IMAGING | Age: 78
DRG: 177 | End: 2023-03-21
Attending: INTERNAL MEDICINE
Payer: MEDICARE

## 2023-03-21 LAB
ALBUMIN SERPL-MCNC: 2.7 G/DL (ref 3.5–5)
ALBUMIN/GLOB SERPL: 0.8 (ref 1.1–2.2)
ALP SERPL-CCNC: 69 U/L (ref 45–117)
ALT SERPL-CCNC: 121 U/L (ref 12–78)
ANION GAP SERPL CALC-SCNC: 8 MMOL/L (ref 5–15)
AST SERPL-CCNC: 92 U/L (ref 15–37)
BASOPHILS # BLD: 0 K/UL (ref 0–0.1)
BASOPHILS NFR BLD: 0 % (ref 0–1)
BILIRUB SERPL-MCNC: 0.4 MG/DL (ref 0.2–1)
BNP SERPL-MCNC: 3503 PG/ML
BUN SERPL-MCNC: 46 MG/DL (ref 6–20)
BUN/CREAT SERPL: 33 (ref 12–20)
CALCIUM SERPL-MCNC: 9.8 MG/DL (ref 8.5–10.1)
CHLORIDE SERPL-SCNC: 109 MMOL/L (ref 97–108)
CO2 SERPL-SCNC: 21 MMOL/L (ref 21–32)
CREAT SERPL-MCNC: 1.38 MG/DL (ref 0.55–1.02)
DIFFERENTIAL METHOD BLD: ABNORMAL
EOSINOPHIL # BLD: 0 K/UL (ref 0–0.4)
EOSINOPHIL NFR BLD: 0 % (ref 0–7)
ERYTHROCYTE [DISTWIDTH] IN BLOOD BY AUTOMATED COUNT: 13.8 % (ref 11.5–14.5)
GLOBULIN SER CALC-MCNC: 3.5 G/DL (ref 2–4)
GLUCOSE BLD STRIP.AUTO-MCNC: 122 MG/DL (ref 65–117)
GLUCOSE BLD STRIP.AUTO-MCNC: 136 MG/DL (ref 65–117)
GLUCOSE BLD STRIP.AUTO-MCNC: 174 MG/DL (ref 65–117)
GLUCOSE BLD STRIP.AUTO-MCNC: 96 MG/DL (ref 65–117)
GLUCOSE SERPL-MCNC: 149 MG/DL (ref 65–100)
HCT VFR BLD AUTO: 34.6 % (ref 35–47)
HGB BLD-MCNC: 11.7 G/DL (ref 11.5–16)
IMM GRANULOCYTES # BLD AUTO: 0.1 K/UL (ref 0–0.04)
IMM GRANULOCYTES NFR BLD AUTO: 1 % (ref 0–0.5)
LYMPHOCYTES # BLD: 0.7 K/UL (ref 0.8–3.5)
LYMPHOCYTES NFR BLD: 6 % (ref 12–49)
M PNEUMO IGG SER IA-ACNC: <100 U/ML (ref 0–99)
M PNEUMO IGM SER IA-ACNC: <770 U/ML (ref 0–769)
MCH RBC QN AUTO: 32.3 PG (ref 26–34)
MCHC RBC AUTO-ENTMCNC: 33.8 G/DL (ref 30–36.5)
MCV RBC AUTO: 95.6 FL (ref 80–99)
MONOCYTES # BLD: 0.8 K/UL (ref 0–1)
MONOCYTES NFR BLD: 6 % (ref 5–13)
NEUTS SEG # BLD: 10.7 K/UL (ref 1.8–8)
NEUTS SEG NFR BLD: 87 % (ref 32–75)
NRBC # BLD: 0 K/UL (ref 0–0.01)
NRBC BLD-RTO: 0 PER 100 WBC
PLATELET # BLD AUTO: 272 K/UL (ref 150–400)
PMV BLD AUTO: 9.5 FL (ref 8.9–12.9)
POTASSIUM SERPL-SCNC: 4.1 MMOL/L (ref 3.5–5.1)
PROT SERPL-MCNC: 6.2 G/DL (ref 6.4–8.2)
RBC # BLD AUTO: 3.62 M/UL (ref 3.8–5.2)
SERVICE CMNT-IMP: ABNORMAL
SERVICE CMNT-IMP: NORMAL
SODIUM SERPL-SCNC: 138 MMOL/L (ref 136–145)
WBC # BLD AUTO: 12.3 K/UL (ref 3.6–11)

## 2023-03-21 PROCEDURE — 94640 AIRWAY INHALATION TREATMENT: CPT

## 2023-03-21 PROCEDURE — 74011250636 HC RX REV CODE- 250/636: Performed by: INTERNAL MEDICINE

## 2023-03-21 PROCEDURE — 74011250637 HC RX REV CODE- 250/637: Performed by: INTERNAL MEDICINE

## 2023-03-21 PROCEDURE — 80053 COMPREHEN METABOLIC PANEL: CPT

## 2023-03-21 PROCEDURE — 74011250636 HC RX REV CODE- 250/636: Performed by: NURSE PRACTITIONER

## 2023-03-21 PROCEDURE — 97530 THERAPEUTIC ACTIVITIES: CPT

## 2023-03-21 PROCEDURE — 94664 DEMO&/EVAL PT USE INHALER: CPT

## 2023-03-21 PROCEDURE — 77010033678 HC OXYGEN DAILY

## 2023-03-21 PROCEDURE — 74011636637 HC RX REV CODE- 636/637: Performed by: INTERNAL MEDICINE

## 2023-03-21 PROCEDURE — 83880 ASSAY OF NATRIURETIC PEPTIDE: CPT

## 2023-03-21 PROCEDURE — 36415 COLL VENOUS BLD VENIPUNCTURE: CPT

## 2023-03-21 PROCEDURE — 80074 ACUTE HEPATITIS PANEL: CPT

## 2023-03-21 PROCEDURE — 85025 COMPLETE CBC W/AUTO DIFF WBC: CPT

## 2023-03-21 PROCEDURE — 94761 N-INVAS EAR/PLS OXIMETRY MLT: CPT

## 2023-03-21 PROCEDURE — 97116 GAIT TRAINING THERAPY: CPT

## 2023-03-21 PROCEDURE — 82962 GLUCOSE BLOOD TEST: CPT

## 2023-03-21 PROCEDURE — 65270000029 HC RM PRIVATE

## 2023-03-21 PROCEDURE — 76700 US EXAM ABDOM COMPLETE: CPT

## 2023-03-21 PROCEDURE — 74011000250 HC RX REV CODE- 250: Performed by: INTERNAL MEDICINE

## 2023-03-21 RX ADMIN — ENOXAPARIN SODIUM 40 MG: 100 INJECTION SUBCUTANEOUS at 09:27

## 2023-03-21 RX ADMIN — Medication 10 ML: at 13:36

## 2023-03-21 RX ADMIN — BENZONATATE 100 MG: 100 CAPSULE ORAL at 17:49

## 2023-03-21 RX ADMIN — CLONIDINE HYDROCHLORIDE 0.1 MG: 0.1 TABLET ORAL at 09:27

## 2023-03-21 RX ADMIN — IPRATROPIUM BROMIDE AND ALBUTEROL SULFATE 3 ML: .5; 3 SOLUTION RESPIRATORY (INHALATION) at 19:21

## 2023-03-21 RX ADMIN — PANTOPRAZOLE SODIUM 40 MG: 40 TABLET, DELAYED RELEASE ORAL at 06:52

## 2023-03-21 RX ADMIN — BUDESONIDE 500 MCG: 0.5 INHALANT RESPIRATORY (INHALATION) at 19:26

## 2023-03-21 RX ADMIN — IPRATROPIUM BROMIDE AND ALBUTEROL SULFATE 3 ML: .5; 3 SOLUTION RESPIRATORY (INHALATION) at 01:44

## 2023-03-21 RX ADMIN — BENZONATATE 100 MG: 100 CAPSULE ORAL at 09:27

## 2023-03-21 RX ADMIN — GUAIFENESIN 1200 MG: 600 TABLET ORAL at 09:27

## 2023-03-21 RX ADMIN — Medication 2 UNITS: at 09:26

## 2023-03-21 RX ADMIN — Medication 5 ML: at 21:21

## 2023-03-21 RX ADMIN — CEFTRIAXONE SODIUM 2 G: 2 INJECTION, POWDER, FOR SOLUTION INTRAMUSCULAR; INTRAVENOUS at 17:38

## 2023-03-21 RX ADMIN — ARFORMOTEROL TARTRATE 15 MCG: 15 SOLUTION RESPIRATORY (INHALATION) at 07:18

## 2023-03-21 RX ADMIN — DILTIAZEM HYDROCHLORIDE 240 MG: 120 CAPSULE, COATED, EXTENDED RELEASE ORAL at 09:27

## 2023-03-21 RX ADMIN — IPRATROPIUM BROMIDE AND ALBUTEROL SULFATE 3 ML: .5; 3 SOLUTION RESPIRATORY (INHALATION) at 07:26

## 2023-03-21 RX ADMIN — ARFORMOTEROL TARTRATE 15 MCG: 15 SOLUTION RESPIRATORY (INHALATION) at 19:26

## 2023-03-21 RX ADMIN — BENZONATATE 100 MG: 100 CAPSULE ORAL at 21:21

## 2023-03-21 RX ADMIN — METHYLPREDNISOLONE SODIUM SUCCINATE 40 MG: 40 INJECTION, POWDER, FOR SOLUTION INTRAMUSCULAR; INTRAVENOUS at 17:38

## 2023-03-21 RX ADMIN — METHYLPREDNISOLONE SODIUM SUCCINATE 60 MG: 125 INJECTION, POWDER, FOR SOLUTION INTRAMUSCULAR; INTRAVENOUS at 02:00

## 2023-03-21 RX ADMIN — MONTELUKAST 10 MG: 10 TABLET, FILM COATED ORAL at 09:27

## 2023-03-21 RX ADMIN — GUAIFENESIN 1200 MG: 600 TABLET ORAL at 21:21

## 2023-03-21 RX ADMIN — CLONIDINE HYDROCHLORIDE 0.1 MG: 0.1 TABLET ORAL at 17:42

## 2023-03-21 RX ADMIN — BUDESONIDE 500 MCG: 0.5 INHALANT RESPIRATORY (INHALATION) at 07:18

## 2023-03-21 RX ADMIN — IPRATROPIUM BROMIDE AND ALBUTEROL SULFATE 3 ML: .5; 3 SOLUTION RESPIRATORY (INHALATION) at 13:06

## 2023-03-21 RX ADMIN — METHYLPREDNISOLONE SODIUM SUCCINATE 60 MG: 125 INJECTION, POWDER, FOR SOLUTION INTRAMUSCULAR; INTRAVENOUS at 09:28

## 2023-03-21 RX ADMIN — Medication 10 ML: at 06:52

## 2023-03-21 NOTE — PROGRESS NOTES
Bedside and Verbal shift change report given to JOS Lambert (oncoming nurse) by Judy Koroma RN (offgoing nurse). Report included the following information SBAR, Kardex, and ED Summary.

## 2023-03-21 NOTE — PROGRESS NOTES
3/21/2023  Case Management Progress Note    3:14 PM  Spoke with patient's son Suleman De Jesus via phone. He does voice some concerns with patient going home, primarily that she herself does not feel that confident. Referrals sent to [facility] (already accepted) and [facility] (preference, still pending) and will continue to follow. KIRSTIN Braden    12:41 PM  Patient is 69year old female admitted 3/16 with vasovagal syncope  Patient's RUR is 11% green/low risk for readmission  Covid test: negative 3/18   Chart reviewed--patient discussed at interdisciplinary rounds  Per rounds patient may be ready for discharge tomorrow pending her continuing to improve. PT did work with her and reports she needs home oxygen at 2L as well as a rolling walker. We will order when all the documentation is available. She will also likely benefit from [facility], so I have sent some referrals to see who services her zip code. Her family is also concerned about her going home, so I will speak with her son this afternoon to address those and potentially send a rehab referral if needed. Will continue to follow and assist with discharge planning as needed.      Transition of Care Plan   Continue medical management/treatment  Home with family and HH vs SNF  Family will transport at discharge  CM will continue to follow    KIRSTIN Braden

## 2023-03-21 NOTE — PROGRESS NOTES
Hospitalist Progress Note  Ramya DO Echo  Answering service: [phone] OR 36 from in house phone        Date of Service:  3/21/2023  NAME:  Guadalupe Narvaez  :  1945  MRN:  [id number]      Admission Summary/HPI:   The patient is a 70 y/o C F w/ PMH DM 2 who comes in w/ brief syncopal episode today after she got up from the toilet and was going to her bedroom. This syncope was unwitnessed but she did not have any post-ictal confusion. She has no chest pain, palpitations, , wheezing, dyspnea, weight gain or lower extremity edema. She has no fever or night sweats but did report of chills  On ROS she notes of non-productive cough and wheezing. The patient was recently treated for bronchitis. Interval history / Subjective: Fu pna, syncope. Now with mild hypoxia; 02 sat < 91% on RA. Continues to report dyspnea, fatigue. Family at bedside. Assessment & Plan:        Sepsis 2/2 left lower lobe community-acquired pneumonia, mild/ Staph Bacteremia/ Left lower lobe community-acquired pneumonia  -worsened despite p.o.  Levaquin; continue ceftriaxone, s/p azithromycin  -blood cx 3/15 with staph bacteremia - suspect contamination; repeat blood cx NTD    --repeat CXR 3/19 with decreased left base pna   -follow pna cx; RVP neg   --Continue high dose IV ceftriaxone; s/p azithro   -s/p IV fluids  -monitor     History of asthma  --does appear in exacerbation; slightly improved  --continue IV steroids dose; wean as able    --Change Duonebs to clay   --Cont w/ Brovana and Pulmicort  --Cont singulair, clay mucinex   --IS    --pulm following     Syncope  --Likely due to tachycardia in the setting of sepsis due to pneumonia  --Continuous telemetry  --CT head negative  --Echo w/ some diastolic dysfunction   --- d-dimer elevated;  LE dopplers and v/q scan neg   --No prodrome  --Orthostatics once improved from pneumonia perspective    DIONISIO or CKD  -not sure of baseline Cr; states her PCP has mentioned Cr fluctuates   -hold ARB for now;continue to hold HCTZ   -monitor     Elevated LFTs   -unclear etiology  -medication induced?    -check ABD US, hep panel   -check probnp   -hold home statin and zeita   -monitor     Hypertension, POA  --Currently slight hypertensive; likely due to steroids  --Cont w/ Diltiazem 240mg daily  --Cont w/ Clonidine 0.1mg bid  --hold w/ HCTZ 25mg daily/ hold diovan for now  --IV hydralazine prn     Hyperlipidemia, POA  --Continue statin and zeita     Diabetes with hyperglycemia, POA  --Hold home meds  --SSI  --Long acting as needed  --diabetes nurse consult     I have independently reviewed and interpreted patient's lab and other diagnostic data. External notes were reviewed  Critical Care Time: Not applicable    Code status: Full  Prophylaxis: lovenox, SCD  Care Plan discussed with: Patient, RN, Family   Anticipated Disposition:      Hospital Problems  Never Reviewed            Codes Class Noted POA    Sepsis ([facility] [address] ICD-10-CM: A41.9  ICD-9-CM: 038.9, 995.91  3/16/2023 Unknown        Vasovagal syncope ICD-10-CM: R55  ICD-9-CM: 780.2  3/15/2023 Unknown       Review of Systems:   A comprehensive review of systems was negative except for that written above         Vital Signs:    Last 24hrs VS reviewed since prior progress note.  Most recent are:    Patient Vitals for the past 24 hrs:   Temp Pulse Resp BP SpO2   03/21/23 1306 -- -- -- -- 93 %   03/21/23 1124 98.1 °F (36.7 °C) 77 22 (!) 144/73 92 %   03/21/23 0838 97.7 °F (36.5 °C) 88 18 (!) 153/58 93 %   03/21/23 0726 -- -- -- -- 93 %   03/21/23 0722 -- -- -- -- 92 %   03/21/23 0505 98.4 °F (36.9 °C) 76 16 (!) 162/64 92 %   03/21/23 0106 97.7 °F (36.5 °C) 70 18 135/68 94 %   03/20/23 1924 97.7 °F (36.5 °C) 68 18 136/72 93 %   03/20/23 1920 -- -- -- -- 96 %   03/20/23 1636 98 °F (36.7 °C) 79 18 (!) 145/67 95 %            Intake/Output Summary (Last 24 hours) at 3/21/2023 1542  Last data filed at 3/21/2023 1410  Gross per 24 hour   Intake 415 ml   Output 0 ml   Net 415 ml            Physical Examination:     I had a face to face encounter with this patient and independently examined them on 3/21/2023 as outlined below:          General : alert x 3, awake, no acute distress,   HEENT: PEERL, EOMI, moist mucus membrane, TM clear  Neck: supple, no JVD, no meningeal signs  Chest: diminished b/l   CVS: S1 S2 heard, Capillary refill less than 2 seconds  Abd: soft/ non tender, non distended, BS physiological,   Ext: no clubbing, no cyanosis, no edema  Neuro/Psych: pleasant mood and affect, CN 2-12 grossly intact, sensory grossly within normal limit, diffuse weakness  Skin: warm            Data Review:    Review and/or order of clinical lab test  Review and/or order of tests in the radiology section of CPT  Review and/or order of tests in the medicine section of CPT    I have independently reviewed and interpreted patient's lab and all other diagnostic data    Notes reviewed from all clinical/nonclinical/nursing services involved in patient's clinical care. Care coordination discussions were held with appropriate clinical/nonclinical/ nursing providers based on care coordination needs. No results found. Labs:     Recent Labs     03/21/23 0205 03/20/23 0203   WBC 12.3* 7.8   HGB 11.7 11.0*   HCT 34.6* 32.1*    217       Recent Labs     03/21/23 0205 03/20/23 0203 03/19/23  0400    136 136   K 4.1 3.8 3.9   * 108 109*   CO2 21 21 22   BUN 46* 41* 33*   CREA 1.38* 1.25* 1.23*   * 157* 163*   CA 9.8 9.3 9.2       Recent Labs     03/21/23  0205 03/20/23  0203 03/19/23  0400   * 98* 71   AP 69 70 78   TBILI 0.4 0.4 0.4   TP 6.2* 5.9* 6.2*   ALB 2.7* 2.4* 2.5*   GLOB 3.5 3.5 3.7       No results for input(s): INR, PTP, APTT, INREXT, INREXT in the last 72 hours. No results for input(s): FE, TIBC, PSAT, FERR in the last 72 hours.    No results found for: FOL, RBCF   No results for input(s): PH, PCO2, PO2 in the last 72 hours. No results for input(s): CPK, CKNDX, TROIQ in the last 72 hours.     No lab exists for component: CPKMB  No results found for: CHOL, CHOLX, CHLST, CHOLV, HDL, HDLP, LDL, LDLC, DLDLP, Londell Yvonne, CHHD, CHHDX  Lab Results   Component Value Date/Time    Glucose (POC) 96 03/21/2023 11:20 AM    Glucose (POC) 174 (H) 03/21/2023 08:31 AM    Glucose (POC) 148 (H) 03/20/2023 10:02 PM    Glucose (POC) 151 (H) 03/20/2023 04:38 PM    Glucose (POC) 115 03/20/2023 11:32 AM     Lab Results   Component Value Date/Time    Color YELLOW/STRAW 07/19/2016 12:08 PM    Appearance CLEAR 07/19/2016 12:08 PM    Specific gravity 1.020 07/19/2016 12:08 PM    pH (UA) 5.5 07/19/2016 12:08 PM    Protein NEGATIVE 07/19/2016 12:08 PM    Glucose NEGATIVE 07/19/2016 12:08 PM    Ketone NEGATIVE 07/19/2016 12:08 PM    Bilirubin NEGATIVE 07/19/2016 12:08 PM    Urobilinogen 0.2 07/19/2016 12:08 PM    Nitrites NEGATIVE 07/19/2016 12:08 PM    Leukocyte Esterase NEGATIVE 07/19/2016 12:08 PM    Epithelial cells FEW 07/19/2016 12:08 PM    Bacteria NEGATIVE 07/19/2016 12:08 PM    WBC 0-4 07/19/2016 12:08 PM    RBC 5-10 07/19/2016 12:08 PM         Medications Reviewed:     Current Facility-Administered Medications   Medication Dose Route Frequency    methylPREDNISolone (PF) (Solu-MEDROL) injection 40 mg  40 mg IntraVENous Q8H    benzonatate (TESSALON) capsule 100 mg  100 mg Oral TID    hydrALAZINE (APRESOLINE) 20 mg/mL injection 10 mg  10 mg IntraVENous Q4H PRN    albuterol-ipratropium (DUO-NEB) 2.5 MG-0.5 MG/3 ML  3 mL Nebulization Q6H RT    cefTRIAXone (ROCEPHIN) 2 g in 0.9% sodium chloride 20 mL IV syringe  2 g IntraVENous Q24H    guaiFENesin ER (MUCINEX) tablet 1,200 mg  1,200 mg Oral Q12H    cloNIDine HCL (CATAPRES) tablet 0.1 mg  0.1 mg Oral BID    [Held by provider] atorvastatin (LIPITOR) tablet 40 mg  40 mg Oral QPM    [Held by provider] valsartan (DIOVAN) tablet 40 mg  40 mg Oral DAILY    dilTIAZem ER (CARDIZEM CD) capsule 240 mg  240 mg Oral DAILY    [Held by provider] ezetimibe (ZETIA) tablet 10 mg  10 mg Oral QPM    arformoteroL (BROVANA) neb solution 15 mcg  15 mcg Nebulization BID RT    And    budesonide (PULMICORT) 500 mcg/2 ml nebulizer suspension  500 mcg Nebulization BID RT    [Held by provider] hydroCHLOROthiazide (HYDRODIURIL) tablet 25 mg  25 mg Oral DAILY    melatonin tablet 3 mg  3 mg Oral QHS PRN    montelukast (SINGULAIR) tablet 10 mg  10 mg Oral DAILY    pantoprazole (PROTONIX) tablet 40 mg  40 mg Oral ACB    insulin lispro (HUMALOG) injection   SubCUTAneous AC&HS    glucose chewable tablet 16 g  4 Tablet Oral PRN    glucagon (GLUCAGEN) injection 1 mg  1 mg IntraMUSCular PRN    dextrose 10% infusion 0-250 mL  0-250 mL IntraVENous PRN    sodium chloride (NS) flush 5-40 mL  5-40 mL IntraVENous Q8H    sodium chloride (NS) flush 5-40 mL  5-40 mL IntraVENous PRN    acetaminophen (TYLENOL) tablet 650 mg  650 mg Oral Q6H PRN    Or    acetaminophen (TYLENOL) suppository 650 mg  650 mg Rectal Q6H PRN    polyethylene glycol (MIRALAX) packet 17 g  17 g Oral DAILY PRN    ondansetron (ZOFRAN ODT) tablet 4 mg  4 mg Oral Q8H PRN    Or    ondansetron (ZOFRAN) injection 4 mg  4 mg IntraVENous Q6H PRN    enoxaparin (LOVENOX) injection 40 mg  40 mg SubCUTAneous DAILY     ______________________________________________________________________  EXPECTED LENGTH OF STAY: 5d 0h  ACTUAL LENGTH OF STAY:          [address] [facility], DO

## 2023-03-21 NOTE — PROGRESS NOTES
Bedside and Verbal shift change report given to Julia Youngblood (oncoming nurse) by Brenda Haas RN (offgoing nurse). Report included the following information SBAR, Kardex, Intake/Output, MAR, and Recent Results.

## 2023-03-21 NOTE — PROGRESS NOTES
Tele notified staff that pt had very short episode of afib up to the 170s, by the time they could call pt was back in sinus rhythm. Pt asymptomatic. MD notified. New orders for cardiology consult received.

## 2023-03-21 NOTE — PROGRESS NOTES
Problem: Mobility Impaired (Adult and Pediatric)  Goal: *Acute Goals and Plan of Care (Insert Text)  Description: FUNCTIONAL STATUS PRIOR TO ADMISSION: Patient was independent and active without use of DME. Assists with laundry and cooking for son and his family. HOME SUPPORT PRIOR TO ADMISSION: The patient lived with son and his family; . Physical Therapy Goals  Initiated 3/17/2023  1. Patient will move from supine to sit and sit to supine  in bed with independence within 7 day(s). 2.  Patient will transfer from bed to chair and chair to bed with independence using the least restrictive device within 7 day(s). 3.  Patient will perform sit to stand with independence within 7 day(s). 4.  Patient will ambulate with independence for 150 feet with the least restrictive device within 7 day(s). 5.  Patient will ascend/descend 13 stairs with single handrail(s) with modified independence within 7 day(s). Note:   PHYSICAL THERAPY TREATMENT  Patient: Caroline Crabtree (57 y.o. female)  Date: 3/21/2023  Diagnosis: Vasovagal syncope [R55]  Sepsis ([facility] [address] [A41.9] <principal problem not specified>      Precautions:    Chart, physical therapy assessment, plan of care and goals were reviewed. ASSESSMENT  Patient continues with skilled PT services. Pt seen for walking oximetry. Pt SPO2 was 94% on RA siting in chair. Pt CGA sit to stand. Pt ambulated 100ft with no device CGA to min assist.Pts SPO2 dropped to 86% on RA with ambulation. Pt was given 2L of O2. Pt SPOT increased to 90% on 2L ambulating back to room. Pts SPO2 post ambulation sitting was 93% on RA. Pt will benefit from RW to stabilize gait. Continue goals. PLAN :  Patient continues to benefit from skilled intervention to address the above impairments. Continue treatment per established plan of care. to address goals.     Recommendation for discharge: (in order for the patient to meet his/her long term goals)  Physical therapy at least 2 days/week in the home     This discharge recommendation:  Has been made in collaboration with the attending provider and/or case management    IF patient discharges home will need the following DME: rolling walker       SUBJECTIVE:       OBJECTIVE DATA SUMMARY:   Critical Behavior:  Neurologic State: Alert  Orientation Level: Oriented X4  Cognition: Appropriate decision making, Appropriate for age attention/concentration, Appropriate safety awareness, Follows commands     Functional Mobility Training:          Transfers:  Sit to Stand: Contact guard assistance  Stand to Sit: Contact guard assistance                             Balance:     Ambulation/Gait Training:  Distance (ft): 100 Feet (ft)     Ambulation - Level of Assistance: Contact guard assistance;Minimal assistance no device        Gait Abnormalities: Decreased step clearance              Speed/Prema: Pace decreased (<100 feet/min)   Documentation for home O2:     ROOM AIR    AT REST   O2 SATS  94%    ROOM AIR WITH ACTIVITY 02 SATS  86%    (2    ) LITERS OF O2 WITH ACTIVITY O2 SATS  90%    (2    )LITERS OF 02 PATIENT LEFT COMFORTABLY  SITTING/SUPINE 02 SATS  93%              Activity Tolerance:   Fair    After treatment patient left in no apparent distress:   Sitting in chair    COMMUNICATION/COLLABORATION:   The patients plan of care was discussed with: Physical therapist.     Moisés Bartlett PTA   Time Calculation: 23 mins

## 2023-03-21 NOTE — PROGRESS NOTES
Name: Loly Nest: [place]   : 1945 Admit Date: 3/15/2023   Phone:   Room: 511/01   PCP: Alvin Leach MD  MRN: [id number]   Date: 3/21/2023  Code: Full Code          Chart and notes reviewed. Data reviewed. I review the patient's current medications in the medical record at each encounter. I have evaluated and examined the patient. HPI:    10:47 AM       History was obtained from patient. I was asked by Raghavendra Keller MD to see Patrick Beard in consultation for a chief complaint of PNA. History of Present Illness:    69 y.o. F with history of asthma, seasonal/environmental allergies, GERD, HTN, pseudomonas PNA, JANET, and DM. Followed outpatient by Dr. Collette Jean. Has not been seen in about a year. She is on Advair and Spiriva as maintenance. She tends to develop bronchitis at the start of allergy season. Per daughter, she had been treated outpatient for bronchitis. She was placed on Levaquin and despite treatment, was worsening. She had a syncopal episode on the toilet and was brought to ED for further evaluation. She feels that cough is not as productive now, but more dry and painful. Cough causes some SOB now. Wheezing as well. Afebrile  BP stable  Oxygen saturations 92% on 2L  WBC 12.3--increased  D-dimer 4.08 (3/18/23)  Creatinine 1.38--increased  BC 3/15/23: positive 1/4 bottles coat negative staph  Repeat BC 3/18/23: ngtd x 3 days  RVP negative  Mycoplasma, legionella, strep pneumo pending  ECHO: EF 55-60%, RA mildly dilated  VQ 3/20: very low probability of PE  Images reviewed:    LE Dopplers 3/19/23: negative. ECHO 3/17/23: EF 55-60%; LV mildly dilated; diastolic dysfunction present with increased LAP; mild sclerosis of the aortic valve cusp; mildly thickened leaflet of mitral valve, at the posterior leaflet; RA mildly dilated. CXR 3/15/23: left basilar airspace disease. PFT's 2021: normal spirometry.      ROS:  Feeling a little better. Still fatigued and SOB with exertion. Cough is dry.     Past Medical History:   Diagnosis Date    Asthma     Diabetes ([facility] [address]     type 2    GERD (gastroesophageal reflux disease)     Hypertension     Ill-defined condition     high cholesterol    Ill-defined condition     seasonal allergies    Ill-defined condition     gout       Past Surgical History:   Procedure Laterality Date    HX APPENDECTOMY      HX HEENT  2010    cataract     HX HYSTERECTOMY  1975    HX LAP CHOLECYSTECTOMY  1985    HX OOPHORECTOMY  1996    benign ovarian tumor    HX OTHER SURGICAL  1993    bladder tack    HX ROTATOR CUFF REPAIR  2015    right    HX TONSILLECTOMY  1964       Family History   Problem Relation Age of Onset    Cancer Mother         uterine    Heart Disease Mother         afib    Heart Disease Father 48        MI    Hypertension Father        Social History     Tobacco Use    Smoking status: Former     Packs/day: 0.25     Types: Cigarettes     Quit date:      Years since quittin.2    Smokeless tobacco: Never    Tobacco comments:     smoked x 1 year   Substance Use Topics    Alcohol use: No       Allergies   Allergen Reactions    Aspirin Cough    Beta Blocker [Beta-Blockers (Beta-Adrenergic Blocking Agts)] Other (comments)     Was told by her PCP to report that she has a mutation- she does not recall a reaction    Codeine Rash       Current Facility-Administered Medications   Medication Dose Route Frequency    benzonatate (TESSALON) capsule 100 mg  100 mg Oral TID    hydrALAZINE (APRESOLINE) 20 mg/mL injection 10 mg  10 mg IntraVENous Q4H PRN    albuterol-ipratropium (DUO-NEB) 2.5 MG-0.5 MG/3 ML  3 mL Nebulization Q6H RT    cefTRIAXone (ROCEPHIN) 2 g in 0.9% sodium chloride 20 mL IV syringe  2 g IntraVENous Q24H    guaiFENesin ER (MUCINEX) tablet 1,200 mg  1,200 mg Oral Q12H    methylPREDNISolone (PF) (SOLU-MEDROL) injection 60 mg  60 mg IntraVENous Q8H    cloNIDine HCL (CATAPRES) tablet 0.1 mg  0.1 mg Oral BID    [Held by provider] atorvastatin (LIPITOR) tablet 40 mg  40 mg Oral QPM    [Held by provider] valsartan (DIOVAN) tablet 40 mg  40 mg Oral DAILY    dilTIAZem ER (CARDIZEM CD) capsule 240 mg  240 mg Oral DAILY    [Held by provider] ezetimibe (ZETIA) tablet 10 mg  10 mg Oral QPM    arformoteroL (BROVANA) neb solution 15 mcg  15 mcg Nebulization BID RT    And    budesonide (PULMICORT) 500 mcg/2 ml nebulizer suspension  500 mcg Nebulization BID RT    [Held by provider] hydroCHLOROthiazide (HYDRODIURIL) tablet 25 mg  25 mg Oral DAILY    melatonin tablet 3 mg  3 mg Oral QHS PRN    montelukast (SINGULAIR) tablet 10 mg  10 mg Oral DAILY    pantoprazole (PROTONIX) tablet 40 mg  40 mg Oral ACB    insulin lispro (HUMALOG) injection   SubCUTAneous AC&HS    glucose chewable tablet 16 g  4 Tablet Oral PRN    glucagon (GLUCAGEN) injection 1 mg  1 mg IntraMUSCular PRN    dextrose 10% infusion 0-250 mL  0-250 mL IntraVENous PRN    sodium chloride (NS) flush 5-40 mL  5-40 mL IntraVENous Q8H    sodium chloride (NS) flush 5-40 mL  5-40 mL IntraVENous PRN    acetaminophen (TYLENOL) tablet 650 mg  650 mg Oral Q6H PRN    Or    acetaminophen (TYLENOL) suppository 650 mg  650 mg Rectal Q6H PRN    polyethylene glycol (MIRALAX) packet 17 g  17 g Oral DAILY PRN    ondansetron (ZOFRAN ODT) tablet 4 mg  4 mg Oral Q8H PRN    Or    ondansetron (ZOFRAN) injection 4 mg  4 mg IntraVENous Q6H PRN    enoxaparin (LOVENOX) injection 40 mg  40 mg SubCUTAneous DAILY         REVIEW OF SYSTEMS   12 point ROS negative except as stated in the HPI. Physical Exam:   Visit Vitals  BP (!) 144/73 (BP 1 Location: Left upper arm, BP Patient Position: At rest;Supine)   Pulse 77   Temp 98.1 °F (36.7 °C)   Resp 22   Ht 5' 5\" (1.651 m)   Wt 82 kg (180 lb 12.4 oz)   SpO2 92%   BMI 30.08 kg/m²       General:  Alert, cooperative, no distress, appears stated age. Head:  Normocephalic, without obvious abnormality, atraumatic. Eyes:  Conjunctivae/corneas clear. Nose: Nares normal. Septum midline. Mucosa normal.    Throat: Lips, mucosa, and tongue normal.    Neck: Supple, symmetrical, trachea midline, no adenopathy. Lungs:   Diminished bases   Chest wall:  No tenderness or deformity. Heart:  Regular rate and rhythm, S1, S2 normal, no murmur, click, rub or gallop. Abdomen:   Soft, non-tender. Bowel sounds normal. No masses,  No organomegaly. Extremities: Extremities normal, atraumatic, no cyanosis or edema. Pulses: 2+ and symmetric all extremities. Skin: Skin color, texture, turgor normal. No rashes or lesions   Lymph nodes: Cervical, supraclavicular nodes normal.   Neurologic: Grossly nonfocal       Lab Results   Component Value Date/Time    Sodium 138 03/21/2023 02:05 AM    Potassium 4.1 03/21/2023 02:05 AM    Chloride 109 (H) 03/21/2023 02:05 AM    CO2 21 03/21/2023 02:05 AM    BUN 46 (H) 03/21/2023 02:05 AM    Creatinine 1.38 (H) 03/21/2023 02:05 AM    Glucose 149 (H) 03/21/2023 02:05 AM    Calcium 9.8 03/21/2023 02:05 AM    Lactic acid 1.2 03/15/2023 05:39 PM       Lab Results   Component Value Date/Time    WBC 12.3 (H) 03/21/2023 02:05 AM    HGB 11.7 03/21/2023 02:05 AM    PLATELET 943 01/22/2319 02:05 AM    MCV 95.6 03/21/2023 02:05 AM       Lab Results   Component Value Date/Time    Alk.  phosphatase 69 03/21/2023 02:05 AM    Protein, total 6.2 (L) 03/21/2023 02:05 AM    Albumin 2.7 (L) 03/21/2023 02:05 AM    Globulin 3.5 03/21/2023 02:05 AM       No results found for: IRON, FE, TIBC, IBCT, PSAT, FERR    No results found for: SR, CRP, JOAQUINA, ANAIGG, RA, RPR, RPRT, VDRLT, VDRLS, TSH, TSHEXT, TSHEXT     No results found for: PH, PHI, PCO2, PCO2I, PO2, PO2I, HCO3, HCO3I, FIO2, FIO2I    No results found for: CPK, RCK1, RCK2, RCK3, RCK4, CKNDX, CKND1, TROPT, TROIQ, BNPP, BNP     Lab Results   Component Value Date/Time    Culture result: NO GROWTH 3 DAYS 03/18/2023 10:02 AM    Culture result: MRSA NOT PRESENT 03/18/2023 09:50 AM    Culture result:  03/18/2023 09:50 AM     Screening of patient nares for MRSA is for surveillance purposes and, if positive, to facilitate isolation considerations in high risk settings. It is not intended for automatic decolonization interventions per se as regimens are not sufficiently effective to warrant routine use.          No results found for: TOXA1, RPR, HBCM, HBSAG, HAAB, HCAB1, HAAT, G6PD, CRYAC, HIVGT, HIVR, HIV1, HIV12, HIVPC, HIVRPI    No results found for: VANCT, CPK    Lab Results   Component Value Date/Time    Color YELLOW/STRAW 07/19/2016 12:08 PM    Appearance CLEAR 07/19/2016 12:08 PM    pH (UA) 5.5 07/19/2016 12:08 PM    Protein NEGATIVE 07/19/2016 12:08 PM    Glucose NEGATIVE 07/19/2016 12:08 PM    Ketone NEGATIVE 07/19/2016 12:08 PM    Bilirubin NEGATIVE 07/19/2016 12:08 PM    Blood NEGATIVE 07/19/2016 12:08 PM    Urobilinogen 0.2 07/19/2016 12:08 PM    Nitrites NEGATIVE 07/19/2016 12:08 PM    Leukocyte Esterase NEGATIVE 07/19/2016 12:08 PM    WBC 0-4 07/19/2016 12:08 PM    RBC 5-10 07/19/2016 12:08 PM    Bacteria NEGATIVE 07/19/2016 12:08 PM       IMPRESSION  CAP  Asthma Exacerbation  Syncope  DIONISIO  HTN  DM  JANET  Hx of pseudomonas PNA (2021)    PLAN  Maintain oxygen saturations > 90%; currently on RA  Follow-up PNA workup; sputum culture ordered  Pulmicort/Brovana nebs; DuoNebs Q6  Wean steroids slightly today  Azithro and Ceftriaxone; failed outpatient Levaquin  Continue Singulair and Mucinex  PT  Add flutter  Encourage IS and OOB as much as possible  DVT prophylaxis: Lovenox  GI prophylaxis: Protonix      Chapis Enrique NP

## 2023-03-22 ENCOUNTER — APPOINTMENT (OUTPATIENT)
Dept: CT IMAGING | Age: 78
DRG: 177 | End: 2023-03-22
Attending: NURSE PRACTITIONER
Payer: MEDICARE

## 2023-03-22 LAB
ALBUMIN SERPL-MCNC: 2.6 G/DL (ref 3.5–5)
ALBUMIN/GLOB SERPL: 0.9 (ref 1.1–2.2)
ALP SERPL-CCNC: 60 U/L (ref 45–117)
ALT SERPL-CCNC: 118 U/L (ref 12–78)
ANION GAP SERPL CALC-SCNC: 6 MMOL/L (ref 5–15)
AST SERPL-CCNC: 61 U/L (ref 15–37)
BASOPHILS # BLD: 0 K/UL (ref 0–0.1)
BASOPHILS NFR BLD: 0 % (ref 0–1)
BILIRUB SERPL-MCNC: 0.4 MG/DL (ref 0.2–1)
BUN SERPL-MCNC: 47 MG/DL (ref 6–20)
BUN/CREAT SERPL: 40 (ref 12–20)
CALCIUM SERPL-MCNC: 9.2 MG/DL (ref 8.5–10.1)
CHLORIDE SERPL-SCNC: 110 MMOL/L (ref 97–108)
CO2 SERPL-SCNC: 23 MMOL/L (ref 21–32)
CREAT SERPL-MCNC: 1.18 MG/DL (ref 0.55–1.02)
DIFFERENTIAL METHOD BLD: ABNORMAL
EOSINOPHIL # BLD: 0 K/UL (ref 0–0.4)
EOSINOPHIL NFR BLD: 0 % (ref 0–7)
ERYTHROCYTE [DISTWIDTH] IN BLOOD BY AUTOMATED COUNT: 13.9 % (ref 11.5–14.5)
FLUID CULTURE, SPNG2: NORMAL
GLOBULIN SER CALC-MCNC: 3 G/DL (ref 2–4)
GLUCOSE BLD STRIP.AUTO-MCNC: 129 MG/DL (ref 65–117)
GLUCOSE BLD STRIP.AUTO-MCNC: 142 MG/DL (ref 65–117)
GLUCOSE BLD STRIP.AUTO-MCNC: 144 MG/DL (ref 65–117)
GLUCOSE BLD STRIP.AUTO-MCNC: 145 MG/DL (ref 65–117)
GLUCOSE SERPL-MCNC: 166 MG/DL (ref 65–100)
HAV IGM SER QL: NONREACTIVE
HBV CORE IGM SER QL: NONREACTIVE
HBV SURFACE AG SER QL: <0.1 INDEX
HBV SURFACE AG SER QL: NEGATIVE
HCT VFR BLD AUTO: 32.5 % (ref 35–47)
HCV AB SERPL QL IA: NONREACTIVE
HGB BLD-MCNC: 11 G/DL (ref 11.5–16)
IMM GRANULOCYTES # BLD AUTO: 0.2 K/UL (ref 0–0.04)
IMM GRANULOCYTES NFR BLD AUTO: 2 % (ref 0–0.5)
L PNEUMO1 AG UR QL IA: NEGATIVE
LYMPHOCYTES # BLD: 0.5 K/UL (ref 0.8–3.5)
LYMPHOCYTES NFR BLD: 4 % (ref 12–49)
MAGNESIUM SERPL-MCNC: 1.6 MG/DL (ref 1.6–2.4)
MCH RBC QN AUTO: 32.4 PG (ref 26–34)
MCHC RBC AUTO-ENTMCNC: 33.8 G/DL (ref 30–36.5)
MCV RBC AUTO: 95.9 FL (ref 80–99)
MONOCYTES # BLD: 0.6 K/UL (ref 0–1)
MONOCYTES NFR BLD: 5 % (ref 5–13)
NEUTS SEG # BLD: 10.4 K/UL (ref 1.8–8)
NEUTS SEG NFR BLD: 89 % (ref 32–75)
NRBC # BLD: 0 K/UL (ref 0–0.01)
NRBC BLD-RTO: 0 PER 100 WBC
ORGANISM ID, SPNG3: NORMAL
PLATELET # BLD AUTO: 265 K/UL (ref 150–400)
PLEASE NOTE, SPNG4: NORMAL
PMV BLD AUTO: 9.3 FL (ref 8.9–12.9)
POTASSIUM SERPL-SCNC: 3.5 MMOL/L (ref 3.5–5.1)
PROT SERPL-MCNC: 5.6 G/DL (ref 6.4–8.2)
RBC # BLD AUTO: 3.39 M/UL (ref 3.8–5.2)
RBC MORPH BLD: ABNORMAL
S PNEUM AG SPEC QL LA: NEGATIVE
SERVICE CMNT-IMP: ABNORMAL
SODIUM SERPL-SCNC: 139 MMOL/L (ref 136–145)
SP1: NORMAL
SP2: NORMAL
SP3: NORMAL
SPECIMEN SOURCE: NORMAL
SPECIMEN SOURCE: NORMAL
SPECIMEN, SPNG1: NORMAL
WBC # BLD AUTO: 11.7 K/UL (ref 3.6–11)

## 2023-03-22 PROCEDURE — 80053 COMPREHEN METABOLIC PANEL: CPT

## 2023-03-22 PROCEDURE — 74011000250 HC RX REV CODE- 250: Performed by: INTERNAL MEDICINE

## 2023-03-22 PROCEDURE — 74011250637 HC RX REV CODE- 250/637: Performed by: INTERNAL MEDICINE

## 2023-03-22 PROCEDURE — 65270000029 HC RM PRIVATE

## 2023-03-22 PROCEDURE — 74011250636 HC RX REV CODE- 250/636: Performed by: NURSE PRACTITIONER

## 2023-03-22 PROCEDURE — APPSS30 APP SPLIT SHARED TIME 16-30 MINUTES: Performed by: NURSE PRACTITIONER

## 2023-03-22 PROCEDURE — 83735 ASSAY OF MAGNESIUM: CPT

## 2023-03-22 PROCEDURE — 74011000250 HC RX REV CODE- 250: Performed by: NURSE PRACTITIONER

## 2023-03-22 PROCEDURE — 74011250637 HC RX REV CODE- 250/637: Performed by: NURSE PRACTITIONER

## 2023-03-22 PROCEDURE — 74011250636 HC RX REV CODE- 250/636: Performed by: INTERNAL MEDICINE

## 2023-03-22 PROCEDURE — 94664 DEMO&/EVAL PT USE INHALER: CPT

## 2023-03-22 PROCEDURE — 36415 COLL VENOUS BLD VENIPUNCTURE: CPT

## 2023-03-22 PROCEDURE — 74011636637 HC RX REV CODE- 636/637: Performed by: INTERNAL MEDICINE

## 2023-03-22 PROCEDURE — 99223 1ST HOSP IP/OBS HIGH 75: CPT | Performed by: INTERNAL MEDICINE

## 2023-03-22 PROCEDURE — 71250 CT THORAX DX C-: CPT

## 2023-03-22 PROCEDURE — 85025 COMPLETE CBC W/AUTO DIFF WBC: CPT

## 2023-03-22 PROCEDURE — 82962 GLUCOSE BLOOD TEST: CPT

## 2023-03-22 PROCEDURE — 94640 AIRWAY INHALATION TREATMENT: CPT

## 2023-03-22 PROCEDURE — 94761 N-INVAS EAR/PLS OXIMETRY MLT: CPT

## 2023-03-22 PROCEDURE — 77010033678 HC OXYGEN DAILY

## 2023-03-22 PROCEDURE — 2709999900 HC NON-CHARGEABLE SUPPLY

## 2023-03-22 PROCEDURE — 74011000258 HC RX REV CODE- 258: Performed by: NURSE PRACTITIONER

## 2023-03-22 RX ORDER — IPRATROPIUM BROMIDE AND ALBUTEROL SULFATE 2.5; .5 MG/3ML; MG/3ML
3 SOLUTION RESPIRATORY (INHALATION)
Status: DISCONTINUED | OUTPATIENT
Start: 2023-03-22 | End: 2023-03-25

## 2023-03-22 RX ORDER — MAGNESIUM SULFATE HEPTAHYDRATE 40 MG/ML
2 INJECTION, SOLUTION INTRAVENOUS ONCE
Status: COMPLETED | OUTPATIENT
Start: 2023-03-22 | End: 2023-03-22

## 2023-03-22 RX ORDER — DILTIAZEM HYDROCHLORIDE 60 MG/1
60 TABLET, FILM COATED ORAL ONCE
Status: COMPLETED | OUTPATIENT
Start: 2023-03-22 | End: 2023-03-22

## 2023-03-22 RX ORDER — POTASSIUM CHLORIDE 750 MG/1
20 TABLET, FILM COATED, EXTENDED RELEASE ORAL
Status: COMPLETED | OUTPATIENT
Start: 2023-03-22 | End: 2023-03-22

## 2023-03-22 RX ADMIN — GUAIFENESIN 1200 MG: 600 TABLET ORAL at 21:47

## 2023-03-22 RX ADMIN — DILTIAZEM HYDROCHLORIDE 240 MG: 120 CAPSULE, COATED, EXTENDED RELEASE ORAL at 08:52

## 2023-03-22 RX ADMIN — METHYLPREDNISOLONE SODIUM SUCCINATE 40 MG: 40 INJECTION, POWDER, FOR SOLUTION INTRAMUSCULAR; INTRAVENOUS at 00:17

## 2023-03-22 RX ADMIN — PANTOPRAZOLE SODIUM 40 MG: 40 TABLET, DELAYED RELEASE ORAL at 06:38

## 2023-03-22 RX ADMIN — POTASSIUM CHLORIDE 20 MEQ: 750 TABLET, FILM COATED, EXTENDED RELEASE ORAL at 11:36

## 2023-03-22 RX ADMIN — BUDESONIDE 500 MCG: 0.5 INHALANT RESPIRATORY (INHALATION) at 07:21

## 2023-03-22 RX ADMIN — Medication 5 ML: at 21:50

## 2023-03-22 RX ADMIN — ARFORMOTEROL TARTRATE 15 MCG: 15 SOLUTION RESPIRATORY (INHALATION) at 07:20

## 2023-03-22 RX ADMIN — Medication 5 ML: at 05:30

## 2023-03-22 RX ADMIN — ENOXAPARIN SODIUM 40 MG: 100 INJECTION SUBCUTANEOUS at 08:51

## 2023-03-22 RX ADMIN — METHYLPREDNISOLONE SODIUM SUCCINATE 40 MG: 40 INJECTION, POWDER, FOR SOLUTION INTRAMUSCULAR; INTRAVENOUS at 08:53

## 2023-03-22 RX ADMIN — IPRATROPIUM BROMIDE AND ALBUTEROL SULFATE 3 ML: .5; 3 SOLUTION RESPIRATORY (INHALATION) at 07:15

## 2023-03-22 RX ADMIN — BENZONATATE 100 MG: 100 CAPSULE ORAL at 21:47

## 2023-03-22 RX ADMIN — IPRATROPIUM BROMIDE AND ALBUTEROL SULFATE 3 ML: .5; 3 SOLUTION RESPIRATORY (INHALATION) at 12:00

## 2023-03-22 RX ADMIN — IPRATROPIUM BROMIDE AND ALBUTEROL SULFATE 3 ML: .5; 3 SOLUTION RESPIRATORY (INHALATION) at 15:56

## 2023-03-22 RX ADMIN — BENZONATATE 100 MG: 100 CAPSULE ORAL at 08:52

## 2023-03-22 RX ADMIN — DILTIAZEM HYDROCHLORIDE 60 MG: 60 TABLET, FILM COATED ORAL at 11:36

## 2023-03-22 RX ADMIN — MAGNESIUM SULFATE HEPTAHYDRATE 2 G: 40 INJECTION, SOLUTION INTRAVENOUS at 11:34

## 2023-03-22 RX ADMIN — IPRATROPIUM BROMIDE AND ALBUTEROL SULFATE 3 ML: .5; 3 SOLUTION RESPIRATORY (INHALATION) at 01:32

## 2023-03-22 RX ADMIN — CEFTRIAXONE SODIUM 2 G: 2 INJECTION, POWDER, FOR SOLUTION INTRAMUSCULAR; INTRAVENOUS at 17:33

## 2023-03-22 RX ADMIN — METHYLPREDNISOLONE SODIUM SUCCINATE 60 MG: 40 INJECTION, POWDER, FOR SOLUTION INTRAMUSCULAR; INTRAVENOUS at 17:34

## 2023-03-22 RX ADMIN — CLONIDINE HYDROCHLORIDE 0.1 MG: 0.1 TABLET ORAL at 08:51

## 2023-03-22 RX ADMIN — DOXYCYCLINE 100 MG: 100 INJECTION, POWDER, LYOPHILIZED, FOR SOLUTION INTRAVENOUS at 13:20

## 2023-03-22 RX ADMIN — MONTELUKAST 10 MG: 10 TABLET, FILM COATED ORAL at 08:52

## 2023-03-22 RX ADMIN — CLONIDINE HYDROCHLORIDE 0.1 MG: 0.1 TABLET ORAL at 17:34

## 2023-03-22 RX ADMIN — BENZONATATE 100 MG: 100 CAPSULE ORAL at 17:32

## 2023-03-22 RX ADMIN — BUDESONIDE 500 MCG: 0.5 INHALANT RESPIRATORY (INHALATION) at 20:59

## 2023-03-22 RX ADMIN — ARFORMOTEROL TARTRATE 15 MCG: 15 SOLUTION RESPIRATORY (INHALATION) at 20:59

## 2023-03-22 RX ADMIN — Medication 2 UNITS: at 17:32

## 2023-03-22 RX ADMIN — Medication 10 ML: at 13:22

## 2023-03-22 RX ADMIN — IPRATROPIUM BROMIDE AND ALBUTEROL SULFATE 3 ML: .5; 3 SOLUTION RESPIRATORY (INHALATION) at 20:50

## 2023-03-22 RX ADMIN — GUAIFENESIN 1200 MG: 600 TABLET ORAL at 08:52

## 2023-03-22 NOTE — PROGRESS NOTES
Comprehensive Nutrition Assessment    Type and Reason for Visit: Initial, RD nutrition re-screen/LOS    Nutrition Recommendations/Plan:   Modify diet: Regular, NCS      Malnutrition Assessment:  Malnutrition Status:  No malnutrition (03/22/23 1140)    Context:  Acute illness     Findings of the 6 clinical characteristics of malnutrition:   Energy Intake:  No significant decrease in energy intake  Weight Loss:  No significant weight loss     Body Fat Loss:  No significant body fat loss,     Muscle Mass Loss:  No significant muscle mass loss,    Fluid Accumulation:  No significant fluid accumulation,     Strength:  Not performed     Nutrition Assessment:     Pt is a 69year old female admitted with Vasovagal syncope [R55]  Sepsis ([facility] [address] [A41.9]. She  has a past medical history of Asthma, Diabetes ([facility] [address] GERD (gastroesophageal reflux disease), Hypertension, Ill-defined condition, Ill-defined condition, and Ill-defined condition. RD screen for LOS. Patient reports fine appetite but dislikes the foods available. RD provided patient with menu and walked her through ordering own meals. Patient voiced thanks. Plenty of snacks/outside foods noted in room, patient reports eating big breakfast, small lunch, and large dinner each day. Likely meeting kcal needs. No chewing/swallowing problems. NKFA. No nausea/vomiting/diarrhea. No complaints at this time. States # and does not believe she has had any weight changes recently. Per documentation, patient has lost 20# (10.4%) x 1 year, not clinically significant for timeframe. BG have been stable, no A1C on file - RD to modify diet order from 4 carb choice to NCS.       Wt Readings from Last 10 Encounters:   03/17/23 82 kg (180 lb 12.4 oz)   03/23/22 91.2 kg (201 lb 1 oz)   07/19/16 96.6 kg (213 lb)   05/11/15 97.1 kg (214 lb)       Nutrition Related Findings:      Wound Type: Skin tears (L elbow)  Last Bowel Movement Date: 03/21/23  Stool Appearance: Formed, Soft  Abdominal Assessment: Intact, Soft  Edema:No data recorded    Nutr. Labs:  Lab Results   Component Value Date/Time    GFR est AA >60 05/11/2015 11:38 AM    GFR est non-AA >60 05/11/2015 11:38 AM    Creatinine 1.18 (H) 03/22/2023 02:14 AM    BUN 47 (H) 03/22/2023 02:14 AM    Sodium 139 03/22/2023 02:14 AM    Potassium 3.5 03/22/2023 02:14 AM    Chloride 110 (H) 03/22/2023 02:14 AM    CO2 23 03/22/2023 02:14 AM       Lab Results   Component Value Date/Time    Glucose 166 (H) 03/22/2023 02:14 AM    Glucose (POC) 129 (H) 03/22/2023 10:42 AM       No results found for: HBA1C, DPU5NMIN, NKQ7AMQY, PRF3UWMX    Magnesium   Date Value Ref Range Status   03/22/2023 1.6 1.6 - 2.4 mg/dL Final       Lab Results   Component Value Date/Time    Calcium 9.2 03/22/2023 02:14 AM       No results found for: CHOL, CHOLPOCT, CHOLX, CHLST, CHOLV, TOTCHOLEXT, HDL, HDLPOC, HDLEXT, HDLP, LDL, LDLCPOC, LDLCEXT, LDLC, DLDLP, VLDLC, VLDL, TGLX, TRIGL, TRIGLYCEXT, TRIGP, TGLPOCT, CHHD, CHHDX      Nutr. Meds:  Catapres, Lipitor (held), Lovenox, glucagon PRN, hydralazine PRN, humalog, MgSulfate, zofran PRN, Protonix, miralax PRN    Current Nutrition Intake & Therapies:  Average Meal Intake: 26-50%  Average Supplement Intake: None ordered  ADULT DIET Regular; No Concentrated Sweets    Anthropometric Measures:  Height: 5' 5\" (165.1 cm)  Ideal Body Weight (IBW): 125 lbs (57 kg)     Current Body Wt:  82 kg (180 lb 12.4 oz), 144.6 % IBW. Bed scale  Current BMI (kg/m2): 30.1  Usual Body Weight: 83.9 kg (185 lb)  % Weight Change (Calculated): -2.3  Weight Adjustment: No adjustment                 BMI Category: Obese class 1 (BMI 30.0-34. 9)    Estimated Daily Nutrient Needs:  Energy Requirements Based On: Formula  Weight Used for Energy Requirements: Current  Energy (kcal/day): 1706 (MSJ x 1.3)  Weight Used for Protein Requirements: Current  Protein (g/day): 82-98 (1.0-1.2 g/kg)  Method Used for Fluid Requirements: 1 ml/kcal  Fluid (ml/day): 1706    Nutrition Diagnosis:   No nutrition diagnosis at this time     Nutrition Interventions:   Food and/or Nutrient Delivery: Continue current diet  Nutrition Education/Counseling: No recommendations at this time  Coordination of Nutrition Care: Continue to monitor while inpatient, Interdisciplinary rounds  Plan of Care discussed with: IDR team    Goals:     Goals: Meet at least 75% of estimated needs, by next RD assessment       Nutrition Monitoring and Evaluation:   Behavioral-Environmental Outcomes: None identified  Food/Nutrient Intake Outcomes: Food and nutrient intake  Physical Signs/Symptoms Outcomes: Biochemical data, Weight    Discharge Planning:    No discharge needs at this time    [address], RD  Contact: Ext: [phone], or via [employer]

## 2023-03-22 NOTE — PROGRESS NOTES
Attempted to work with the patient for Physical Therapy, chart reviewed and discussed with nurse cleared for therapy patient supine on bd when received family at bedside asked to defer therapy for now having asthma attack. Communicated with nurse who agreed to monitor patient.

## 2023-03-22 NOTE — PROGRESS NOTES
Informed next nurse sputum needs to be collected. Bedside and Verbal shift change report given to Taqueria Pablo (oncoming nurse) by Armando Garza RN (offgoing nurse). Report included the following information SBAR, Kardex, Intake/Output, MAR, Recent Results, and Med Rec Status.

## 2023-03-22 NOTE — PROGRESS NOTES
Name: Yessenia Wild: [facility]   : 1945 Admit Date: 3/15/2023   Phone:   Room: 511/01   PCP: Earle Quintero MD  MRN: [id number]   Date: 3/22/2023  Code: Full Code          Chart and notes reviewed. Data reviewed. I review the patient's current medications in the medical record at each encounter. I have evaluated and examined the patient. HPI:    10:47 AM       History was obtained from patient. I was asked by Issa Galvan MD to see Mitch Davis in consultation for a chief complaint of PNA. History of Present Illness:    69 y.o. F with history of asthma, seasonal/environmental allergies, GERD, HTN, pseudomonas PNA, JANET, and DM. Followed outpatient by Dr. Lucretia Porter. Has not been seen in about a year. She is on Advair and Spiriva as maintenance. She tends to develop bronchitis at the start of allergy season. Per daughter, she had been treated outpatient for bronchitis. She was placed on Levaquin and despite treatment, was worsening. She had a syncopal episode on the toilet and was brought to ED for further evaluation. She feels that cough is not as productive now, but more dry and painful. Cough causes some SOB now. Wheezing as well. Afebrile  BP stable  Oxygen saturations 95% on 2L  WBC 11.7--decreased  D-dimer 4.08 (3/18/23)  Creatinine 1.18--better  LFTs trending down  BC 3/15/23: positive 1/4 bottles coat negative staph  Repeat BC 3/18/23: ngtd x 4 days  RVP negative  Mycoplasma negative  legionella, strep pneumo pending  ECHO: EF 55-60%, RA mildly dilated  VQ 3/20: very low probability of PE  Images reviewed:    LE Dopplers 3/19/23: negative. CXR 3/15/23: left basilar airspace disease. PFT's 2021: normal spirometry. ROS:    Feels worse today with increased congestion/cough. No fever/chills. Feels tired.     Past Medical History:   Diagnosis Date    Asthma     Diabetes ([address]     type 2    GERD (gastroesophageal reflux disease) Hypertension     Ill-defined condition     high cholesterol    Ill-defined condition     seasonal allergies    Ill-defined condition     gout       Past Surgical History:   Procedure Laterality Date    HX APPENDECTOMY      HX HEENT  2010    cataract     HX HYSTERECTOMY      HX LAP CHOLECYSTECTOMY      HX OOPHORECTOMY      benign ovarian tumor    HX OTHER SURGICAL  1993    bladder tack    HX ROTATOR CUFF REPAIR  2015    right    HX TONSILLECTOMY  1964       Family History   Problem Relation Age of Onset    Cancer Mother         uterine    Heart Disease Mother         afib    Heart Disease Father 48        MI    Hypertension Father        Social History     Tobacco Use    Smoking status: Former     Packs/day: 0.25     Types: Cigarettes     Quit date:      Years since quittin.2    Smokeless tobacco: Never    Tobacco comments:     smoked x 1 year   Substance Use Topics    Alcohol use: No       Allergies   Allergen Reactions    Aspirin Cough    Beta Blocker [Beta-Blockers (Beta-Adrenergic Blocking Agts)] Other (comments)     Was told by her PCP to report that she has a mutation- she does not recall a reaction    Codeine Rash       Current Facility-Administered Medications   Medication Dose Route Frequency    methylPREDNISolone (PF) (Solu-MEDROL) injection 40 mg  40 mg IntraVENous Q8H    sodium chloride (OCEAN) 0.65 % nasal squeeze bottle 2 Spray  2 Spray Both Nostrils Q2H PRN    benzonatate (TESSALON) capsule 100 mg  100 mg Oral TID    hydrALAZINE (APRESOLINE) 20 mg/mL injection 10 mg  10 mg IntraVENous Q4H PRN    albuterol-ipratropium (DUO-NEB) 2.5 MG-0.5 MG/3 ML  3 mL Nebulization Q6H RT    guaiFENesin ER (MUCINEX) tablet 1,200 mg  1,200 mg Oral Q12H    cloNIDine HCL (CATAPRES) tablet 0.1 mg  0.1 mg Oral BID    [Held by provider] atorvastatin (LIPITOR) tablet 40 mg  40 mg Oral QPM    [Held by provider] valsartan (DIOVAN) tablet 40 mg  40 mg Oral DAILY    dilTIAZem ER (CARDIZEM CD) capsule 240 mg 240 mg Oral DAILY    [Held by provider] ezetimibe (ZETIA) tablet 10 mg  10 mg Oral QPM    arformoteroL (BROVANA) neb solution 15 mcg  15 mcg Nebulization BID RT    And    budesonide (PULMICORT) 500 mcg/2 ml nebulizer suspension  500 mcg Nebulization BID RT    [Held by provider] hydroCHLOROthiazide (HYDRODIURIL) tablet 25 mg  25 mg Oral DAILY    melatonin tablet 3 mg  3 mg Oral QHS PRN    montelukast (SINGULAIR) tablet 10 mg  10 mg Oral DAILY    pantoprazole (PROTONIX) tablet 40 mg  40 mg Oral ACB    insulin lispro (HUMALOG) injection   SubCUTAneous AC&HS    glucose chewable tablet 16 g  4 Tablet Oral PRN    glucagon (GLUCAGEN) injection 1 mg  1 mg IntraMUSCular PRN    dextrose 10% infusion 0-250 mL  0-250 mL IntraVENous PRN    sodium chloride (NS) flush 5-40 mL  5-40 mL IntraVENous Q8H    sodium chloride (NS) flush 5-40 mL  5-40 mL IntraVENous PRN    acetaminophen (TYLENOL) tablet 650 mg  650 mg Oral Q6H PRN    Or    acetaminophen (TYLENOL) suppository 650 mg  650 mg Rectal Q6H PRN    polyethylene glycol (MIRALAX) packet 17 g  17 g Oral DAILY PRN    ondansetron (ZOFRAN ODT) tablet 4 mg  4 mg Oral Q8H PRN    Or    ondansetron (ZOFRAN) injection 4 mg  4 mg IntraVENous Q6H PRN    enoxaparin (LOVENOX) injection 40 mg  40 mg SubCUTAneous DAILY         REVIEW OF SYSTEMS   12 point ROS negative except as stated in the HPI. Physical Exam:   Visit Vitals  /66 (BP 1 Location: Left upper arm, BP Patient Position: At rest)   Pulse 73   Temp 98.7 °F (37.1 °C)   Resp 18   Ht 5' 5\" (1.651 m)   Wt 82 kg (180 lb 12.4 oz)   SpO2 95%   BMI 30.08 kg/m²       General:  Alert, cooperative, no distress, appears stated age. Head:  Normocephalic, without obvious abnormality, atraumatic. Eyes:  Conjunctivae/corneas clear. Nose: Nares normal. Septum midline. Mucosa normal.    Throat: Lips, mucosa, and tongue normal.    Neck: Supple, symmetrical, trachea midline, no adenopathy.    Lungs:   Some scattered rhonchi and bilateral exp wheeze   Chest wall:  No tenderness or deformity. Heart:  Regular rate and rhythm, S1, S2 normal, no murmur, click, rub or gallop. Abdomen:   Soft, non-tender. Bowel sounds normal. No masses,  No organomegaly. Extremities: Extremities normal, atraumatic, no cyanosis or edema. Pulses: 2+ and symmetric all extremities. Skin: Skin color, texture, turgor normal. No rashes or lesions   Lymph nodes: Cervical, supraclavicular nodes normal.   Neurologic: Grossly nonfocal       Lab Results   Component Value Date/Time    Sodium 139 03/22/2023 02:14 AM    Potassium 3.5 03/22/2023 02:14 AM    Chloride 110 (H) 03/22/2023 02:14 AM    CO2 23 03/22/2023 02:14 AM    BUN 47 (H) 03/22/2023 02:14 AM    Creatinine 1.18 (H) 03/22/2023 02:14 AM    Glucose 166 (H) 03/22/2023 02:14 AM    Calcium 9.2 03/22/2023 02:14 AM    Lactic acid 1.2 03/15/2023 05:39 PM       Lab Results   Component Value Date/Time    WBC 11.7 (H) 03/22/2023 02:14 AM    HGB 11.0 (L) 03/22/2023 02:14 AM    PLATELET 330 99/29/8127 02:14 AM    MCV 95.9 03/22/2023 02:14 AM       Lab Results   Component Value Date/Time    Alk. phosphatase 60 03/22/2023 02:14 AM    Protein, total 5.6 (L) 03/22/2023 02:14 AM    Albumin 2.6 (L) 03/22/2023 02:14 AM    Globulin 3.0 03/22/2023 02:14 AM       No results found for: IRON, FE, TIBC, IBCT, PSAT, FERR    No results found for: SR, CRP, JOAQUINA, ANAIGG, RA, RPR, RPRT, VDRLT, VDRLS, TSH, TSHEXT, TSHEXT     No results found for: PH, PHI, PCO2, PCO2I, PO2, PO2I, HCO3, HCO3I, FIO2, FIO2I    No results found for: CPK, RCK1, RCK2, RCK3, RCK4, CKNDX, CKND1, TROPT, TROIQ, BNPP, BNP     Lab Results   Component Value Date/Time    Culture result: NO GROWTH 4 DAYS 03/18/2023 10:02 AM    Culture result: MRSA NOT PRESENT 03/18/2023 09:50 AM    Culture result:  03/18/2023 09:50 AM     Screening of patient nares for MRSA is for surveillance purposes and, if positive, to facilitate isolation considerations in high risk settings.  It is not intended for automatic decolonization interventions per se as regimens are not sufficiently effective to warrant routine use.          Lab Results   Component Value Date/Time    Hepatitis B surface Ag <0.10 03/21/2023 04:30 PM       No results found for: VANCT, CPK    Lab Results   Component Value Date/Time    Color YELLOW/STRAW 07/19/2016 12:08 PM    Appearance CLEAR 07/19/2016 12:08 PM    pH (UA) 5.5 07/19/2016 12:08 PM    Protein NEGATIVE 07/19/2016 12:08 PM    Glucose NEGATIVE 07/19/2016 12:08 PM    Ketone NEGATIVE 07/19/2016 12:08 PM    Bilirubin NEGATIVE 07/19/2016 12:08 PM    Blood NEGATIVE 07/19/2016 12:08 PM    Urobilinogen 0.2 07/19/2016 12:08 PM    Nitrites NEGATIVE 07/19/2016 12:08 PM    Leukocyte Esterase NEGATIVE 07/19/2016 12:08 PM    WBC 0-4 07/19/2016 12:08 PM    RBC 5-10 07/19/2016 12:08 PM    Bacteria NEGATIVE 07/19/2016 12:08 PM       IMPRESSION  CAP  Asthma Exacerbation  Syncope  DIONISIO  HTN  DM  JANET  Hx of pseudomonas PNA (2021)    PLAN  Maintain oxygen saturations > 90%; currently on RA  Follow-up PNA workup; sputum culture ordered  Pulmicort/Brovana nebs; DuoNebs increased to q4h  Increase IV steroids  Completed Azithro/Rocephin  CT chest today  Continue Singulair and Mucinex  PT  Flutter  Encourage IS and OOB as much as possible  DVT prophylaxis: Lovenox  GI prophylaxis: Protonix      Kelsey Martinez NP

## 2023-03-22 NOTE — PROGRESS NOTES
ATTENDING CARDIOLOGIST    The patient was personally examined and chart reviewed. All the elements of history and examination were personally performed and I agree with the plan as listed by advanced practice provider. Treatment plan was addressed with the patient. Subjective:  Brief PAF  Syncope      Blood pressure (!) 142/63, pulse 74, temperature 98.2 °F (36.8 °C), resp. rate 19, height 5' 5\" (1.651 m), weight 82 kg (180 lb 12.4 oz), SpO2 95 %. Heart: Normal rate, regular rhythm, normal S1/S2, no murmur  Lungs: Clear  Abdomen: Soft, nontender, nondistended  Extremities: no edema      A/P:   PAF - increase cardizem. Not on [address]. Rec'd outpatient monitor to evaluate for AF burden, pauses. Followed by Dr. Alexandra Narayan. PNA    Will sign off, call with questions, FU with Dr. Alexandra Narayan.        []    High complexity decision making was performed  []    Patient is at high-risk of decompensation with multiple organ involvement      Brooks Ogden. Katrine Mcburney, MD, [facility]  [facility] [address], [address]                                   Office [phone]      Fax [phone]                                                                [address] [place] [address]                    Cardiology Care Note     [x]Initial Encounter     []Follow-up    Patient Name: Artis Martinez CDD: - PU  Primary Cardiologist: Dr Evan Urban Cardiologist: [address] [place] [address] Cardiology Physicians: Mario Betancourt MD     Reason for encounter: PAF    HPI:       Jasmin Enamorado is a 69 y.o. female with PMH significant for  CAD on plavix, cath 2019 with 50% blockage, unknown artery, normal EF by ECHO in 2019 who comes to Ed with brief syncopal episode. After she got up from the toilet on her way to her bedroom. This syncope was unwitnessed but she did not have any post-ictal confusion.   She has no chest pain, palpitations, , wheezing, dyspnea, weight gain or lower extremity edema. She has no fever or night sweats but did report of chills  On ROS she notes of non-productive cough and wheezing. The patient was recently treated for bronchitis. Subjective:      Mary Leyva reports dyspnea, cough  . Assessment and Plan     1 PAF: brief run of PAF yesterday. She has frequent PVC's, PAC's on tele. No previous hx of a fib. Will increase her cardizem to 300 mg every day (PTA dose 240 mg). She has a follow up with Dr Jovanna Stephens in April. Would recommend an event monitor and if recurrent a fib then would consider DOAC.  ECHO done this admission shows normal EF, no WMA. 2 PNA: pulmonary following, completed rocephin zithromax. Chest CT today   3 HTN    Obtain vcs records. Son and dtr updated at bedside   Will see prn/pls call if needed      ____________________________________________________________    Cardiac testing  03/15/23    ECHO ADULT COMPLETE 03/17/2023 3/17/2023    Interpretation Summary    Left Ventricle: Normal left ventricular systolic function with a visually estimated EF of 55 - 60%. Left ventricle is mildly dilated. Normal wall thickness. Normal wall motion. Diastolic dysfunction present with increased LAP with normal LV EF. Aortic Valve: Tricuspid valve. Mildly calcified right and noncoronary cusps. Mild sclerosis of the aortic valve cusp. Mitral Valve: Mildly thickened leaflet, at the posterior leaflet. Right Atrium: Right atrium is mildly dilated. Signed by: Linn Dumas DO on 3/17/2023  3:27 PM              Most recent HS troponins:  No results for input(s): TROPHS in the last 72 hours.     No lab exists for component:  CKMB    ECG:   EKG Results       Procedure 720 Value Units Date/Time    EKG 12 LEAD INITIAL [[id number]] Collected: 03/15/23 1638    Order Status: Completed Updated: 03/19/23 1706     Ventricular Rate 107 BPM      Atrial Rate 107 BPM      P-R Interval 190 ms      QRS Duration 94 ms      Q-T Interval 326 ms      QTC Calculation (Bezet) 435 ms      Calculated P Axis 21 degrees      Calculated R Axis -28 degrees      Calculated T Axis 45 degrees      Diagnosis --     Sinus tachycardia with occasional premature ventricular complexes  Inferior infarct , age undetermined  Poor R-wave Progression  Abnormal ECG  When compared with ECG of 11-MAY-2015 11:08,  premature ventricular complexes are now present  Inferior infarct is now present  Confirmed by Dave Salinas MD., Dai Howe ([id number]) on 3/19/2023 5:06:31 PM                Review of Systems:    [x]All other systems reviewed and all negative except as written in HPI    [] Patient unable to provide secondary to condition    Past Medical History:   Diagnosis Date    Asthma     Diabetes ([facility] [address]     type 2    GERD (gastroesophageal reflux disease)     Hypertension     Ill-defined condition     high cholesterol    Ill-defined condition     seasonal allergies    Ill-defined condition     gout     Past Surgical History:   Procedure Laterality Date    HX APPENDECTOMY      HX HEENT  2010    cataract     HX HYSTERECTOMY  1975    HX LAP CHOLECYSTECTOMY  1985    HX OOPHORECTOMY  1996    benign ovarian tumor    HX OTHER SURGICAL  1993    bladder tack    HX ROTATOR CUFF REPAIR  2015    right    HX TONSILLECTOMY  1964     Social Hx:  reports that she quit smoking about 59 years ago. She smoked an average of .25 packs per day. She has never used smokeless tobacco. She reports that she does not drink alcohol and does not use drugs. Family Hx: family history includes Cancer in her mother; Heart Disease in her mother; Heart Disease (age of onset: 48) in her father; Hypertension in her father.   Allergies   Allergen Reactions    Aspirin Cough    Beta Blocker [Beta-Blockers (Beta-Adrenergic Blocking Agts)] Other (comments)     Was told by her PCP to report that she has a mutation- she does not recall a reaction    Codeine Rash          OBJECTIVE:  Wt Readings from Last 3 Encounters:   03/17/23 82 kg (180 lb 12.4 oz) 03/23/22 91.2 kg (201 lb 1 oz)   07/19/16 96.6 kg (213 lb)       Intake/Output Summary (Last 24 hours) at 3/22/2023 0909  Last data filed at 3/22/2023 4471  Gross per 24 hour   Intake 945 ml   Output 0 ml   Net 945 ml       Physical Exam:    Vitals:   Vitals:    03/22/23 0700 03/22/23 0716 03/22/23 0824 03/22/23 0831   BP:    (!) 153/77   Pulse: 81   90   Resp:    18   Temp:    98 °F (36.7 °C)   SpO2:  95%  94%   Weight:       Height:   5' 5\" (1.651 m)      Telemetry: PAF, ST PAC's, PVC's. Gen: Well-developed, well-nourished, in no acute distress  Neck: Supple, No JVD, No Carotid Bruit  Resp: No accessory muscle use, wheezes anteriorly   Card: Regular Rate,Rythm, Normal S1, S2, No murmurs, rubs or gallop. Abd:   obese, Soft, non-tender, non-distended, BS+   MSK: No cyanosis  Skin: No rashes    Neuro: Moving all four extremities, follows commands appropriately  Psych: Good insight, oriented to person, place, alert, Nml Affect  LE: No edema    Data Review:     Radiology:   XR Results (most recent):  Results from Hospital Encounter encounter on 03/15/23    XR CHEST PORT    Narrative  EXAM: XR CHEST PORT    INDICATION: Cough, syncope, recent pneumonia. COMPARISON: Portable chest on 3/15/2023    TECHNIQUE: Upright portable chest AP view    FINDINGS: Cardiac monitoring wires overlie the thorax. Mild cardiomegaly is  accentuated by technique. Aortic arch is atherosclerotic. The pulmonary  vasculature is within normal limits. Decreased subtle left lung base opacity. Right lung is clear. No pneumothorax. Bones are osteopenic. Impression  Decreased left lung base pneumonia versus atelectasis. No pneumothorax. Recommend followup PA and lateral chest views in 8-10 weeks to ensure  resolution.       Recent Labs     03/22/23  0214 03/21/23  0205    138   K 3.5 4.1   * 109*   CO2 23 21   BUN 47* 46*   CREA 1.18* 1.38*   * 149*   CA 9.2 9.8     Recent Labs     03/22/23  0214 03/21/23  0205   WBC 11.7* 12.3*   HGB 11.0* 11.7   HCT 32.5* 34.6*    272     Recent Labs     03/22/23  0214 03/21/23  0205   AP 60 69     No results for input(s): CHOL, LDLC in the last 72 hours.     No lab exists for component: TGL, HDLC,  HBA1C      Current meds:    Current Facility-Administered Medications:     methylPREDNISolone (PF) (Solu-MEDROL) injection 40 mg, 40 mg, IntraVENous, Q8H, Dee Dee Martinez S, NP, 40 mg at 03/22/23 0853    sodium chloride (OCEAN) 0.65 % nasal squeeze bottle 2 Spray, 2 Spray, Both Nostrils, Q2H PRN, Eddie, Cinthia, DO    benzonatate (TESSALON) capsule 100 mg, 100 mg, Oral, TID, Eddie, Cinthia, DO, 100 mg at 03/22/23 6532    hydrALAZINE (APRESOLINE) 20 mg/mL injection 10 mg, 10 mg, IntraVENous, Q4H PRN, Eddie, Cinthia, DO    albuterol-ipratropium (DUO-NEB) 2.5 MG-0.5 MG/3 ML, 3 mL, Nebulization, Q6H RT, Eddie, Cinthia, DO, 3 mL at 03/22/23 0715    guaiFENesin ER (MUCINEX) tablet 1,200 mg, 1,200 mg, Oral, Q12H, Eddie, Cinthia, DO, 1,200 mg at 03/22/23 7892    cloNIDine HCL (CATAPRES) tablet 0.1 mg, 0.1 mg, Oral, BID, Eddie, Manus Slocumb, DO, 0.1 mg at 03/22/23 0851    [Held by provider] atorvastatin (LIPITOR) tablet 40 mg, 40 mg, Oral, QPM, Micky Scott MD, 40 mg at 03/18/23 1737    [Held by provider] valsartan (DIOVAN) tablet 40 mg, 40 mg, Oral, DAILY, Miky Johnson MD, 40 mg at 03/19/23 0857    dilTIAZem ER (CARDIZEM CD) capsule 240 mg, 240 mg, Oral, DAILY, Miky Johnson MD, 240 mg at 03/22/23 2284    [Held by provider] ezetimibe (ZETIA) tablet 10 mg, 10 mg, Oral, QPM, Micky Scott MD, 10 mg at 03/18/23 1736    arformoteroL (BROVANA) neb solution 15 mcg, 15 mcg, Nebulization, BID RT, 15 mcg at 03/22/23 0720 **AND** budesonide (PULMICORT) 500 mcg/2 ml nebulizer suspension, 500 mcg, Nebulization, BID RT, Micky Scott MD, 500 mcg at 03/22/23 3433    [Held by provider] hydroCHLOROthiazide (HYDRODIURIL) tablet 25 mg, 25 mg, Oral, DAILY, Miky Johnson MD, 25 mg at 03/16/23 0914    melatonin tablet 3 mg, 3 mg, Oral, QHS PRN, Evita Edmond MD    montelukast (SINGULAIR) tablet 10 mg, 10 mg, Oral, DAILY, Jason Johnson MD, 10 mg at 03/22/23 1832    pantoprazole (PROTONIX) tablet 40 mg, 40 mg, Oral, ACB, Evita Edmond MD, 40 mg at 03/22/23 3752    insulin lispro (HUMALOG) injection, , SubCUTAneous, AC&HS, Evita Edmond MD, 2 Units at 03/21/23 0926    glucose chewable tablet 16 g, 4 Tablet, Oral, PRN, Evita Edmond MD    glucagon (GLUCAGEN) injection 1 mg, 1 mg, IntraMUSCular, PRN, Evita Edmond MD    dextrose 10% infusion 0-250 mL, 0-250 mL, IntraVENous, PRN, Evita Edmond MD    sodium chloride (NS) flush 5-40 mL, 5-40 mL, IntraVENous, Q8H, Jason Johnson MD, 5 mL at 03/22/23 0530    sodium chloride (NS) flush 5-40 mL, 5-40 mL, IntraVENous, PRN, Evita Edmond MD    acetaminophen (TYLENOL) tablet 650 mg, 650 mg, Oral, Q6H PRN, 650 mg at 03/16/23 2052 **OR** acetaminophen (TYLENOL) suppository 650 mg, 650 mg, Rectal, Q6H PRN, Evita Edmond MD    polyethylene glycol (MIRALAX) packet 17 g, 17 g, Oral, DAILY PRN, Evita Edmond MD    ondansetron (ZOFRAN ODT) tablet 4 mg, 4 mg, Oral, Q8H PRN **OR** ondansetron (ZOFRAN) injection 4 mg, 4 mg, IntraVENous, Q6H PRN, Evita Edmond MD, 4 mg at 03/16/23 0456    enoxaparin (LOVENOX) injection 40 mg, 40 mg, SubCUTAneous, DAILY, Evita Edmond MD, 40 mg at 03/22/23 RM Capellan    [address] [facility] Cardiology  Call center: [phone]) [phone]      Ezio Bynum MD

## 2023-03-22 NOTE — PROGRESS NOTES
Physician Progress Note      PATIENT:               Gwendolyn Mail  CSN #:                  [id number]  :                       1945  ADMIT DATE:       3/15/2023 4:30 PM  [address] [place] DATE:  RESPONDING  PROVIDER #:        BOWEN LOZOYA DO          QUERY TEXT:    Pt admitted with syncope and suspected pna noted on H&P. Pt noted to have sepsis 2/2 CAP on 3/16 PN.  If possible, please document in progress notes and discharge summary the present on admission status of sepsis:    The medical record reflects the following:  Risk Factors: CAP, advanced age, asthma  Clinical Indicators: pt admitted with syncope and suspected pna noted on H&P  - WBC 18.3 on admission with Temp 102.1 in ED  - XR with airspace disease  - 3/16 PN- Sepsis 2/2 left lower lobe community-acquired pneumonia, mild  Treatment: Rocephin 2 g IV, Zithromax 500 mg IV, monitoring    Thank you,    Lane Cash RN  CDI  Options provided:  -- Yes, sepsis was present at the time of the order to admit to the hospital  -- No, sepsis was not present on admission and developed during the inpatient stay  -- Other - I will add my own diagnosis  -- Disagree - Not applicable / Not valid  -- Disagree - Clinically unable to determine / Unknown  -- Refer to Clinical Documentation Reviewer    PROVIDER RESPONSE TEXT:    Yes, sepsis was present at the time of the order to admit to the hospital.    Query created by: Shona Millard on 3/21/2023 4:42 PM      Electronically signed by:  Parth Allen DO 3/22/2023 6:54 PM

## 2023-03-22 NOTE — ADVANCED PRACTICE NURSE
S records  ECHO 1/29/2022 LVEF 60%  LAE, grade 1 diastolic dysfunction, aortic sclerosis     CAD with moderate dse in LAD on cath in 12./21     Family hx cad Father MI in his 52's.

## 2023-03-22 NOTE — PROGRESS NOTES
Bedside and Verbal shift change report given to [employer] (oncoming nurse) by Diana Proctor (offgoing nurse). Report included the following information SBAR, Kardex, Intake/Output, MAR, and Recent Results.

## 2023-03-22 NOTE — PROGRESS NOTES
Problem: Falls - Risk of  Goal: *Absence of Falls  Description: Document Ankur Morocho Fall Risk and appropriate interventions in the flowsheet.   Outcome: Progressing Towards Goal  Note: Fall Risk Interventions:

## 2023-03-22 NOTE — PROGRESS NOTES
Pt instructed on how to use flutter device. Pt currently taking a breathing tx with flutter device. Pt instructed to use every hour x 10 breaths in conjunction with incentive spirometry.

## 2023-03-22 NOTE — PROGRESS NOTES
3/22/2023  Case Management Progress Note    12:21 PM  Patient is 69year old female admitted 3/16 with vasovagal syncope  Patient's RUR is 12% green/low risk for readmission  Covid test: negative 3/18   Chart reviewed--patient discussed at interdisciplinary rounds  Per rounds patient is doing well mobility wise, however her respiratory status with her asthma is worsening. At the request of family I did refer her to [facility] and [facility]un[facility]ila for SNF rehab. She has been accepted at [facility] and [address] [place] [address] was asking for some further documentation which I have sent. Will continue to follow and assist with discharge planning.      Transition of Care Plan   Continue medical management/treatment  SNF vs home with [facility]  Transportation tbd pending progress  CM will continue to follow    KIRSTIN Yuan

## 2023-03-23 ENCOUNTER — APPOINTMENT (OUTPATIENT)
Dept: GENERAL RADIOLOGY | Age: 78
DRG: 177 | End: 2023-03-23
Attending: INTERNAL MEDICINE
Payer: MEDICARE

## 2023-03-23 LAB
ALBUMIN SERPL-MCNC: 2.5 G/DL (ref 3.5–5)
ALBUMIN/GLOB SERPL: 0.8 (ref 1.1–2.2)
ALP SERPL-CCNC: 60 U/L (ref 45–117)
ALT SERPL-CCNC: 114 U/L (ref 12–78)
ANION GAP SERPL CALC-SCNC: 5 MMOL/L (ref 5–15)
AST SERPL-CCNC: 37 U/L (ref 15–37)
BACTERIA SPEC CULT: NORMAL
BASOPHILS # BLD: 0 K/UL (ref 0–0.1)
BASOPHILS NFR BLD: 0 % (ref 0–1)
BILIRUB SERPL-MCNC: 0.4 MG/DL (ref 0.2–1)
BUN SERPL-MCNC: 47 MG/DL (ref 6–20)
BUN/CREAT SERPL: 40 (ref 12–20)
CALCIUM SERPL-MCNC: 9.3 MG/DL (ref 8.5–10.1)
CHLORIDE SERPL-SCNC: 111 MMOL/L (ref 97–108)
CO2 SERPL-SCNC: 23 MMOL/L (ref 21–32)
CREAT SERPL-MCNC: 1.18 MG/DL (ref 0.55–1.02)
DIFFERENTIAL METHOD BLD: ABNORMAL
EOSINOPHIL # BLD: 0 K/UL (ref 0–0.4)
EOSINOPHIL NFR BLD: 0 % (ref 0–7)
ERYTHROCYTE [DISTWIDTH] IN BLOOD BY AUTOMATED COUNT: 14 % (ref 11.5–14.5)
GLOBULIN SER CALC-MCNC: 3 G/DL (ref 2–4)
GLUCOSE BLD STRIP.AUTO-MCNC: 113 MG/DL (ref 65–117)
GLUCOSE BLD STRIP.AUTO-MCNC: 120 MG/DL (ref 65–117)
GLUCOSE BLD STRIP.AUTO-MCNC: 129 MG/DL (ref 65–117)
GLUCOSE BLD STRIP.AUTO-MCNC: 138 MG/DL (ref 65–117)
GLUCOSE SERPL-MCNC: 177 MG/DL (ref 65–100)
HCT VFR BLD AUTO: 32.3 % (ref 35–47)
HGB BLD-MCNC: 11 G/DL (ref 11.5–16)
IMM GRANULOCYTES # BLD AUTO: 0.2 K/UL (ref 0–0.04)
IMM GRANULOCYTES NFR BLD AUTO: 2 % (ref 0–0.5)
LYMPHOCYTES # BLD: 0.3 K/UL (ref 0.8–3.5)
LYMPHOCYTES NFR BLD: 3 % (ref 12–49)
MAGNESIUM SERPL-MCNC: 2.1 MG/DL (ref 1.6–2.4)
MCH RBC QN AUTO: 32 PG (ref 26–34)
MCHC RBC AUTO-ENTMCNC: 34.1 G/DL (ref 30–36.5)
MCV RBC AUTO: 93.9 FL (ref 80–99)
MONOCYTES # BLD: 0.5 K/UL (ref 0–1)
MONOCYTES NFR BLD: 4 % (ref 5–13)
NEUTS SEG # BLD: 10.3 K/UL (ref 1.8–8)
NEUTS SEG NFR BLD: 91 % (ref 32–75)
NRBC # BLD: 0 K/UL (ref 0–0.01)
NRBC BLD-RTO: 0 PER 100 WBC
PLATELET # BLD AUTO: 306 K/UL (ref 150–400)
PMV BLD AUTO: 9.1 FL (ref 8.9–12.9)
POTASSIUM SERPL-SCNC: 3.9 MMOL/L (ref 3.5–5.1)
PROT SERPL-MCNC: 5.5 G/DL (ref 6.4–8.2)
RBC # BLD AUTO: 3.44 M/UL (ref 3.8–5.2)
RBC MORPH BLD: ABNORMAL
SERVICE CMNT-IMP: ABNORMAL
SERVICE CMNT-IMP: NORMAL
SERVICE CMNT-IMP: NORMAL
SODIUM SERPL-SCNC: 139 MMOL/L (ref 136–145)
WBC # BLD AUTO: 11.3 K/UL (ref 3.6–11)

## 2023-03-23 PROCEDURE — 80053 COMPREHEN METABOLIC PANEL: CPT

## 2023-03-23 PROCEDURE — 74011250636 HC RX REV CODE- 250/636: Performed by: FAMILY MEDICINE

## 2023-03-23 PROCEDURE — 83735 ASSAY OF MAGNESIUM: CPT

## 2023-03-23 PROCEDURE — 71045 X-RAY EXAM CHEST 1 VIEW: CPT

## 2023-03-23 PROCEDURE — 94640 AIRWAY INHALATION TREATMENT: CPT

## 2023-03-23 PROCEDURE — 74011250637 HC RX REV CODE- 250/637: Performed by: INTERNAL MEDICINE

## 2023-03-23 PROCEDURE — 74011250636 HC RX REV CODE- 250/636: Performed by: INTERNAL MEDICINE

## 2023-03-23 PROCEDURE — 97116 GAIT TRAINING THERAPY: CPT

## 2023-03-23 PROCEDURE — 74011000250 HC RX REV CODE- 250: Performed by: FAMILY MEDICINE

## 2023-03-23 PROCEDURE — 82962 GLUCOSE BLOOD TEST: CPT

## 2023-03-23 PROCEDURE — 74011250637 HC RX REV CODE- 250/637: Performed by: NURSE PRACTITIONER

## 2023-03-23 PROCEDURE — 36415 COLL VENOUS BLD VENIPUNCTURE: CPT

## 2023-03-23 PROCEDURE — 74011000258 HC RX REV CODE- 258: Performed by: INTERNAL MEDICINE

## 2023-03-23 PROCEDURE — 85025 COMPLETE CBC W/AUTO DIFF WBC: CPT

## 2023-03-23 PROCEDURE — 74011000250 HC RX REV CODE- 250: Performed by: INTERNAL MEDICINE

## 2023-03-23 PROCEDURE — 65270000029 HC RM PRIVATE

## 2023-03-23 PROCEDURE — 74011000250 HC RX REV CODE- 250: Performed by: NURSE PRACTITIONER

## 2023-03-23 PROCEDURE — 94761 N-INVAS EAR/PLS OXIMETRY MLT: CPT

## 2023-03-23 PROCEDURE — 74011250636 HC RX REV CODE- 250/636: Performed by: NURSE PRACTITIONER

## 2023-03-23 RX ORDER — FUROSEMIDE 10 MG/ML
40 INJECTION INTRAMUSCULAR; INTRAVENOUS ONCE
Status: COMPLETED | OUTPATIENT
Start: 2023-03-23 | End: 2023-03-23

## 2023-03-23 RX ORDER — BACITRACIN 500 UNIT/G
1 PACKET (EA) TOPICAL EVERY OTHER DAY
Status: DISCONTINUED | OUTPATIENT
Start: 2023-03-23 | End: 2023-03-31 | Stop reason: HOSPADM

## 2023-03-23 RX ORDER — DOXYCYCLINE HYCLATE 100 MG
100 TABLET ORAL EVERY 12 HOURS
Status: COMPLETED | OUTPATIENT
Start: 2023-03-23 | End: 2023-03-28

## 2023-03-23 RX ADMIN — FUROSEMIDE 40 MG: 10 INJECTION, SOLUTION INTRAMUSCULAR; INTRAVENOUS at 17:16

## 2023-03-23 RX ADMIN — MONTELUKAST 10 MG: 10 TABLET, FILM COATED ORAL at 09:54

## 2023-03-23 RX ADMIN — BENZONATATE 100 MG: 100 CAPSULE ORAL at 16:00

## 2023-03-23 RX ADMIN — IPRATROPIUM BROMIDE AND ALBUTEROL SULFATE 3 ML: .5; 3 SOLUTION RESPIRATORY (INHALATION) at 04:27

## 2023-03-23 RX ADMIN — ARFORMOTEROL TARTRATE 15 MCG: 15 SOLUTION RESPIRATORY (INHALATION) at 20:52

## 2023-03-23 RX ADMIN — PANTOPRAZOLE SODIUM 40 MG: 40 TABLET, DELAYED RELEASE ORAL at 06:33

## 2023-03-23 RX ADMIN — METHYLPREDNISOLONE SODIUM SUCCINATE 60 MG: 40 INJECTION, POWDER, FOR SOLUTION INTRAMUSCULAR; INTRAVENOUS at 00:19

## 2023-03-23 RX ADMIN — IPRATROPIUM BROMIDE AND ALBUTEROL SULFATE 3 ML: .5; 3 SOLUTION RESPIRATORY (INHALATION) at 00:59

## 2023-03-23 RX ADMIN — IPRATROPIUM BROMIDE AND ALBUTEROL SULFATE 3 ML: .5; 3 SOLUTION RESPIRATORY (INHALATION) at 15:07

## 2023-03-23 RX ADMIN — BUDESONIDE 500 MCG: 0.5 INHALANT RESPIRATORY (INHALATION) at 20:52

## 2023-03-23 RX ADMIN — IPRATROPIUM BROMIDE AND ALBUTEROL SULFATE 3 ML: .5; 3 SOLUTION RESPIRATORY (INHALATION) at 11:14

## 2023-03-23 RX ADMIN — Medication 10 ML: at 21:53

## 2023-03-23 RX ADMIN — CLONIDINE HYDROCHLORIDE 0.1 MG: 0.1 TABLET ORAL at 17:16

## 2023-03-23 RX ADMIN — DILTIAZEM HYDROCHLORIDE 300 MG: 180 CAPSULE, COATED, EXTENDED RELEASE ORAL at 09:56

## 2023-03-23 RX ADMIN — GUAIFENESIN 1200 MG: 600 TABLET ORAL at 21:52

## 2023-03-23 RX ADMIN — Medication 10 ML: at 13:31

## 2023-03-23 RX ADMIN — DOXYCYCLINE 100 MG: 100 INJECTION, POWDER, LYOPHILIZED, FOR SOLUTION INTRAVENOUS at 00:18

## 2023-03-23 RX ADMIN — DOXYCYCLINE HYCLATE 100 MG: 100 TABLET, COATED ORAL at 21:52

## 2023-03-23 RX ADMIN — METHYLPREDNISOLONE SODIUM SUCCINATE 60 MG: 40 INJECTION, POWDER, FOR SOLUTION INTRAMUSCULAR; INTRAVENOUS at 10:06

## 2023-03-23 RX ADMIN — METHYLPREDNISOLONE SODIUM SUCCINATE 60 MG: 40 INJECTION, POWDER, FOR SOLUTION INTRAMUSCULAR; INTRAVENOUS at 17:16

## 2023-03-23 RX ADMIN — DOXYCYCLINE 100 MG: 100 INJECTION, POWDER, LYOPHILIZED, FOR SOLUTION INTRAVENOUS at 12:20

## 2023-03-23 RX ADMIN — Medication 1 PACKET: at 09:00

## 2023-03-23 RX ADMIN — IPRATROPIUM BROMIDE AND ALBUTEROL SULFATE 3 ML: .5; 3 SOLUTION RESPIRATORY (INHALATION) at 20:45

## 2023-03-23 RX ADMIN — GUAIFENESIN 1200 MG: 600 TABLET ORAL at 09:54

## 2023-03-23 RX ADMIN — HYDRALAZINE HYDROCHLORIDE 10 MG: 20 INJECTION INTRAMUSCULAR; INTRAVENOUS at 12:14

## 2023-03-23 RX ADMIN — BENZONATATE 100 MG: 100 CAPSULE ORAL at 09:54

## 2023-03-23 RX ADMIN — Medication 5 ML: at 05:28

## 2023-03-23 RX ADMIN — ENOXAPARIN SODIUM 40 MG: 100 INJECTION SUBCUTANEOUS at 09:57

## 2023-03-23 RX ADMIN — BENZONATATE 100 MG: 100 CAPSULE ORAL at 21:52

## 2023-03-23 RX ADMIN — ARFORMOTEROL TARTRATE 15 MCG: 15 SOLUTION RESPIRATORY (INHALATION) at 07:25

## 2023-03-23 RX ADMIN — BUDESONIDE 500 MCG: 0.5 INHALANT RESPIRATORY (INHALATION) at 07:25

## 2023-03-23 RX ADMIN — IPRATROPIUM BROMIDE AND ALBUTEROL SULFATE 3 ML: .5; 3 SOLUTION RESPIRATORY (INHALATION) at 07:19

## 2023-03-23 RX ADMIN — CLONIDINE HYDROCHLORIDE 0.1 MG: 0.1 TABLET ORAL at 09:54

## 2023-03-23 NOTE — PROGRESS NOTES
Problem: Falls - Risk of  Goal: *Absence of Falls  Description: Document Payal Jane Fall Risk and appropriate interventions in the flowsheet.   Outcome: Progressing Towards Goal  Note: Fall Risk Interventions:

## 2023-03-23 NOTE — PROGRESS NOTES
Jeffrey Cle[facility] [address]  [address] [facility], [address] [place] [address]  [phone] [id number] [place] [facility]                                                                                          Hospitalist Progress Note  Marisol Tello MD        Date of Service:  3/23/2023  NAME:  Yamile Ordoñez  :  1945  MRN:  [id number]      Admission Summary:   69-year-old female presented to the emergency department after syncopal episode    Interval history / Subjective:   Still reports feeling short of breath     Assessment & Plan:     Sepsis secondary to left lower lobe CAP  -Blood cultures with staph  -Continue Rocephin, completed azithromycin.   Doxycycline added for further coverage  -Repeat blood cultures negative so far  -CT chest 3/22 showed left upper lobe collapse with dense consolidation and several internal air bronchograms  -Sputum culture if able, RVP negative  -Continue Mucinex, flutter valve, incentive spirometry  -Pulmonology following    Acute asthma exacerbation  -Continue IV steroids  -Scheduled DuoNebs, Brovana/Pulmicort  -Singulair  -Incentive spirometry, flutter valve  -Pulmonology following    Acute respiratory failure with hypoxia  -Likely due to acute issues above  -Also suspect mild fluid overload so we will give a dose of Lasix today  -Continue to monitor and use oxygen as tolerated to maintain sats above 90%    A-fib with RVR  -This was brief and resolved on its own  -Echo with diastolic dysfunction  -Continue with diltiazem  -Will need event monitor on discharge  -Cardiology evaluated    Syncope  -Likely due to sepsis and acute issues above  -Negative head CT  -Echo with diastolic dysfunction    DIONISIO on CKD  -Hold ARB and HCTZ  -Monitor closely with furosemide    Hypertension  -Continue with Dilt, clonidine  -HCTZ and ARB on hold as above    Type 2 diabetes  -Continue SSI per protocol and diabetic diet    Outisde Records, prior notes, labs, radiology, and medications reviewed     Code status: Full code  DVT prophylaxis: [facility] Problems  Never Reviewed            Codes Class Noted POA    Sepsis ([facility] [address] ICD-10-CM: A41.9  ICD-9-CM: 038.9, 995.91  3/16/2023 Unknown        Vasovagal syncope ICD-10-CM: R55  ICD-9-CM: 780.2  3/15/2023 Unknown             Review of Systems:   A comprehensive review of systems was negative except for that written in the HPI. Vital Signs:    Last 24hrs VS reviewed since prior progress note. Most recent are:  Visit Vitals  BP (!) 141/77 (BP 1 Location: Right upper arm, BP Patient Position: Sitting)   Pulse 91   Temp 98.4 °F (36.9 °C)   Resp 18   Ht 5' 5\" (1.651 m)   Wt 82 kg (180 lb 12.4 oz)   SpO2 94%   BMI 30.08 kg/m²         Intake/Output Summary (Last 24 hours) at 3/23/2023 1505  Last data filed at 3/23/2023 2500  Gross per 24 hour   Intake 1020 ml   Output --   Net 1020 ml        Physical Examination:             Constitutional:  No acute distress, cooperative, pleasant    ENT:  Oral mucosa moist, oropharynx benign. Resp: Diminished breath sounds bilateral bases, no wheezing/rhonchi/rales. No accessory muscle use   CV:  Regular rhythm, normal rate, no murmurs, gallops, rubs    GI:  Soft, non distended, non tender. normoactive bowel sounds, no hepatosplenomegaly     Musculoskeletal:  No edema, warm, 2+ pulses throughout    Neurologic:  Moves all extremities. AAOx3, CN II-XII reviewed     Psych:  Good insight, Not anxious nor agitated.        Data Review:    Review and/or order of clinical lab test*      Labs:     Recent Labs     03/23/23 0205 03/22/23 0214   WBC 11.3* 11.7*   HGB 11.0* 11.0*   HCT 32.3* 32.5*    265     Recent Labs     03/23/23  0205 03/22/23 0214 03/21/23 0205    139 138   K 3.9 3.5 4.1   * 110* 109*   CO2 23 23 21   BUN 47* 47* 46*   CREA 1.18* 1.18* 1.38*   * 166* 149*   CA 9.3 9.2 9.8   MG 2.1 1.6  --      Recent Labs     03/23/23  0205 03/22/23  0216 03/21/23  0205   * 118* 121*   AP 60 60 69   TBILI 0.4 0.4 0.4   TP 5.5* 5.6* 6.2*   ALB 2.5* 2.6* 2.7*   GLOB 3.0 3.0 3.5     No results for input(s): INR, PTP, APTT, INREXT in the last 72 hours. No results for input(s): FE, TIBC, PSAT, FERR in the last 72 hours. No results found for: FOL, RBCF   No results for input(s): PH, PCO2, PO2 in the last 72 hours. No results for input(s): CPK, CKNDX, TROIQ in the last 72 hours.     No lab exists for component: CPKMB  No results found for: CHOL, CHOLX, CHLST, CHOLV, HDL, HDLP, LDL, LDLC, DLDLP, TGLX, TRIGL, TRIGP, CHHD, CHHDX  Lab Results   Component Value Date/Time    Glucose (POC) 113 03/23/2023 11:18 AM    Glucose (POC) 129 (H) 03/23/2023 06:53 AM    Glucose (POC) 142 (H) 03/22/2023 09:47 PM    Glucose (POC) 145 (H) 03/22/2023 04:07 PM    Glucose (POC) 129 (H) 03/22/2023 10:42 AM     Lab Results   Component Value Date/Time    Color YELLOW/STRAW 07/19/2016 12:08 PM    Appearance CLEAR 07/19/2016 12:08 PM    Specific gravity 1.020 07/19/2016 12:08 PM    pH (UA) 5.5 07/19/2016 12:08 PM    Protein NEGATIVE 07/19/2016 12:08 PM    Glucose NEGATIVE 07/19/2016 12:08 PM    Ketone NEGATIVE 07/19/2016 12:08 PM    Bilirubin NEGATIVE 07/19/2016 12:08 PM    Urobilinogen 0.2 07/19/2016 12:08 PM    Nitrites NEGATIVE 07/19/2016 12:08 PM    Leukocyte Esterase NEGATIVE 07/19/2016 12:08 PM    Epithelial cells FEW 07/19/2016 12:08 PM    Bacteria NEGATIVE 07/19/2016 12:08 PM    WBC 0-4 07/19/2016 12:08 PM    RBC 5-10 07/19/2016 12:08 PM         Medications Reviewed:     Current Facility-Administered Medications   Medication Dose Route Frequency    bacitracin 500 unit/gram packet 1 Packet  1 Packet Topical EVERY OTHER DAY    doxycycline (VIBRA-TABS) tablet 100 mg  100 mg Oral Q12H    furosemide (LASIX) injection 40 mg  40 mg IntraVENous ONCE    methylPREDNISolone (PF) (SOLU-MEDROL) injection 60 mg  60 mg IntraVENous Q8H    albuterol-ipratropium (DUO-NEB) 2.5 MG-0.5 MG/3 ML  3 mL Nebulization Q4H RT    dilTIAZem ER (CARDIZEM CD) capsule 300 mg  300 mg Oral DAILY    sodium chloride (OCEAN) 0.65 % nasal squeeze bottle 2 Spray  2 Spray Both Nostrils Q2H PRN    benzonatate (TESSALON) capsule 100 mg  100 mg Oral TID    hydrALAZINE (APRESOLINE) 20 mg/mL injection 10 mg  10 mg IntraVENous Q4H PRN    guaiFENesin ER (MUCINEX) tablet 1,200 mg  1,200 mg Oral Q12H    cloNIDine HCL (CATAPRES) tablet 0.1 mg  0.1 mg Oral BID    [Held by provider] atorvastatin (LIPITOR) tablet 40 mg  40 mg Oral QPM    [Held by provider] valsartan (DIOVAN) tablet 40 mg  40 mg Oral DAILY    [Held by provider] ezetimibe (ZETIA) tablet 10 mg  10 mg Oral QPM    arformoteroL (BROVANA) neb solution 15 mcg  15 mcg Nebulization BID RT    And    budesonide (PULMICORT) 500 mcg/2 ml nebulizer suspension  500 mcg Nebulization BID RT    [Held by provider] hydroCHLOROthiazide (HYDRODIURIL) tablet 25 mg  25 mg Oral DAILY    melatonin tablet 3 mg  3 mg Oral QHS PRN    montelukast (SINGULAIR) tablet 10 mg  10 mg Oral DAILY    pantoprazole (PROTONIX) tablet 40 mg  40 mg Oral ACB    insulin lispro (HUMALOG) injection   SubCUTAneous AC&HS    glucose chewable tablet 16 g  4 Tablet Oral PRN    glucagon (GLUCAGEN) injection 1 mg  1 mg IntraMUSCular PRN    dextrose 10% infusion 0-250 mL  0-250 mL IntraVENous PRN    sodium chloride (NS) flush 5-40 mL  5-40 mL IntraVENous Q8H    sodium chloride (NS) flush 5-40 mL  5-40 mL IntraVENous PRN    acetaminophen (TYLENOL) tablet 650 mg  650 mg Oral Q6H PRN    Or    acetaminophen (TYLENOL) suppository 650 mg  650 mg Rectal Q6H PRN    polyethylene glycol (MIRALAX) packet 17 g  17 g Oral DAILY PRN    ondansetron (ZOFRAN ODT) tablet 4 mg  4 mg Oral Q8H PRN    Or    ondansetron (ZOFRAN) injection 4 mg  4 mg IntraVENous Q6H PRN    enoxaparin (LOVENOX) injection 40 mg  40 mg SubCUTAneous DAILY     ______________________________________________________________________  EXPECTED LENGTH OF STAY: 5d 0h  ACTUAL LENGTH OF STAY:          7                 Marcelino Toro MD

## 2023-03-23 NOTE — WOUND CARE
Wound Nurse Note    Reconsulted to see patient regarding left elbow dressing concerns; patient reports current dressing lifting with elbow movement; irritating to skin. Assessment:  Left elbow - skin tear with partial drying flap distally; surrounding areas of ecchymosis; wound base exposure mixed pink/tan. Recommendations:  Left elbow - cleanse with wound cleanser every other day; apply bacitracin + foam for soothing wound coverage; padded elbow sleeve to protect dressing from displacement with elbow movement. Plan: Will notify MD of visit findings; reviewed with patient's nurse. Will follow; reconsult as needed.     Samia Bradford RN [facility], [facility].  [facility] Wound Dept  [phone]

## 2023-03-23 NOTE — PROGRESS NOTES
3/23/2023  Case Management Progress Note    12:53 PM  Patient is 69year old female admitted 3/16 with vasovagal syncope  Patient's RUR is 11% green/low risk for readmission  Covid test: negative 3/18   Chart reviewed--patient discussed at interdisciplinary rounds  Per rounds this morning patient is still having trouble with asthma exacerbation, pending another CXR, pulm clearance. She is not yet ready for discharge. Sasha Henderson is still reviewing, wondering about her medications and sputum cultures. Per MD, the sputum culture was ordered but not done due to patient not producing enough sputum. Will continue to follow and assist with discharge planning as clinical course continues.      Transition of Care Plan   Continue medical management/treatment  Working on SNF placement  Transportation tbd, likely [facility] will continue to follow    KIRSTIN Scruggs

## 2023-03-23 NOTE — PROGRESS NOTES
Bedside and Verbal shift change report given to Taqueria Pablo (oncoming nurse) by Cece Farooq RN (offgoing nurse). Report included the following information SBAR, Kardex, Intake/Output, MAR, Recent Results, and Med Rec Status.

## 2023-03-23 NOTE — PROGRESS NOTES
Problem: Mobility Impaired (Adult and Pediatric)  Goal: *Acute Goals and Plan of Care (Insert Text)  Description: FUNCTIONAL STATUS PRIOR TO ADMISSION: Patient was independent and active without use of DME. Assists with laundry and cooking for son and his family. HOME SUPPORT PRIOR TO ADMISSION: The patient lived with son and his family; . Physical Therapy Goals  Initiated 3/17/2023  1. Patient will move from supine to sit and sit to supine  in bed with independence within 7 day(s). 2.  Patient will transfer from bed to chair and chair to bed with independence using the least restrictive device within 7 day(s). 3.  Patient will perform sit to stand with independence within 7 day(s). 4.  Patient will ambulate with independence for 150 feet with the least restrictive device within 7 day(s). 5.  Patient will ascend/descend 13 stairs with single handrail(s) with modified independence within 7 day(s). Outcome: Progressing Towards Goal  Note:   PHYSICAL THERAPY TREATMENT  Patient: Rachael Garcia (88 y.o. female)  Date: 3/23/2023  Diagnosis: Vasovagal syncope [R55]  Sepsis ([facility] [address] [A41.9] <principal problem not specified>      Precautions:    Chart, physical therapy assessment, plan of care and goals were reviewed. ASSESSMENT  Patient continues with skilled PT services and progressing towards goals. Pt less steady today without support, requiring HHA and reaching outside FLO with activity, will trial RW/rollator next visit. 90% on room air at rest Pt desat  87% on room air with limited gait training. With CHURCH. Seated rest placed on 1 L recovered to 91%, declined additional gait training 2/2 SOB and fatigue.      Pulse oximetry assessment   90% at rest on room air (if 88% or less, skip next steps)  87% while ambulating on room air    Current Level of Function Impacting Discharge (mobility/balance): CGA/ Min A    Other factors to consider for discharge:          PLAN :  Patient continues to benefit from skilled intervention to address the above impairments. Continue treatment per established plan of care. to address goals. Recommendation for discharge: (in order for the patient to meet his/her long term goals)  Physical therapy at least 2 days/week in the home vs rehab    This discharge recommendation:  Has been made in collaboration with the attending provider and/or case management    IF patient discharges home will need the following DME: rollator vs rolling walker       SUBJECTIVE:   Patient stated .    OBJECTIVE DATA SUMMARY:   Critical Behavior:  Neurologic State: Appropriate for age, Eyes open spontaneously  Orientation Level: Oriented X4  Cognition: Appropriate decision making, Appropriate for age attention/concentration, Appropriate safety awareness     Functional Mobility Training:  Bed Mobility:     Supine to Sit: Modified independent  Sit to Supine: Modified independent           Transfers:  Sit to Stand: Contact guard assistance  Stand to Sit: Stand-by assistance                             Balance:  Sitting: Intact  Standing: Impaired; Without support  Ambulation/Gait Training:  Distance (ft): 20 Feet (ft)     Ambulation - Level of Assistance: Minimal assistance;Assist x1        Gait Abnormalities: Decreased step clearance;Trunk sway increased              Speed/Prema: Slow                       Stairs: Therapeutic Exercises:     Pain Rating:  Denies pain    Activity Tolerance:   Good, desaturates with exertion and requires oxygen, and observed SOB with activity    After treatment patient left in no apparent distress:   Supine in bed and Call bell within reach    COMMUNICATION/COLLABORATION:   The patients plan of care was discussed with: Registered nurseAlyx Saldivar   Time Calculation: 26 mins

## 2023-03-23 NOTE — PROGRESS NOTES
Name: Nelli Lane: [facility]   : 1945 Admit Date: 3/15/2023   Phone:   Room: 511/01   PCP: Beth Ornelas MD  MRN: [id number]   Date: 3/23/2023  Code: Full Code          Chart and notes reviewed. Data reviewed. I review the patient's current medications in the medical record at each encounter. I have evaluated and examined the patient. HPI:    10:47 AM       History was obtained from patient. I was asked by Cyrus Price MD to see Claudean Guarneri in consultation for a chief complaint of PNA. History of Present Illness:    69 y.o. F with history of asthma, seasonal/environmental allergies, GERD, HTN, pseudomonas PNA, JANET, and DM. Followed outpatient by Dr. Fortunato Ibarra. Has not been seen in about a year. She is on Advair and Spiriva as maintenance. She tends to develop bronchitis at the start of allergy season. Per daughter, she had been treated outpatient for bronchitis. She was placed on Levaquin and despite treatment, was worsening. She had a syncopal episode on the toilet and was brought to ED for further evaluation. She feels that cough is not as productive now, but more dry and painful. Cough causes some SOB now. Wheezing as well. Afebrile  BP stable  Oxygen saturations 90-91% on 2L  WBC 11.3--decreased  D-dimer 4.08 (3/18/23)  Creatinine 1.18--stable  LFTs trending down  BC 3/15/23: positive 1/4 bottles coat negative staph  Repeat BC 3/18/23: ngtd x 4 days  RVP negative  Mycoplasma negative  legionella, strep pneumo pending  ECHO: EF 55-60%, RA mildly dilated  VQ 3/20: very low probability of PE  Images reviewed:    LE Dopplers 3/19/23: negative. CXR 3/15/23: left basilar airspace disease. PFT's 2021: normal spirometry. CT chest 3/22/2023: TOAN collapsed with areas of consolidation, bilateral patchy consolidation and areas of ggo    ROS:    Feels worse today with increased congestion/cough. No fever/chills. Feels tired.     Past Medical History:   Diagnosis Date    Asthma     Diabetes ([facility] [address]     type 2    GERD (gastroesophageal reflux disease)     Hypertension     Ill-defined condition     high cholesterol    Ill-defined condition     seasonal allergies    Ill-defined condition     gout       Past Surgical History:   Procedure Laterality Date    HX APPENDECTOMY      HX HEENT  2010    cataract     HX HYSTERECTOMY  1975    HX LAP CHOLECYSTECTOMY  1985    HX OOPHORECTOMY  1996    benign ovarian tumor    HX OTHER SURGICAL  1993    bladder tack    HX ROTATOR CUFF REPAIR  2015    right    HX TONSILLECTOMY  1964       Family History   Problem Relation Age of Onset    Cancer Mother         uterine    Heart Disease Mother         afib    Heart Disease Father 48        MI    Hypertension Father        Social History     Tobacco Use    Smoking status: Former     Packs/day: 0.25     Types: Cigarettes     Quit date:      Years since quittin.2    Smokeless tobacco: Never    Tobacco comments:     smoked x 1 year   Substance Use Topics    Alcohol use: No       Allergies   Allergen Reactions    Aspirin Cough    Beta Blocker [Beta-Blockers (Beta-Adrenergic Blocking Agts)] Other (comments)     Was told by her PCP to report that she has a mutation- she does not recall a reaction    Codeine Rash       Current Facility-Administered Medications   Medication Dose Route Frequency    bacitracin 500 unit/gram packet 1 Packet  1 Packet Topical EVERY OTHER DAY    methylPREDNISolone (PF) (SOLU-MEDROL) injection 60 mg  60 mg IntraVENous Q8H    albuterol-ipratropium (DUO-NEB) 2.5 MG-0.5 MG/3 ML  3 mL Nebulization Q4H RT    dilTIAZem ER (CARDIZEM CD) capsule 300 mg  300 mg Oral DAILY    doxycycline (VIBRAMYCIN) 100 mg in 0.9% sodium chloride (MBP/ADV) 100 mL MBP  100 mg IntraVENous Q12H    sodium chloride (OCEAN) 0.65 % nasal squeeze bottle 2 Spray  2 Spray Both Nostrils Q2H PRN    benzonatate (TESSALON) capsule 100 mg  100 mg Oral TID    hydrALAZINE (APRESOLINE) 20 mg/mL injection 10 mg  10 mg IntraVENous Q4H PRN    guaiFENesin ER (MUCINEX) tablet 1,200 mg  1,200 mg Oral Q12H    cloNIDine HCL (CATAPRES) tablet 0.1 mg  0.1 mg Oral BID    [Held by provider] atorvastatin (LIPITOR) tablet 40 mg  40 mg Oral QPM    [Held by provider] valsartan (DIOVAN) tablet 40 mg  40 mg Oral DAILY    [Held by provider] ezetimibe (ZETIA) tablet 10 mg  10 mg Oral QPM    arformoteroL (BROVANA) neb solution 15 mcg  15 mcg Nebulization BID RT    And    budesonide (PULMICORT) 500 mcg/2 ml nebulizer suspension  500 mcg Nebulization BID RT    [Held by provider] hydroCHLOROthiazide (HYDRODIURIL) tablet 25 mg  25 mg Oral DAILY    melatonin tablet 3 mg  3 mg Oral QHS PRN    montelukast (SINGULAIR) tablet 10 mg  10 mg Oral DAILY    pantoprazole (PROTONIX) tablet 40 mg  40 mg Oral ACB    insulin lispro (HUMALOG) injection   SubCUTAneous AC&HS    glucose chewable tablet 16 g  4 Tablet Oral PRN    glucagon (GLUCAGEN) injection 1 mg  1 mg IntraMUSCular PRN    dextrose 10% infusion 0-250 mL  0-250 mL IntraVENous PRN    sodium chloride (NS) flush 5-40 mL  5-40 mL IntraVENous Q8H    sodium chloride (NS) flush 5-40 mL  5-40 mL IntraVENous PRN    acetaminophen (TYLENOL) tablet 650 mg  650 mg Oral Q6H PRN    Or    acetaminophen (TYLENOL) suppository 650 mg  650 mg Rectal Q6H PRN    polyethylene glycol (MIRALAX) packet 17 g  17 g Oral DAILY PRN    ondansetron (ZOFRAN ODT) tablet 4 mg  4 mg Oral Q8H PRN    Or    ondansetron (ZOFRAN) injection 4 mg  4 mg IntraVENous Q6H PRN    enoxaparin (LOVENOX) injection 40 mg  40 mg SubCUTAneous DAILY         REVIEW OF SYSTEMS   12 point ROS negative except as stated in the HPI.       Physical Exam:   Visit Vitals  BP (!) 166/64 (BP 1 Location: Left upper arm, BP Patient Position: Semi fowlers)   Pulse 92   Temp 98.3 °F (36.8 °C)   Resp 18   Ht 5' 5\" (1.651 m)   Wt 82 kg (180 lb 12.4 oz)   SpO2 90%   BMI 30.08 kg/m²       General:  Alert, cooperative, no distress, appears stated age.   Head:  Normocephalic, without obvious abnormality, atraumatic. Eyes:  Conjunctivae/corneas clear. Nose: Nares normal. Septum midline. Mucosa normal.    Throat: Lips, mucosa, and tongue normal.    Neck: Supple, symmetrical, trachea midline, no adenopathy. Lungs:   Some scattered rhonchi and bilateral exp wheeze   Chest wall:  No tenderness or deformity. Heart:  Regular rate and rhythm, S1, S2 normal, no murmur, click, rub or gallop. Abdomen:   Soft, non-tender. Bowel sounds normal. No masses,  No organomegaly. Extremities: Extremities normal, atraumatic, no cyanosis or edema. Pulses: 2+ and symmetric all extremities. Skin: Skin color, texture, turgor normal. No rashes or lesions   Lymph nodes: Cervical, supraclavicular nodes normal.   Neurologic: Grossly nonfocal       Lab Results   Component Value Date/Time    Sodium 139 03/23/2023 02:05 AM    Potassium 3.9 03/23/2023 02:05 AM    Chloride 111 (H) 03/23/2023 02:05 AM    CO2 23 03/23/2023 02:05 AM    BUN 47 (H) 03/23/2023 02:05 AM    Creatinine 1.18 (H) 03/23/2023 02:05 AM    Glucose 177 (H) 03/23/2023 02:05 AM    Calcium 9.3 03/23/2023 02:05 AM    Magnesium 2.1 03/23/2023 02:05 AM    Lactic acid 1.2 03/15/2023 05:39 PM       Lab Results   Component Value Date/Time    WBC 11.3 (H) 03/23/2023 02:05 AM    HGB 11.0 (L) 03/23/2023 02:05 AM    PLATELET 966 36/71/7606 02:05 AM    MCV 93.9 03/23/2023 02:05 AM       Lab Results   Component Value Date/Time    Alk.  phosphatase 60 03/23/2023 02:05 AM    Protein, total 5.5 (L) 03/23/2023 02:05 AM    Albumin 2.5 (L) 03/23/2023 02:05 AM    Globulin 3.0 03/23/2023 02:05 AM       No results found for: IRON, FE, TIBC, IBCT, PSAT, FERR    No results found for: SR, CRP, JOAQUINA, ANAIGG, RA, RPR, RPRT, VDRLT, VDRLS, TSH, TSHEXT, TSHEXT     No results found for: PH, PHI, PCO2, PCO2I, PO2, PO2I, HCO3, HCO3I, FIO2, FIO2I    No results found for: CPK, RCK1, RCK2, RCK3, RCK4, CKNDX, CKND1, TROPT, TROIQ, BNPP, BNP     Lab Results   Component Value Date/Time    Culture result: NO GROWTH 5 DAYS 03/18/2023 10:02 AM    Culture result: MRSA NOT PRESENT 03/18/2023 09:50 AM    Culture result:  03/18/2023 09:50 AM     Screening of patient nares for MRSA is for surveillance purposes and, if positive, to facilitate isolation considerations in high risk settings. It is not intended for automatic decolonization interventions per se as regimens are not sufficiently effective to warrant routine use.          Lab Results   Component Value Date/Time    Hepatitis B surface Ag <0.10 03/21/2023 04:30 PM       No results found for: VANCT, CPK    Lab Results   Component Value Date/Time    Color YELLOW/STRAW 07/19/2016 12:08 PM    Appearance CLEAR 07/19/2016 12:08 PM    pH (UA) 5.5 07/19/2016 12:08 PM    Protein NEGATIVE 07/19/2016 12:08 PM    Glucose NEGATIVE 07/19/2016 12:08 PM    Ketone NEGATIVE 07/19/2016 12:08 PM    Bilirubin NEGATIVE 07/19/2016 12:08 PM    Blood NEGATIVE 07/19/2016 12:08 PM    Urobilinogen 0.2 07/19/2016 12:08 PM    Nitrites NEGATIVE 07/19/2016 12:08 PM    Leukocyte Esterase NEGATIVE 07/19/2016 12:08 PM    WBC 0-4 07/19/2016 12:08 PM    RBC 5-10 07/19/2016 12:08 PM    Bacteria NEGATIVE 07/19/2016 12:08 PM       IMPRESSION  CAP  Asthma Exacerbation  Syncope  DIONISIO  HTN  DM  JANET  Hx of pseudomonas PNA (2021)    PLAN  Maintain oxygen saturations > 90%; currently on RA  Follow-up PNA workup; sputum culture ordered  Pulmicort/Brovana nebs; DuoNebs increased to q4h  IV steroids  Completed Azithro/Rocephin  Chest PT  Repeat CXR today  Continue Singulair and Mucinex  PT  Flutter  Encourage IS and OOB as much as possible  DVT prophylaxis: Lovenox  GI prophylaxis: Protonix      Oliva Reed MD

## 2023-03-23 NOTE — PROGRESS NOTES
Bedside and Verbal shift change report [facility] to [facility] (oncoming nurse) by Ann Montanez RN (offgoing nurse). Report included the following information SBAR, Kardex, Intake/Output, and MAR.

## 2023-03-23 NOTE — PROGRESS NOTES
[facility] Dosing Services: IV to PO Conversion    The pharmacist has determined that this patient meets P & T approved criteria for conversion from IV to oral therapy for the following medication:Doxycycline    The pharmacist has written the following order for the patient: Doxy 100 mg PO BID    The pharmacist will continue to monitor the patient's status and advise the physician if conversion back to IV therapy is recommended.     Signed Elihue Curling, [facility] Contact information:  [phone]

## 2023-03-24 LAB
GLUCOSE BLD STRIP.AUTO-MCNC: 117 MG/DL (ref 65–117)
GLUCOSE BLD STRIP.AUTO-MCNC: 131 MG/DL (ref 65–117)
GLUCOSE BLD STRIP.AUTO-MCNC: 136 MG/DL (ref 65–117)
GLUCOSE BLD STRIP.AUTO-MCNC: 157 MG/DL (ref 65–117)
SERVICE CMNT-IMP: ABNORMAL
SERVICE CMNT-IMP: NORMAL

## 2023-03-24 PROCEDURE — 74011250636 HC RX REV CODE- 250/636: Performed by: INTERNAL MEDICINE

## 2023-03-24 PROCEDURE — 74011000250 HC RX REV CODE- 250: Performed by: INTERNAL MEDICINE

## 2023-03-24 PROCEDURE — 94669 MECHANICAL CHEST WALL OSCILL: CPT

## 2023-03-24 PROCEDURE — 74011250637 HC RX REV CODE- 250/637: Performed by: NURSE PRACTITIONER

## 2023-03-24 PROCEDURE — 87070 CULTURE OTHR SPECIMN AEROBIC: CPT

## 2023-03-24 PROCEDURE — 74011000250 HC RX REV CODE- 250: Performed by: NURSE PRACTITIONER

## 2023-03-24 PROCEDURE — 94761 N-INVAS EAR/PLS OXIMETRY MLT: CPT

## 2023-03-24 PROCEDURE — 65270000029 HC RM PRIVATE

## 2023-03-24 PROCEDURE — 94640 AIRWAY INHALATION TREATMENT: CPT

## 2023-03-24 PROCEDURE — 74011250637 HC RX REV CODE- 250/637: Performed by: INTERNAL MEDICINE

## 2023-03-24 PROCEDURE — 74011636637 HC RX REV CODE- 636/637: Performed by: INTERNAL MEDICINE

## 2023-03-24 PROCEDURE — 74011250636 HC RX REV CODE- 250/636: Performed by: NURSE PRACTITIONER

## 2023-03-24 PROCEDURE — 97116 GAIT TRAINING THERAPY: CPT

## 2023-03-24 PROCEDURE — 74011250636 HC RX REV CODE- 250/636: Performed by: FAMILY MEDICINE

## 2023-03-24 PROCEDURE — 77010033678 HC OXYGEN DAILY

## 2023-03-24 PROCEDURE — 82962 GLUCOSE BLOOD TEST: CPT

## 2023-03-24 RX ADMIN — DOXYCYCLINE HYCLATE 100 MG: 100 TABLET, COATED ORAL at 09:50

## 2023-03-24 RX ADMIN — BUDESONIDE 500 MCG: 0.5 INHALANT RESPIRATORY (INHALATION) at 20:20

## 2023-03-24 RX ADMIN — ARFORMOTEROL TARTRATE 15 MCG: 15 SOLUTION RESPIRATORY (INHALATION) at 20:20

## 2023-03-24 RX ADMIN — GUAIFENESIN 1200 MG: 600 TABLET ORAL at 09:51

## 2023-03-24 RX ADMIN — IPRATROPIUM BROMIDE AND ALBUTEROL SULFATE 3 ML: .5; 3 SOLUTION RESPIRATORY (INHALATION) at 01:16

## 2023-03-24 RX ADMIN — CLONIDINE HYDROCHLORIDE 0.1 MG: 0.1 TABLET ORAL at 18:08

## 2023-03-24 RX ADMIN — ARFORMOTEROL TARTRATE 15 MCG: 15 SOLUTION RESPIRATORY (INHALATION) at 07:33

## 2023-03-24 RX ADMIN — BUDESONIDE 500 MCG: 0.5 INHALANT RESPIRATORY (INHALATION) at 07:33

## 2023-03-24 RX ADMIN — BENZONATATE 100 MG: 100 CAPSULE ORAL at 09:51

## 2023-03-24 RX ADMIN — Medication 2 UNITS: at 18:08

## 2023-03-24 RX ADMIN — ENOXAPARIN SODIUM 40 MG: 100 INJECTION SUBCUTANEOUS at 09:52

## 2023-03-24 RX ADMIN — METHYLPREDNISOLONE SODIUM SUCCINATE 60 MG: 125 INJECTION, POWDER, FOR SOLUTION INTRAMUSCULAR; INTRAVENOUS at 23:34

## 2023-03-24 RX ADMIN — BENZONATATE 100 MG: 100 CAPSULE ORAL at 21:19

## 2023-03-24 RX ADMIN — IPRATROPIUM BROMIDE AND ALBUTEROL SULFATE 3 ML: .5; 3 SOLUTION RESPIRATORY (INHALATION) at 11:33

## 2023-03-24 RX ADMIN — PANTOPRAZOLE SODIUM 40 MG: 40 TABLET, DELAYED RELEASE ORAL at 06:30

## 2023-03-24 RX ADMIN — DILTIAZEM HYDROCHLORIDE 300 MG: 180 CAPSULE, COATED, EXTENDED RELEASE ORAL at 09:50

## 2023-03-24 RX ADMIN — IPRATROPIUM BROMIDE AND ALBUTEROL SULFATE 3 ML: .5; 3 SOLUTION RESPIRATORY (INHALATION) at 07:27

## 2023-03-24 RX ADMIN — IPRATROPIUM BROMIDE AND ALBUTEROL SULFATE 3 ML: .5; 3 SOLUTION RESPIRATORY (INHALATION) at 20:20

## 2023-03-24 RX ADMIN — METHYLPREDNISOLONE SODIUM SUCCINATE 60 MG: 40 INJECTION, POWDER, FOR SOLUTION INTRAMUSCULAR; INTRAVENOUS at 10:53

## 2023-03-24 RX ADMIN — Medication 10 ML: at 21:19

## 2023-03-24 RX ADMIN — IPRATROPIUM BROMIDE AND ALBUTEROL SULFATE 3 ML: .5; 3 SOLUTION RESPIRATORY (INHALATION) at 16:02

## 2023-03-24 RX ADMIN — IPRATROPIUM BROMIDE AND ALBUTEROL SULFATE 3 ML: .5; 3 SOLUTION RESPIRATORY (INHALATION) at 04:36

## 2023-03-24 RX ADMIN — MONTELUKAST 10 MG: 10 TABLET, FILM COATED ORAL at 09:51

## 2023-03-24 RX ADMIN — Medication 10 ML: at 05:44

## 2023-03-24 RX ADMIN — BENZONATATE 100 MG: 100 CAPSULE ORAL at 15:23

## 2023-03-24 RX ADMIN — CLONIDINE HYDROCHLORIDE 0.1 MG: 0.1 TABLET ORAL at 09:51

## 2023-03-24 RX ADMIN — METHYLPREDNISOLONE SODIUM SUCCINATE 60 MG: 40 INJECTION, POWDER, FOR SOLUTION INTRAMUSCULAR; INTRAVENOUS at 00:49

## 2023-03-24 RX ADMIN — DOXYCYCLINE HYCLATE 100 MG: 100 TABLET, COATED ORAL at 21:19

## 2023-03-24 RX ADMIN — GUAIFENESIN 1200 MG: 600 TABLET ORAL at 21:19

## 2023-03-24 RX ADMIN — Medication 10 ML: at 15:23

## 2023-03-24 NOTE — PROGRESS NOTES
Problem: Mobility Impaired (Adult and Pediatric)  Goal: *Acute Goals and Plan of Care (Insert Text)  Description: FUNCTIONAL STATUS PRIOR TO ADMISSION: Patient was independent and active without use of DME. Assists with laundry and cooking for son and his family. HOME SUPPORT PRIOR TO ADMISSION: The patient lived with son and his family; . Physical Therapy Goals  Initiated 3/17/2023  1. Patient will move from supine to sit and sit to supine  in bed with independence within 7 day(s). 2.  Patient will transfer from bed to chair and chair to bed with independence using the least restrictive device within 7 day(s). 3.  Patient will perform sit to stand with independence within 7 day(s). 4.  Patient will ambulate with independence for 150 feet with the least restrictive device within 7 day(s). 5.  Patient will ascend/descend 13 stairs with single handrail(s) with modified independence within 7 day(s). Outcome: Progressing Towards Goal  Note:   PHYSICAL THERAPY TREATMENT  Patient: Miko Escalante (17 y.o. female)  Date: 3/24/2023  Diagnosis: Vasovagal syncope [R55]  [facility] [A41.9] <principal problem not specified>      Precautions:    Chart, physical therapy assessment, plan of care and goals were reviewed. ASSESSMENT  Patient continues with skilled PT services and progressing towards goals. Pt demonstrates improved steadiness with use of RW for gait training longer distances. O2 sat 94-91% with activity using 2L throughout, taking one standing rest break with gait training 40'. Pt remained in chair at end of session. Current Level of Function Impacting Discharge (mobility/balance): CGA    Other factors to consider for discharge:          PLAN :  Patient continues to benefit from skilled intervention to address the above impairments. Continue treatment per established plan of care. to address goals.     Recommendation for discharge: (in order for the patient to meet his/her long term goals)  Physical therapy at least 2 days/week in the home     This discharge recommendation:  Has been made in collaboration with the attending provider and/or case management    IF patient discharges home will need the following DME: possible rollator vs rolling walker       SUBJECTIVE:   Patient stated I feel better with this (RW).     OBJECTIVE DATA SUMMARY:   Critical Behavior:  Neurologic State: Alert, Appropriate for age  Orientation Level: Oriented X4  Cognition: Appropriate decision making, Appropriate for age attention/concentration, Appropriate safety awareness, Follows commands     Functional Mobility Training:  Bed Mobility:     Supine to Sit: Modified independent              Transfers:  Sit to Stand: Contact guard assistance;Stand-by assistance  Stand to Sit: Stand-by assistance                             Balance:  Sitting: Intact  Standing: Intact; With support  Standing - Static: Constant support;Good  Standing - Dynamic : Fair  Ambulation/Gait Training:  Distance (ft): 40 Feet (ft)  Assistive Device: Walker, rolling;Gait belt  Ambulation - Level of Assistance: Contact guard assistance        Gait Abnormalities: Decreased step clearance              Speed/Prema: Slow                       Stairs: Therapeutic Exercises:     Pain Rating:  Denies pain    Activity Tolerance:   Good and observed SOB with activity    After treatment patient left in no apparent distress:   Sitting in chair and Call bell within reach    COMMUNICATION/COLLABORATION:   The patients plan of care was discussed with: Registered nurse.      Francine Scott   Time Calculation: 14 mins

## 2023-03-24 NOTE — PROGRESS NOTES
Name: Yessenia Wild: [facility]   : 1945 Admit Date: 3/15/2023   Phone:   Room: 511/01   PCP: Earle Quintero MD  MRN: [id number]   Date: 3/24/2023  Code: Full Code          Chart and notes reviewed. Data reviewed. I review the patient's current medications in the medical record at each encounter. I have evaluated and examined the patient. HPI:    10:47 AM       History was obtained from patient. I was asked by Issa Galvan MD to see Mitch Davis in consultation for a chief complaint of PNA. History of Present Illness:    69 y.o. F with history of asthma, seasonal/environmental allergies, GERD, HTN, pseudomonas PNA, JANET, and DM. Followed outpatient by Dr. Lucretia Porter. Has not been seen in about a year. She is on Advair and Spiriva as maintenance. She tends to develop bronchitis at the start of allergy season. Per daughter, she had been treated outpatient for bronchitis. She was placed on Levaquin and despite treatment, was worsening. She had a syncopal episode on the toilet and was brought to ED for further evaluation. She feels that cough is not as productive now, but more dry and painful. Cough causes some SOB now. Wheezing as well. Afebrile  BP elevated  Oxygen saturations 94% on 2L  No new labs today  LFTs trending down  [facility] 3/15/23: positive 1/4 bottles coat negative staph  Repeat BC 3/18/23: ngtd x 5 days  RVP negative  Mycoplasma, legionella, s.pneumo negative  ECHO: EF 55-60%, RA mildly dilated  VQ 3/20: very low probability of PE  Images reviewed:    LE Dopplers 3/19/23: negative. CXR 3/15/23: left basilar airspace disease. PFT's 2021: normal spirometry. CT chest 3/22/2023: TOAN collapsed with areas of consolidation, bilateral patchy consolidation and areas of ggo    ROS:    Not really feeling much better. Still has a lot of congestion.   Past Medical History:   Diagnosis Date    Asthma     Diabetes ([facility] [address]     type 2    GERD (gastroesophageal reflux disease)     Hypertension     Ill-defined condition     high cholesterol    Ill-defined condition     seasonal allergies    Ill-defined condition     gout       Past Surgical History:   Procedure Laterality Date    HX APPENDECTOMY      HX HEENT  2010    cataract     HX HYSTERECTOMY      HX LAP CHOLECYSTECTOMY      HX OOPHORECTOMY      benign ovarian tumor    HX OTHER SURGICAL  1993    bladder tack    HX ROTATOR CUFF REPAIR  2015    right    HX TONSILLECTOMY  1964       Family History   Problem Relation Age of Onset    Cancer Mother         uterine    Heart Disease Mother         afib    Heart Disease Father 48        MI    Hypertension Father        Social History     Tobacco Use    Smoking status: Former     Packs/day: 0.25     Types: Cigarettes     Quit date:      Years since quittin.2    Smokeless tobacco: Never    Tobacco comments:     smoked x 1 year   Substance Use Topics    Alcohol use: No       Allergies   Allergen Reactions    Aspirin Cough    Beta Blocker [Beta-Blockers (Beta-Adrenergic Blocking Agts)] Other (comments)     Was told by her PCP to report that she has a mutation- she does not recall a reaction    Codeine Rash       Current Facility-Administered Medications   Medication Dose Route Frequency    bacitracin 500 unit/gram packet 1 Packet  1 Packet Topical EVERY OTHER DAY    doxycycline (VIBRA-TABS) tablet 100 mg  100 mg Oral Q12H    methylPREDNISolone (PF) (SOLU-MEDROL) injection 60 mg  60 mg IntraVENous Q8H    albuterol-ipratropium (DUO-NEB) 2.5 MG-0.5 MG/3 ML  3 mL Nebulization Q4H RT    dilTIAZem ER (CARDIZEM CD) capsule 300 mg  300 mg Oral DAILY    sodium chloride (OCEAN) 0.65 % nasal squeeze bottle 2 Spray  2 Spray Both Nostrils Q2H PRN    benzonatate (TESSALON) capsule 100 mg  100 mg Oral TID    hydrALAZINE (APRESOLINE) 20 mg/mL injection 10 mg  10 mg IntraVENous Q4H PRN    guaiFENesin ER (MUCINEX) tablet 1,200 mg  1,200 mg Oral Q12H    cloNIDine HCL (CATAPRES) tablet 0.1 mg  0.1 mg Oral BID    [Held by provider] atorvastatin (LIPITOR) tablet 40 mg  40 mg Oral QPM    [Held by provider] valsartan (DIOVAN) tablet 40 mg  40 mg Oral DAILY    [Held by provider] ezetimibe (ZETIA) tablet 10 mg  10 mg Oral QPM    arformoteroL (BROVANA) neb solution 15 mcg  15 mcg Nebulization BID RT    And    budesonide (PULMICORT) 500 mcg/2 ml nebulizer suspension  500 mcg Nebulization BID RT    [Held by provider] hydroCHLOROthiazide (HYDRODIURIL) tablet 25 mg  25 mg Oral DAILY    melatonin tablet 3 mg  3 mg Oral QHS PRN    montelukast (SINGULAIR) tablet 10 mg  10 mg Oral DAILY    pantoprazole (PROTONIX) tablet 40 mg  40 mg Oral ACB    insulin lispro (HUMALOG) injection   SubCUTAneous AC&HS    glucose chewable tablet 16 g  4 Tablet Oral PRN    glucagon (GLUCAGEN) injection 1 mg  1 mg IntraMUSCular PRN    dextrose 10% infusion 0-250 mL  0-250 mL IntraVENous PRN    sodium chloride (NS) flush 5-40 mL  5-40 mL IntraVENous Q8H    sodium chloride (NS) flush 5-40 mL  5-40 mL IntraVENous PRN    acetaminophen (TYLENOL) tablet 650 mg  650 mg Oral Q6H PRN    Or    acetaminophen (TYLENOL) suppository 650 mg  650 mg Rectal Q6H PRN    polyethylene glycol (MIRALAX) packet 17 g  17 g Oral DAILY PRN    ondansetron (ZOFRAN ODT) tablet 4 mg  4 mg Oral Q8H PRN    Or    ondansetron (ZOFRAN) injection 4 mg  4 mg IntraVENous Q6H PRN    enoxaparin (LOVENOX) injection 40 mg  40 mg SubCUTAneous DAILY         REVIEW OF SYSTEMS   12 point ROS negative except as stated in the HPI. Physical Exam:   Visit Vitals  BP (!) 159/70 (BP 1 Location: Left upper arm, BP Patient Position: Semi fowlers)   Pulse 85   Temp 98 °F (36.7 °C)   Resp 16   Ht 5' 5\" (1.651 m)   Wt 82 kg (180 lb 12.4 oz)   SpO2 94%   BMI 30.08 kg/m²       General:  Alert, cooperative, no distress, appears stated age. Head:  Normocephalic, without obvious abnormality, atraumatic. Eyes:  Conjunctivae/corneas clear.     Nose: Nares normal. Septum midline. Mucosa normal.    Throat: Lips, mucosa, and tongue normal.    Neck: Supple, symmetrical, trachea midline, no adenopathy. Lungs:   Some scattered rhonchi and bilateral exp wheeze   Chest wall:  No tenderness or deformity. Heart:  Regular rate and rhythm, S1, S2 normal, no murmur, click, rub or gallop. Abdomen:   Soft, non-tender. Bowel sounds normal. No masses,  No organomegaly. Extremities: Extremities normal, atraumatic, no cyanosis or edema. Pulses: 2+ and symmetric all extremities. Skin: Skin color, texture, turgor normal. No rashes or lesions   Lymph nodes: Cervical, supraclavicular nodes normal.   Neurologic: Grossly nonfocal       Lab Results   Component Value Date/Time    Sodium 139 03/23/2023 02:05 AM    Potassium 3.9 03/23/2023 02:05 AM    Chloride 111 (H) 03/23/2023 02:05 AM    CO2 23 03/23/2023 02:05 AM    BUN 47 (H) 03/23/2023 02:05 AM    Creatinine 1.18 (H) 03/23/2023 02:05 AM    Glucose 177 (H) 03/23/2023 02:05 AM    Calcium 9.3 03/23/2023 02:05 AM    Magnesium 2.1 03/23/2023 02:05 AM    Lactic acid 1.2 03/15/2023 05:39 PM       Lab Results   Component Value Date/Time    WBC 11.3 (H) 03/23/2023 02:05 AM    HGB 11.0 (L) 03/23/2023 02:05 AM    PLATELET 682 04/39/6174 02:05 AM    MCV 93.9 03/23/2023 02:05 AM       Lab Results   Component Value Date/Time    Alk.  phosphatase 60 03/23/2023 02:05 AM    Protein, total 5.5 (L) 03/23/2023 02:05 AM    Albumin 2.5 (L) 03/23/2023 02:05 AM    Globulin 3.0 03/23/2023 02:05 AM       No results found for: IRON, FE, TIBC, IBCT, PSAT, FERR    No results found for: SR, CRP, JOAQUINA, ANAIGG, RA, RPR, RPRT, VDRLT, VDRLS, TSH, TSHEXT, TSHEXT     No results found for: PH, PHI, PCO2, PCO2I, PO2, PO2I, HCO3, HCO3I, FIO2, FIO2I    No results found for: CPK, RCK1, RCK2, RCK3, RCK4, CKNDX, CKND1, TROPT, TROIQ, BNPP, BNP     Lab Results   Component Value Date/Time    Culture result: NO GROWTH 5 DAYS 03/18/2023 10:02 AM    Culture result: MRSA NOT PRESENT 03/18/2023 09:50 AM    Culture result:  03/18/2023 09:50 AM     Screening of patient nares for MRSA is for surveillance purposes and, if positive, to facilitate isolation considerations in high risk settings. It is not intended for automatic decolonization interventions per se as regimens are not sufficiently effective to warrant routine use. Lab Results   Component Value Date/Time    Hepatitis B surface Ag <0.10 03/21/2023 04:30 PM       No results found for: VANCT, CPK    Lab Results   Component Value Date/Time    Color YELLOW/STRAW 07/19/2016 12:08 PM    Appearance CLEAR 07/19/2016 12:08 PM    pH (UA) 5.5 07/19/2016 12:08 PM    Protein NEGATIVE 07/19/2016 12:08 PM    Glucose NEGATIVE 07/19/2016 12:08 PM    Ketone NEGATIVE 07/19/2016 12:08 PM    Bilirubin NEGATIVE 07/19/2016 12:08 PM    Blood NEGATIVE 07/19/2016 12:08 PM    Urobilinogen 0.2 07/19/2016 12:08 PM    Nitrites NEGATIVE 07/19/2016 12:08 PM    Leukocyte Esterase NEGATIVE 07/19/2016 12:08 PM    WBC 0-4 07/19/2016 12:08 PM    RBC 5-10 07/19/2016 12:08 PM    Bacteria NEGATIVE 07/19/2016 12:08 PM       IMPRESSION  CAP  Asthma Exacerbation  Syncope  DIONISIO  HTN  DM  JANET  Hx of pseudomonas PNA (2021)    PLAN  Maintain oxygen saturations > 90%; currently on RA  Follow-up PNA workup; sputum culture ordered  Tentatively planned for bronch on Monday afternoon at 3:00 PM.  NPO on Monday at 5:00AM.  Repeat CXR Monday morning prior to procedure.   Pulmicort/Brovana nebs; DuoNebs increased to q4h  IV steroids; weaned to 60mg q12h  Completed Azithro/Rocephin  Chest PT q4h while awake  Continue Singulair and Mucinex  PT  Flutter  Encourage IS and OOB as much as possible  DVT prophylaxis: Lovenox  GI prophylaxis: Protonix      Kelley Alexis NP

## 2023-03-24 NOTE — PROGRESS NOTES
3/24/2023  Case Management Progress Note    12:11 PM  Patient is 69year old female admitted 3/16 with vasovagal syncope  Patient's RUR is 11% green/low risk for readmission  Covid test: negative 3/18   Chart reviewed--patient discussed at interdisciplinary rounds  Per rounds patient will be likely getting a bronchoscopy on Monday and will be here through that time and potentially to Tuesday. Spoke with patient's son Emi Vizcarra for an update, he is agreeable to further referrals being sent to the [facility] for pulmonary rehab just to have all the options open--patient may be more confident about going home come discharge. Will continue to follow and assist with discharge planning.      Transition of Care Plan   Continue medical management/treatment  Rehab vs home, referrals out  Transportation tbd pending progress; family vs wc Vijay Gonzalez CM will continue to follow    KIRSTIN Fuller

## 2023-03-24 NOTE — PROGRESS NOTES
Bedside shift change report given to [address] [place] [address] (oncoming nurse) by Reji Schilling (offgoing nurse). Report included the following information SBAR, Kardex, Intake/Output, MAR, and Recent Results.

## 2023-03-24 NOTE — PROGRESS NOTES
Problem: Falls - Risk of  Goal: *Absence of Falls  Description: Document Leon Anderson Fall Risk and appropriate interventions in the flowsheet. Outcome: Progressing Towards Goal  Note: Fall Risk Interventions:                                Problem: Patient Education: Go to Patient Education Activity  Goal: Patient/Family Education  Outcome: Progressing Towards Goal     Problem: Patient Education: Go to Patient Education Activity  Goal: Patient/Family Education  Outcome: Progressing Towards Goal     Problem:  Activity Intolerance  Goal: *Oxygen saturation during activity within specified parameters  Outcome: Progressing Towards Goal  Goal: *Able to remain out of bed as prescribed  Outcome: Progressing Towards Goal     Problem: Patient Education: Go to Patient Education Activity  Goal: Patient/Family Education  Outcome: Progressing Towards Goal     Problem: Nutrition Deficit  Goal: *Optimize nutritional status  Outcome: Progressing Towards Goal

## 2023-03-24 NOTE — PROGRESS NOTES
Bedside and Verbal shift change report given to JOS Maddox  (oncoming nurse) by Brisa Morel  (offgoing nurse). Report included the following information SBAR, Kardex, Procedure Summary, MAR, and Recent Results.

## 2023-03-24 NOTE — PROGRESS NOTES
Jeffrey Evin [facility]  [address] [facility] [place], [address] [place] [address]  [phone] [facility]                                                                                          Hospitalist Progress Note  Reva Mac MD        Date of Service:  3/24/2023  NAME:  Ricardo Mclean  :  1945  MRN:  [id number]      Admission Summary:   69-year-old female presented to the emergency department after syncopal episode    Interval history / Subjective:   Still reports feeling short of breath     Assessment & Plan:     Sepsis secondary to left lower lobe CAP  -Blood cultures with staph  -Continue Rocephin, completed azithromycin.   Doxycycline added for further coverage  -Repeat blood cultures negative so far  -CT chest 3/22 showed left upper lobe collapse with dense consolidation and several internal air bronchograms  -Sputum culture if able, RVP negative  -Continue Mucinex, flutter valve, incentive spirometry  -Pulmonology following, planning for bronchoscopy on Monday    Acute asthma exacerbation  -Continue IV steroids, weaned per pulmonology  -Scheduled DuKomal Lynne/Pulmicort  -Singulair  -Incentive spirometry, flutter valve  -Pulmonology following    Acute respiratory failure with hypoxia  -Likely due to acute issues above  -Continue to monitor and use oxygen as tolerated to maintain sats above 90%    A-fib with RVR  -This was brief and resolved on its own  -Echo with diastolic dysfunction  -Continue with diltiazem  -Will need event monitor on discharge  -Cardiology evaluated    Syncope  -Likely due to sepsis and acute issues above  -Negative head CT  -Echo with diastolic dysfunction    DIONISIO on CKD  -Hold ARB and HCTZ    Hypertension  -Continue with Dilt, clonidine  -HCTZ and ARB on hold as above    Type 2 diabetes  -Continue SSI per protocol and diabetic diet    Outisde Records, prior notes, labs, radiology, and medications reviewed     Code status: Full code  DVT prophylaxis: [facility] Problems  Never Reviewed            Codes Class Noted POA    Sepsis ([facility] [address] ICD-10-CM: A41.9  ICD-9-CM: 038.9, 995.91  3/16/2023 Unknown        Vasovagal syncope ICD-10-CM: R55  ICD-9-CM: 780.2  3/15/2023 Unknown           Review of Systems:   A comprehensive review of systems was negative except for that written in the HPI. Vital Signs:    Last 24hrs VS reviewed since prior progress note. Most recent are:  Visit Vitals  BP (!) 152/77 (BP 1 Location: Left upper arm, BP Patient Position: Semi fowlers)   Pulse 99   Temp 98.3 °F (36.8 °C)   Resp 16   Ht 5' 5\" (1.651 m)   Wt 82 kg (180 lb 12.4 oz)   SpO2 93%   BMI 30.08 kg/m²         Intake/Output Summary (Last 24 hours) at 3/24/2023 1316  Last data filed at 3/24/2023 0334  Gross per 24 hour   Intake 430 ml   Output --   Net 430 ml          Physical Examination:             Constitutional:  No acute distress, cooperative, pleasant    ENT:  Oral mucosa moist, oropharynx benign. Resp: Diminished breath sounds bilateral bases, no wheezing/rhonchi/rales. No accessory muscle use   CV:  Regular rhythm, normal rate, no murmurs, gallops, rubs    GI:  Soft, non distended, non tender. normoactive bowel sounds, no hepatosplenomegaly     Musculoskeletal:  No edema, warm, 2+ pulses throughout    Neurologic:  Moves all extremities. AAOx3, CN II-XII reviewed     Psych:  Good insight, Not anxious nor agitated.        Data Review:    Review and/or order of clinical lab test*      Labs:     Recent Labs     03/23/23 0205 03/22/23 0214   WBC 11.3* 11.7*   HGB 11.0* 11.0*   HCT 32.3* 32.5*    265       Recent Labs     03/23/23 0205 03/22/23 0214    139   K 3.9 3.5   * 110*   CO2 23 23   BUN 47* 47*   CREA 1.18* 1.18*   * 166*   CA 9.3 9.2   MG 2.1 1.6       Recent Labs     03/23/23 0205 03/22/23  0214   * 118*   AP 60 60   TBILI 0.4 0.4   TP 5.5* 5.6*   ALB 2.5* 2.6*   GLOB 3.0 3.0 No results for input(s): INR, PTP, APTT, INREXT, INREXT in the last 72 hours. No results for input(s): FE, TIBC, PSAT, FERR in the last 72 hours. No results found for: FOL, RBCF   No results for input(s): PH, PCO2, PO2 in the last 72 hours. No results for input(s): CPK, CKNDX, TROIQ in the last 72 hours.     No lab exists for component: CPKMB  No results found for: CHOL, CHOLX, CHLST, CHOLV, HDL, HDLP, LDL, LDLC, DLDLP, Marcheta Flood, CHHD, CHHDX  Lab Results   Component Value Date/Time    Glucose (POC) 117 03/24/2023 11:25 AM    Glucose (POC) 136 (H) 03/24/2023 07:35 AM    Glucose (POC) 138 (H) 03/23/2023 09:18 PM    Glucose (POC) 120 (H) 03/23/2023 04:13 PM    Glucose (POC) 113 03/23/2023 11:18 AM     Lab Results   Component Value Date/Time    Color YELLOW/STRAW 07/19/2016 12:08 PM    Appearance CLEAR 07/19/2016 12:08 PM    Specific gravity 1.020 07/19/2016 12:08 PM    pH (UA) 5.5 07/19/2016 12:08 PM    Protein NEGATIVE 07/19/2016 12:08 PM    Glucose NEGATIVE 07/19/2016 12:08 PM    Ketone NEGATIVE 07/19/2016 12:08 PM    Bilirubin NEGATIVE 07/19/2016 12:08 PM    Urobilinogen 0.2 07/19/2016 12:08 PM    Nitrites NEGATIVE 07/19/2016 12:08 PM    Leukocyte Esterase NEGATIVE 07/19/2016 12:08 PM    Epithelial cells FEW 07/19/2016 12:08 PM    Bacteria NEGATIVE 07/19/2016 12:08 PM    WBC 0-4 07/19/2016 12:08 PM    RBC 5-10 07/19/2016 12:08 PM         Medications Reviewed:     Current Facility-Administered Medications   Medication Dose Route Frequency    [START ON 3/25/2023] methylPREDNISolone (PF) (SOLU-MEDROL) injection 60 mg  60 mg IntraVENous Q12H    bacitracin 500 unit/gram packet 1 Packet  1 Packet Topical EVERY OTHER DAY    doxycycline (VIBRA-TABS) tablet 100 mg  100 mg Oral Q12H    albuterol-ipratropium (DUO-NEB) 2.5 MG-0.5 MG/3 ML  3 mL Nebulization Q4H RT    dilTIAZem ER (CARDIZEM CD) capsule 300 mg  300 mg Oral DAILY    sodium chloride (OCEAN) 0.65 % nasal squeeze bottle 2 Spray  2 Spray Both Nostrils Q2H PRN    benzonatate (TESSALON) capsule 100 mg  100 mg Oral TID    hydrALAZINE (APRESOLINE) 20 mg/mL injection 10 mg  10 mg IntraVENous Q4H PRN    guaiFENesin ER (MUCINEX) tablet 1,200 mg  1,200 mg Oral Q12H    cloNIDine HCL (CATAPRES) tablet 0.1 mg  0.1 mg Oral BID    [Held by provider] atorvastatin (LIPITOR) tablet 40 mg  40 mg Oral QPM    [Held by provider] valsartan (DIOVAN) tablet 40 mg  40 mg Oral DAILY    [Held by provider] ezetimibe (ZETIA) tablet 10 mg  10 mg Oral QPM    arformoteroL (BROVANA) neb solution 15 mcg  15 mcg Nebulization BID RT    And    budesonide (PULMICORT) 500 mcg/2 ml nebulizer suspension  500 mcg Nebulization BID RT    [Held by provider] hydroCHLOROthiazide (HYDRODIURIL) tablet 25 mg  25 mg Oral DAILY    melatonin tablet 3 mg  3 mg Oral QHS PRN    montelukast (SINGULAIR) tablet 10 mg  10 mg Oral DAILY    pantoprazole (PROTONIX) tablet 40 mg  40 mg Oral ACB    insulin lispro (HUMALOG) injection   SubCUTAneous AC&HS    glucose chewable tablet 16 g  4 Tablet Oral PRN    glucagon (GLUCAGEN) injection 1 mg  1 mg IntraMUSCular PRN    dextrose 10% infusion 0-250 mL  0-250 mL IntraVENous PRN    sodium chloride (NS) flush 5-40 mL  5-40 mL IntraVENous Q8H    sodium chloride (NS) flush 5-40 mL  5-40 mL IntraVENous PRN    acetaminophen (TYLENOL) tablet 650 mg  650 mg Oral Q6H PRN    Or    acetaminophen (TYLENOL) suppository 650 mg  650 mg Rectal Q6H PRN    polyethylene glycol (MIRALAX) packet 17 g  17 g Oral DAILY PRN    ondansetron (ZOFRAN ODT) tablet 4 mg  4 mg Oral Q8H PRN    Or    ondansetron (ZOFRAN) injection 4 mg  4 mg IntraVENous Q6H PRN    enoxaparin (LOVENOX) injection 40 mg  40 mg SubCUTAneous DAILY     ______________________________________________________________________  EXPECTED LENGTH OF STAY: 5d 0h  ACTUAL LENGTH OF STAY:          8                 Sammy Parker MD

## 2023-03-25 LAB
ALBUMIN SERPL-MCNC: 2.6 G/DL (ref 3.5–5)
ALBUMIN/GLOB SERPL: 1 (ref 1.1–2.2)
ALP SERPL-CCNC: 52 U/L (ref 45–117)
ALT SERPL-CCNC: 88 U/L (ref 12–78)
ANION GAP SERPL CALC-SCNC: 5 MMOL/L (ref 5–15)
AST SERPL-CCNC: 20 U/L (ref 15–37)
BASOPHILS # BLD: 0 K/UL (ref 0–0.1)
BASOPHILS NFR BLD: 0 % (ref 0–1)
BILIRUB SERPL-MCNC: 0.5 MG/DL (ref 0.2–1)
BUN SERPL-MCNC: 53 MG/DL (ref 6–20)
BUN/CREAT SERPL: 44 (ref 12–20)
CALCIUM SERPL-MCNC: 9.3 MG/DL (ref 8.5–10.1)
CHLORIDE SERPL-SCNC: 112 MMOL/L (ref 97–108)
CO2 SERPL-SCNC: 23 MMOL/L (ref 21–32)
CREAT SERPL-MCNC: 1.2 MG/DL (ref 0.55–1.02)
DIFFERENTIAL METHOD BLD: ABNORMAL
EOSINOPHIL # BLD: 0 K/UL (ref 0–0.4)
EOSINOPHIL NFR BLD: 0 % (ref 0–7)
ERYTHROCYTE [DISTWIDTH] IN BLOOD BY AUTOMATED COUNT: 14.4 % (ref 11.5–14.5)
GLOBULIN SER CALC-MCNC: 2.5 G/DL (ref 2–4)
GLUCOSE BLD STRIP.AUTO-MCNC: 115 MG/DL (ref 65–117)
GLUCOSE BLD STRIP.AUTO-MCNC: 119 MG/DL (ref 65–117)
GLUCOSE BLD STRIP.AUTO-MCNC: 145 MG/DL (ref 65–117)
GLUCOSE BLD STRIP.AUTO-MCNC: 160 MG/DL (ref 65–117)
GLUCOSE SERPL-MCNC: 163 MG/DL (ref 65–100)
HCT VFR BLD AUTO: 31.7 % (ref 35–47)
HGB BLD-MCNC: 10.8 G/DL (ref 11.5–16)
IMM GRANULOCYTES # BLD AUTO: 0.2 K/UL (ref 0–0.04)
IMM GRANULOCYTES NFR BLD AUTO: 2 % (ref 0–0.5)
LYMPHOCYTES # BLD: 0.3 K/UL (ref 0.8–3.5)
LYMPHOCYTES NFR BLD: 3 % (ref 12–49)
MAGNESIUM SERPL-MCNC: 1.6 MG/DL (ref 1.6–2.4)
MCH RBC QN AUTO: 32 PG (ref 26–34)
MCHC RBC AUTO-ENTMCNC: 34.1 G/DL (ref 30–36.5)
MCV RBC AUTO: 94.1 FL (ref 80–99)
MONOCYTES # BLD: 0.5 K/UL (ref 0–1)
MONOCYTES NFR BLD: 5 % (ref 5–13)
NEUTS SEG # BLD: 9.7 K/UL (ref 1.8–8)
NEUTS SEG NFR BLD: 90 % (ref 32–75)
NRBC # BLD: 0.02 K/UL (ref 0–0.01)
NRBC BLD-RTO: 0.2 PER 100 WBC
PLATELET # BLD AUTO: 300 K/UL (ref 150–400)
PMV BLD AUTO: 9.1 FL (ref 8.9–12.9)
POTASSIUM SERPL-SCNC: 3.6 MMOL/L (ref 3.5–5.1)
PROT SERPL-MCNC: 5.1 G/DL (ref 6.4–8.2)
RBC # BLD AUTO: 3.37 M/UL (ref 3.8–5.2)
RBC MORPH BLD: ABNORMAL
SERVICE CMNT-IMP: ABNORMAL
SERVICE CMNT-IMP: NORMAL
SODIUM SERPL-SCNC: 140 MMOL/L (ref 136–145)
WBC # BLD AUTO: 10.7 K/UL (ref 3.6–11)

## 2023-03-25 PROCEDURE — 85025 COMPLETE CBC W/AUTO DIFF WBC: CPT

## 2023-03-25 PROCEDURE — 94664 DEMO&/EVAL PT USE INHALER: CPT

## 2023-03-25 PROCEDURE — 74011250637 HC RX REV CODE- 250/637: Performed by: INTERNAL MEDICINE

## 2023-03-25 PROCEDURE — 65270000029 HC RM PRIVATE

## 2023-03-25 PROCEDURE — 74011636637 HC RX REV CODE- 636/637: Performed by: INTERNAL MEDICINE

## 2023-03-25 PROCEDURE — 77010033678 HC OXYGEN DAILY

## 2023-03-25 PROCEDURE — 36415 COLL VENOUS BLD VENIPUNCTURE: CPT

## 2023-03-25 PROCEDURE — 74011000250 HC RX REV CODE- 250: Performed by: NURSE PRACTITIONER

## 2023-03-25 PROCEDURE — 82962 GLUCOSE BLOOD TEST: CPT

## 2023-03-25 PROCEDURE — 74011000250 HC RX REV CODE- 250: Performed by: FAMILY MEDICINE

## 2023-03-25 PROCEDURE — 83735 ASSAY OF MAGNESIUM: CPT

## 2023-03-25 PROCEDURE — 74011250636 HC RX REV CODE- 250/636: Performed by: FAMILY MEDICINE

## 2023-03-25 PROCEDURE — 74011250636 HC RX REV CODE- 250/636: Performed by: INTERNAL MEDICINE

## 2023-03-25 PROCEDURE — 80053 COMPREHEN METABOLIC PANEL: CPT

## 2023-03-25 PROCEDURE — 94640 AIRWAY INHALATION TREATMENT: CPT

## 2023-03-25 PROCEDURE — 74011250637 HC RX REV CODE- 250/637: Performed by: NURSE PRACTITIONER

## 2023-03-25 PROCEDURE — 94761 N-INVAS EAR/PLS OXIMETRY MLT: CPT

## 2023-03-25 PROCEDURE — 74011000250 HC RX REV CODE- 250: Performed by: INTERNAL MEDICINE

## 2023-03-25 RX ORDER — IPRATROPIUM BROMIDE AND ALBUTEROL SULFATE 2.5; .5 MG/3ML; MG/3ML
3 SOLUTION RESPIRATORY (INHALATION)
Status: DISCONTINUED | OUTPATIENT
Start: 2023-03-25 | End: 2023-03-31 | Stop reason: HOSPADM

## 2023-03-25 RX ORDER — IPRATROPIUM BROMIDE AND ALBUTEROL SULFATE 2.5; .5 MG/3ML; MG/3ML
3 SOLUTION RESPIRATORY (INHALATION)
Status: DISCONTINUED
Start: 2023-03-25 | End: 2023-03-27

## 2023-03-25 RX ADMIN — BENZONATATE 100 MG: 100 CAPSULE ORAL at 21:49

## 2023-03-25 RX ADMIN — GUAIFENESIN 1200 MG: 600 TABLET ORAL at 11:15

## 2023-03-25 RX ADMIN — Medication 2 UNITS: at 11:16

## 2023-03-25 RX ADMIN — CLONIDINE HYDROCHLORIDE 0.1 MG: 0.1 TABLET ORAL at 11:15

## 2023-03-25 RX ADMIN — ARFORMOTEROL TARTRATE 15 MCG: 15 SOLUTION RESPIRATORY (INHALATION) at 07:33

## 2023-03-25 RX ADMIN — IPRATROPIUM BROMIDE AND ALBUTEROL SULFATE 3 ML: 2.5; .5 SOLUTION RESPIRATORY (INHALATION) at 20:18

## 2023-03-25 RX ADMIN — BENZONATATE 100 MG: 100 CAPSULE ORAL at 11:15

## 2023-03-25 RX ADMIN — DILTIAZEM HYDROCHLORIDE 300 MG: 180 CAPSULE, COATED, EXTENDED RELEASE ORAL at 11:14

## 2023-03-25 RX ADMIN — MONTELUKAST 10 MG: 10 TABLET, FILM COATED ORAL at 11:16

## 2023-03-25 RX ADMIN — METHYLPREDNISOLONE SODIUM SUCCINATE 60 MG: 125 INJECTION, POWDER, FOR SOLUTION INTRAMUSCULAR; INTRAVENOUS at 13:24

## 2023-03-25 RX ADMIN — DOXYCYCLINE HYCLATE 100 MG: 100 TABLET, COATED ORAL at 21:49

## 2023-03-25 RX ADMIN — CLONIDINE HYDROCHLORIDE 0.1 MG: 0.1 TABLET ORAL at 18:23

## 2023-03-25 RX ADMIN — ENOXAPARIN SODIUM 40 MG: 100 INJECTION SUBCUTANEOUS at 11:16

## 2023-03-25 RX ADMIN — IPRATROPIUM BROMIDE AND ALBUTEROL SULFATE 3 ML: .5; 3 SOLUTION RESPIRATORY (INHALATION) at 07:27

## 2023-03-25 RX ADMIN — PANTOPRAZOLE SODIUM 40 MG: 40 TABLET, DELAYED RELEASE ORAL at 06:38

## 2023-03-25 RX ADMIN — Medication 10 ML: at 05:32

## 2023-03-25 RX ADMIN — IPRATROPIUM BROMIDE AND ALBUTEROL SULFATE 3 ML: .5; 3 SOLUTION RESPIRATORY (INHALATION) at 13:06

## 2023-03-25 RX ADMIN — IPRATROPIUM BROMIDE AND ALBUTEROL SULFATE 3 ML: .5; 3 SOLUTION RESPIRATORY (INHALATION) at 04:26

## 2023-03-25 RX ADMIN — Medication 1 PACKET: at 11:15

## 2023-03-25 RX ADMIN — Medication 10 ML: at 21:45

## 2023-03-25 RX ADMIN — GUAIFENESIN 1200 MG: 600 TABLET ORAL at 21:49

## 2023-03-25 RX ADMIN — IPRATROPIUM BROMIDE AND ALBUTEROL SULFATE 3 ML: .5; 3 SOLUTION RESPIRATORY (INHALATION) at 00:25

## 2023-03-25 RX ADMIN — BUDESONIDE 500 MCG: 0.5 INHALANT RESPIRATORY (INHALATION) at 07:33

## 2023-03-25 RX ADMIN — ARFORMOTEROL TARTRATE 15 MCG: 15 SOLUTION RESPIRATORY (INHALATION) at 20:18

## 2023-03-25 RX ADMIN — DOXYCYCLINE HYCLATE 100 MG: 100 TABLET, COATED ORAL at 11:15

## 2023-03-25 RX ADMIN — BENZONATATE 100 MG: 100 CAPSULE ORAL at 18:23

## 2023-03-25 RX ADMIN — BUDESONIDE 500 MCG: 0.5 INHALANT RESPIRATORY (INHALATION) at 20:18

## 2023-03-25 NOTE — PROGRESS NOTES
Problem: Falls - Risk of  Goal: *Absence of Falls  Description: Document Ankur Morocho Fall Risk and appropriate interventions in the flowsheet. Outcome: Progressing Towards Goal  Note: Fall Risk Interventions:                                Problem: Patient Education: Go to Patient Education Activity  Goal: Patient/Family Education  Outcome: Progressing Towards Goal     Problem: Patient Education: Go to Patient Education Activity  Goal: Patient/Family Education  Outcome: Progressing Towards Goal     Problem:  Activity Intolerance  Goal: *Oxygen saturation during activity within specified parameters  Outcome: Progressing Towards Goal  Goal: *Able to remain out of bed as prescribed  Outcome: Progressing Towards Goal     Problem: Patient Education: Go to Patient Education Activity  Goal: Patient/Family Education  Outcome: Progressing Towards Goal     Problem: Nutrition Deficit  Goal: *Optimize nutritional status  Outcome: Progressing Towards Goal

## 2023-03-25 NOTE — PROGRESS NOTES
Jeffrey Cleary [facility] 79  [address]47 [facility], [address] [place] [address]  [phone] [id number] [facility] Adult  Hospitalist Group                                                                                          Hospitalist Progress Note  Aayush Faye MD        Date of Service:  3/25/2023  NAME:  Celeste Escoto  :  1945  MRN:  [id number]      Admission Summary:   73-year-old female presented to the emergency department after syncopal episode    Interval history / Subjective:   Patient states she feels about the same     Assessment & Plan:     Sepsis secondary to left lower lobe CAP  -Blood cultures with staph  -Continue Rocephin, completed azithromycin.   Doxycycline added for further coverage  -Repeat blood cultures negative so far  -CT chest 3/22 showed left upper lobe collapse with dense consolidation and several internal air bronchograms  -Sputum culture if able, RVP negative  -Continue Mucinex, flutter valve, incentive spirometry  -Pulmonology following, planning for bronchoscopy on Monday afternoon    Acute asthma exacerbation  -Continue IV steroids, weaned per pulmonology  -Scheduled Komal Soliz/Pulmicort  -Singulair  -Incentive spirometry, flutter valve  -Pulmonology following    Acute respiratory failure with hypoxia  -Likely due to acute issues above  -Continue to monitor and use oxygen as tolerated to maintain sats above 90%    A-fib with RVR  -This was brief and resolved on its own  -Echo with diastolic dysfunction  -Continue with diltiazem  -Will need event monitor on discharge  -Cardiology evaluated    Syncope  -Likely due to sepsis and acute issues above  -Negative head CT  -Echo with diastolic dysfunction    DIONISIO on CKD  -Hold ARB and HCTZ    Hypertension  -Continue with Dilt, clonidine  -HCTZ and ARB on hold as above    Type 2 diabetes  -Continue SSI per protocol and diabetic diet    Outisde Records, prior notes, labs, radiology, and medications reviewed Code status: Full code  DVT prophylaxis: [facility] Problems  Never Reviewed            Codes Class Noted POA    Sepsis ([facility] [address] ICD-10-CM: A41.9  ICD-9-CM: 038.9, 995.91  3/16/2023 Unknown        Vasovagal syncope ICD-10-CM: R55  ICD-9-CM: 780.2  3/15/2023 Unknown         Review of Systems:   A comprehensive review of systems was negative except for that written in the HPI. Vital Signs:    Last 24hrs VS reviewed since prior progress note. Most recent are:  Visit Vitals  BP (!) 160/66 (BP 1 Location: Left upper arm, BP Patient Position: Sitting)   Pulse 82   Temp 98.1 °F (36.7 °C)   Resp 16   Ht 5' 5\" (1.651 m)   Wt 82 kg (180 lb 12.4 oz)   SpO2 96%   BMI 30.08 kg/m²         Intake/Output Summary (Last 24 hours) at 3/25/2023 1159  Last data filed at 3/25/2023 1112  Gross per 24 hour   Intake 740 ml   Output 0 ml   Net 740 ml          Physical Examination:             Constitutional:  No acute distress, cooperative, pleasant    ENT:  Oral mucosa moist, oropharynx benign. Resp: Diminished breath sounds bilateral bases, no wheezing/rhonchi/rales. No accessory muscle use   CV:  Regular rhythm, normal rate, no murmurs, gallops, rubs    GI:  Soft, non distended, non tender. normoactive bowel sounds, no hepatosplenomegaly     Musculoskeletal:  No edema, warm, 2+ pulses throughout    Neurologic:  Moves all extremities. AAOx3, CN II-XII reviewed     Psych:  Good insight, Not anxious nor agitated.        Data Review:    Review and/or order of clinical lab test*      Labs:     Recent Labs     03/25/23 0212 03/23/23  0205   WBC 10.7 11.3*   HGB 10.8* 11.0*   HCT 31.7* 32.3*    306       Recent Labs     03/25/23 0212 03/23/23  0205    139   K 3.6 3.9   * 111*   CO2 23 23   BUN 53* 47*   CREA 1.20* 1.18*   * 177*   CA 9.3 9.3   MG 1.6 2.1       Recent Labs     03/25/23 0212 03/23/23  0205   ALT 88* 114*   AP 52 60   TBILI 0.5 0.4   TP 5.1* 5.5*   ALB 2.6* 2.5*   GLOB 2.5 3.0 No results for input(s): INR, PTP, APTT, INREXT, INREXT in the last 72 hours. No results for input(s): FE, TIBC, PSAT, FERR in the last 72 hours. No results found for: FOL, RBCF   No results for input(s): PH, PCO2, PO2 in the last 72 hours. No results for input(s): CPK, CKNDX, TROIQ in the last 72 hours.     No lab exists for component: CPKMB  No results found for: CHOL, CHOLX, CHLST, CHOLV, HDL, HDLP, LDL, LDLC, DLDLP, TGLX, Christiano Hilding, CHHD, CHHDX  Lab Results   Component Value Date/Time    Glucose (POC) 115 03/25/2023 11:47 AM    Glucose (POC) 145 (H) 03/25/2023 08:15 AM    Glucose (POC) 131 (H) 03/24/2023 09:21 PM    Glucose (POC) 157 (H) 03/24/2023 05:47 PM    Glucose (POC) 117 03/24/2023 11:25 AM     Lab Results   Component Value Date/Time    Color YELLOW/STRAW 07/19/2016 12:08 PM    Appearance CLEAR 07/19/2016 12:08 PM    Specific gravity 1.020 07/19/2016 12:08 PM    pH (UA) 5.5 07/19/2016 12:08 PM    Protein NEGATIVE 07/19/2016 12:08 PM    Glucose NEGATIVE 07/19/2016 12:08 PM    Ketone NEGATIVE 07/19/2016 12:08 PM    Bilirubin NEGATIVE 07/19/2016 12:08 PM    Urobilinogen 0.2 07/19/2016 12:08 PM    Nitrites NEGATIVE 07/19/2016 12:08 PM    Leukocyte Esterase NEGATIVE 07/19/2016 12:08 PM    Epithelial cells FEW 07/19/2016 12:08 PM    Bacteria NEGATIVE 07/19/2016 12:08 PM    WBC 0-4 07/19/2016 12:08 PM    RBC 5-10 07/19/2016 12:08 PM         Medications Reviewed:     Current Facility-Administered Medications   Medication Dose Route Frequency    methylPREDNISolone (PF) (SOLU-MEDROL) injection 60 mg  60 mg IntraVENous Q12H    bacitracin 500 unit/gram packet 1 Packet  1 Packet Topical EVERY OTHER DAY    doxycycline (VIBRA-TABS) tablet 100 mg  100 mg Oral Q12H    albuterol-ipratropium (DUO-NEB) 2.5 MG-0.5 MG/3 ML  3 mL Nebulization Q4H RT    dilTIAZem ER (CARDIZEM CD) capsule 300 mg  300 mg Oral DAILY    sodium chloride (OCEAN) 0.65 % nasal squeeze bottle 2 Spray  2 Spray Both Nostrils Q2H PRN benzonatate (TESSALON) capsule 100 mg  100 mg Oral TID    hydrALAZINE (APRESOLINE) 20 mg/mL injection 10 mg  10 mg IntraVENous Q4H PRN    guaiFENesin ER (MUCINEX) tablet 1,200 mg  1,200 mg Oral Q12H    cloNIDine HCL (CATAPRES) tablet 0.1 mg  0.1 mg Oral BID    [Held by provider] atorvastatin (LIPITOR) tablet 40 mg  40 mg Oral QPM    [Held by provider] valsartan (DIOVAN) tablet 40 mg  40 mg Oral DAILY    [Held by provider] ezetimibe (ZETIA) tablet 10 mg  10 mg Oral QPM    arformoteroL (BROVANA) neb solution 15 mcg  15 mcg Nebulization BID RT    And    budesonide (PULMICORT) 500 mcg/2 ml nebulizer suspension  500 mcg Nebulization BID RT    [Held by provider] hydroCHLOROthiazide (HYDRODIURIL) tablet 25 mg  25 mg Oral DAILY    melatonin tablet 3 mg  3 mg Oral QHS PRN    montelukast (SINGULAIR) tablet 10 mg  10 mg Oral DAILY    pantoprazole (PROTONIX) tablet 40 mg  40 mg Oral ACB    insulin lispro (HUMALOG) injection   SubCUTAneous AC&HS    glucose chewable tablet 16 g  4 Tablet Oral PRN    glucagon (GLUCAGEN) injection 1 mg  1 mg IntraMUSCular PRN    dextrose 10% infusion 0-250 mL  0-250 mL IntraVENous PRN    sodium chloride (NS) flush 5-40 mL  5-40 mL IntraVENous Q8H    sodium chloride (NS) flush 5-40 mL  5-40 mL IntraVENous PRN    acetaminophen (TYLENOL) tablet 650 mg  650 mg Oral Q6H PRN    Or    acetaminophen (TYLENOL) suppository 650 mg  650 mg Rectal Q6H PRN    polyethylene glycol (MIRALAX) packet 17 g  17 g Oral DAILY PRN    ondansetron (ZOFRAN ODT) tablet 4 mg  4 mg Oral Q8H PRN    Or    ondansetron (ZOFRAN) injection 4 mg  4 mg IntraVENous Q6H PRN    enoxaparin (LOVENOX) injection 40 mg  40 mg SubCUTAneous DAILY     ______________________________________________________________________  EXPECTED LENGTH OF STAY: 5d 0h  ACTUAL LENGTH OF STAY:          9                 Nathaniel Lemons MD

## 2023-03-25 NOTE — PROGRESS NOTES
Bedside and Verbal shift change report given to Monroe Jean  (oncoming nurse) by Alida Dubois (offgoing nurse). Report included the following information SBAR, Kardex, OR Summary, MAR, and Recent Results.

## 2023-03-26 LAB
ANION GAP SERPL CALC-SCNC: 6 MMOL/L (ref 5–15)
BACTERIA SPEC CULT: ABNORMAL
BACTERIA SPEC CULT: ABNORMAL
BASOPHILS # BLD: 0 K/UL (ref 0–0.1)
BASOPHILS NFR BLD: 0 % (ref 0–1)
BUN SERPL-MCNC: 55 MG/DL (ref 6–20)
BUN/CREAT SERPL: 48 (ref 12–20)
CALCIUM SERPL-MCNC: 9 MG/DL (ref 8.5–10.1)
CHLORIDE SERPL-SCNC: 113 MMOL/L (ref 97–108)
CO2 SERPL-SCNC: 22 MMOL/L (ref 21–32)
CREAT SERPL-MCNC: 1.14 MG/DL (ref 0.55–1.02)
DIFFERENTIAL METHOD BLD: ABNORMAL
EOSINOPHIL # BLD: 0 K/UL (ref 0–0.4)
EOSINOPHIL NFR BLD: 0 % (ref 0–7)
ERYTHROCYTE [DISTWIDTH] IN BLOOD BY AUTOMATED COUNT: 14.3 % (ref 11.5–14.5)
GLUCOSE BLD STRIP.AUTO-MCNC: 132 MG/DL (ref 65–117)
GLUCOSE BLD STRIP.AUTO-MCNC: 137 MG/DL (ref 65–117)
GLUCOSE BLD STRIP.AUTO-MCNC: 191 MG/DL (ref 65–117)
GLUCOSE BLD STRIP.AUTO-MCNC: 199 MG/DL (ref 65–117)
GLUCOSE SERPL-MCNC: 169 MG/DL (ref 65–100)
GRAM STN SPEC: ABNORMAL
HCT VFR BLD AUTO: 31 % (ref 35–47)
HGB BLD-MCNC: 10.5 G/DL (ref 11.5–16)
IMM GRANULOCYTES # BLD AUTO: 0.1 K/UL (ref 0–0.04)
IMM GRANULOCYTES NFR BLD AUTO: 1 % (ref 0–0.5)
LYMPHOCYTES # BLD: 0.3 K/UL (ref 0.8–3.5)
LYMPHOCYTES NFR BLD: 3 % (ref 12–49)
MAGNESIUM SERPL-MCNC: 1.5 MG/DL (ref 1.6–2.4)
MCH RBC QN AUTO: 32.1 PG (ref 26–34)
MCHC RBC AUTO-ENTMCNC: 33.9 G/DL (ref 30–36.5)
MCV RBC AUTO: 94.8 FL (ref 80–99)
MONOCYTES # BLD: 0.3 K/UL (ref 0–1)
MONOCYTES NFR BLD: 3 % (ref 5–13)
NEUTS SEG # BLD: 10.6 K/UL (ref 1.8–8)
NEUTS SEG NFR BLD: 93 % (ref 32–75)
NRBC # BLD: 0 K/UL (ref 0–0.01)
NRBC BLD-RTO: 0 PER 100 WBC
PLATELET # BLD AUTO: 282 K/UL (ref 150–400)
PMV BLD AUTO: 9.2 FL (ref 8.9–12.9)
POTASSIUM SERPL-SCNC: 3.7 MMOL/L (ref 3.5–5.1)
RBC # BLD AUTO: 3.27 M/UL (ref 3.8–5.2)
RBC MORPH BLD: ABNORMAL
SERVICE CMNT-IMP: ABNORMAL
SODIUM SERPL-SCNC: 141 MMOL/L (ref 136–145)
WBC # BLD AUTO: 11.3 K/UL (ref 3.6–11)

## 2023-03-26 PROCEDURE — 83735 ASSAY OF MAGNESIUM: CPT

## 2023-03-26 PROCEDURE — 74011250637 HC RX REV CODE- 250/637: Performed by: NURSE PRACTITIONER

## 2023-03-26 PROCEDURE — 74011250637 HC RX REV CODE- 250/637: Performed by: INTERNAL MEDICINE

## 2023-03-26 PROCEDURE — 74011636637 HC RX REV CODE- 636/637: Performed by: INTERNAL MEDICINE

## 2023-03-26 PROCEDURE — 74011250636 HC RX REV CODE- 250/636: Performed by: FAMILY MEDICINE

## 2023-03-26 PROCEDURE — 82962 GLUCOSE BLOOD TEST: CPT

## 2023-03-26 PROCEDURE — 94664 DEMO&/EVAL PT USE INHALER: CPT

## 2023-03-26 PROCEDURE — 74011250636 HC RX REV CODE- 250/636: Performed by: INTERNAL MEDICINE

## 2023-03-26 PROCEDURE — 80048 BASIC METABOLIC PNL TOTAL CA: CPT

## 2023-03-26 PROCEDURE — 85025 COMPLETE CBC W/AUTO DIFF WBC: CPT

## 2023-03-26 PROCEDURE — 36415 COLL VENOUS BLD VENIPUNCTURE: CPT

## 2023-03-26 PROCEDURE — 94761 N-INVAS EAR/PLS OXIMETRY MLT: CPT

## 2023-03-26 PROCEDURE — 74011000250 HC RX REV CODE- 250: Performed by: INTERNAL MEDICINE

## 2023-03-26 PROCEDURE — 74011250637 HC RX REV CODE- 250/637: Performed by: FAMILY MEDICINE

## 2023-03-26 PROCEDURE — 65270000029 HC RM PRIVATE

## 2023-03-26 PROCEDURE — 77010033678 HC OXYGEN DAILY

## 2023-03-26 PROCEDURE — 94640 AIRWAY INHALATION TREATMENT: CPT

## 2023-03-26 RX ORDER — DOCUSATE SODIUM 100 MG/1
100 CAPSULE, LIQUID FILLED ORAL DAILY
Status: DISCONTINUED | OUTPATIENT
Start: 2023-03-26 | End: 2023-03-28

## 2023-03-26 RX ADMIN — DOCUSATE SODIUM 100 MG: 100 CAPSULE, LIQUID FILLED ORAL at 12:14

## 2023-03-26 RX ADMIN — PANTOPRAZOLE SODIUM 40 MG: 40 TABLET, DELAYED RELEASE ORAL at 06:38

## 2023-03-26 RX ADMIN — METHYLPREDNISOLONE SODIUM SUCCINATE 60 MG: 125 INJECTION, POWDER, FOR SOLUTION INTRAMUSCULAR; INTRAVENOUS at 00:06

## 2023-03-26 RX ADMIN — METHYLPREDNISOLONE SODIUM SUCCINATE 60 MG: 125 INJECTION, POWDER, FOR SOLUTION INTRAMUSCULAR; INTRAVENOUS at 23:32

## 2023-03-26 RX ADMIN — GUAIFENESIN 1200 MG: 600 TABLET ORAL at 20:51

## 2023-03-26 RX ADMIN — DILTIAZEM HYDROCHLORIDE 300 MG: 180 CAPSULE, COATED, EXTENDED RELEASE ORAL at 09:49

## 2023-03-26 RX ADMIN — MONTELUKAST 10 MG: 10 TABLET, FILM COATED ORAL at 09:49

## 2023-03-26 RX ADMIN — ENOXAPARIN SODIUM 40 MG: 100 INJECTION SUBCUTANEOUS at 09:49

## 2023-03-26 RX ADMIN — IPRATROPIUM BROMIDE AND ALBUTEROL SULFATE 3 ML: 2.5; .5 SOLUTION RESPIRATORY (INHALATION) at 13:39

## 2023-03-26 RX ADMIN — IPRATROPIUM BROMIDE AND ALBUTEROL SULFATE 3 ML: 2.5; .5 SOLUTION RESPIRATORY (INHALATION) at 02:06

## 2023-03-26 RX ADMIN — DOXYCYCLINE HYCLATE 100 MG: 100 TABLET, COATED ORAL at 09:49

## 2023-03-26 RX ADMIN — BENZONATATE 100 MG: 100 CAPSULE ORAL at 15:19

## 2023-03-26 RX ADMIN — IPRATROPIUM BROMIDE AND ALBUTEROL SULFATE 3 ML: 2.5; .5 SOLUTION RESPIRATORY (INHALATION) at 20:50

## 2023-03-26 RX ADMIN — Medication 2 UNITS: at 12:13

## 2023-03-26 RX ADMIN — BUDESONIDE 500 MCG: 0.5 INHALANT RESPIRATORY (INHALATION) at 20:59

## 2023-03-26 RX ADMIN — Medication 10 ML: at 21:06

## 2023-03-26 RX ADMIN — BENZONATATE 100 MG: 100 CAPSULE ORAL at 21:05

## 2023-03-26 RX ADMIN — DOXYCYCLINE HYCLATE 100 MG: 100 TABLET, COATED ORAL at 20:52

## 2023-03-26 RX ADMIN — ACETAMINOPHEN 650 MG: 325 TABLET ORAL at 18:17

## 2023-03-26 RX ADMIN — CLONIDINE HYDROCHLORIDE 0.1 MG: 0.1 TABLET ORAL at 18:17

## 2023-03-26 RX ADMIN — ARFORMOTEROL TARTRATE 15 MCG: 15 SOLUTION RESPIRATORY (INHALATION) at 20:59

## 2023-03-26 RX ADMIN — CLONIDINE HYDROCHLORIDE 0.1 MG: 0.1 TABLET ORAL at 09:49

## 2023-03-26 RX ADMIN — IPRATROPIUM BROMIDE AND ALBUTEROL SULFATE 3 ML: 2.5; .5 SOLUTION RESPIRATORY (INHALATION) at 07:37

## 2023-03-26 RX ADMIN — GUAIFENESIN 1200 MG: 600 TABLET ORAL at 09:48

## 2023-03-26 RX ADMIN — BENZONATATE 100 MG: 100 CAPSULE ORAL at 09:49

## 2023-03-26 RX ADMIN — Medication 10 ML: at 05:26

## 2023-03-26 RX ADMIN — METHYLPREDNISOLONE SODIUM SUCCINATE 60 MG: 125 INJECTION, POWDER, FOR SOLUTION INTRAMUSCULAR; INTRAVENOUS at 12:13

## 2023-03-26 RX ADMIN — Medication 2 UNITS: at 18:18

## 2023-03-26 NOTE — PROGRESS NOTES
Problem: Falls - Risk of  Goal: *Absence of Falls  Description: Document Jewel Gilliam Fall Risk and appropriate interventions in the flowsheet.   Outcome: Progressing Towards Goal  Note: Fall Risk Interventions:

## 2023-03-26 NOTE — PROGRESS NOTES
Problem: Falls - Risk of  Goal: *Absence of Falls  Description: Document Danell Holter Fall Risk and appropriate interventions in the flowsheet. Outcome: Progressing Towards Goal  Note: Fall Risk Interventions:                                Problem: Patient Education: Go to Patient Education Activity  Goal: Patient/Family Education  Outcome: Progressing Towards Goal     Problem: Patient Education: Go to Patient Education Activity  Goal: Patient/Family Education  Outcome: Progressing Towards Goal     Problem:  Activity Intolerance  Goal: *Oxygen saturation during activity within specified parameters  Outcome: Progressing Towards Goal  Goal: *Able to remain out of bed as prescribed  Outcome: Progressing Towards Goal     Problem: Patient Education: Go to Patient Education Activity  Goal: Patient/Family Education  Outcome: Progressing Towards Goal     Problem: Nutrition Deficit  Goal: *Optimize nutritional status  Outcome: Progressing Towards Goal

## 2023-03-26 NOTE — PROGRESS NOTES
Jeffrey Cleary [facility] 79  4610 [facility], [address] [place] [address]  [phone] [place] Adult  Hospitalist Group                                                                                          Hospitalist Progress Note  Dee Dee Maxwell MD        Date of Service:  3/26/2023  NAME:  Yasmine Sierra  :  1945  MRN:  [id number]      Admission Summary:   71-year-old female presented to the emergency department after syncopal episode    Interval history / Subjective:   Patient states she feels about the same     Assessment & Plan:     Sepsis secondary to left lower lobe CAP  -Blood cultures with staph  -Completed Rocephin and azithromycin.   Doxycycline added for further coverage  -Repeat blood cultures negative so far  -CT chest 3/22 showed left upper lobe collapse with dense consolidation and several internal air bronchograms  -Sputum culture if able, RVP negative  -Continue Mucinex, flutter valve, incentive spirometry  -Pulmonology following, planning for bronchoscopy on     Acute asthma exacerbation  -Continue IV steroids, weaned per pulmonology  -Scheduled DuKomal Lynne/Pulmicort  -Singulair  -Incentive spirometry, flutter valve  -Pulmonology following    Acute respiratory failure with hypoxia  -Likely due to acute issues above  -Continue to monitor and use oxygen as tolerated to maintain sats above 90%    A-fib with RVR  -This was brief and resolved on its own  -Echo with diastolic dysfunction  -Continue with diltiazem  -Will need event monitor on discharge  -Cardiology evaluated    Syncope  -Likely due to sepsis and acute issues above  -Negative head CT  -Echo with diastolic dysfunction    DIONISIO on CKD  -Hold ARB and HCTZ    Hypertension  -Continue with Dilt, clonidine  -HCTZ and ARB on hold as above    Type 2 diabetes  -Continue SSI per protocol and diabetic diet    Outisde Records, prior notes, labs, radiology, and medications reviewed Code status: Full code  DVT prophylaxis: [facility] Problems  Never Reviewed            Codes Class Noted POA    Sepsis ([facility] [address] ICD-10-CM: A41.9  ICD-9-CM: 038.9, 995.91  3/16/2023 Unknown        Vasovagal syncope ICD-10-CM: R55  ICD-9-CM: 780.2  3/15/2023 Unknown         Review of Systems:   A comprehensive review of systems was negative except for that written in the HPI. Vital Signs:    Last 24hrs VS reviewed since prior progress note. Most recent are:  Visit Vitals  BP (!) 154/98 (BP 1 Location: Left upper arm, BP Patient Position: Lying)   Pulse 83   Temp 98.4 °F (36.9 °C)   Resp 16   Ht 5' 5\" (1.651 m)   Wt 82 kg (180 lb 12.4 oz)   SpO2 93%   BMI 30.08 kg/m²         Intake/Output Summary (Last 24 hours) at 3/26/2023 1116  Last data filed at 3/26/2023 0354  Gross per 24 hour   Intake 270 ml   Output --   Net 270 ml          Physical Examination:             Constitutional:  No acute distress, cooperative, pleasant    ENT:  Oral mucosa moist, oropharynx benign. Resp: Diminished breath sounds bilateral bases, no wheezing/rhonchi/rales. No accessory muscle use   CV:  Regular rhythm, normal rate, no murmurs, gallops, rubs    GI:  Soft, non distended, non tender. normoactive bowel sounds, no hepatosplenomegaly     Musculoskeletal:  No edema, warm, 2+ pulses throughout    Neurologic:  Moves all extremities. AAOx3, CN II-XII reviewed     Psych:  Good insight, Not anxious nor agitated.        Data Review:    Review and/or order of clinical lab test*      Labs:     Recent Labs     03/26/23  0157 03/25/23 0212   WBC 11.3* 10.7   HGB 10.5* 10.8*   HCT 31.0* 31.7*    300       Recent Labs     03/26/23  0157 03/25/23 0212    140   K 3.7 3.6   * 112*   CO2 22 23   BUN 55* 53*   CREA 1.14* 1.20*   * 163*   CA 9.0 9.3   MG 1.5* 1.6       Recent Labs     03/25/23  0212   ALT 88*   AP 52   TBILI 0.5   TP 5.1*   ALB 2.6*   GLOB 2.5       No results for input(s): INR, PTP, APTT, INREXT, INREXT in the last 72 hours. No results for input(s): FE, TIBC, PSAT, FERR in the last 72 hours. No results found for: FOL, RBCF   No results for input(s): PH, PCO2, PO2 in the last 72 hours. No results for input(s): CPK, CKNDX, TROIQ in the last 72 hours.     No lab exists for component: CPKMB  No results found for: CHOL, CHOLX, CHLST, CHOLV, HDL, HDLP, LDL, LDLC, DLDLP, TGLX, TRIGL, TRIGP, CHHD, CHHDX  Lab Results   Component Value Date/Time    Glucose (POC) 199 (H) 03/26/2023 11:04 AM    Glucose (POC) 132 (H) 03/26/2023 07:23 AM    Glucose (POC) 160 (H) 03/25/2023 09:51 PM    Glucose (POC) 119 (H) 03/25/2023 04:38 PM    Glucose (POC) 115 03/25/2023 11:47 AM     Lab Results   Component Value Date/Time    Color YELLOW/STRAW 07/19/2016 12:08 PM    Appearance CLEAR 07/19/2016 12:08 PM    Specific gravity 1.020 07/19/2016 12:08 PM    pH (UA) 5.5 07/19/2016 12:08 PM    Protein NEGATIVE 07/19/2016 12:08 PM    Glucose NEGATIVE 07/19/2016 12:08 PM    Ketone NEGATIVE 07/19/2016 12:08 PM    Bilirubin NEGATIVE 07/19/2016 12:08 PM    Urobilinogen 0.2 07/19/2016 12:08 PM    Nitrites NEGATIVE 07/19/2016 12:08 PM    Leukocyte Esterase NEGATIVE 07/19/2016 12:08 PM    Epithelial cells FEW 07/19/2016 12:08 PM    Bacteria NEGATIVE 07/19/2016 12:08 PM    WBC 0-4 07/19/2016 12:08 PM    RBC 5-10 07/19/2016 12:08 PM         Medications Reviewed:     Current Facility-Administered Medications   Medication Dose Route Frequency    docusate sodium (COLACE) capsule 100 mg  100 mg Oral DAILY    albuterol-ipratropium (DUO-NEB) 2.5 MG-0.5 MG/3 ML  3 mL Nebulization Q6H RT    albuterol-ipratropium (DUO-NEB) 2.5 MG-0.5 MG/3 ML  3 mL Nebulization Q4H PRN    methylPREDNISolone (PF) (SOLU-MEDROL) injection 60 mg  60 mg IntraVENous Q12H    bacitracin 500 unit/gram packet 1 Packet  1 Packet Topical EVERY OTHER DAY    doxycycline (VIBRA-TABS) tablet 100 mg  100 mg Oral Q12H    dilTIAZem ER (CARDIZEM CD) capsule 300 mg  300 mg Oral DAILY sodium chloride (OCEAN) 0.65 % nasal squeeze bottle 2 Spray  2 Spray Both Nostrils Q2H PRN    benzonatate (TESSALON) capsule 100 mg  100 mg Oral TID    hydrALAZINE (APRESOLINE) 20 mg/mL injection 10 mg  10 mg IntraVENous Q4H PRN    guaiFENesin ER (MUCINEX) tablet 1,200 mg  1,200 mg Oral Q12H    cloNIDine HCL (CATAPRES) tablet 0.1 mg  0.1 mg Oral BID    [Held by provider] atorvastatin (LIPITOR) tablet 40 mg  40 mg Oral QPM    [Held by provider] valsartan (DIOVAN) tablet 40 mg  40 mg Oral DAILY    [Held by provider] ezetimibe (ZETIA) tablet 10 mg  10 mg Oral QPM    arformoteroL (BROVANA) neb solution 15 mcg  15 mcg Nebulization BID RT    And    budesonide (PULMICORT) 500 mcg/2 ml nebulizer suspension  500 mcg Nebulization BID RT    [Held by provider] hydroCHLOROthiazide (HYDRODIURIL) tablet 25 mg  25 mg Oral DAILY    melatonin tablet 3 mg  3 mg Oral QHS PRN    montelukast (SINGULAIR) tablet 10 mg  10 mg Oral DAILY    pantoprazole (PROTONIX) tablet 40 mg  40 mg Oral ACB    insulin lispro (HUMALOG) injection   SubCUTAneous AC&HS    glucose chewable tablet 16 g  4 Tablet Oral PRN    glucagon (GLUCAGEN) injection 1 mg  1 mg IntraMUSCular PRN    dextrose 10% infusion 0-250 mL  0-250 mL IntraVENous PRN    sodium chloride (NS) flush 5-40 mL  5-40 mL IntraVENous Q8H    sodium chloride (NS) flush 5-40 mL  5-40 mL IntraVENous PRN    acetaminophen (TYLENOL) tablet 650 mg  650 mg Oral Q6H PRN    Or    acetaminophen (TYLENOL) suppository 650 mg  650 mg Rectal Q6H PRN    polyethylene glycol (MIRALAX) packet 17 g  17 g Oral DAILY PRN    ondansetron (ZOFRAN ODT) tablet 4 mg  4 mg Oral Q8H PRN    Or    ondansetron (ZOFRAN) injection 4 mg  4 mg IntraVENous Q6H PRN    enoxaparin (LOVENOX) injection 40 mg  40 mg SubCUTAneous DAILY     ______________________________________________________________________  EXPECTED LENGTH OF STAY: 5d 0h  ACTUAL LENGTH OF STAY:          10                 Petty Maurer MD

## 2023-03-27 ENCOUNTER — APPOINTMENT (OUTPATIENT)
Dept: GENERAL RADIOLOGY | Age: 78
DRG: 177 | End: 2023-03-27
Attending: NURSE PRACTITIONER
Payer: MEDICARE

## 2023-03-27 LAB
ANION GAP SERPL CALC-SCNC: 5 MMOL/L (ref 5–15)
BASOPHILS # BLD: 0 K/UL (ref 0–0.1)
BASOPHILS NFR BLD: 0 % (ref 0–1)
BUN SERPL-MCNC: 61 MG/DL (ref 6–20)
BUN/CREAT SERPL: 49 (ref 12–20)
CALCIUM SERPL-MCNC: 8.9 MG/DL (ref 8.5–10.1)
CHLORIDE SERPL-SCNC: 111 MMOL/L (ref 97–108)
CO2 SERPL-SCNC: 23 MMOL/L (ref 21–32)
CREAT SERPL-MCNC: 1.25 MG/DL (ref 0.55–1.02)
DIFFERENTIAL METHOD BLD: ABNORMAL
EOSINOPHIL # BLD: 0 K/UL (ref 0–0.4)
EOSINOPHIL NFR BLD: 0 % (ref 0–7)
ERYTHROCYTE [DISTWIDTH] IN BLOOD BY AUTOMATED COUNT: 14.4 % (ref 11.5–14.5)
GLUCOSE BLD STRIP.AUTO-MCNC: 109 MG/DL (ref 65–117)
GLUCOSE BLD STRIP.AUTO-MCNC: 116 MG/DL (ref 65–117)
GLUCOSE BLD STRIP.AUTO-MCNC: 143 MG/DL (ref 65–117)
GLUCOSE BLD STRIP.AUTO-MCNC: 191 MG/DL (ref 65–117)
GLUCOSE SERPL-MCNC: 204 MG/DL (ref 65–100)
HCT VFR BLD AUTO: 32.6 % (ref 35–47)
HGB BLD-MCNC: 11.1 G/DL (ref 11.5–16)
IMM GRANULOCYTES # BLD AUTO: 0.3 K/UL (ref 0–0.04)
IMM GRANULOCYTES NFR BLD AUTO: 2 % (ref 0–0.5)
LYMPHOCYTES # BLD: 0.5 K/UL (ref 0.8–3.5)
LYMPHOCYTES NFR BLD: 4 % (ref 12–49)
MAGNESIUM SERPL-MCNC: 1.5 MG/DL (ref 1.6–2.4)
MCH RBC QN AUTO: 31.9 PG (ref 26–34)
MCHC RBC AUTO-ENTMCNC: 34 G/DL (ref 30–36.5)
MCV RBC AUTO: 93.7 FL (ref 80–99)
MONOCYTES # BLD: 0.5 K/UL (ref 0–1)
MONOCYTES NFR BLD: 4 % (ref 5–13)
NEUTS SEG # BLD: 12.3 K/UL (ref 1.8–8)
NEUTS SEG NFR BLD: 90 % (ref 32–75)
NRBC # BLD: 0 K/UL (ref 0–0.01)
NRBC BLD-RTO: 0 PER 100 WBC
PLATELET # BLD AUTO: 246 K/UL (ref 150–400)
PMV BLD AUTO: 9.4 FL (ref 8.9–12.9)
POTASSIUM SERPL-SCNC: 3.4 MMOL/L (ref 3.5–5.1)
RBC # BLD AUTO: 3.48 M/UL (ref 3.8–5.2)
RBC MORPH BLD: ABNORMAL
SERVICE CMNT-IMP: ABNORMAL
SERVICE CMNT-IMP: ABNORMAL
SERVICE CMNT-IMP: NORMAL
SERVICE CMNT-IMP: NORMAL
SODIUM SERPL-SCNC: 139 MMOL/L (ref 136–145)
WBC # BLD AUTO: 13.6 K/UL (ref 3.6–11)

## 2023-03-27 PROCEDURE — 74011250636 HC RX REV CODE- 250/636: Performed by: INTERNAL MEDICINE

## 2023-03-27 PROCEDURE — 74011250637 HC RX REV CODE- 250/637: Performed by: INTERNAL MEDICINE

## 2023-03-27 PROCEDURE — 74011000250 HC RX REV CODE- 250: Performed by: FAMILY MEDICINE

## 2023-03-27 PROCEDURE — 80048 BASIC METABOLIC PNL TOTAL CA: CPT

## 2023-03-27 PROCEDURE — 77010033678 HC OXYGEN DAILY

## 2023-03-27 PROCEDURE — 85025 COMPLETE CBC W/AUTO DIFF WBC: CPT

## 2023-03-27 PROCEDURE — 2709999900 HC NON-CHARGEABLE SUPPLY

## 2023-03-27 PROCEDURE — 65270000029 HC RM PRIVATE

## 2023-03-27 PROCEDURE — 94761 N-INVAS EAR/PLS OXIMETRY MLT: CPT

## 2023-03-27 PROCEDURE — 82962 GLUCOSE BLOOD TEST: CPT

## 2023-03-27 PROCEDURE — 94640 AIRWAY INHALATION TREATMENT: CPT

## 2023-03-27 PROCEDURE — 74011000250 HC RX REV CODE- 250: Performed by: PHYSICIAN ASSISTANT

## 2023-03-27 PROCEDURE — 74011250637 HC RX REV CODE- 250/637: Performed by: NURSE PRACTITIONER

## 2023-03-27 PROCEDURE — 74011250637 HC RX REV CODE- 250/637: Performed by: FAMILY MEDICINE

## 2023-03-27 PROCEDURE — 83735 ASSAY OF MAGNESIUM: CPT

## 2023-03-27 PROCEDURE — 74011636637 HC RX REV CODE- 636/637: Performed by: INTERNAL MEDICINE

## 2023-03-27 PROCEDURE — 74011250636 HC RX REV CODE- 250/636: Performed by: FAMILY MEDICINE

## 2023-03-27 PROCEDURE — 71045 X-RAY EXAM CHEST 1 VIEW: CPT

## 2023-03-27 PROCEDURE — 36415 COLL VENOUS BLD VENIPUNCTURE: CPT

## 2023-03-27 PROCEDURE — 74011000250 HC RX REV CODE- 250: Performed by: INTERNAL MEDICINE

## 2023-03-27 RX ORDER — MAGNESIUM SULFATE HEPTAHYDRATE 40 MG/ML
2 INJECTION, SOLUTION INTRAVENOUS ONCE
Status: COMPLETED | OUTPATIENT
Start: 2023-03-27 | End: 2023-03-27

## 2023-03-27 RX ORDER — IPRATROPIUM BROMIDE AND ALBUTEROL SULFATE 2.5; .5 MG/3ML; MG/3ML
3 SOLUTION RESPIRATORY (INHALATION)
Status: DISCONTINUED
Start: 2023-03-27 | End: 2023-03-31

## 2023-03-27 RX ORDER — POTASSIUM CHLORIDE 750 MG/1
40 TABLET, FILM COATED, EXTENDED RELEASE ORAL
Status: COMPLETED | OUTPATIENT
Start: 2023-03-27 | End: 2023-03-27

## 2023-03-27 RX ADMIN — IPRATROPIUM BROMIDE AND ALBUTEROL SULFATE 3 ML: 2.5; .5 SOLUTION RESPIRATORY (INHALATION) at 02:03

## 2023-03-27 RX ADMIN — DOXYCYCLINE HYCLATE 100 MG: 100 TABLET, COATED ORAL at 21:30

## 2023-03-27 RX ADMIN — CLONIDINE HYDROCHLORIDE 0.1 MG: 0.1 TABLET ORAL at 17:35

## 2023-03-27 RX ADMIN — METHYLPREDNISOLONE SODIUM SUCCINATE 60 MG: 125 INJECTION, POWDER, FOR SOLUTION INTRAMUSCULAR; INTRAVENOUS at 23:57

## 2023-03-27 RX ADMIN — IPRATROPIUM BROMIDE AND ALBUTEROL SULFATE 3 ML: 2.5; .5 SOLUTION RESPIRATORY (INHALATION) at 15:14

## 2023-03-27 RX ADMIN — CLONIDINE HYDROCHLORIDE 0.1 MG: 0.1 TABLET ORAL at 09:00

## 2023-03-27 RX ADMIN — PANTOPRAZOLE SODIUM 40 MG: 40 TABLET, DELAYED RELEASE ORAL at 07:30

## 2023-03-27 RX ADMIN — Medication 1 PACKET: at 09:37

## 2023-03-27 RX ADMIN — DILTIAZEM HYDROCHLORIDE 300 MG: 180 CAPSULE, COATED, EXTENDED RELEASE ORAL at 09:00

## 2023-03-27 RX ADMIN — METHYLPREDNISOLONE SODIUM SUCCINATE 60 MG: 125 INJECTION, POWDER, FOR SOLUTION INTRAMUSCULAR; INTRAVENOUS at 12:46

## 2023-03-27 RX ADMIN — MAGNESIUM SULFATE HEPTAHYDRATE 2 G: 40 INJECTION, SOLUTION INTRAVENOUS at 12:46

## 2023-03-27 RX ADMIN — Medication 10 ML: at 13:00

## 2023-03-27 RX ADMIN — GUAIFENESIN 1200 MG: 600 TABLET ORAL at 21:30

## 2023-03-27 RX ADMIN — GUAIFENESIN 1200 MG: 600 TABLET ORAL at 09:00

## 2023-03-27 RX ADMIN — ARFORMOTEROL TARTRATE 15 MCG: 15 SOLUTION RESPIRATORY (INHALATION) at 20:19

## 2023-03-27 RX ADMIN — ENOXAPARIN SODIUM 40 MG: 100 INJECTION SUBCUTANEOUS at 09:32

## 2023-03-27 RX ADMIN — BENZONATATE 100 MG: 100 CAPSULE ORAL at 21:30

## 2023-03-27 RX ADMIN — BUDESONIDE 500 MCG: 0.5 INHALANT RESPIRATORY (INHALATION) at 07:32

## 2023-03-27 RX ADMIN — ARFORMOTEROL TARTRATE 15 MCG: 15 SOLUTION RESPIRATORY (INHALATION) at 07:32

## 2023-03-27 RX ADMIN — IPRATROPIUM BROMIDE AND ALBUTEROL SULFATE 3 ML: 2.5; .5 SOLUTION RESPIRATORY (INHALATION) at 11:23

## 2023-03-27 RX ADMIN — BENZONATATE 100 MG: 100 CAPSULE ORAL at 09:00

## 2023-03-27 RX ADMIN — BUDESONIDE 500 MCG: 0.5 INHALANT RESPIRATORY (INHALATION) at 20:19

## 2023-03-27 RX ADMIN — Medication 2 UNITS: at 12:46

## 2023-03-27 RX ADMIN — DOCUSATE SODIUM 100 MG: 100 CAPSULE, LIQUID FILLED ORAL at 09:33

## 2023-03-27 RX ADMIN — Medication 10 ML: at 05:03

## 2023-03-27 RX ADMIN — IPRATROPIUM BROMIDE AND ALBUTEROL SULFATE 3 ML: 2.5; .5 SOLUTION RESPIRATORY (INHALATION) at 20:27

## 2023-03-27 RX ADMIN — DOXYCYCLINE HYCLATE 100 MG: 100 TABLET, COATED ORAL at 09:00

## 2023-03-27 RX ADMIN — IPRATROPIUM BROMIDE AND ALBUTEROL SULFATE 3 ML: 2.5; .5 SOLUTION RESPIRATORY (INHALATION) at 07:27

## 2023-03-27 RX ADMIN — MONTELUKAST 10 MG: 10 TABLET, FILM COATED ORAL at 09:33

## 2023-03-27 RX ADMIN — BENZONATATE 100 MG: 100 CAPSULE ORAL at 16:22

## 2023-03-27 RX ADMIN — POTASSIUM CHLORIDE 40 MEQ: 750 TABLET, FILM COATED, EXTENDED RELEASE ORAL at 12:46

## 2023-03-27 RX ADMIN — Medication 10 ML: at 21:30

## 2023-03-27 NOTE — PROGRESS NOTES
Jeffrey Cleary [facility] 79  [address] [facility], [place], [address] [place] [address]  [phone] [address] Adult  Hospitalist Group                                                                                          Hospitalist Progress Note  Jesse Rivera DO        Date of Service:  3/27/2023  NAME:  Celeste Escoto  :  1945  MRN:  [id number]      Admission Summary:   69-year-old female presented to the emergency department after syncopal episode    Interval history / Subjective:   Patient has some dyspnea, cough      Assessment & Plan:     Sepsis secondary to left lower lobe CAP  -Blood cultures with staph  -Completed Rocephin and azithromycin. Doxycycline added for further coverage  -Repeat blood cultures negative so far  -CT chest 3/22 showed left upper lobe collapse with dense consolidation and several internal air bronchograms  -Sputum culture if able, RVP negative  -Continue Mucinex, flutter valve, incentive spirometry  -Pulmonology following, planning for bronchoscopy on 3/29     Acute asthma exacerbation  -Continue IV steroids, wean as able   -Scheduled Komal Soliz/Pulmicort  -Singulair  -Incentive spirometry, flutter valve  -Pulmonology following    Acute respiratory failure with hypoxia  -Likely due to acute issues above  -Continue to monitor and use oxygen as tolerated to maintain sats above 90%    A-fib with RVR  -This was brief and resolved on its own  -Echo with diastolic dysfunction  -Continue with diltiazem  -Will need event monitor on discharge  -Cardiology evaluated    Syncope  -Likely due to sepsis and acute issues above  -Negative head CT  -V/q scan low prob for PE   -Echo with diastolic dysfunction    DIONISIO on CKD  -Hold ARB and HCTZ  -Monitor     Hypertension  -Continue with Dilt, clonidine  -HCTZ and ARB on hold as above    Type 2 diabetes  -Continue SSI per protocol and diabetic diet    Elevated LFTs   -unclear etiology  -medication induced?     -ABD US, hep panel neg   -hold home statin and zeita   -monitor      Hyperlipidemia, POA  -holding statin and zeita due to LFTS     Outisde Records, prior notes, labs, radiology, and medications reviewed     Code status: Full code  DVT prophylaxis: [facility] Problems  Never Reviewed            Codes Class Noted POA    Sepsis ([facility] [address] ICD-10-CM: A41.9  ICD-9-CM: 038.9, 995.91  3/16/2023 Unknown        Vasovagal syncope ICD-10-CM: R55  ICD-9-CM: 780.2  3/15/2023 Unknown       Review of Systems:   A comprehensive review of systems was negative except for that written in the HPI. Vital Signs:    Last 24hrs VS reviewed since prior progress note. Most recent are:  Visit Vitals  BP (!) 146/65 (BP 1 Location: Left upper arm, BP Patient Position: Sitting)   Pulse 66   Temp 99 °F (37.2 °C)   Resp 16   Ht 5' 5\" (1.651 m)   Wt 82 kg (180 lb 12.4 oz)   SpO2 98%   BMI 30.08 kg/m²         Intake/Output Summary (Last 24 hours) at 3/27/2023 1356  Last data filed at 3/27/2023 0920  Gross per 24 hour   Intake 190 ml   Output 0 ml   Net 190 ml          Physical Examination:             Constitutional:  No acute distress, cooperative, pleasant    ENT:  Oral mucosa moist, oropharynx benign. Resp: Diminished breath sounds bilateral bases, no wheezing/rhonchi/rales. No accessory muscle use   CV:  Regular rhythm, normal rate, no murmurs, gallops, rubs    GI:  Soft, non distended, non tender. normoactive bowel sounds, no hepatosplenomegaly     Musculoskeletal:  No edema, warm, 2+ pulses throughout    Neurologic:  Moves all extremities. AAOx3, CN II-XII reviewed     Psych:  Good insight, Not anxious nor agitated.        Data Review:    Review and/or order of clinical lab test*      Labs:     Recent Labs     03/27/23 0216 03/26/23 0157   WBC 13.6* 11.3*   HGB 11.1* 10.5*   HCT 32.6* 31.0*    282       Recent Labs     03/27/23 0216 03/26/23 0157 03/25/23 0212    141 140   K 3.4* 3.7 3.6   * 113* 112*   CO2 23 22 23 BUN 61* 55* 53*   CREA 1.25* 1.14* 1.20*   * 169* 163*   CA 8.9 9.0 9.3   MG 1.5* 1.5* 1.6       Recent Labs     03/25/23  0212   ALT 88*   AP 52   TBILI 0.5   TP 5.1*   ALB 2.6*   GLOB 2.5       No results for input(s): INR, PTP, APTT, INREXT, INREXT in the last 72 hours. No results for input(s): FE, TIBC, PSAT, FERR in the last 72 hours. No results found for: FOL, RBCF   No results for input(s): PH, PCO2, PO2 in the last 72 hours. No results for input(s): CPK, CKNDX, TROIQ in the last 72 hours.     No lab exists for component: CPKMB  No results found for: CHOL, CHOLX, CHLST, CHOLV, HDL, HDLP, LDL, LDLC, DLDLP, TGLX, TRIGL, TRIGP, CHHD, CHHDX  Lab Results   Component Value Date/Time    Glucose (POC) 143 (H) 03/27/2023 11:09 AM    Glucose (POC) 116 03/27/2023 07:55 AM    Glucose (POC) 137 (H) 03/26/2023 09:04 PM    Glucose (POC) 191 (H) 03/26/2023 03:52 PM    Glucose (POC) 199 (H) 03/26/2023 11:04 AM     Lab Results   Component Value Date/Time    Color YELLOW/STRAW 07/19/2016 12:08 PM    Appearance CLEAR 07/19/2016 12:08 PM    Specific gravity 1.020 07/19/2016 12:08 PM    pH (UA) 5.5 07/19/2016 12:08 PM    Protein NEGATIVE 07/19/2016 12:08 PM    Glucose NEGATIVE 07/19/2016 12:08 PM    Ketone NEGATIVE 07/19/2016 12:08 PM    Bilirubin NEGATIVE 07/19/2016 12:08 PM    Urobilinogen 0.2 07/19/2016 12:08 PM    Nitrites NEGATIVE 07/19/2016 12:08 PM    Leukocyte Esterase NEGATIVE 07/19/2016 12:08 PM    Epithelial cells FEW 07/19/2016 12:08 PM    Bacteria NEGATIVE 07/19/2016 12:08 PM    WBC 0-4 07/19/2016 12:08 PM    RBC 5-10 07/19/2016 12:08 PM         Medications Reviewed:     Current Facility-Administered Medications   Medication Dose Route Frequency    albuterol-ipratropium (DUO-NEB) 2.5 MG-0.5 MG/3 ML  3 mL Nebulization Q4H RT    magnesium sulfate 2 g/50 ml IVPB (premix or compounded)  2 g IntraVENous ONCE    docusate sodium (COLACE) capsule 100 mg  100 mg Oral DAILY    albuterol-ipratropium (DUO-NEB) 2.5 MG-0.5 MG/3 ML  3 mL Nebulization Q4H PRN    methylPREDNISolone (PF) (SOLU-MEDROL) injection 60 mg  60 mg IntraVENous Q12H    bacitracin 500 unit/gram packet 1 Packet  1 Packet Topical EVERY OTHER DAY    doxycycline (VIBRA-TABS) tablet 100 mg  100 mg Oral Q12H    dilTIAZem ER (CARDIZEM CD) capsule 300 mg  300 mg Oral DAILY    sodium chloride (OCEAN) 0.65 % nasal squeeze bottle 2 Spray  2 Spray Both Nostrils Q2H PRN    benzonatate (TESSALON) capsule 100 mg  100 mg Oral TID    hydrALAZINE (APRESOLINE) 20 mg/mL injection 10 mg  10 mg IntraVENous Q4H PRN    guaiFENesin ER (MUCINEX) tablet 1,200 mg  1,200 mg Oral Q12H    cloNIDine HCL (CATAPRES) tablet 0.1 mg  0.1 mg Oral BID    [Held by provider] atorvastatin (LIPITOR) tablet 40 mg  40 mg Oral QPM    [Held by provider] valsartan (DIOVAN) tablet 40 mg  40 mg Oral DAILY    [Held by provider] ezetimibe (ZETIA) tablet 10 mg  10 mg Oral QPM    arformoteroL (BROVANA) neb solution 15 mcg  15 mcg Nebulization BID RT    And    budesonide (PULMICORT) 500 mcg/2 ml nebulizer suspension  500 mcg Nebulization BID RT    [Held by provider] hydroCHLOROthiazide (HYDRODIURIL) tablet 25 mg  25 mg Oral DAILY    melatonin tablet 3 mg  3 mg Oral QHS PRN    montelukast (SINGULAIR) tablet 10 mg  10 mg Oral DAILY    pantoprazole (PROTONIX) tablet 40 mg  40 mg Oral ACB    insulin lispro (HUMALOG) injection   SubCUTAneous AC&HS    glucose chewable tablet 16 g  4 Tablet Oral PRN    glucagon (GLUCAGEN) injection 1 mg  1 mg IntraMUSCular PRN    dextrose 10% infusion 0-250 mL  0-250 mL IntraVENous PRN    sodium chloride (NS) flush 5-40 mL  5-40 mL IntraVENous Q8H    sodium chloride (NS) flush 5-40 mL  5-40 mL IntraVENous PRN    acetaminophen (TYLENOL) tablet 650 mg  650 mg Oral Q6H PRN    Or    acetaminophen (TYLENOL) suppository 650 mg  650 mg Rectal Q6H PRN    polyethylene glycol (MIRALAX) packet 17 g  17 g Oral DAILY PRN    ondansetron (ZOFRAN ODT) tablet 4 mg  4 mg Oral Q8H PRN    Or ondansetron (ZOFRAN) injection 4 mg  4 mg IntraVENous Q6H PRN    enoxaparin (LOVENOX) injection 40 mg  40 mg SubCUTAneous DAILY     ______________________________________________________________________  EXPECTED LENGTH OF STAY: 5d 0h  ACTUAL LENGTH OF STAY:          [address], DO

## 2023-03-27 NOTE — PROGRESS NOTES
3/27/2023  Case Management Progress Note    2:53 PM  Patient is 69year old female admitted 3/16 with syncope  Patient's RUR is 12% green/low risk for readmission  Covid test: negative 3/18  Chart reviewed--patient discussed at interdisciplinary rounds  Per rounds patient's Dayna Simpler has been rescheduled to Wednesday, so she will be with us until around Thursday. Patient has been approved for pulmonary rehab at [facility] and also for SNF at the [id number] [place] [address], so patient and family have options for rehab if that's what they opt to do. Will continue to follow and assist with discharge planning as needed.      Transition of Care Plan   Continue medical management/treatment  Home with [facility] vs rehab pending progress  Transportation tbd pending progress  CM will continue to follow    KIRSTIN Herr Do

## 2023-03-27 NOTE — PROGRESS NOTES
Bedside and Verbal shift change report given to JOS Ziegler (oncoming nurse) by Kaleb Landa RN &JOS Graham (offgoing nurse). Report included the following information SBAR, Kardex, OR Summary, Procedure Summary, Intake/Output, MAR, and Recent Results.

## 2023-03-27 NOTE — PROGRESS NOTES
Problem: Falls - Risk of  Goal: *Absence of Falls  Description: Document Lj Knife Fall Risk and appropriate interventions in the flowsheet.   Outcome: Progressing Towards Goal  Note: Fall Risk Interventions:                                Problem: Patient Education: Go to Patient Education Activity  Goal: Patient/Family Education  Outcome: Progressing Towards Goal

## 2023-03-27 NOTE — PROGRESS NOTES
Bedside and Verbal shift change report given to JOS Maddox  (oncoming nurse) by Emelyn Jones  (offgoing nurse). Report included the following information SBAR, Kardex, Procedure Summary, MAR, and Recent Results.

## 2023-03-27 NOTE — PROGRESS NOTES
Name: Victoria Escobedo: [address]   : 1945 Admit Date: 3/15/2023   Phone:   Room: 511/01   PCP: Jesus Brennan MD  MRN: [id number]   Date: 3/27/2023  Code: Full Code          Chart and notes reviewed. Data reviewed. I review the patient's current medications in the medical record at each encounter. I have evaluated and examined the patient. 69 y.o. F with history of asthma, seasonal/environmental allergies, GERD, HTN, pseudomonas PNA, JANET, and DM. Followed outpatient by Dr. Niraj Bonds. Has not been seen in about a year. She is on Advair and Spiriva as maintenance. She tends to develop bronchitis at the start of allergy season. Per daughter, she had been treated outpatient for bronchitis. She was placed on Levaquin and despite treatment, was worsening. She had a syncopal episode on the toilet and was brought to ED for further evaluation. She feels that cough is not as productive now, but more dry and painful. Cough causes some SOB now. Wheezing as well. Interval history  Afebrile  BP stable  Oxygen saturations 93% on 2L  WBC 13.6  Hgb 11.1  K 3.4  Creat 1.25  LFTs trending down  [facility] 3/15/23: positive 1/4 bottles coat negative staph  Repeat BC 3/18/23: ngtd x 5 days  RVP negative  Mycoplasma, legionella, s.pneumo negative  ECHO: EF 55-60%, RA mildly dilated  VQ 3/20: very low probability of PE  Images reviewed:    LE Dopplers 3/19/23: negative. CXR 3/15/23: left basilar airspace disease. PFT's 2021: normal spirometry. CT chest 3/22/2023: TOAN collapsed with areas of consolidation, bilateral patchy consolidation and areas of ggo    ROS:    Not really feeling much better. Still has a lot of cough and congestion. Denies fever or chills. Denies CP.     Past Medical History:   Diagnosis Date    Asthma     Diabetes ([facility] [address]     type 2    GERD (gastroesophageal reflux disease)     Hypertension     Ill-defined condition     high cholesterol    Ill-defined condition     seasonal allergies    Ill-defined condition     gout       Past Surgical History:   Procedure Laterality Date    HX APPENDECTOMY      HX HEENT  2010    cataract     HX HYSTERECTOMY  1975    HX LAP CHOLECYSTECTOMY      HX OOPHORECTOMY      benign ovarian tumor    HX OTHER SURGICAL  1993    bladder tack    HX ROTATOR CUFF REPAIR  2015    right    HX TONSILLECTOMY  1964       Family History   Problem Relation Age of Onset    Cancer Mother         uterine    Heart Disease Mother         afib    Heart Disease Father 48        MI    Hypertension Father        Social History     Tobacco Use    Smoking status: Former     Packs/day: 0.25     Types: Cigarettes     Quit date:      Years since quittin.2    Smokeless tobacco: Never    Tobacco comments:     smoked x 1 year   Substance Use Topics    Alcohol use: No       Allergies   Allergen Reactions    Aspirin Cough    Beta Blocker [Beta-Blockers (Beta-Adrenergic Blocking Agts)] Other (comments)     Was told by her PCP to report that she has a mutation- she does not recall a reaction    Codeine Rash       Current Facility-Administered Medications   Medication Dose Route Frequency    docusate sodium (COLACE) capsule 100 mg  100 mg Oral DAILY    albuterol-ipratropium (DUO-NEB) 2.5 MG-0.5 MG/3 ML  3 mL Nebulization Q6H RT    albuterol-ipratropium (DUO-NEB) 2.5 MG-0.5 MG/3 ML  3 mL Nebulization Q4H PRN    methylPREDNISolone (PF) (SOLU-MEDROL) injection 60 mg  60 mg IntraVENous Q12H    bacitracin 500 unit/gram packet 1 Packet  1 Packet Topical EVERY OTHER DAY    doxycycline (VIBRA-TABS) tablet 100 mg  100 mg Oral Q12H    dilTIAZem ER (CARDIZEM CD) capsule 300 mg  300 mg Oral DAILY    sodium chloride (OCEAN) 0.65 % nasal squeeze bottle 2 Spray  2 Spray Both Nostrils Q2H PRN    benzonatate (TESSALON) capsule 100 mg  100 mg Oral TID    hydrALAZINE (APRESOLINE) 20 mg/mL injection 10 mg  10 mg IntraVENous Q4H PRN    guaiFENesin ER (MUCINEX) tablet 1,200 mg 1,200 mg Oral Q12H    cloNIDine HCL (CATAPRES) tablet 0.1 mg  0.1 mg Oral BID    [Held by provider] atorvastatin (LIPITOR) tablet 40 mg  40 mg Oral QPM    [Held by provider] valsartan (DIOVAN) tablet 40 mg  40 mg Oral DAILY    [Held by provider] ezetimibe (ZETIA) tablet 10 mg  10 mg Oral QPM    arformoteroL (BROVANA) neb solution 15 mcg  15 mcg Nebulization BID RT    And    budesonide (PULMICORT) 500 mcg/2 ml nebulizer suspension  500 mcg Nebulization BID RT    [Held by provider] hydroCHLOROthiazide (HYDRODIURIL) tablet 25 mg  25 mg Oral DAILY    melatonin tablet 3 mg  3 mg Oral QHS PRN    montelukast (SINGULAIR) tablet 10 mg  10 mg Oral DAILY    pantoprazole (PROTONIX) tablet 40 mg  40 mg Oral ACB    insulin lispro (HUMALOG) injection   SubCUTAneous AC&HS    glucose chewable tablet 16 g  4 Tablet Oral PRN    glucagon (GLUCAGEN) injection 1 mg  1 mg IntraMUSCular PRN    dextrose 10% infusion 0-250 mL  0-250 mL IntraVENous PRN    sodium chloride (NS) flush 5-40 mL  5-40 mL IntraVENous Q8H    sodium chloride (NS) flush 5-40 mL  5-40 mL IntraVENous PRN    acetaminophen (TYLENOL) tablet 650 mg  650 mg Oral Q6H PRN    Or    acetaminophen (TYLENOL) suppository 650 mg  650 mg Rectal Q6H PRN    polyethylene glycol (MIRALAX) packet 17 g  17 g Oral DAILY PRN    ondansetron (ZOFRAN ODT) tablet 4 mg  4 mg Oral Q8H PRN    Or    ondansetron (ZOFRAN) injection 4 mg  4 mg IntraVENous Q6H PRN    enoxaparin (LOVENOX) injection 40 mg  40 mg SubCUTAneous DAILY         REVIEW OF SYSTEMS   12 point ROS negative except as stated in the HPI. Physical Exam:   Visit Vitals  /69 (BP 1 Location: Left upper arm, BP Patient Position: Sitting)   Pulse 67   Temp 98.1 °F (36.7 °C)   Resp 14   Ht 5' 5\" (1.651 m)   Wt 82 kg (180 lb 12.4 oz)   SpO2 93%   BMI 30.08 kg/m²       General:  Alert, cooperative, no distress, appears stated age. Head:  Normocephalic, without obvious abnormality, atraumatic. Eyes:  Conjunctivae/corneas clear. Nose: Nares normal. Septum midline. Mucosa normal.    Throat: Lips, mucosa, and tongue normal.    Neck: Supple, symmetrical, trachea midline, no adenopathy. Lungs:   Some scattered rhonchi and bilateral exp wheeze   Chest wall:  No tenderness or deformity. Heart:  Regular rate and rhythm, S1, S2 normal, no murmur, click, rub or gallop. Abdomen:   Soft, non-tender. Bowel sounds normal. No masses,  No organomegaly. Extremities: Extremities normal, atraumatic, no cyanosis or edema. Pulses: 2+ and symmetric all extremities. Skin: Skin color, texture, turgor normal. No rashes or lesions   Lymph nodes: Cervical, supraclavicular nodes normal.   Neurologic: Grossly nonfocal       Lab Results   Component Value Date/Time    Sodium 139 03/27/2023 02:16 AM    Potassium 3.4 (L) 03/27/2023 02:16 AM    Chloride 111 (H) 03/27/2023 02:16 AM    CO2 23 03/27/2023 02:16 AM    BUN 61 (H) 03/27/2023 02:16 AM    Creatinine 1.25 (H) 03/27/2023 02:16 AM    Glucose 204 (H) 03/27/2023 02:16 AM    Calcium 8.9 03/27/2023 02:16 AM    Magnesium 1.5 (L) 03/27/2023 02:16 AM    Lactic acid 1.2 03/15/2023 05:39 PM       Lab Results   Component Value Date/Time    WBC 13.6 (H) 03/27/2023 02:16 AM    HGB 11.1 (L) 03/27/2023 02:16 AM    PLATELET 398 31/45/4535 02:16 AM    MCV 93.7 03/27/2023 02:16 AM       Lab Results   Component Value Date/Time    Alk.  phosphatase 52 03/25/2023 02:12 AM    Protein, total 5.1 (L) 03/25/2023 02:12 AM    Albumin 2.6 (L) 03/25/2023 02:12 AM    Globulin 2.5 03/25/2023 02:12 AM       No results found for: IRON, FE, TIBC, IBCT, PSAT, FERR    No results found for: SR, CRP, JOAQUINA, ANAIGG, RA, RPR, RPRT, VDRLT, VDRLS, TSH, TSHEXT, TSHEXT     No results found for: PH, PHI, PCO2, PCO2I, PO2, PO2I, HCO3, HCO3I, FIO2, FIO2I    No results found for: CPK, RCK1, RCK2, RCK3, RCK4, CKNDX, CKND1, TROPT, TROIQ, BNPP, BNP     Lab Results   Component Value Date/Time    Culture result: (A) 03/24/2023 03:25 PM     HEAVY  Yeast, (apparent Candida albicans/Candida dubliniensis)      Culture result: SCANT NORMAL RESPIRATORY LACY 03/24/2023 03:25 PM    Culture result: NO GROWTH 5 DAYS 03/18/2023 10:02 AM       Lab Results   Component Value Date/Time    Hepatitis B surface Ag <0.10 03/21/2023 04:30 PM       No results found for: VANCT, CPK    Lab Results   Component Value Date/Time    Color YELLOW/STRAW 07/19/2016 12:08 PM    Appearance CLEAR 07/19/2016 12:08 PM    pH (UA) 5.5 07/19/2016 12:08 PM    Protein NEGATIVE 07/19/2016 12:08 PM    Glucose NEGATIVE 07/19/2016 12:08 PM    Ketone NEGATIVE 07/19/2016 12:08 PM    Bilirubin NEGATIVE 07/19/2016 12:08 PM    Blood NEGATIVE 07/19/2016 12:08 PM    Urobilinogen 0.2 07/19/2016 12:08 PM    Nitrites NEGATIVE 07/19/2016 12:08 PM    Leukocyte Esterase NEGATIVE 07/19/2016 12:08 PM    WBC 0-4 07/19/2016 12:08 PM    RBC 5-10 07/19/2016 12:08 PM    Bacteria NEGATIVE 07/19/2016 12:08 PM       IMPRESSION  CAP  Asthma Exacerbation  Syncope  DIONISIO  HTN  DM  JANET  Hx of pseudomonas PNA (2021)    PLAN  Maintain oxygen saturations > 90%; currently on RA  Endo unavailable for broch today. Rescheduled for Wednesday at [facility].  NPO after midnight on Tuesday.   Fu repeat CXR   Pulmicort/Brovana nebs; DuoNebs q4h  IV steroids 60mg q12h  Completed Azithro/Rocephin  Chest PT q4h while awake  Continue Singulair and Mucinex  Chest PT  Flutter  Encourage IS and OOB as much as possible  DVT prophylaxis: Lovenox  GI prophylaxis: Protonix      Brooklyn Aponte, [place]

## 2023-03-28 LAB
ALBUMIN SERPL-MCNC: 2.4 G/DL (ref 3.5–5)
ALBUMIN/GLOB SERPL: 1 (ref 1.1–2.2)
ALP SERPL-CCNC: 44 U/L (ref 45–117)
ALT SERPL-CCNC: 58 U/L (ref 12–78)
ANION GAP SERPL CALC-SCNC: 3 MMOL/L (ref 5–15)
AST SERPL-CCNC: 19 U/L (ref 15–37)
BASOPHILS # BLD: 0 K/UL (ref 0–0.1)
BASOPHILS NFR BLD: 0 % (ref 0–1)
BILIRUB SERPL-MCNC: 0.5 MG/DL (ref 0.2–1)
BUN SERPL-MCNC: 52 MG/DL (ref 6–20)
BUN/CREAT SERPL: 50 (ref 12–20)
CALCIUM SERPL-MCNC: 8.7 MG/DL (ref 8.5–10.1)
CHLORIDE SERPL-SCNC: 113 MMOL/L (ref 97–108)
CO2 SERPL-SCNC: 22 MMOL/L (ref 21–32)
CREAT SERPL-MCNC: 1.05 MG/DL (ref 0.55–1.02)
DIFFERENTIAL METHOD BLD: ABNORMAL
EOSINOPHIL # BLD: 0 K/UL (ref 0–0.4)
EOSINOPHIL NFR BLD: 0 % (ref 0–7)
ERYTHROCYTE [DISTWIDTH] IN BLOOD BY AUTOMATED COUNT: 14.4 % (ref 11.5–14.5)
GLOBULIN SER CALC-MCNC: 2.4 G/DL (ref 2–4)
GLUCOSE BLD STRIP.AUTO-MCNC: 116 MG/DL (ref 65–117)
GLUCOSE BLD STRIP.AUTO-MCNC: 137 MG/DL (ref 65–117)
GLUCOSE BLD STRIP.AUTO-MCNC: 165 MG/DL (ref 65–117)
GLUCOSE BLD STRIP.AUTO-MCNC: 231 MG/DL (ref 65–117)
GLUCOSE SERPL-MCNC: 166 MG/DL (ref 65–100)
HCT VFR BLD AUTO: 30.6 % (ref 35–47)
HGB BLD-MCNC: 10.4 G/DL (ref 11.5–16)
IMM GRANULOCYTES # BLD AUTO: 0.1 K/UL (ref 0–0.04)
IMM GRANULOCYTES NFR BLD AUTO: 1 % (ref 0–0.5)
LYMPHOCYTES # BLD: 0.3 K/UL (ref 0.8–3.5)
LYMPHOCYTES NFR BLD: 3 % (ref 12–49)
MAGNESIUM SERPL-MCNC: 1.9 MG/DL (ref 1.6–2.4)
MCH RBC QN AUTO: 31.9 PG (ref 26–34)
MCHC RBC AUTO-ENTMCNC: 34 G/DL (ref 30–36.5)
MCV RBC AUTO: 93.9 FL (ref 80–99)
MONOCYTES # BLD: 0.6 K/UL (ref 0–1)
MONOCYTES NFR BLD: 5 % (ref 5–13)
NEUTS SEG # BLD: 9.3 K/UL (ref 1.8–8)
NEUTS SEG NFR BLD: 90 % (ref 32–75)
NRBC # BLD: 0 K/UL (ref 0–0.01)
NRBC BLD-RTO: 0 PER 100 WBC
PLATELET # BLD AUTO: 224 K/UL (ref 150–400)
PMV BLD AUTO: 9.9 FL (ref 8.9–12.9)
POTASSIUM SERPL-SCNC: 4.1 MMOL/L (ref 3.5–5.1)
PROT SERPL-MCNC: 4.8 G/DL (ref 6.4–8.2)
RBC # BLD AUTO: 3.26 M/UL (ref 3.8–5.2)
SERVICE CMNT-IMP: ABNORMAL
SERVICE CMNT-IMP: NORMAL
SODIUM SERPL-SCNC: 138 MMOL/L (ref 136–145)
WBC # BLD AUTO: 10.4 K/UL (ref 3.6–11)

## 2023-03-28 PROCEDURE — 74011250637 HC RX REV CODE- 250/637: Performed by: INTERNAL MEDICINE

## 2023-03-28 PROCEDURE — 74011636637 HC RX REV CODE- 636/637: Performed by: INTERNAL MEDICINE

## 2023-03-28 PROCEDURE — 94640 AIRWAY INHALATION TREATMENT: CPT

## 2023-03-28 PROCEDURE — 83735 ASSAY OF MAGNESIUM: CPT

## 2023-03-28 PROCEDURE — 94761 N-INVAS EAR/PLS OXIMETRY MLT: CPT

## 2023-03-28 PROCEDURE — 74011250636 HC RX REV CODE- 250/636: Performed by: INTERNAL MEDICINE

## 2023-03-28 PROCEDURE — 74011000250 HC RX REV CODE- 250: Performed by: INTERNAL MEDICINE

## 2023-03-28 PROCEDURE — 85025 COMPLETE CBC W/AUTO DIFF WBC: CPT

## 2023-03-28 PROCEDURE — 74011250637 HC RX REV CODE- 250/637: Performed by: NURSE PRACTITIONER

## 2023-03-28 PROCEDURE — 36415 COLL VENOUS BLD VENIPUNCTURE: CPT

## 2023-03-28 PROCEDURE — 65270000029 HC RM PRIVATE

## 2023-03-28 PROCEDURE — 74011250637 HC RX REV CODE- 250/637: Performed by: FAMILY MEDICINE

## 2023-03-28 PROCEDURE — 82962 GLUCOSE BLOOD TEST: CPT

## 2023-03-28 PROCEDURE — 94668 MNPJ CHEST WALL SBSQ: CPT

## 2023-03-28 PROCEDURE — 97116 GAIT TRAINING THERAPY: CPT

## 2023-03-28 PROCEDURE — 77010033678 HC OXYGEN DAILY

## 2023-03-28 PROCEDURE — 94664 DEMO&/EVAL PT USE INHALER: CPT

## 2023-03-28 PROCEDURE — 74011000250 HC RX REV CODE- 250: Performed by: PHYSICIAN ASSISTANT

## 2023-03-28 PROCEDURE — 80053 COMPREHEN METABOLIC PANEL: CPT

## 2023-03-28 PROCEDURE — 94667 MNPJ CHEST WALL 1ST: CPT

## 2023-03-28 PROCEDURE — 97530 THERAPEUTIC ACTIVITIES: CPT

## 2023-03-28 PROCEDURE — 74011250636 HC RX REV CODE- 250/636: Performed by: FAMILY MEDICINE

## 2023-03-28 RX ORDER — AMOXICILLIN 250 MG
1 CAPSULE ORAL 2 TIMES DAILY
Status: DISCONTINUED | OUTPATIENT
Start: 2023-03-28 | End: 2023-03-31 | Stop reason: HOSPADM

## 2023-03-28 RX ADMIN — DILTIAZEM HYDROCHLORIDE 300 MG: 180 CAPSULE, COATED, EXTENDED RELEASE ORAL at 08:37

## 2023-03-28 RX ADMIN — IPRATROPIUM BROMIDE AND ALBUTEROL SULFATE 3 ML: 2.5; .5 SOLUTION RESPIRATORY (INHALATION) at 04:17

## 2023-03-28 RX ADMIN — METHYLPREDNISOLONE SODIUM SUCCINATE 60 MG: 125 INJECTION, POWDER, FOR SOLUTION INTRAMUSCULAR; INTRAVENOUS at 23:57

## 2023-03-28 RX ADMIN — IPRATROPIUM BROMIDE AND ALBUTEROL SULFATE 3 ML: 2.5; .5 SOLUTION RESPIRATORY (INHALATION) at 21:06

## 2023-03-28 RX ADMIN — ARFORMOTEROL TARTRATE 15 MCG: 15 SOLUTION RESPIRATORY (INHALATION) at 07:37

## 2023-03-28 RX ADMIN — BENZONATATE 100 MG: 100 CAPSULE ORAL at 15:55

## 2023-03-28 RX ADMIN — Medication 2 UNITS: at 08:37

## 2023-03-28 RX ADMIN — CLONIDINE HYDROCHLORIDE 0.1 MG: 0.1 TABLET ORAL at 17:07

## 2023-03-28 RX ADMIN — DOXYCYCLINE HYCLATE 100 MG: 100 TABLET, COATED ORAL at 08:37

## 2023-03-28 RX ADMIN — ARFORMOTEROL TARTRATE 15 MCG: 15 SOLUTION RESPIRATORY (INHALATION) at 21:13

## 2023-03-28 RX ADMIN — IPRATROPIUM BROMIDE AND ALBUTEROL SULFATE 3 ML: 2.5; .5 SOLUTION RESPIRATORY (INHALATION) at 07:30

## 2023-03-28 RX ADMIN — DOCUSATE SODIUM 100 MG: 100 CAPSULE, LIQUID FILLED ORAL at 08:37

## 2023-03-28 RX ADMIN — CLONIDINE HYDROCHLORIDE 0.1 MG: 0.1 TABLET ORAL at 08:37

## 2023-03-28 RX ADMIN — Medication 10 ML: at 14:31

## 2023-03-28 RX ADMIN — EZETIMIBE 10 MG: 10 TABLET ORAL at 17:03

## 2023-03-28 RX ADMIN — PANTOPRAZOLE SODIUM 40 MG: 40 TABLET, DELAYED RELEASE ORAL at 06:16

## 2023-03-28 RX ADMIN — GUAIFENESIN 1200 MG: 600 TABLET ORAL at 21:45

## 2023-03-28 RX ADMIN — MONTELUKAST 10 MG: 10 TABLET, FILM COATED ORAL at 08:38

## 2023-03-28 RX ADMIN — Medication 3 UNITS: at 11:57

## 2023-03-28 RX ADMIN — BENZONATATE 100 MG: 100 CAPSULE ORAL at 08:37

## 2023-03-28 RX ADMIN — Medication 10 ML: at 21:45

## 2023-03-28 RX ADMIN — IPRATROPIUM BROMIDE AND ALBUTEROL SULFATE 3 ML: 2.5; .5 SOLUTION RESPIRATORY (INHALATION) at 00:40

## 2023-03-28 RX ADMIN — IPRATROPIUM BROMIDE AND ALBUTEROL SULFATE 3 ML: 2.5; .5 SOLUTION RESPIRATORY (INHALATION) at 15:47

## 2023-03-28 RX ADMIN — DOCUSATE SODIUM 50MG AND SENNOSIDES 8.6MG 1 TABLET: 8.6; 5 TABLET, FILM COATED ORAL at 14:30

## 2023-03-28 RX ADMIN — DOCUSATE SODIUM 50MG AND SENNOSIDES 8.6MG 1 TABLET: 8.6; 5 TABLET, FILM COATED ORAL at 17:03

## 2023-03-28 RX ADMIN — ENOXAPARIN SODIUM 40 MG: 100 INJECTION SUBCUTANEOUS at 08:37

## 2023-03-28 RX ADMIN — DOXYCYCLINE HYCLATE 100 MG: 100 TABLET, COATED ORAL at 21:45

## 2023-03-28 RX ADMIN — ATORVASTATIN CALCIUM 40 MG: 20 TABLET, FILM COATED ORAL at 17:03

## 2023-03-28 RX ADMIN — Medication 10 ML: at 06:15

## 2023-03-28 RX ADMIN — METHYLPREDNISOLONE SODIUM SUCCINATE 60 MG: 125 INJECTION, POWDER, FOR SOLUTION INTRAMUSCULAR; INTRAVENOUS at 11:56

## 2023-03-28 RX ADMIN — BUDESONIDE 500 MCG: 0.5 INHALANT RESPIRATORY (INHALATION) at 07:37

## 2023-03-28 RX ADMIN — BUDESONIDE 500 MCG: 0.5 INHALANT RESPIRATORY (INHALATION) at 21:13

## 2023-03-28 RX ADMIN — BENZONATATE 100 MG: 100 CAPSULE ORAL at 21:45

## 2023-03-28 RX ADMIN — IPRATROPIUM BROMIDE AND ALBUTEROL SULFATE 3 ML: 2.5; .5 SOLUTION RESPIRATORY (INHALATION) at 11:29

## 2023-03-28 RX ADMIN — GUAIFENESIN 1200 MG: 600 TABLET ORAL at 08:38

## 2023-03-28 NOTE — PROGRESS NOTES
0730: Bedside and Verbal shift change report given to JOS Turner (oncoming nurse) by Hannah CIFUENTES RN (offgoing nurse). Report included the following information SBAR, Kardex, Intake/Output, MAR, and Recent Results.

## 2023-03-28 NOTE — PROGRESS NOTES
Bedside shift change report given to Karlie (oncoming nurse) by Ina Hager and Ritchie Charles (offgoing nurses). Report included the following information SBAR, Kardex, Intake/Output, MAR, and Recent Results.

## 2023-03-28 NOTE — PROGRESS NOTES
Problem: Mobility Impaired (Adult and Pediatric)  Goal: *Acute Goals and Plan of Care (Insert Text)  Description: FUNCTIONAL STATUS PRIOR TO ADMISSION: Patient was independent and active without use of DME. Assists with laundry and cooking for son and his family. HOME SUPPORT PRIOR TO ADMISSION: The patient lived with son and his family; . Physical Therapy Goals  Initiated 3/17/2023  1. Patient will move from supine to sit and sit to supine  in bed with independence within 7 day(s). 2.  Patient will transfer from bed to chair and chair to bed with independence using the least restrictive device within 7 day(s). 3.  Patient will perform sit to stand with independence within 7 day(s). 4.  Patient will ambulate with independence for 150 feet with the least restrictive device within 7 day(s). 5.  Patient will ascend/descend 13 stairs with single handrail(s) with modified independence within 7 day(s). Note:   PHYSICAL THERAPY TREATMENT  Patient: Rachael Garcia (2 y.o. female)  Date: 3/28/2023  Diagnosis: Vasovagal syncope [R55]  Sepsis ([address] [A41.9] <principal problem not specified>  Procedure(s) (LRB):  BRONCHOSCOPY (N/A)    Precautions:    Chart, physical therapy assessment, plan of care and goals were reviewed. ASSESSMENT  Patient continues with skilled PT services. Pt supine to sit to stand with CGA. Pt ambulated 30ft with RW CGA. Pt ambulates at slow pace and fatigues quickly. Pt noted with shortness  of breath post ambulation. SPO2 was 91% on O2 post ambulation sitting. Pt progressing slowly. Continue goals. PLAN :  Patient continues to benefit from skilled intervention to address the above impairments. Continue treatment per established plan of care. to address goals.     Recommendation for discharge: (in order for the patient to meet his/her long term goals)  Therapy up to 5 days/week in SNF setting vs HHPT    This discharge recommendation:  Has been made in collaboration with the attending provider and/or case management    IF patient discharges home will need the following DME: rolling walker         OBJECTIVE DATA SUMMARY:   Critical Behavior:  Neurologic State: Alert  Orientation Level: Oriented X4  Cognition: Follows commands, Appropriate decision making     Functional Mobility Training:  Bed Mobility:     Supine to Sit: Contact guard assistance              Transfers:  Sit to Stand: Contact guard assistance  Stand to Sit: Contact guard assistance                             Balance:  Sitting: Intact  Standing: With support  Standing - Static: Fair;Good  Ambulation/Gait Training:        Ambulation - Level of Assistance: Contact guard assistance 30ft         Gait Abnormalities: Decreased step clearance        Base of Support: Narrowed     Speed/Prema: Pace decreased (<100 feet/min)  Step Length: Right shortened;Left shortened               Activity Tolerance:   Fair    After treatment patient left in no apparent distress:   Sitting in chair    COMMUNICATION/COLLABORATION:   The patients plan of care was discussed with: Physical therapist.     Jhon Coma, PTA   Time Calculation: 23 mins

## 2023-03-28 NOTE — PROGRESS NOTES
Name: Fe Pineda: [address]   : 1945 Admit Date: 3/15/2023   Phone:   Room: 511/01   PCP: Yandy Ibanez MD  MRN: [id number]   Date: 3/28/2023  Code: Full Code          Chart and notes reviewed. Data reviewed. I review the patient's current medications in the medical record at each encounter. I have evaluated and examined the patient. 69 y.o. F with history of asthma, seasonal/environmental allergies, GERD, HTN, pseudomonas PNA, JANET, and DM. Followed outpatient by Dr. Yvon Rodriguez. Has not been seen in about a year. She is on Advair and Spiriva as maintenance. She tends to develop bronchitis at the start of allergy season. Per daughter, she had been treated outpatient for bronchitis. She was placed on Levaquin and despite treatment, was worsening. She had a syncopal episode on the toilet and was brought to ED for further evaluation. She feels that cough is not as productive now, but more dry and painful. Cough causes some SOB now. Wheezing as well. Interval history  Afebrile  BP stable  Oxygen saturations 93% on 2L  WBC 10.4 - better  Hgb 10.4  K 4.1 - better  Creat 1.05  LFTs trending down/wnl; Hep panel neg  BC 3/15/23: positive 1/4 bottles coat negative staph  Repeat BC 3/18/23: ngtd x 5 days - final  3/24 resp cx: NRF and yeast  RVP negative  Mycoplasma, legionella, s.pneumo negative    ECHO: EF 55-60%, RA mildly dilated    VQ 3/20: very low probability of PE    LE Dopplers 3/19/23: negative. CXR 3/15/23: left basilar airspace disease. PFT's 2021: normal spirometry. CT chest 3/22/2023: TOAN collapsed with areas of consolidation, bilateral patchy consolidation and areas of ggo    3/27 CXR: Left basilar atelectasis    ROS:  Feeling out the same, not really feeling much better. Feels fatigued. Still with cough and congestion. Denies fever or chills. Denies CP.     Past Medical History:   Diagnosis Date    Asthma     Diabetes ([address]     type 2    GERD (gastroesophageal reflux disease)     Hypertension     Ill-defined condition     high cholesterol    Ill-defined condition     seasonal allergies    Ill-defined condition     gout       Past Surgical History:   Procedure Laterality Date    HX APPENDECTOMY      HX HEENT  2010    cataract     HX HYSTERECTOMY      HX LAP CHOLECYSTECTOMY      HX OOPHORECTOMY  1996    benign ovarian tumor    HX OTHER SURGICAL  1993    bladder tack    HX ROTATOR CUFF REPAIR  2015    right    HX TONSILLECTOMY  1964       Family History   Problem Relation Age of Onset    Cancer Mother         uterine    Heart Disease Mother         afib    Heart Disease Father 48        MI    Hypertension Father        Social History     Tobacco Use    Smoking status: Former     Packs/day: 0.25     Types: Cigarettes     Quit date:      Years since quittin.2    Smokeless tobacco: Never    Tobacco comments:     smoked x 1 year   Substance Use Topics    Alcohol use: No       Allergies   Allergen Reactions    Aspirin Cough    Beta Blocker [Beta-Blockers (Beta-Adrenergic Blocking Agts)] Other (comments)     Was told by her PCP to report that she has a mutation- she does not recall a reaction    Codeine Rash       Current Facility-Administered Medications   Medication Dose Route Frequency    albuterol-ipratropium (DUO-NEB) 2.5 MG-0.5 MG/3 ML  3 mL Nebulization Q4H RT    docusate sodium (COLACE) capsule 100 mg  100 mg Oral DAILY    albuterol-ipratropium (DUO-NEB) 2.5 MG-0.5 MG/3 ML  3 mL Nebulization Q4H PRN    methylPREDNISolone (PF) (SOLU-MEDROL) injection 60 mg  60 mg IntraVENous Q12H    bacitracin 500 unit/gram packet 1 Packet  1 Packet Topical EVERY OTHER DAY    doxycycline (VIBRA-TABS) tablet 100 mg  100 mg Oral Q12H    dilTIAZem ER (CARDIZEM CD) capsule 300 mg  300 mg Oral DAILY    sodium chloride (OCEAN) 0.65 % nasal squeeze bottle 2 Spray  2 Spray Both Nostrils Q2H PRN    benzonatate (TESSALON) capsule 100 mg  100 mg Oral TID hydrALAZINE (APRESOLINE) 20 mg/mL injection 10 mg  10 mg IntraVENous Q4H PRN    guaiFENesin ER (MUCINEX) tablet 1,200 mg  1,200 mg Oral Q12H    cloNIDine HCL (CATAPRES) tablet 0.1 mg  0.1 mg Oral BID    [Held by provider] atorvastatin (LIPITOR) tablet 40 mg  40 mg Oral QPM    [Held by provider] valsartan (DIOVAN) tablet 40 mg  40 mg Oral DAILY    [Held by provider] ezetimibe (ZETIA) tablet 10 mg  10 mg Oral QPM    arformoteroL (BROVANA) neb solution 15 mcg  15 mcg Nebulization BID RT    And    budesonide (PULMICORT) 500 mcg/2 ml nebulizer suspension  500 mcg Nebulization BID RT    [Held by provider] hydroCHLOROthiazide (HYDRODIURIL) tablet 25 mg  25 mg Oral DAILY    melatonin tablet 3 mg  3 mg Oral QHS PRN    montelukast (SINGULAIR) tablet 10 mg  10 mg Oral DAILY    pantoprazole (PROTONIX) tablet 40 mg  40 mg Oral ACB    insulin lispro (HUMALOG) injection   SubCUTAneous AC&HS    glucose chewable tablet 16 g  4 Tablet Oral PRN    glucagon (GLUCAGEN) injection 1 mg  1 mg IntraMUSCular PRN    dextrose 10% infusion 0-250 mL  0-250 mL IntraVENous PRN    sodium chloride (NS) flush 5-40 mL  5-40 mL IntraVENous Q8H    sodium chloride (NS) flush 5-40 mL  5-40 mL IntraVENous PRN    acetaminophen (TYLENOL) tablet 650 mg  650 mg Oral Q6H PRN    Or    acetaminophen (TYLENOL) suppository 650 mg  650 mg Rectal Q6H PRN    polyethylene glycol (MIRALAX) packet 17 g  17 g Oral DAILY PRN    ondansetron (ZOFRAN ODT) tablet 4 mg  4 mg Oral Q8H PRN    Or    ondansetron (ZOFRAN) injection 4 mg  4 mg IntraVENous Q6H PRN    enoxaparin (LOVENOX) injection 40 mg  40 mg SubCUTAneous DAILY         REVIEW OF SYSTEMS   12 point ROS negative except as stated in the HPI.       Physical Exam:   Visit Vitals  BP (!) 149/65 (BP 1 Location: Left upper arm, BP Patient Position: At rest)   Pulse 81   Temp 98.1 °F (36.7 °C)   Resp 17   Ht 5' 5\" (1.651 m)   Wt 82 kg (180 lb 12.4 oz)   SpO2 92%   BMI 30.08 kg/m²       General:  Alert, cooperative, no distress, appears stated age. Head:  Normocephalic, without obvious abnormality, atraumatic. Eyes:  Conjunctivae/corneas clear. Nose: Nares normal. Septum midline. Mucosa normal.    Throat: Lips, mucosa, and tongue normal.    Neck: Supple, symmetrical, trachea midline, no adenopathy. Lungs:   Some scattered rhonchi and bilateral exp wheeze. More so in upper airways   Chest wall:  No tenderness or deformity. Heart:  Regular rate and rhythm, S1, S2 normal, no murmur, click, rub or gallop. Abdomen:   Soft, non-tender. Bowel sounds normal. No masses,  No organomegaly. Extremities: Extremities normal, atraumatic, no cyanosis or edema. Pulses: 2+ and symmetric all extremities. Skin: Skin color, texture, turgor normal. No rashes or lesions   Lymph nodes: Cervical, supraclavicular nodes normal.   Neurologic: Grossly nonfocal       Lab Results   Component Value Date/Time    Sodium 138 03/28/2023 01:35 AM    Potassium 4.1 03/28/2023 01:35 AM    Chloride 113 (H) 03/28/2023 01:35 AM    CO2 22 03/28/2023 01:35 AM    BUN 52 (H) 03/28/2023 01:35 AM    Creatinine 1.05 (H) 03/28/2023 01:35 AM    Glucose 166 (H) 03/28/2023 01:35 AM    Calcium 8.7 03/28/2023 01:35 AM    Magnesium 1.9 03/28/2023 01:35 AM    Lactic acid 1.2 03/15/2023 05:39 PM       Lab Results   Component Value Date/Time    WBC 10.4 03/28/2023 01:35 AM    HGB 10.4 (L) 03/28/2023 01:35 AM    PLATELET 872 20/46/1584 01:35 AM    MCV 93.9 03/28/2023 01:35 AM       Lab Results   Component Value Date/Time    Alk.  phosphatase 44 (L) 03/28/2023 01:35 AM    Protein, total 4.8 (L) 03/28/2023 01:35 AM    Albumin 2.4 (L) 03/28/2023 01:35 AM    Globulin 2.4 03/28/2023 01:35 AM       No results found for: IRON, FE, TIBC, IBCT, PSAT, FERR    No results found for: SR, CRP, JOAQUINA, ANAIGG, RA, RPR, RPRT, VDRLT, VDRLS, TSH, TSHEXT, TSHEXT     No results found for: PH, PHI, PCO2, PCO2I, PO2, PO2I, HCO3, HCO3I, FIO2, FIO2I    No results found for: CPK, RCK1, RCK2, RCK3, RCK4, CKNDX, CKND1, TROPT, TROIQ, BNPP, BNP     Lab Results   Component Value Date/Time    Culture result: (A) 03/24/2023 03:25 PM     HEAVY  Yeast, (apparent Candida albicans/Candida dubliniensis)      Culture result: SCANT NORMAL RESPIRATORY LACY 03/24/2023 03:25 PM    Culture result: NO GROWTH 5 DAYS 03/18/2023 10:02 AM       Lab Results   Component Value Date/Time    Hepatitis B surface Ag <0.10 03/21/2023 04:30 PM       No results found for: VANCT, CPK    Lab Results   Component Value Date/Time    Color YELLOW/STRAW 07/19/2016 12:08 PM    Appearance CLEAR 07/19/2016 12:08 PM    pH (UA) 5.5 07/19/2016 12:08 PM    Protein NEGATIVE 07/19/2016 12:08 PM    Glucose NEGATIVE 07/19/2016 12:08 PM    Ketone NEGATIVE 07/19/2016 12:08 PM    Bilirubin NEGATIVE 07/19/2016 12:08 PM    Blood NEGATIVE 07/19/2016 12:08 PM    Urobilinogen 0.2 07/19/2016 12:08 PM    Nitrites NEGATIVE 07/19/2016 12:08 PM    Leukocyte Esterase NEGATIVE 07/19/2016 12:08 PM    WBC 0-4 07/19/2016 12:08 PM    RBC 5-10 07/19/2016 12:08 PM    Bacteria NEGATIVE 07/19/2016 12:08 PM       IMPRESSION  CAP  Asthma Exacerbation  Syncope  DIONISIO  HTN  DM  JANET  Hx of pseudomonas PNA (2021)    PLAN  Maintain oxygen saturations > 90%; currently on 2L  Endo unavailable for broch until Wednesday. Currently scheduled for Wednesday at [address] [place] [address].  NPO after midnight on Tuesday.   Pulmicort/Brovana nebs; DuoNebs q4h  IV steroids 60mg q12h  Completed Azithro/Rocephin  Chest PT q4h while awake  Continue Singulair and Mucinex  Chest PT  Flutter  Encourage IS and OOB as much as possible  DVT prophylaxis: Lovenox  GI prophylaxis: Protonix      Marisela Swartz

## 2023-03-28 NOTE — PROGRESS NOTES
Jeffrey Cleary [facility] [address]  [address] [facility], [place], [id number] [place] [address]  [phone] [facility]                                                                                          Hospitalist Progress Note  Jesse Rivera DO        Date of Service:  3/28/2023  NAME:  Celeste Escoto  :  1945  MRN:  [id number]      Admission Summary:   69-year-old female presented to the emergency department after syncopal episode    Interval history / Subjective:   Patient reports some dyspnea with exertion, improved wheezing and cough. Reports constipation      Assessment & Plan:     Sepsis secondary to left lower lobe CAP  -Blood cultures with staph  -Completed Rocephin and azithromycin.   Doxycycline added for further coverage  -Repeat blood cultures negative so far  -CT chest 3/22 showed left upper lobe collapse with dense consolidation and several internal air bronchograms  -Sputum culture w/ yeast, RVP negative  -Continue Mucinex, flutter valve, incentive spirometry  -Pulmonology following, planning for bronchoscopy on 3/29     Acute asthma exacerbation  -Continue IV steroids; wean as able   -Scheduled Komal Soliz/Pulmicort  -Singulair  -Incentive spirometry, flutter valve  -Pulmonology following    Acute respiratory failure with hypoxia  -Likely due to acute issues above  -Continue to monitor and use oxygen as tolerated to maintain sats above 90%    A-fib with RVR  -This was brief and resolved on its own  -Echo with diastolic dysfunction  -Continue with diltiazem  -Will need event monitor on discharge  -Cardiology evaluated    Syncope  -Likely due to sepsis and acute issues above  -Negative head CT  -V/q scan low prob for PE   -Echo with diastolic dysfunction    DIONISIO on CKD  -Hold ARB and HCTZ  -Monitor     Hypertension  -Continue with Dilt, clonidine  -HCTZ and ARB on hold as above    Type 2 diabetes  -Continue SSI per protocol and diabetic diet    Elevated LFTs   -resolved  -unclear etiology  -medication induced? -ABD US, hep panel neg   -resume home statin and zeita   -monitor      Hyperlipidemia, POA  -resume statin and zeita as LFTS are now wnl     Outisde Records, prior notes, labs, radiology, and medications reviewed     Code status: Full code  DVT prophylaxis: [facility] Problems  Never Reviewed            Codes Class Noted POA    Sepsis ([facility] [address] ICD-10-CM: A41.9  ICD-9-CM: 038.9, 995.91  3/16/2023 Unknown        Vasovagal syncope ICD-10-CM: R55  ICD-9-CM: 780.2  3/15/2023 Unknown       Review of Systems:   A comprehensive review of systems was negative except for that written in the HPI. Vital Signs:    Last 24hrs VS reviewed since prior progress note. Most recent are:  Visit Vitals  BP (!) 141/58 (BP 1 Location: Left upper arm, BP Patient Position: Sitting)   Pulse 74   Temp 98.5 °F (36.9 °C)   Resp 15   Ht 5' 5\" (1.651 m)   Wt 82 kg (180 lb 12.4 oz)   SpO2 96%   BMI 30.08 kg/m²         Intake/Output Summary (Last 24 hours) at 3/28/2023 1315  Last data filed at 3/28/2023 0357  Gross per 24 hour   Intake 810 ml   Output 0 ml   Net 810 ml          Physical Examination:             Constitutional:  No acute distress, cooperative, pleasant    ENT:  Oral mucosa moist, oropharynx benign. Resp: Diminished breath sounds bilateral bases, no wheezing/rhonchi/rales. No accessory muscle use   CV:  Regular rhythm, normal rate, no murmurs, gallops, rubs    GI:  Soft, non distended, non tender. normoactive bowel sounds, no hepatosplenomegaly     Musculoskeletal:  No edema, warm, 2+ pulses throughout    Neurologic:  Moves all extremities. AAOx3, CN II-XII reviewed     Psych:  Good insight, Not anxious nor agitated.        Data Review:    Review and/or order of clinical lab test*      Labs:     Recent Labs     03/28/23 0135 03/27/23 0216   WBC 10.4 13.6*   HGB 10.4* 11.1*   HCT 30.6* 32.6*    246       Recent Labs     03/28/23 0135 03/27/23 0216 03/26/23  0157    139 141   K 4.1 3.4* 3.7   * 111* 113*   CO2 22 23 22   BUN 52* 61* 55*   CREA 1.05* 1.25* 1.14*   * 204* 169*   CA 8.7 8.9 9.0   MG 1.9 1.5* 1.5*       Recent Labs     03/28/23  0135   ALT 58   AP 44*   TBILI 0.5   TP 4.8*   ALB 2.4*   GLOB 2.4       No results for input(s): INR, PTP, APTT, INREXT, INREXT in the last 72 hours. No results for input(s): FE, TIBC, PSAT, FERR in the last 72 hours. No results found for: FOL, RBCF   No results for input(s): PH, PCO2, PO2 in the last 72 hours. No results for input(s): CPK, CKNDX, TROIQ in the last 72 hours.     No lab exists for component: CPKMB  No results found for: CHOL, CHOLX, CHLST, CHOLV, HDL, HDLP, LDL, LDLC, DLDLP, TGLX, TRIGL, TRIGP, CHHD, CHHDX  Lab Results   Component Value Date/Time    Glucose (POC) 231 (H) 03/28/2023 11:16 AM    Glucose (POC) 165 (H) 03/28/2023 07:55 AM    Glucose (POC) 191 (H) 03/27/2023 09:29 PM    Glucose (POC) 109 03/27/2023 03:55 PM    Glucose (POC) 143 (H) 03/27/2023 11:09 AM     Lab Results   Component Value Date/Time    Color YELLOW/STRAW 07/19/2016 12:08 PM    Appearance CLEAR 07/19/2016 12:08 PM    Specific gravity 1.020 07/19/2016 12:08 PM    pH (UA) 5.5 07/19/2016 12:08 PM    Protein NEGATIVE 07/19/2016 12:08 PM    Glucose NEGATIVE 07/19/2016 12:08 PM    Ketone NEGATIVE 07/19/2016 12:08 PM    Bilirubin NEGATIVE 07/19/2016 12:08 PM    Urobilinogen 0.2 07/19/2016 12:08 PM    Nitrites NEGATIVE 07/19/2016 12:08 PM    Leukocyte Esterase NEGATIVE 07/19/2016 12:08 PM    Epithelial cells FEW 07/19/2016 12:08 PM    Bacteria NEGATIVE 07/19/2016 12:08 PM    WBC 0-4 07/19/2016 12:08 PM    RBC 5-10 07/19/2016 12:08 PM         Medications Reviewed:     Current Facility-Administered Medications   Medication Dose Route Frequency    senna-docusate (PERICOLACE) 8.6-50 mg per tablet 1 Tablet  1 Tablet Oral BID    albuterol-ipratropium (DUO-NEB) 2.5 MG-0.5 MG/3 ML  3 mL Nebulization Q4H RT albuterol-ipratropium (DUO-NEB) 2.5 MG-0.5 MG/3 ML  3 mL Nebulization Q4H PRN    methylPREDNISolone (PF) (SOLU-MEDROL) injection 60 mg  60 mg IntraVENous Q12H    bacitracin 500 unit/gram packet 1 Packet  1 Packet Topical EVERY OTHER DAY    doxycycline (VIBRA-TABS) tablet 100 mg  100 mg Oral Q12H    dilTIAZem ER (CARDIZEM CD) capsule 300 mg  300 mg Oral DAILY    sodium chloride (OCEAN) 0.65 % nasal squeeze bottle 2 Spray  2 Spray Both Nostrils Q2H PRN    benzonatate (TESSALON) capsule 100 mg  100 mg Oral TID    hydrALAZINE (APRESOLINE) 20 mg/mL injection 10 mg  10 mg IntraVENous Q4H PRN    guaiFENesin ER (MUCINEX) tablet 1,200 mg  1,200 mg Oral Q12H    cloNIDine HCL (CATAPRES) tablet 0.1 mg  0.1 mg Oral BID    atorvastatin (LIPITOR) tablet 40 mg  40 mg Oral QPM    [Held by provider] valsartan (DIOVAN) tablet 40 mg  40 mg Oral DAILY    ezetimibe (ZETIA) tablet 10 mg  10 mg Oral QPM    arformoteroL (BROVANA) neb solution 15 mcg  15 mcg Nebulization BID RT    And    budesonide (PULMICORT) 500 mcg/2 ml nebulizer suspension  500 mcg Nebulization BID RT    [Held by provider] hydroCHLOROthiazide (HYDRODIURIL) tablet 25 mg  25 mg Oral DAILY    melatonin tablet 3 mg  3 mg Oral QHS PRN    montelukast (SINGULAIR) tablet 10 mg  10 mg Oral DAILY    pantoprazole (PROTONIX) tablet 40 mg  40 mg Oral ACB    insulin lispro (HUMALOG) injection   SubCUTAneous AC&HS    glucose chewable tablet 16 g  4 Tablet Oral PRN    glucagon (GLUCAGEN) injection 1 mg  1 mg IntraMUSCular PRN    dextrose 10% infusion 0-250 mL  0-250 mL IntraVENous PRN    sodium chloride (NS) flush 5-40 mL  5-40 mL IntraVENous Q8H    sodium chloride (NS) flush 5-40 mL  5-40 mL IntraVENous PRN    acetaminophen (TYLENOL) tablet 650 mg  650 mg Oral Q6H PRN    Or    acetaminophen (TYLENOL) suppository 650 mg  650 mg Rectal Q6H PRN    polyethylene glycol (MIRALAX) packet 17 g  17 g Oral DAILY PRN    ondansetron (ZOFRAN ODT) tablet 4 mg  4 mg Oral Q8H PRN    Or    ondansetron [facility]) injection 4 mg  4 mg IntraVENous Q6H PRN    enoxaparin (LOVENOX) injection 40 mg  40 mg SubCUTAneous DAILY     ______________________________________________________________________  EXPECTED LENGTH OF STAY: 5d 0h  ACTUAL LENGTH OF STAY:          [address], DO

## 2023-03-28 NOTE — PROGRESS NOTES
Comprehensive Nutrition Assessment    Type and Reason for Visit: Reassess    Nutrition Recommendations/Plan:   Continue regular, NCS diet order  Modify bowel regimen - no BM x 1 week     Malnutrition Assessment:  Malnutrition Status:  No malnutrition (03/28/23 1215)    Context:  Acute illness     Findings of the 6 clinical characteristics of malnutrition:   Energy Intake:  No significant decrease in energy intake  Weight Loss:  No significant weight loss     Body Fat Loss:  No significant body fat loss,     Muscle Mass Loss:  No significant muscle mass loss,    Fluid Accumulation:  No significant fluid accumulation,     Strength:  Not performed     Nutrition Assessment:    3/28: Follow up. Patient's appetite is great - consuming 75% of meals per her report. Ordering own meals which has improved intake. Patient continues to have snacks at bedside from home - goldfish, rice krispies, Ginger Ale, applesauce cups. No complaints at this time. No nausea/vomiting/diarrhea. Per documentation, no BM x 1 week - patient states she will not take any miralax, but accepts colace. No abd discomfort currently. Awaiting bronch tomorrow. Nutrition Related Findings:      Wound Type: Skin tears (L elbow)  Last Bowel Movement Date: 03/21/23  Stool Appearance: Formed, Soft  Abdominal Assessment: Soft  Bowel Sounds: Active   Edema:No data recorded    Nutr.  Labs:    Lab Results   Component Value Date/Time    GFR est AA >60 05/11/2015 11:38 AM    GFR est non-AA >60 05/11/2015 11:38 AM    Creatinine 1.05 (H) 03/28/2023 01:35 AM    BUN 52 (H) 03/28/2023 01:35 AM    Sodium 138 03/28/2023 01:35 AM    Potassium 4.1 03/28/2023 01:35 AM    Chloride 113 (H) 03/28/2023 01:35 AM    CO2 22 03/28/2023 01:35 AM       Lab Results   Component Value Date/Time    Glucose 166 (H) 03/28/2023 01:35 AM    Glucose (POC) 231 (H) 03/28/2023 11:16 AM       No results found for: HBA1C, [id number], QTX4AVCR, OLP0TYSS    Magnesium   Date Value Ref Range Status 03/28/2023 1.9 1.6 - 2.4 mg/dL Final   03/27/2023 1.5 (L) 1.6 - 2.4 mg/dL Final   03/26/2023 1.5 (L) 1.6 - 2.4 mg/dL Final   03/25/2023 1.6 1.6 - 2.4 mg/dL Final   03/23/2023 2.1 1.6 - 2.4 mg/dL Final       Lab Results   Component Value Date/Time    Calcium 8.7 03/28/2023 01:35 AM       No results found for: CHOL, CHOLPOCT, CHOLX, CHLST, CHOLV, TOTCHOLEXT, HDL, HDLPOC, HDLEXT, HDLP, LDL, LDLCPOC, LDLCEXT, LDLC, DLDLP, VLDLC, VLDL, TGLX, TRIGL, TRIGLYCEXT, TRIGP, TGLPOCT, CHHD, CHHDX      Nutr. Meds:  Lipitor (held), catapres, Cardizem, colace, Lovenox, zetia (held), glucagon PRN, hydralazine PRN, humalog, solu-medrol, zofran PRN, Protonix, miralax PRN, valsartan (held)      Current Nutrition Intake & Therapies:  Average Meal Intake: %  Average Supplement Intake: None ordered  ADULT DIET Regular; No Concentrated Sweets  DIET NPO    Anthropometric Measures:  Height: 5' 5\" (165.1 cm)  Ideal Body Weight (IBW): 125 lbs (57 kg)     Current Body Wt:  82 kg (180 lb 12.4 oz), 144.6 % IBW. Bed scale  Current BMI (kg/m2): 30.1  Usual Body Weight: 83.9 kg (185 lb)  % Weight Change (Calculated): -2.3  Weight Adjustment: No adjustment                 BMI Category: Obese class 1 (BMI 30.0-34. 9)    Estimated Daily Nutrient Needs:  Energy Requirements Based On: Formula  Weight Used for Energy Requirements: Current  Energy (kcal/day): 1706 (MSJ x 1.3)  Weight Used for Protein Requirements: Current  Protein (g/day): 82-98 (1.0-1.2 g/kg)  Method Used for Fluid Requirements: 1 ml/kcal  Fluid (ml/day): 1706    Nutrition Diagnosis:   No nutrition diagnosis at this time    Nutrition Interventions:   Food and/or Nutrient Delivery: Continue current diet  Nutrition Education/Counseling: No recommendations at this time  Coordination of Nutrition Care: Interdisciplinary rounds, Continue to monitor while inpatient  Plan of Care discussed with: IDR team    Goals:     Goals: Meet at least 75% of estimated needs, by next RD assessment Nutrition Monitoring and Evaluation:   Behavioral-Environmental Outcomes: None identified  Food/Nutrient Intake Outcomes: Food and nutrient intake  Physical Signs/Symptoms Outcomes: Biochemical data, Weight    Discharge Planning:    No discharge needs at this time    Harlin Mohs, MS, RD  Contact: Ext: [phone], or via [email or web address]

## 2023-03-28 NOTE — PROGRESS NOTES
Last BM 3/21/23, pt denies any abdominal discomforts. Abdomen is soft, bowel sounds active. Pt taking colace daily, offered to ask MD for miralax or other intervention but pt declines at this time.

## 2023-03-28 NOTE — PROGRESS NOTES
Problem: Falls - Risk of  Goal: *Absence of Falls  Description: Document Deb Gibbons Fall Risk and appropriate interventions in the flowsheet. Outcome: Progressing Towards Goal  Note: Fall Risk Interventions:                                Problem: Patient Education: Go to Patient Education Activity  Goal: Patient/Family Education  Outcome: Progressing Towards Goal     Problem: Patient Education: Go to Patient Education Activity  Goal: Patient/Family Education  Outcome: Progressing Towards Goal     Problem:  Activity Intolerance  Goal: *Oxygen saturation during activity within specified parameters  Outcome: Progressing Towards Goal  Goal: *Able to remain out of bed as prescribed  Outcome: Progressing Towards Goal     Problem: Patient Education: Go to Patient Education Activity  Goal: Patient/Family Education  Outcome: Progressing Towards Goal     Problem: Nutrition Deficit  Goal: *Optimize nutritional status  Outcome: Progressing Towards Goal

## 2023-03-29 LAB
ALBUMIN SERPL-MCNC: 2.4 G/DL (ref 3.5–5)
ALBUMIN/GLOB SERPL: 1.1 (ref 1.1–2.2)
ALP SERPL-CCNC: 46 U/L (ref 45–117)
ALT SERPL-CCNC: 55 U/L (ref 12–78)
ANION GAP SERPL CALC-SCNC: 7 MMOL/L (ref 5–15)
APPEARANCE FLD: ABNORMAL
AST SERPL-CCNC: 19 U/L (ref 15–37)
BASOPHILS # BLD: 0 K/UL (ref 0–0.1)
BASOPHILS NFR BLD: 0 % (ref 0–1)
BILIRUB SERPL-MCNC: 0.8 MG/DL (ref 0.2–1)
BUN SERPL-MCNC: 53 MG/DL (ref 6–20)
BUN/CREAT SERPL: 49 (ref 12–20)
CALCIUM SERPL-MCNC: 8.8 MG/DL (ref 8.5–10.1)
CHLORIDE SERPL-SCNC: 113 MMOL/L (ref 97–108)
CO2 SERPL-SCNC: 20 MMOL/L (ref 21–32)
COLOR FLD: ABNORMAL
CREAT SERPL-MCNC: 1.08 MG/DL (ref 0.55–1.02)
DIFFERENTIAL METHOD BLD: ABNORMAL
EOSINOPHIL # BLD: 0 K/UL (ref 0–0.4)
EOSINOPHIL NFR BLD: 0 % (ref 0–7)
ERYTHROCYTE [DISTWIDTH] IN BLOOD BY AUTOMATED COUNT: 14.5 % (ref 11.5–14.5)
GLOBULIN SER CALC-MCNC: 2.2 G/DL (ref 2–4)
GLUCOSE BLD STRIP.AUTO-MCNC: 109 MG/DL (ref 65–117)
GLUCOSE BLD STRIP.AUTO-MCNC: 139 MG/DL (ref 65–117)
GLUCOSE BLD STRIP.AUTO-MCNC: 153 MG/DL (ref 65–117)
GLUCOSE BLD STRIP.AUTO-MCNC: 166 MG/DL (ref 65–117)
GLUCOSE SERPL-MCNC: 186 MG/DL (ref 65–100)
HCT VFR BLD AUTO: 31.4 % (ref 35–47)
HGB BLD-MCNC: 10.7 G/DL (ref 11.5–16)
IMM GRANULOCYTES # BLD AUTO: 0.3 K/UL (ref 0–0.04)
IMM GRANULOCYTES NFR BLD AUTO: 2 % (ref 0–0.5)
LYMPHOCYTES # BLD: 0.4 K/UL (ref 0.8–3.5)
LYMPHOCYTES NFR BLD: 3 % (ref 12–49)
MAGNESIUM SERPL-MCNC: 1.8 MG/DL (ref 1.6–2.4)
MCH RBC QN AUTO: 31.9 PG (ref 26–34)
MCHC RBC AUTO-ENTMCNC: 34.1 G/DL (ref 30–36.5)
MCV RBC AUTO: 93.7 FL (ref 80–99)
MONOCYTES # BLD: 0.8 K/UL (ref 0–1)
MONOCYTES NFR BLD: 6 % (ref 5–13)
NEUTS SEG # BLD: 11 K/UL (ref 1.8–8)
NEUTS SEG NFR BLD: 89 % (ref 32–75)
NRBC # BLD: 0 K/UL (ref 0–0.01)
NRBC BLD-RTO: 0 PER 100 WBC
NUC CELL # FLD: 85 /CU MM
PLATELET # BLD AUTO: 222 K/UL (ref 150–400)
PMV BLD AUTO: 9.8 FL (ref 8.9–12.9)
POTASSIUM SERPL-SCNC: 4.1 MMOL/L (ref 3.5–5.1)
PROT SERPL-MCNC: 4.6 G/DL (ref 6.4–8.2)
RBC # BLD AUTO: 3.35 M/UL (ref 3.8–5.2)
RBC # FLD: >100 /CU MM
RBC MORPH BLD: ABNORMAL
SERVICE CMNT-IMP: ABNORMAL
SERVICE CMNT-IMP: NORMAL
SODIUM SERPL-SCNC: 140 MMOL/L (ref 136–145)
SPECIMEN SOURCE FLD: ABNORMAL
TOTAL CELLS COUNTED SPEC: 0
WBC # BLD AUTO: 12.5 K/UL (ref 3.6–11)

## 2023-03-29 PROCEDURE — 2709999900 HC NON-CHARGEABLE SUPPLY: Performed by: INTERNAL MEDICINE

## 2023-03-29 PROCEDURE — 87102 FUNGUS ISOLATION CULTURE: CPT

## 2023-03-29 PROCEDURE — 89050 BODY FLUID CELL COUNT: CPT

## 2023-03-29 PROCEDURE — 74011250636 HC RX REV CODE- 250/636: Performed by: FAMILY MEDICINE

## 2023-03-29 PROCEDURE — 36415 COLL VENOUS BLD VENIPUNCTURE: CPT

## 2023-03-29 PROCEDURE — 87070 CULTURE OTHR SPECIMN AEROBIC: CPT

## 2023-03-29 PROCEDURE — 87252 VIRUS INOCULATION TISSUE: CPT

## 2023-03-29 PROCEDURE — 65270000029 HC RM PRIVATE

## 2023-03-29 PROCEDURE — 94640 AIRWAY INHALATION TREATMENT: CPT

## 2023-03-29 PROCEDURE — 74011000250 HC RX REV CODE- 250: Performed by: PHYSICIAN ASSISTANT

## 2023-03-29 PROCEDURE — 85025 COMPLETE CBC W/AUTO DIFF WBC: CPT

## 2023-03-29 PROCEDURE — 74011250637 HC RX REV CODE- 250/637: Performed by: INTERNAL MEDICINE

## 2023-03-29 PROCEDURE — 87077 CULTURE AEROBIC IDENTIFY: CPT

## 2023-03-29 PROCEDURE — 83735 ASSAY OF MAGNESIUM: CPT

## 2023-03-29 PROCEDURE — 74011000250 HC RX REV CODE- 250: Performed by: INTERNAL MEDICINE

## 2023-03-29 PROCEDURE — 77030003657 HC NDL SCLER BSC -B: Performed by: INTERNAL MEDICINE

## 2023-03-29 PROCEDURE — 77030022556 HC FCPS BIOP TIS ENDOSC BSC -B: Performed by: INTERNAL MEDICINE

## 2023-03-29 PROCEDURE — 87280 RESPIRATORY SYNCYTIAL AG IF: CPT

## 2023-03-29 PROCEDURE — 94761 N-INVAS EAR/PLS OXIMETRY MLT: CPT

## 2023-03-29 PROCEDURE — 74011636637 HC RX REV CODE- 636/637: Performed by: INTERNAL MEDICINE

## 2023-03-29 PROCEDURE — 80053 COMPREHEN METABOLIC PANEL: CPT

## 2023-03-29 PROCEDURE — 82962 GLUCOSE BLOOD TEST: CPT

## 2023-03-29 PROCEDURE — 76040000019: Performed by: INTERNAL MEDICINE

## 2023-03-29 PROCEDURE — 88112 CYTOPATH CELL ENHANCE TECH: CPT

## 2023-03-29 PROCEDURE — 87186 SC STD MICRODIL/AGAR DIL: CPT

## 2023-03-29 PROCEDURE — 74011250636 HC RX REV CODE- 250/636: Performed by: INTERNAL MEDICINE

## 2023-03-29 PROCEDURE — 87116 MYCOBACTERIA CULTURE: CPT

## 2023-03-29 PROCEDURE — 87106 FUNGI IDENTIFICATION YEAST: CPT

## 2023-03-29 PROCEDURE — 87278 LEGION PNEUMOPHILIA AG IF: CPT

## 2023-03-29 PROCEDURE — 0B9G8ZX DRAINAGE OF LEFT UPPER LUNG LOBE, VIA NATURAL OR ARTIFICIAL OPENING ENDOSCOPIC, DIAGNOSTIC: ICD-10-PCS | Performed by: INTERNAL MEDICINE

## 2023-03-29 RX ORDER — FENTANYL CITRATE 50 UG/ML
25 INJECTION, SOLUTION INTRAMUSCULAR; INTRAVENOUS
Status: DISCONTINUED | OUTPATIENT
Start: 2023-03-29 | End: 2023-03-29 | Stop reason: HOSPADM

## 2023-03-29 RX ORDER — LIDOCAINE HYDROCHLORIDE AND EPINEPHRINE 20; 10 MG/ML; UG/ML
2 INJECTION, SOLUTION INFILTRATION; PERINEURAL AS NEEDED
Status: DISCONTINUED | OUTPATIENT
Start: 2023-03-29 | End: 2023-03-29 | Stop reason: HOSPADM

## 2023-03-29 RX ORDER — LIDOCAINE HYDROCHLORIDE 40 MG/ML
SOLUTION TOPICAL ONCE
Status: COMPLETED | OUTPATIENT
Start: 2023-03-29 | End: 2023-03-29

## 2023-03-29 RX ORDER — FLUMAZENIL 0.1 MG/ML
0.2 INJECTION INTRAVENOUS
Status: DISCONTINUED | OUTPATIENT
Start: 2023-03-29 | End: 2023-03-29 | Stop reason: HOSPADM

## 2023-03-29 RX ORDER — MIDAZOLAM HYDROCHLORIDE 1 MG/ML
.25-5 INJECTION, SOLUTION INTRAMUSCULAR; INTRAVENOUS
Status: DISCONTINUED | OUTPATIENT
Start: 2023-03-29 | End: 2023-03-29 | Stop reason: HOSPADM

## 2023-03-29 RX ORDER — LIDOCAINE HYDROCHLORIDE 20 MG/ML
2 INJECTION, SOLUTION INFILTRATION; PERINEURAL
Status: DISCONTINUED | OUTPATIENT
Start: 2023-03-29 | End: 2023-03-29 | Stop reason: HOSPADM

## 2023-03-29 RX ORDER — ACETYLCYSTEINE 200 MG/ML
4 SOLUTION ORAL; RESPIRATORY (INHALATION) ONCE
Status: DISCONTINUED | OUTPATIENT
Start: 2023-03-29 | End: 2023-03-29 | Stop reason: HOSPADM

## 2023-03-29 RX ORDER — NALOXONE HYDROCHLORIDE 0.4 MG/ML
0.2 INJECTION, SOLUTION INTRAMUSCULAR; INTRAVENOUS; SUBCUTANEOUS
Status: DISCONTINUED | OUTPATIENT
Start: 2023-03-29 | End: 2023-03-29 | Stop reason: HOSPADM

## 2023-03-29 RX ORDER — SODIUM CHLORIDE 9 MG/ML
50 INJECTION, SOLUTION INTRAVENOUS CONTINUOUS
Status: DISCONTINUED | OUTPATIENT
Start: 2023-03-29 | End: 2023-03-31

## 2023-03-29 RX ADMIN — Medication 10 ML: at 06:55

## 2023-03-29 RX ADMIN — BENZONATATE 100 MG: 100 CAPSULE ORAL at 21:24

## 2023-03-29 RX ADMIN — GUAIFENESIN 1200 MG: 600 TABLET ORAL at 21:25

## 2023-03-29 RX ADMIN — ARFORMOTEROL TARTRATE 15 MCG: 15 SOLUTION RESPIRATORY (INHALATION) at 19:56

## 2023-03-29 RX ADMIN — EZETIMIBE 10 MG: 10 TABLET ORAL at 18:38

## 2023-03-29 RX ADMIN — BUDESONIDE 500 MCG: 0.5 INHALANT RESPIRATORY (INHALATION) at 19:56

## 2023-03-29 RX ADMIN — METHYLPREDNISOLONE SODIUM SUCCINATE 60 MG: 125 INJECTION, POWDER, FOR SOLUTION INTRAMUSCULAR; INTRAVENOUS at 13:03

## 2023-03-29 RX ADMIN — IPRATROPIUM BROMIDE AND ALBUTEROL SULFATE 3 ML: 2.5; .5 SOLUTION RESPIRATORY (INHALATION) at 00:10

## 2023-03-29 RX ADMIN — IPRATROPIUM BROMIDE AND ALBUTEROL SULFATE 3 ML: 2.5; .5 SOLUTION RESPIRATORY (INHALATION) at 04:49

## 2023-03-29 RX ADMIN — LIDOCAINE HYDROCHLORIDE 15 MG: 20 INJECTION, SOLUTION INFILTRATION; PERINEURAL at 09:46

## 2023-03-29 RX ADMIN — BENZONATATE 100 MG: 100 CAPSULE ORAL at 18:38

## 2023-03-29 RX ADMIN — CLONIDINE HYDROCHLORIDE 0.1 MG: 0.1 TABLET ORAL at 18:38

## 2023-03-29 RX ADMIN — DOCUSATE SODIUM 50MG AND SENNOSIDES 8.6MG 1 TABLET: 8.6; 5 TABLET, FILM COATED ORAL at 18:38

## 2023-03-29 RX ADMIN — Medication 2 UNITS: at 13:04

## 2023-03-29 RX ADMIN — IPRATROPIUM BROMIDE AND ALBUTEROL SULFATE 3 ML: 2.5; .5 SOLUTION RESPIRATORY (INHALATION) at 19:42

## 2023-03-29 RX ADMIN — IPRATROPIUM BROMIDE AND ALBUTEROL SULFATE 3 ML: 2.5; .5 SOLUTION RESPIRATORY (INHALATION) at 15:12

## 2023-03-29 RX ADMIN — ATORVASTATIN CALCIUM 40 MG: 20 TABLET, FILM COATED ORAL at 18:38

## 2023-03-29 RX ADMIN — Medication 10 ML: at 21:14

## 2023-03-29 RX ADMIN — HYDRALAZINE HYDROCHLORIDE 10 MG: 20 INJECTION INTRAMUSCULAR; INTRAVENOUS at 07:08

## 2023-03-29 RX ADMIN — LIDOCAINE HYDROCHLORIDE: 40 SOLUTION ORAL at 07:57

## 2023-03-29 NOTE — WOUND CARE
Wound follow up-  Patient oriented X4, family at bedside, patient feels wound is healing but feels she hits at times due to where its at on elbow. Will use larger foam to help with protection  Left elbow skin tear- 6x 3x0.1cm- nonviable flap removed, pale pink wound bed, moist with serous drainage, some edema on elbow. Improving. Treatment: cleansed with NSS, small sacral foam applied to fit curvature of elbow.   Will sign off  Please re-consult if needed  Jenelle Quinn RN

## 2023-03-29 NOTE — PROGRESS NOTES
Problem: Falls - Risk of  Goal: *Absence of Falls  Description: Document Martine Lombardo Fall Risk and appropriate interventions in the flowsheet. Outcome: Progressing Towards Goal  Note: Fall Risk Interventions:                                Problem: Patient Education: Go to Patient Education Activity  Goal: Patient/Family Education  Outcome: Progressing Towards Goal     Problem: Patient Education: Go to Patient Education Activity  Goal: Patient/Family Education  Outcome: Progressing Towards Goal     Problem:  Activity Intolerance  Goal: *Oxygen saturation during activity within specified parameters  Outcome: Progressing Towards Goal  Goal: *Able to remain out of bed as prescribed  Outcome: Progressing Towards Goal     Problem: Patient Education: Go to Patient Education Activity  Goal: Patient/Family Education  Outcome: Progressing Towards Goal     Problem: Nutrition Deficit  Goal: *Optimize nutritional status  Outcome: Progressing Towards Goal

## 2023-03-29 NOTE — PROGRESS NOTES
Asaf [facility]  1945  [id number]    Situation:  Verbal report received from: Sheela Spine RN  Procedure: Procedure(s):  BRONCHOSCOPY    Background:    Preoperative diagnosis: Pneumonia  Postoperative diagnosis: * No post-op diagnosis entered *    :  Dr. Leela Munson  Assistant(s): Endoscopy Technician-1: Klarissa Castro LPN  Endoscopy RN-1: Micheal Suresh RN  Endoscopy RN-2: Michelle Covarrubias RN    Specimens: * No specimens in log *  H. Pylori  no    Assessment:  Intra-procedure medications   Versed yes 4 mg  Fentanyl yes 100 mcg    Anesthesia gave intra-procedure sedation and medications, see anesthesia flow sheet yes    Intravenous fluids: NS@ KVO     Vital signs stable yes    Abdominal assessment: round and soft yes    Recommendation:  Discharge patient per MD order yes.   Return to floor yes  Family or Friend son in patient's room  Permission to share finding with family or friend yes

## 2023-03-29 NOTE — PROGRESS NOTES
TRANSFER - OUT REPORT:    Verbal report given to JOS Paulino(name) on Angelica Smiley RN  being transferred to OR(unit) for routine post - op       Report consisted of patients Situation, Background, Assessment and   Recommendations(SBAR). Information from the following report(s) SBAR, Kardex, Intake/Output, and MAR was reviewed with the receiving nurse. Lines:   Peripheral IV 03/21/23 Left Antecubital (Active)   Site Assessment Clean, dry, & intact 03/29/23 0430   Phlebitis Assessment 0 03/29/23 0430   Infiltration Assessment 0 03/29/23 0430   Dressing Status Clean, dry, & intact 03/29/23 0430   Dressing Type Transparent;Tape 03/29/23 0430   Hub Color/Line Status Pink;Capped 03/29/23 0430   Action Taken Open ports on tubing capped 03/29/23 0430   Alcohol Cap Used Yes 03/29/23 0430        Opportunity for questions and clarification was provided. Patient transported with:   Monitor  Registered Nurse      0730: Bedside and Verbal shift change report given to JOS Duarte (oncoming nurse) by Bret CIFUENTES RN (offgoing nurse). Report included the following information SBAR, Kardex, Intake/Output, MAR, and Recent Results.

## 2023-03-29 NOTE — PROGRESS NOTES
[facility]  [address] [facility], [address] [place] [address]  [phone] [facility] Adult  Hospitalist Group                                                                                          Hospitalist Progress Note  Nadia Pulido DO        Date of Service:  3/29/2023  NAME:  Lincoln Elmore  :  1945  MRN:  [id number]      Admission Summary:   80-year-old female presented to the emergency department after syncopal episode    Interval history / Subjective:   Patient reports some dyspnea with exertion, improved wheezing and cough. Reports constipation      Assessment & Plan:     Sepsis secondary to left lower lobe CAP  -Blood cultures with staph; likely contamination   -Completed Rocephin and azithromycin.   Doxycycline added for further coverage but had now been stopped   -Repeat blood cultures negative so far  -CT chest 3/22 showed left upper lobe collapse with dense consolidation and several internal air bronchograms  -Sputum culture w/ yeast, RVP negative  -Continue Mucinex, flutter valve, incentive spirometry  -Pulmonology following, s/p bronchoscopy today -awaiting report     Acute asthma exacerbation  -Continue IV steroids; wean as able   -Scheduled Komal Soliz/Pulmicort  -Singulair  -Incentive spirometry, flutter valve  -Pulmonology following    Acute respiratory failure with hypoxia  -Likely due to acute issues above  -Continue to monitor and use oxygen as tolerated to maintain sats above 90%    A-fib with RVR  -This was brief and resolved on its own  -Echo with diastolic dysfunction  -Continue with diltiazem  -Will need event monitor on discharge  -Cardiology evaluated    Syncope  -Likely due to sepsis and acute issues above  -Negative head CT  -V/q scan low prob for PE   -Echo with diastolic dysfunction    DIONISIO on CKD  -may now be around baseline   -Holding ARB and HCTZ for now   -Monitor     Hypertension  -Continue with Dilt, clonidine  -HCTZ and ARB on hold as above    Type 2 diabetes  -Continue SSI per protocol and diabetic diet    Elevated LFTs   -resolved  -unclear etiology  -medication induced? -ABD US, hep panel neg   -resume home statin and zeita   -monitor      Hyperlipidemia, POA  -resume statin and zeita as LFTS are now wnl     Outisde Records, prior notes, labs, radiology, and medications reviewed     Code status: Full code  DVT prophylaxis: [facility] Problems  Never Reviewed            Codes Class Noted POA    Sepsis ([facility] [address] ICD-10-CM: A41.9  ICD-9-CM: 038.9, 995.91  3/16/2023 Unknown        Vasovagal syncope ICD-10-CM: R55  ICD-9-CM: 780.2  3/15/2023 Unknown       Review of Systems:   A comprehensive review of systems was negative except for that written in the HPI. Vital Signs:    Last 24hrs VS reviewed since prior progress note. Most recent are:  Visit Vitals  BP (!) 150/55   Pulse 68   Temp 97.7 °F (36.5 °C)   Resp 19   Ht 5' 5\" (1.651 m)   Wt 82 kg (180 lb 12.4 oz)   SpO2 98%   BMI 30.08 kg/m²         Intake/Output Summary (Last 24 hours) at 3/29/2023 1217  Last data filed at 3/29/2023 6300  Gross per 24 hour   Intake 525 ml   Output 0 ml   Net 525 ml          Physical Examination:             Constitutional:  No acute distress, cooperative, pleasant    ENT:  Oral mucosa moist, oropharynx benign. Resp: Diminished breath sounds bilateral bases, no wheezing/rhonchi/rales. No accessory muscle use   CV:  Regular rhythm, normal rate, no murmurs, gallops, rubs    GI:  Soft, non distended, non tender. normoactive bowel sounds, no hepatosplenomegaly     Musculoskeletal:  No edema, warm, 2+ pulses throughout    Neurologic:  Moves all extremities. AAOx3, CN II-XII reviewed     Psych:  Good insight, Not anxious nor agitated.        Data Review:    Review and/or order of clinical lab test*      Labs:     Recent Labs     03/29/23  0159 03/28/23  0135   WBC 12.5* 10.4   HGB 10.7* 10.4*   HCT 31.4* 30.6*    224       Recent Labs     03/29/23 0159 03/28/23  0135 03/27/23  0216    138 139   K 4.1 4.1 3.4*   * 113* 111*   CO2 20* 22 23   BUN 53* 52* 61*   CREA 1.08* 1.05* 1.25*   * 166* 204*   CA 8.8 8.7 8.9   MG 1.8 1.9 1.5*       Recent Labs     03/29/23 0159 03/28/23 0135   ALT 55 58   AP 46 44*   TBILI 0.8 0.5   TP 4.6* 4.8*   ALB 2.4* 2.4*   GLOB 2.2 2.4       No results for input(s): INR, PTP, APTT, INREXT, INREXT in the last 72 hours. No results for input(s): FE, TIBC, PSAT, FERR in the last 72 hours. No results found for: FOL, RBCF   No results for input(s): PH, PCO2, PO2 in the last 72 hours. No results for input(s): CPK, CKNDX, TROIQ in the last 72 hours.     No lab exists for component: CPKMB  No results found for: CHOL, CHOLX, CHLST, CHOLV, HDL, HDLP, LDL, LDLC, DLDLP, TGLX, TRIGL, TRIGP, CHHD, CHHDX  Lab Results   Component Value Date/Time    Glucose (POC) 139 (H) 03/29/2023 06:57 AM    Glucose (POC) 137 (H) 03/28/2023 09:42 PM    Glucose (POC) 116 03/28/2023 04:34 PM    Glucose (POC) 231 (H) 03/28/2023 11:16 AM    Glucose (POC) 165 (H) 03/28/2023 07:55 AM     Lab Results   Component Value Date/Time    Color YELLOW/STRAW 07/19/2016 12:08 PM    Appearance CLEAR 07/19/2016 12:08 PM    Specific gravity 1.020 07/19/2016 12:08 PM    pH (UA) 5.5 07/19/2016 12:08 PM    Protein NEGATIVE 07/19/2016 12:08 PM    Glucose NEGATIVE 07/19/2016 12:08 PM    Ketone NEGATIVE 07/19/2016 12:08 PM    Bilirubin NEGATIVE 07/19/2016 12:08 PM    Urobilinogen 0.2 07/19/2016 12:08 PM    Nitrites NEGATIVE 07/19/2016 12:08 PM    Leukocyte Esterase NEGATIVE 07/19/2016 12:08 PM    Epithelial cells FEW 07/19/2016 12:08 PM    Bacteria NEGATIVE 07/19/2016 12:08 PM    WBC 0-4 07/19/2016 12:08 PM    RBC 5-10 07/19/2016 12:08 PM         Medications Reviewed:     Current Facility-Administered Medications   Medication Dose Route Frequency    0.9% sodium chloride infusion  50 mL/hr IntraVENous CONTINUOUS    senna-docusate (PERICOLACE) 8.6-50 mg per tablet 1 Tablet  1 Tablet Oral BID    albuterol-ipratropium (DUO-NEB) 2.5 MG-0.5 MG/3 ML  3 mL Nebulization Q4H RT    albuterol-ipratropium (DUO-NEB) 2.5 MG-0.5 MG/3 ML  3 mL Nebulization Q4H PRN    methylPREDNISolone (PF) (SOLU-MEDROL) injection 60 mg  60 mg IntraVENous Q12H    bacitracin 500 unit/gram packet 1 Packet  1 Packet Topical EVERY OTHER DAY    dilTIAZem ER (CARDIZEM CD) capsule 300 mg  300 mg Oral DAILY    sodium chloride (OCEAN) 0.65 % nasal squeeze bottle 2 Spray  2 Spray Both Nostrils Q2H PRN    benzonatate (TESSALON) capsule 100 mg  100 mg Oral TID    hydrALAZINE (APRESOLINE) 20 mg/mL injection 10 mg  10 mg IntraVENous Q4H PRN    guaiFENesin ER (MUCINEX) tablet 1,200 mg  1,200 mg Oral Q12H    cloNIDine HCL (CATAPRES) tablet 0.1 mg  0.1 mg Oral BID    atorvastatin (LIPITOR) tablet 40 mg  40 mg Oral QPM    [Held by provider] valsartan (DIOVAN) tablet 40 mg  40 mg Oral DAILY    ezetimibe (ZETIA) tablet 10 mg  10 mg Oral QPM    arformoteroL (BROVANA) neb solution 15 mcg  15 mcg Nebulization BID RT    And    budesonide (PULMICORT) 500 mcg/2 ml nebulizer suspension  500 mcg Nebulization BID RT    [Held by provider] hydroCHLOROthiazide (HYDRODIURIL) tablet 25 mg  25 mg Oral DAILY    melatonin tablet 3 mg  3 mg Oral QHS PRN    montelukast (SINGULAIR) tablet 10 mg  10 mg Oral DAILY    pantoprazole (PROTONIX) tablet 40 mg  40 mg Oral ACB    insulin lispro (HUMALOG) injection   SubCUTAneous AC&HS    glucose chewable tablet 16 g  4 Tablet Oral PRN    glucagon (GLUCAGEN) injection 1 mg  1 mg IntraMUSCular PRN    dextrose 10% infusion 0-250 mL  0-250 mL IntraVENous PRN    sodium chloride (NS) flush 5-40 mL  5-40 mL IntraVENous Q8H    sodium chloride (NS) flush 5-40 mL  5-40 mL IntraVENous PRN    acetaminophen (TYLENOL) tablet 650 mg  650 mg Oral Q6H PRN    Or    acetaminophen (TYLENOL) suppository 650 mg  650 mg Rectal Q6H PRN    polyethylene glycol (MIRALAX) packet 17 g  17 g Oral DAILY PRN ondansetron (ZOFRAN ODT) tablet 4 mg  4 mg Oral Q8H PRN    Or    ondansetron (ZOFRAN) injection 4 mg  4 mg IntraVENous Q6H PRN    enoxaparin (LOVENOX) injection 40 mg  40 mg SubCUTAneous DAILY     ______________________________________________________________________  EXPECTED LENGTH OF STAY: 5d 0h  ACTUAL LENGTH OF STAY:          Shankar Sorto DO

## 2023-03-29 NOTE — PROGRESS NOTES
Problem: Falls - Risk of  Goal: *Absence of Falls  Description: Document Dory Engel Fall Risk and appropriate interventions in the flowsheet. Outcome: Progressing Towards Goal  Note: Fall Risk Interventions:                                Problem: Patient Education: Go to Patient Education Activity  Goal: Patient/Family Education  Outcome: Progressing Towards Goal     Problem:  Activity Intolerance  Goal: *Oxygen saturation during activity within specified parameters  Outcome: Progressing Towards Goal     Problem: Patient Education: Go to Patient Education Activity  Goal: Patient/Family Education  Outcome: Progressing Towards Goal

## 2023-03-29 NOTE — PROCEDURES
[facility]  [address]  [address] [facility] [address]  [address]  [phone]    Jasmin Fei  1945  [id number]      Date of Procedure: 3/29/2023    Preoperative diagnosis: Pneumonia    Procedure: Procedure(s):  BRONCHOSCOPY  INJECTION  BRONCHIAL ALVEOLAR LAVAGE    Indication: mucus plug    :  Kelley Mane MD    Assistant(s): Endoscopy Technician-1: Ayush Dominguez LPN  Endoscopy RN-1: Eddie Chandler RN  Endoscopy RN-2: Alise Hicks RN    Anesthesia/Sedation:  Versed 4 mg IV and Fentanyl 100 mcg IV      Procedure Details:  After infomed consent was obtained for the procedure, with all risks and benefits of procedure explained the patient was taken to the endoscopy suite and placed in the supine position. Following sequential administration of sedation as per above, the bronchoscope was inserted into the mouth and advanced under direct vision to the tracheobronchial tree      Findings: Thick, light yellow, concreted secretions in TOAN. Removed through repeated suctioning. No lesions noted in underlying subsegments. Right lung with no lesions or secretions down to secondary vinicio. Therapies:   Suctioning    Specimens:   BAL x1 performed in TOAN. 30 cc inserted with 15 cc of blood tinged fluid returned           Complications:   None noted; patient tolerated the procedure well. EBL:  Minimal           Impression:    Mucus Plug      Recommendations:    Follow up sputum cultures      Ivette Bhandari MD  3/29/2023  5:14 PM

## 2023-03-29 NOTE — PERIOP NOTES
Endoscope was pre-cleaned at bedside immediately following procedure by [facility]. Bedside verbal report given to Zelda Palacios RN.         TRANSFER - OUT REPORT:    Verbal report given to Anjana Pulido RN on Yasmine Sierra  being transferred to  for routine post - op       Report consisted of patients Situation, Background, Assessment and   Recommendations(SBAR). Information from the following report(s) SBAR, Procedure Summary, Intake/Output, and Med Rec Status was reviewed with the receiving nurse. Lines:   Peripheral IV 03/21/23 Left Antecubital (Active)   Site Assessment Clean, dry, & intact 03/29/23 0909   Phlebitis Assessment 0 03/29/23 0909   Infiltration Assessment 0 03/29/23 0909   Dressing Status Clean, dry, & intact; New 03/29/23 0909   Dressing Type Tape;Transparent 03/29/23 0909   Hub Color/Line Status Pink;Capped; End cap changed; Flushed 03/29/23 5943   Action Taken Open ports on tubing capped 03/29/23 0909   Alcohol Cap Used Yes 03/29/23 8655        Opportunity for questions and clarification was provided.       Patient transported with:   [facility]

## 2023-03-29 NOTE — PERIOP NOTES
TRANSFER - IN REPORT:    Verbal report received from Luba Mera RN on [facility]  being received from  for ordered procedure      Report consisted of patients Situation, Background, Assessment and   Recommendations(SBAR). Information from the following report(s) SBAR and MAR was reviewed with the receiving nurse. Opportunity for questions and clarification was provided. Assessment completed upon patients arrival to unit and care assumed.

## 2023-03-29 NOTE — PROGRESS NOTES
3/29/2023  Case Management Progress Note    1:47 PM  Patient is 69year old female admitted 3/16 with syncope  Patient's RUR is 13% green/low risk for readmission  Covid test: negative 3/18  Chart reviewed--patient discussed at interdisciplinary rounds  Per rounds this morning patient had her bronchoscopy, results are still pending. Attending feels that pulmonary rehab is most appropriate--patient has been accepted to [facility] for pulmonary rehab. I have let the liaison know that patient will soon be ready and left her son an update on his voicemail as well. Will continue to follow and assist with discharge planning as needed.      Transition of Care Plan   Continue medical management/treatment  Pulmonary rehab at [facility] on dc   Transportation tbd pending progress  CM will continue to follow    KIRSTIN Jones

## 2023-03-30 LAB
ALBUMIN SERPL-MCNC: 2.4 G/DL (ref 3.5–5)
ALBUMIN/GLOB SERPL: 1.1 (ref 1.1–2.2)
ALP SERPL-CCNC: 46 U/L (ref 45–117)
ALT SERPL-CCNC: 57 U/L (ref 12–78)
ANION GAP SERPL CALC-SCNC: 4 MMOL/L (ref 5–15)
AST SERPL-CCNC: 22 U/L (ref 15–37)
BASOPHILS # BLD: 0 K/UL (ref 0–0.1)
BASOPHILS NFR BLD: 0 % (ref 0–1)
BILIRUB SERPL-MCNC: 0.6 MG/DL (ref 0.2–1)
BUN SERPL-MCNC: 50 MG/DL (ref 6–20)
BUN/CREAT SERPL: 51 (ref 12–20)
CALCIUM SERPL-MCNC: 8.6 MG/DL (ref 8.5–10.1)
CHLORIDE SERPL-SCNC: 116 MMOL/L (ref 97–108)
CO2 SERPL-SCNC: 21 MMOL/L (ref 21–32)
CREAT SERPL-MCNC: 0.98 MG/DL (ref 0.55–1.02)
DIFFERENTIAL METHOD BLD: ABNORMAL
EOSINOPHIL # BLD: 0 K/UL (ref 0–0.4)
EOSINOPHIL NFR BLD: 0 % (ref 0–7)
ERYTHROCYTE [DISTWIDTH] IN BLOOD BY AUTOMATED COUNT: 14.6 % (ref 11.5–14.5)
GLOBULIN SER CALC-MCNC: 2.2 G/DL (ref 2–4)
GLUCOSE BLD STRIP.AUTO-MCNC: 102 MG/DL (ref 65–117)
GLUCOSE BLD STRIP.AUTO-MCNC: 135 MG/DL (ref 65–117)
GLUCOSE BLD STRIP.AUTO-MCNC: 160 MG/DL (ref 65–117)
GLUCOSE BLD STRIP.AUTO-MCNC: 171 MG/DL (ref 65–117)
GLUCOSE SERPL-MCNC: 165 MG/DL (ref 65–100)
HCT VFR BLD AUTO: 31.4 % (ref 35–47)
HGB BLD-MCNC: 10.8 G/DL (ref 11.5–16)
IMM GRANULOCYTES # BLD AUTO: 0.2 K/UL (ref 0–0.04)
IMM GRANULOCYTES NFR BLD AUTO: 1 % (ref 0–0.5)
LYMPHOCYTES # BLD: 0.2 K/UL (ref 0.8–3.5)
LYMPHOCYTES NFR BLD: 1 % (ref 12–49)
MAGNESIUM SERPL-MCNC: 1.8 MG/DL (ref 1.6–2.4)
MCH RBC QN AUTO: 32 PG (ref 26–34)
MCHC RBC AUTO-ENTMCNC: 34.4 G/DL (ref 30–36.5)
MCV RBC AUTO: 93.2 FL (ref 80–99)
MONOCYTES # BLD: 0.7 K/UL (ref 0–1)
MONOCYTES NFR BLD: 4 % (ref 5–13)
NEUTS SEG # BLD: 16.1 K/UL (ref 1.8–8)
NEUTS SEG NFR BLD: 94 % (ref 32–75)
NRBC # BLD: 0 K/UL (ref 0–0.01)
NRBC BLD-RTO: 0 PER 100 WBC
PLATELET # BLD AUTO: 177 K/UL (ref 150–400)
PMV BLD AUTO: 9.7 FL (ref 8.9–12.9)
POTASSIUM SERPL-SCNC: 3.9 MMOL/L (ref 3.5–5.1)
PROT SERPL-MCNC: 4.6 G/DL (ref 6.4–8.2)
RBC # BLD AUTO: 3.37 M/UL (ref 3.8–5.2)
RBC MORPH BLD: ABNORMAL
SERVICE CMNT-IMP: ABNORMAL
SERVICE CMNT-IMP: NORMAL
SODIUM SERPL-SCNC: 141 MMOL/L (ref 136–145)
WBC # BLD AUTO: 17.2 K/UL (ref 3.6–11)

## 2023-03-30 PROCEDURE — 82962 GLUCOSE BLOOD TEST: CPT

## 2023-03-30 PROCEDURE — 94761 N-INVAS EAR/PLS OXIMETRY MLT: CPT

## 2023-03-30 PROCEDURE — 74011250637 HC RX REV CODE- 250/637: Performed by: INTERNAL MEDICINE

## 2023-03-30 PROCEDURE — 85025 COMPLETE CBC W/AUTO DIFF WBC: CPT

## 2023-03-30 PROCEDURE — 74011250636 HC RX REV CODE- 250/636: Performed by: FAMILY MEDICINE

## 2023-03-30 PROCEDURE — 65270000029 HC RM PRIVATE

## 2023-03-30 PROCEDURE — 94640 AIRWAY INHALATION TREATMENT: CPT

## 2023-03-30 PROCEDURE — 94664 DEMO&/EVAL PT USE INHALER: CPT

## 2023-03-30 PROCEDURE — 74011250636 HC RX REV CODE- 250/636: Performed by: INTERNAL MEDICINE

## 2023-03-30 PROCEDURE — 36415 COLL VENOUS BLD VENIPUNCTURE: CPT

## 2023-03-30 PROCEDURE — 97116 GAIT TRAINING THERAPY: CPT

## 2023-03-30 PROCEDURE — 83735 ASSAY OF MAGNESIUM: CPT

## 2023-03-30 PROCEDURE — 80053 COMPREHEN METABOLIC PANEL: CPT

## 2023-03-30 PROCEDURE — 74011000250 HC RX REV CODE- 250: Performed by: PHYSICIAN ASSISTANT

## 2023-03-30 PROCEDURE — 74011250637 HC RX REV CODE- 250/637: Performed by: NURSE PRACTITIONER

## 2023-03-30 PROCEDURE — 74011636637 HC RX REV CODE- 636/637: Performed by: INTERNAL MEDICINE

## 2023-03-30 PROCEDURE — 74011000250 HC RX REV CODE- 250: Performed by: INTERNAL MEDICINE

## 2023-03-30 PROCEDURE — 74011250636 HC RX REV CODE- 250/636: Performed by: PHYSICIAN ASSISTANT

## 2023-03-30 RX ORDER — DILTIAZEM HYDROCHLORIDE 300 MG/1
300 CAPSULE, COATED, EXTENDED RELEASE ORAL DAILY
Qty: 30 CAPSULE | Refills: 0 | Status: SHIPPED | OUTPATIENT
Start: 2023-03-31 | End: 2023-04-30

## 2023-03-30 RX ORDER — PREDNISONE 10 MG/1
TABLET ORAL
Qty: 21 TABLET | Refills: 0 | Status: SHIPPED | OUTPATIENT
Start: 2023-03-30

## 2023-03-30 RX ADMIN — IPRATROPIUM BROMIDE AND ALBUTEROL SULFATE 3 ML: 2.5; .5 SOLUTION RESPIRATORY (INHALATION) at 01:14

## 2023-03-30 RX ADMIN — GUAIFENESIN 1200 MG: 600 TABLET ORAL at 09:41

## 2023-03-30 RX ADMIN — METHYLPREDNISOLONE SODIUM SUCCINATE 40 MG: 40 INJECTION, POWDER, FOR SOLUTION INTRAMUSCULAR; INTRAVENOUS at 23:28

## 2023-03-30 RX ADMIN — Medication 10 ML: at 13:02

## 2023-03-30 RX ADMIN — BENZONATATE 100 MG: 100 CAPSULE ORAL at 17:10

## 2023-03-30 RX ADMIN — Medication 10 ML: at 05:36

## 2023-03-30 RX ADMIN — METHYLPREDNISOLONE SODIUM SUCCINATE 40 MG: 40 INJECTION, POWDER, FOR SOLUTION INTRAMUSCULAR; INTRAVENOUS at 13:02

## 2023-03-30 RX ADMIN — MONTELUKAST 10 MG: 10 TABLET, FILM COATED ORAL at 09:41

## 2023-03-30 RX ADMIN — METHYLPREDNISOLONE SODIUM SUCCINATE 60 MG: 125 INJECTION, POWDER, FOR SOLUTION INTRAMUSCULAR; INTRAVENOUS at 00:24

## 2023-03-30 RX ADMIN — CLONIDINE HYDROCHLORIDE 0.1 MG: 0.1 TABLET ORAL at 17:10

## 2023-03-30 RX ADMIN — EZETIMIBE 10 MG: 10 TABLET ORAL at 17:10

## 2023-03-30 RX ADMIN — BUDESONIDE 500 MCG: 0.5 INHALANT RESPIRATORY (INHALATION) at 19:54

## 2023-03-30 RX ADMIN — IPRATROPIUM BROMIDE AND ALBUTEROL SULFATE 3 ML: 2.5; .5 SOLUTION RESPIRATORY (INHALATION) at 04:24

## 2023-03-30 RX ADMIN — Medication 10 ML: at 21:08

## 2023-03-30 RX ADMIN — CLONIDINE HYDROCHLORIDE 0.1 MG: 0.1 TABLET ORAL at 09:41

## 2023-03-30 RX ADMIN — Medication 2 UNITS: at 07:46

## 2023-03-30 RX ADMIN — BENZONATATE 100 MG: 100 CAPSULE ORAL at 21:07

## 2023-03-30 RX ADMIN — IPRATROPIUM BROMIDE AND ALBUTEROL SULFATE 3 ML: 2.5; .5 SOLUTION RESPIRATORY (INHALATION) at 19:48

## 2023-03-30 RX ADMIN — ATORVASTATIN CALCIUM 40 MG: 20 TABLET, FILM COATED ORAL at 17:10

## 2023-03-30 RX ADMIN — DILTIAZEM HYDROCHLORIDE 300 MG: 180 CAPSULE, COATED, EXTENDED RELEASE ORAL at 09:41

## 2023-03-30 RX ADMIN — DOCUSATE SODIUM 50MG AND SENNOSIDES 8.6MG 1 TABLET: 8.6; 5 TABLET, FILM COATED ORAL at 09:41

## 2023-03-30 RX ADMIN — ARFORMOTEROL TARTRATE 15 MCG: 15 SOLUTION RESPIRATORY (INHALATION) at 19:54

## 2023-03-30 RX ADMIN — ARFORMOTEROL TARTRATE 15 MCG: 15 SOLUTION RESPIRATORY (INHALATION) at 07:30

## 2023-03-30 RX ADMIN — Medication 2 UNITS: at 17:10

## 2023-03-30 RX ADMIN — IPRATROPIUM BROMIDE AND ALBUTEROL SULFATE 3 ML: 2.5; .5 SOLUTION RESPIRATORY (INHALATION) at 12:17

## 2023-03-30 RX ADMIN — ENOXAPARIN SODIUM 40 MG: 100 INJECTION SUBCUTANEOUS at 09:40

## 2023-03-30 RX ADMIN — GUAIFENESIN 1200 MG: 600 TABLET ORAL at 21:07

## 2023-03-30 RX ADMIN — BUDESONIDE 500 MCG: 0.5 INHALANT RESPIRATORY (INHALATION) at 07:31

## 2023-03-30 RX ADMIN — IPRATROPIUM BROMIDE AND ALBUTEROL SULFATE 3 ML: 2.5; .5 SOLUTION RESPIRATORY (INHALATION) at 07:25

## 2023-03-30 RX ADMIN — IPRATROPIUM BROMIDE AND ALBUTEROL SULFATE 3 ML: 2.5; .5 SOLUTION RESPIRATORY (INHALATION) at 16:49

## 2023-03-30 RX ADMIN — BENZONATATE 100 MG: 100 CAPSULE ORAL at 09:41

## 2023-03-30 RX ADMIN — PANTOPRAZOLE SODIUM 40 MG: 40 TABLET, DELAYED RELEASE ORAL at 06:35

## 2023-03-30 NOTE — PROGRESS NOTES
Problem: Falls - Risk of  Goal: *Absence of Falls  Description: Document Bessie Altamirano Fall Risk and appropriate interventions in the flowsheet. Outcome: Progressing Towards Goal  Note: Fall Risk Interventions:                                Problem: Patient Education: Go to Patient Education Activity  Goal: Patient/Family Education  Outcome: Progressing Towards Goal     Problem: Activity Intolerance  Goal: *Oxygen saturation during activity within specified parameters  Outcome: Progressing Towards Goal  Goal: *Able to remain out of bed as prescribed  Outcome: Progressing Towards Goal     Problem: Patient Education: Go to Patient Education Activity  Goal: Patient/Family Education  Outcome: Progressing Towards Goal     Problem: Nutrition Deficit  Goal: *Optimize nutritional status  Outcome: Progressing Towards Goal     Problem: Risk for Spread of Infection  Goal: Prevent transmission of infectious organism to others  Description: Prevent the transmission of infectious organisms to other patients, staff members, and visitors.   Outcome: Progressing Towards Goal     Problem: Patient Education:  Go to Education Activity  Goal: Patient/Family Education  Outcome: Progressing Towards Goal

## 2023-03-30 NOTE — DISCHARGE SUMMARY
Physician Discharge Summary     Patient ID:  Carlo Pablo  [id number]  69 y.o.  1945    Admit date: 3/15/2023    Discharge date of service and time: 3/31/2023  Greater than 30 minutes were spent providing discharge related services for this patient    Admission Diagnoses: Vasovagal syncope [R55]  Sepsis ([facility] [address] [A41.9]    Discharge Diagnoses:    Principal Diagnosis     Community acquired CLEAR [facility] Course and other diagnoses  Community acquired PNA / Leukocytosis - POA, Cx negative. Sputum just mixed adele and yeast. Pulmonary did bronch and washed out TOAN mucous. Completed ceftriaxone and azithromycin. Continue tessalon, Mucinex, flutter valve, incentive spirometry. Steroids are driving leukocytosis. The mixed adele included pseudomonas, and I will DC on 5 days of levaquin as a precaution     Debilitated patient - POA and worse than baseline. PT OT eval recommend IPR and that is being arranged. Acute asthma exacerbation - POA, triggered by PNA. Better after IV steroids, will DC on PO taper. Continue scheduled DuoNebs, Brovana, Pulmicort, Singulair, Incentive spirometry, flutter valve     Acute respiratory failure with hypoxia - POA due to PNA and Asthma. Now on room air     A-fib with RVR - POA, brief and resolved spontaneously. ECHO unremarkable. Continue diltiazem. Cardiology consulted. Holter at discharge. Syncope - POA, mild, due to IVVD. Head CT negative. Orthostatics now normal.  VQ low prob for PE.  ECHO bland. DIONISIO on CKD / Dehydration - POA, now better. Resume ARB. Stopped HCTZ     Hypertension - BP stable on diltiazem and clonidine. Resume diovan. Stopped HCTZ     Type 2 diabetes without complications - Diabetic diet and counseling. SSI per protocol. Not on home meds. Resume ARB. Elevated LFTs - POA, mild, better. Negative US and viral panel. Hyperlipidemia - POA, resume statin and zeita     Staph bacteremia - Contaminant. No treatment needed. PCP: Shelbie High MD    Consults: Cardiology and Pulmonary/Intensive care    Significant Diagnostic Studies: See Hospital Course    Discharged home in improved condition. Discharge Exam:  BP (!) 147/69 (BP 1 Location: Left upper arm, BP Patient Position: At rest)   Pulse 76   Temp 97.7 °F (36.5 °C)   Resp 16   Ht 5' 5\" (1.651 m)   Wt 82 kg (180 lb 12.4 oz)   SpO2 96%   BMI 30.08 kg/m²      Gen:  Obese, in no acute distress  HEENT:  Pink conjunctivae, PERRL, hearing intact to voice, moist mucous membranes  Neck:  Supple, without masses, thyroid non-tender  Resp:  No accessory muscle use, clear breath sounds without wheezes rales or rhonchi  Card:  No murmurs, normal S1, S2 without thrills, bruits or peripheral edema  Abd:  Soft, non-tender, non-distended, normoactive bowel sounds are present, no mass  Lymph:  No cervical or inguinal adenopathy  Musc:  No cyanosis or clubbing  Skin:  No rashes or ulcers, skin turgor is good  Neuro:  Cranial nerves are grossly intact, general motor weakness, follows commands appropriately  Psych:  Good insight, oriented to person, place and time, alert    Patient Instructions:   Current Discharge Medication List        START taking these medications    Details   levoFLOXacin (LEVAQUIN) 750 mg tablet Take 1 Tablet by mouth every twenty-four (24) hours for 5 doses. Qty: 5 Tablet, Refills: 0      predniSONE (STERAPRED DS) 10 mg dose pack See administration instruction per 10mg dose pack  Qty: 21 Tablet, Refills: 0           CONTINUE these medications which have CHANGED    Details   dilTIAZem ER (CARDIZEM CD) 300 mg capsule Take 1 Capsule by mouth daily for 30 days. Qty: 30 Capsule, Refills: 0           CONTINUE these medications which have NOT CHANGED    Details   omeprazole (PRILOSEC OTC) 20 mg tablet Take 20 mg by mouth two (2) times a day. azilsartan medoxomiL (EDARBI) 80 mg tab tablet Take 40 mg by mouth daily.       cloNIDine HCL (CATAPRES) 0.1 mg tablet Take 0.1 mg by mouth two (2) times a day. evolocumab (Repatha SureClick) pen injection 145 mg by SubCUTAneous route every fourteen (14) days. folic acid/multivit-min/lutein (CENTRUM SILVER PO) Take 1 Tablet by mouth daily. glucosamine HCl/chondroitin rico (GLUCOSAMINE-CHONDROITIN PO) Take 1 Capsule by mouth two (2) times a day. 1500/1000      MAGNESIUM CHLORIDE PO Take 225 mg by mouth two (2) times a day. tiotropium bromide (SPIRIVA RESPIMAT) 1.25 mcg/actuation inhaler Take 2 Puffs by inhalation daily. fluticasone propion-salmeteroL (ADVAIR/WIXELA) 500-50 mcg/dose diskus inhaler Take 2 Puffs by inhalation every twelve (12) hours. melatonin 3 mg cap capsule Take 6 mg by mouth nightly. Pehizumf-Wyck-Pafdzh-Hyalur Ac 878-291-26-2 mg cap Take  by mouth two (2) times a day. therapeutic multivitamin (THERAGRAN) tablet Take 1 Tab by mouth daily. SITagliptin-metFORMIN (JANUMET)  mg per tablet Take 1 Tab by mouth daily. fluticasone propionate (FLONASE) 50 mcg/actuation nasal spray 2 Sprays by Both Nostrils route daily. ezetimibe (ZETIA) 10 mg tablet Take 10 mg by mouth every evening. atorvastatin (LIPITOR) 80 mg tablet Take 40 mg by mouth every evening.      montelukast (SINGULAIR) 10 mg tablet Take 10 mg by mouth daily. albuterol sulfate (PROVENTIL;VENTOLIN) 2.5 mg/0.5 mL nebu nebulizer solution by Nebulization route every four (4) hours as needed for Wheezing. STOP taking these medications       hydroCHLOROthiazide (HYDRODIURIL) 25 mg tablet Comments:   Reason for Stopping:         levocetirizine dihydrochloride (XYZAL PO) Comments:   Reason for Stopping:             Activity: Activity as tolerated and PT/OT Eval and Treat  Diet: Regular Diet  Wound Care: None needed    Follow-up with your PCP in a few weeks.   Follow-up tests/labs - none    Signed:  Evon Saenz MD  3/31/2023  1:42 PM

## 2023-03-30 NOTE — PROGRESS NOTES
Name: George Anne: [employer] [facility]   : 1945 Admit Date: 3/15/2023   Phone:   Room: 511/01   PCP: Jeet Mendoza MD  MRN: [id number]   Date: 3/30/2023  Code: Full Code          Chart and notes reviewed. Data reviewed. I review the patient's current medications in the medical record at each encounter. I have evaluated and examined the patient. 69 y.o. F with history of asthma, seasonal/environmental allergies, GERD, HTN, pseudomonas PNA, JANET, and DM. Followed outpatient by Dr. Carolyn Abdalla. Has not been seen in about a year. She is on Advair and Spiriva as maintenance. She tends to develop bronchitis at the start of allergy season. Per daughter, she had been treated outpatient for bronchitis. She was placed on Levaquin and despite treatment, was worsening. She had a syncopal episode on the toilet and was brought to ED for further evaluation. She feels that cough is not as productive now, but more dry and painful. Cough causes some SOB now. Wheezing as well. Interval history  Afebrile  BP elevated  Oxygen saturations 94% on RA  WBC 17.2 - increased  Hgb 10.8  Creat 0.98 - better  LFTs wnl; Hep panel neg  BC 3/15/23: positive /4 bottles coat negative staph  Repeat BC 3/18/23: ngtd x 5 days - final  3/24 resp cx: NRF and yeast  RVP negative  Mycoplasma, legionella, s.pneumo negative    ECHO: EF 55-60%, RA mildly dilated    VQ 3/20: very low probability of PE    LE Dopplers 3/19/23: negative. CXR 3/15/23: left basilar airspace disease. PFT's 2021: normal spirometry. CT chest 3/22/2023: TOAN collapsed with areas of consolidation, bilateral patchy consolidation and areas of ggo    3/27 CXR: Left basilar atelectasis    ROS:  Feeling a little bit better after bronch. Still with some weakness/fatigue. Denies fever or chills. Denies CP.     Past Medical History:   Diagnosis Date    Asthma     Diabetes ([facility] [address]     type 2    GERD (gastroesophageal reflux disease) Hypertension     Ill-defined condition     high cholesterol    Ill-defined condition     seasonal allergies    Ill-defined condition     gout       Past Surgical History:   Procedure Laterality Date    HX APPENDECTOMY      HX HEENT  2010    cataract     HX HYSTERECTOMY      HX LAP CHOLECYSTECTOMY      HX OOPHORECTOMY      benign ovarian tumor    HX OTHER SURGICAL  1993    bladder tack    HX ROTATOR CUFF REPAIR  2015    right    HX TONSILLECTOMY  1964       Family History   Problem Relation Age of Onset    Cancer Mother         uterine    Heart Disease Mother         afib    Heart Disease Father 48        MI    Hypertension Father        Social History     Tobacco Use    Smoking status: Former     Packs/day: 0.25     Types: Cigarettes     Quit date:      Years since quittin.2    Smokeless tobacco: Never    Tobacco comments:     smoked x 1 year   Substance Use Topics    Alcohol use: No       Allergies   Allergen Reactions    Aspirin Cough    Beta Blocker [Beta-Blockers (Beta-Adrenergic Blocking Agts)] Other (comments)     Was told by her PCP to report that she has a mutation- she does not recall a reaction    Codeine Rash       Current Facility-Administered Medications   Medication Dose Route Frequency    0.9% sodium chloride infusion  50 mL/hr IntraVENous CONTINUOUS    senna-docusate (PERICOLACE) 8.6-50 mg per tablet 1 Tablet  1 Tablet Oral BID    albuterol-ipratropium (DUO-NEB) 2.5 MG-0.5 MG/3 ML  3 mL Nebulization Q4H RT    albuterol-ipratropium (DUO-NEB) 2.5 MG-0.5 MG/3 ML  3 mL Nebulization Q4H PRN    methylPREDNISolone (PF) (SOLU-MEDROL) injection 60 mg  60 mg IntraVENous Q12H    bacitracin 500 unit/gram packet 1 Packet  1 Packet Topical EVERY OTHER DAY    dilTIAZem ER (CARDIZEM CD) capsule 300 mg  300 mg Oral DAILY    sodium chloride (OCEAN) 0.65 % nasal squeeze bottle 2 Spray  2 Spray Both Nostrils Q2H PRN    benzonatate (TESSALON) capsule 100 mg  100 mg Oral TID    hydrALAZINE (APRESOLINE) 20 mg/mL injection 10 mg  10 mg IntraVENous Q4H PRN    guaiFENesin ER (MUCINEX) tablet 1,200 mg  1,200 mg Oral Q12H    cloNIDine HCL (CATAPRES) tablet 0.1 mg  0.1 mg Oral BID    atorvastatin (LIPITOR) tablet 40 mg  40 mg Oral QPM    [Held by provider] valsartan (DIOVAN) tablet 40 mg  40 mg Oral DAILY    ezetimibe (ZETIA) tablet 10 mg  10 mg Oral QPM    arformoteroL (BROVANA) neb solution 15 mcg  15 mcg Nebulization BID RT    And    budesonide (PULMICORT) 500 mcg/2 ml nebulizer suspension  500 mcg Nebulization BID RT    [Held by provider] hydroCHLOROthiazide (HYDRODIURIL) tablet 25 mg  25 mg Oral DAILY    melatonin tablet 3 mg  3 mg Oral QHS PRN    montelukast (SINGULAIR) tablet 10 mg  10 mg Oral DAILY    pantoprazole (PROTONIX) tablet 40 mg  40 mg Oral ACB    insulin lispro (HUMALOG) injection   SubCUTAneous AC&HS    glucose chewable tablet 16 g  4 Tablet Oral PRN    glucagon (GLUCAGEN) injection 1 mg  1 mg IntraMUSCular PRN    dextrose 10% infusion 0-250 mL  0-250 mL IntraVENous PRN    sodium chloride (NS) flush 5-40 mL  5-40 mL IntraVENous Q8H    sodium chloride (NS) flush 5-40 mL  5-40 mL IntraVENous PRN    acetaminophen (TYLENOL) tablet 650 mg  650 mg Oral Q6H PRN    Or    acetaminophen (TYLENOL) suppository 650 mg  650 mg Rectal Q6H PRN    polyethylene glycol (MIRALAX) packet 17 g  17 g Oral DAILY PRN    ondansetron (ZOFRAN ODT) tablet 4 mg  4 mg Oral Q8H PRN    Or    ondansetron (ZOFRAN) injection 4 mg  4 mg IntraVENous Q6H PRN    enoxaparin (LOVENOX) injection 40 mg  40 mg SubCUTAneous DAILY         REVIEW OF SYSTEMS   12 point ROS negative except as stated in the HPI. Physical Exam:   Visit Vitals  BP (!) 182/76 (BP 1 Location: Right upper arm, BP Patient Position: At rest)   Pulse 84   Temp 98 °F (36.7 °C)   Resp 18   Ht 5' 5\" (1.651 m)   Wt 82 kg (180 lb 12.4 oz)   SpO2 94%   BMI 30.08 kg/m²       General:  Alert, cooperative, no distress, appears stated age.    Head:  Normocephalic, without obvious abnormality, atraumatic. Eyes:  Conjunctivae/corneas clear. Nose: Nares normal. Septum midline. Mucosa normal.    Throat: Lips, mucosa, and tongue normal.    Neck: Supple, symmetrical, trachea midline, no adenopathy. Lungs:   Trace scattered rhonchi and minimal exp wheeze. Chest wall:  No tenderness or deformity. Heart:  Regular rate and rhythm, S1, S2 normal, no murmur, click, rub or gallop. Abdomen:   Soft, non-tender. Bowel sounds normal. No masses,  No organomegaly. Extremities: Extremities normal, atraumatic, no cyanosis or edema. Pulses: 2+ and symmetric all extremities. Skin: Skin color, texture, turgor normal. No rashes or lesions   Lymph nodes: Cervical, supraclavicular nodes normal.   Neurologic: Grossly nonfocal       Lab Results   Component Value Date/Time    Sodium 141 03/30/2023 03:16 AM    Potassium 3.9 03/30/2023 03:16 AM    Chloride 116 (H) 03/30/2023 03:16 AM    CO2 21 03/30/2023 03:16 AM    BUN 50 (H) 03/30/2023 03:16 AM    Creatinine 0.98 03/30/2023 03:16 AM    Glucose 165 (H) 03/30/2023 03:16 AM    Calcium 8.6 03/30/2023 03:16 AM    Magnesium 1.8 03/30/2023 03:16 AM    Lactic acid 1.2 03/15/2023 05:39 PM       Lab Results   Component Value Date/Time    WBC 17.2 (H) 03/30/2023 03:16 AM    HGB 10.8 (L) 03/30/2023 03:16 AM    PLATELET 167 26/21/8453 03:16 AM    MCV 93.2 03/30/2023 03:16 AM       Lab Results   Component Value Date/Time    Alk.  phosphatase 46 03/30/2023 03:16 AM    Protein, total 4.6 (L) 03/30/2023 03:16 AM    Albumin 2.4 (L) 03/30/2023 03:16 AM    Globulin 2.2 03/30/2023 03:16 AM       No results found for: IRON, FE, TIBC, IBCT, PSAT, FERR    No results found for: SR, CRP, JOAQUINA, ANAIGG, RA, RPR, RPRT, VDRLT, VDRLS, TSH, TSHEXT, TSHEXT     No results found for: PH, PHI, PCO2, PCO2I, PO2, PO2I, HCO3, HCO3I, FIO2, FIO2I    No results found for: CPK, RCK1, RCK2, RCK3, RCK4, CKNDX, CKND1, TROPT, TROIQ, BNPP, BNP     Lab Results   Component Value Date/Time    Culture result: PENDING 03/29/2023 09:18 AM    Culture result: PENDING 03/29/2023 09:18 AM    Culture result: (A) 03/24/2023 03:25 PM     HEAVY  Yeast, (apparent Candida albicans/Candida dubliniensis)      Culture result: SCANT NORMAL RESPIRATORY LACY 03/24/2023 03:25 PM       Lab Results   Component Value Date/Time    Hepatitis B surface Ag <0.10 03/21/2023 04:30 PM       No results found for: VANCT, CPK    Lab Results   Component Value Date/Time    Color YELLOW/STRAW 07/19/2016 12:08 PM    Appearance CLEAR 07/19/2016 12:08 PM    pH (UA) 5.5 07/19/2016 12:08 PM    Protein NEGATIVE 07/19/2016 12:08 PM    Glucose NEGATIVE 07/19/2016 12:08 PM    Ketone NEGATIVE 07/19/2016 12:08 PM    Bilirubin NEGATIVE 07/19/2016 12:08 PM    Blood NEGATIVE 07/19/2016 12:08 PM    Urobilinogen 0.2 07/19/2016 12:08 PM    Nitrites NEGATIVE 07/19/2016 12:08 PM    Leukocyte Esterase NEGATIVE 07/19/2016 12:08 PM    WBC 0-4 07/19/2016 12:08 PM    RBC 5-10 07/19/2016 12:08 PM    Bacteria NEGATIVE 07/19/2016 12:08 PM       IMPRESSION  CAP; s/p bronch on 3/29 with thick, light yellow, concreted secretions in TOAN. Removed through repeated suctioning. No lesions noted in underlying subsegments. Right lung with no lesions or secretions down to secondary vinicio.    Asthma Exacerbation  Syncope  DIONISIO  HTN  DM  JANET  Hx of pseudomonas PNA (2021)    PLAN  Maintain oxygen saturations > 90%; currently on 2L  Fu bronch cx/serologies/cytology  Pulmicort/Brovana nebs; DuoNebs q4h  Wean IV steroids to 40mg q12h; can likely switch to pred taper tomorroe  Completed Azithro/Rocephin  Chest PT q4h while awake  Continue Singulair and Mucinex  Chest PT  Flutter  Encourage IS and OOB as much as possible  DVT prophylaxis: Lovenox  GI prophylaxis: Protonix  Dispo: [facility] vs the [place], [place]

## 2023-03-30 NOTE — PROGRESS NOTES
Problem: Mobility Impaired (Adult and Pediatric)  Goal: *Acute Goals and Plan of Care (Insert Text)  Description: FUNCTIONAL STATUS PRIOR TO ADMISSION: Patient was independent and active without use of DME. Assists with laundry and cooking for son and his family. HOME SUPPORT PRIOR TO ADMISSION: The patient lived with son and his family; . Re-Assessment 3/30/2023  Goals updated based on status  1. Patient will move from supine to sit and sit to supine  in bed with modified independence within 7 day(s). 2.  Patient will transfer from bed to chair and chair to bed with modified independence using the least restrictive device within 7 day(s). 3.  Patient will perform sit to stand with supervision/set-up within 7 day(s). 4.  Patient will ambulate with supervision/set-up for 150 feet with the least restrictive device within 7 day(s). Initiated 3/17/2023  1. Patient will move from supine to sit and sit to supine  in bed with independence within 7 day(s). 2.  Patient will transfer from bed to chair and chair to bed with independence using the least restrictive device within 7 day(s). 3.  Patient will perform sit to stand with independence within 7 day(s). 4.  Patient will ambulate with independence for 150 feet with the least restrictive device within 7 day(s). 5.  Patient will ascend/descend 13 stairs with single handrail(s) with modified independence within 7 day(s). Outcome: Progressing Towards Goal  Note:   PHYSICAL THERAPY TREATMENT: WEEKLY REASSESSMENT  Patient: Lincoln Elmore (59 y.o. female)  Date: 3/30/2023  Primary Diagnosis: Vasovagal syncope [R55]  Sepsis ([facility] [address] [A41.9]  Procedure(s) (LRB):  BRONCHOSCOPY (N/A)  INJECTION (N/A)  BRONCHIAL ALVEOLAR LAVAGE (Left) 1 Day Post-Op   Precautions: fall         ASSESSMENT  Patient continues with skilled PT services and is progressing towards goals.  Patient able to perform repeated ambulation trails with seated rest breaks between, now on room air. Briefly desaturates to 86% but with standing rest break and pursed lip breathing able to recover to 94% on room air. She requires seated rest break for recovery and continues to be motivated to progress and would benefit from inpatient rehab. .     Patient's progression toward goals since last assessment: Patient continues to make steady progress, continues to be limited by medical complexity. Underwent bronch yesterday. Patient now on room air at rest and still with decreased cardiovascular endurance. Current Level of Function Impacting Discharge (mobility/balance): MIN A, ambulating short distances with RW and requiring seated rest breaks between. Functional Outcome Measure: The patient scored 22/28 on the TInetti outcome measure which is indicative of moderate fall risk. Other factors to consider for discharge:          PLAN :  Goals have been updated based on progression since last assessment. Patient continues to benefit from skilled intervention to address the above impairments. Recommendations and Planned Interventions: bed mobility training, transfer training, gait training, therapeutic exercises, and therapeutic activities      Frequency/Duration: Patient will be followed by physical therapy:  5 times a week to address goals.     Recommendation for discharge: (in order for the patient to meet his/her long term goals)  Therapy 3 hours per day 5-7 days per week- pulmonary rehab    This discharge recommendation:  Has been made in collaboration with the attending provider and/or case management    IF patient discharges home will need the following DME: to be determined (TBD)         SUBJECTIVE:   Patient stated .    OBJECTIVE DATA SUMMARY:   HISTORY:    Past Medical History:   Diagnosis Date    Asthma     Diabetes ([facility] [address]     type 2    GERD (gastroesophageal reflux disease)     Hypertension     Ill-defined condition     high cholesterol    Ill-defined condition     seasonal allergies    Ill-defined condition     gout     Past Surgical History:   Procedure Laterality Date    HX APPENDECTOMY      HX HEENT  2010    cataract     HX HYSTERECTOMY  1975    HX LAP CHOLECYSTECTOMY  1985    HX OOPHORECTOMY  1996    benign ovarian tumor    HX OTHER SURGICAL  1993    bladder tack    HX ROTATOR CUFF REPAIR  2015    right    HX TONSILLECTOMY  1964       Personal factors and/or comorbidities impacting plan of care: see above    Home Situation  Home Environment: Private residence  One/Two Story Residence: Two story  # of Interior Steps: 13  Living Alone: No  Support Systems: Child(dwain)  Patient Expects to be Discharged to[de-identified] Skilled nursing facility  Current DME Used/Available at Home: Grab bars, Shower chair  Tub or Shower Type: Shower    EXAMINATION/PRESENTATION/DECISION MAKING:       Critical Behavior:  Neurologic State: Alert  Orientation Level: Oriented X4  Cognition: Follows commands     Hearing:   Auditory  Auditory Impairment: None    Range Of Motion:           Functional Mobility:  Bed Mobility:              Transfers:  Sit to Stand: Contact guard assistance  Stand to Sit: Contact guard assistance        Bed to Chair: Contact guard assistance              Balance:      Ambulation/Gait Training:  Distance (ft): 100 Feet (ft) (x3)  Assistive Device: Walker, rolling;Gait belt  Ambulation - Level of Assistance: Contact guard assistance        Gait Abnormalities:  (seated rest break)                   Functional Measure:    Tinetti test:    Sitting Balance: 1  Arises: 1  Attempts to Rise: 2  Immediate Standing Balance: 1  Standing Balance: 2  Nudged: 1  Eyes Closed: 1  Turn 360 Degrees - Continuous/Discontinuous: 1  Turn 360 Degrees - Steady/Unsteady: 1  Sitting Down: 1  Balance Score: 12 Balance total score  Indication of Gait: 1  R Step Length/Height: 1  L Step Length/Height: 1  R Foot Clearance: 1  L Foot Clearance: 1  Step Symmetry: 1  Step Continuity: 1  Path: 1  Trunk: 1  Walking Time: 1  Gait Score: 10 Gait total score  Total Score: 22/28 Overall total score         Tinetti Tool Score Risk of Falls  <19 = High Fall Risk  19-24 = Moderate Fall Risk  25-28 = Low Fall Risk  Deshaunetti ME. Performance-Oriented Assessment of Mobility Problems in Elderly Patients. [place] [address]; [id number]. (Scoring Description: PT Bulletin Feb. 10, 1993)    Older adults: Boonepratik Gaytan et al, 2009; n = 1000 [facility] elderly evaluated with ABC, AYAZ, ADL, and IADL)  · Mean AYAZ score for males aged 69-68 years = 26.21(3.40)  · Mean AYAZ score for females age 69-68 years = 25.16(4.30)  · Mean AYAZ score for males over 80 years = 23.29(6.02)  · Mean AYAZ score for females over 80 years = 17.20(8.32)        Pain Rating:  None noted    Activity Tolerance:   Fair    After treatment patient left in no apparent distress:   Sitting in chair, Call bell within reach, and Bed / chair alarm activated    COMMUNICATION/EDUCATION:   The patients plan of care was discussed with: Registered nurse. Fall prevention education was provided and the patient/caregiver indicated understanding., Patient/family have participated as able in goal setting and plan of care. , and Patient/family agree to work toward stated goals and plan of care.     Thank you for this referral.  Howard Phillips, PT, DPT   Time Calculation: 15 mins

## 2023-03-30 NOTE — PROGRESS NOTES
3/30/2023  Case Management Progress Note    11:55 AM  Patient is 69year old female admitted 3/16 with syncope  Patient's RUR is 12% green/low risk for readmission  Covid test: negative 3/18  Chart reviewed--patient discussed at interdisciplinary rounds  Per rounds patient is medically stable for discharge. I have reached out to [facility] regarding bed status--they do not have a bed today but it is highly likely for tomorrow. Patient and family on board with this plan. Will continue to follow and assist with discharge planning as needed.      Transition of Care Plan   Continue medical management/treatment  Discharge likely tomorrow to pulmonary rehab  Transportation tbd: family vs [facility] minor CM will continue to follow    Betsy Esparza, MSW

## 2023-03-30 NOTE — PROGRESS NOTES
Bedside and Verbal shift change report given to Anuradha ARGUELLO (oncoming nurse) by Bard Funes (offgoing nurse). Report included the following information SBAR, Kardex, Intake/Output, and MAR.

## 2023-03-30 NOTE — PROGRESS NOTES
Bedside and Verbal shift change report given to Patti Rodgers (oncoming nurse) by Chaparro Champion RN (offgoing nurse). Report included the following information SBAR, Kardex, ED Summary, MAR, Recent Results, and Med Rec Status.

## 2023-03-30 NOTE — PROGRESS NOTES
[facility]  [address], [facility] 19  [phone]    Hospitalist Progress Note      NAME: Susie Peoples   :  1945  MRM:  [id number]    Date/Time of service 3/30/2023  1:30 PM          Assessment and Plan:     Community acquired PNA / Leukocytosis - POA, Cx negative. Sputum just mixed adele and yeast. Pulmonary did bronch and washed out TOAN mucous. Completed ceftriaxone and azithromycin. Continue tessalon, Mucinex, flutter valve, incentive spirometry. Steroids are driving leukocytosis. Acute asthma exacerbation - POA, triggered by PNA. Better after IV steroids, will DC on PO taper. Continue scheduled DuoNebs, Brovana, Pulmicort, Singulair, Incentive spirometry, flutter valve    Acute respiratory failure with hypoxia - POA due to PNA and Asthma. Now on room air     A-fib with RVR - POA, brief and resolved spontaneously. ECHO unremarkable. Continue diltiazem. Cardiology consulted. Holter at discharge. Syncope - POA, mild, due to IVVD. Head CT negative. Orthostatics now normal.  VQ low prob for PE.  ECHO bland. DIONISIO on CKD / Dehydration - POA, now better. Resume ARB. Stopped HCTZ     Hypertension - BP stable on diltiazem and clonidine. Resume diovan. Stopped HCTZ    Type 2 diabetes without complications - Diabetic diet and counseling. SSI per protocol. Not on home meds. Resume ARB. Elevated LFTs - POA, mild, better. Negative US and viral panel. Hyperlipidemia - POA, resume statin and zeita    Staph bacteremia - Contaminant. No treatment needed. Subjective:     Chief Complaint:  feels better, wants to go to rehab now    ROS:  (bold if positive, if negative)    Tolerating PT  Tolerating Diet        Objective:     Last 24hrs VS reviewed since prior progress note.  Most recent are:    Visit Vitals  BP (!) 186/77 (BP 1 Location: Right upper arm, BP Patient Position: At rest)   Pulse 87   Temp 97.9 °F (36.6 °C)   Resp 16   Ht 5' 5\" (1.651 m)   Wt 82 kg (180 lb 12.4 oz)   SpO2 96%   BMI 30.08 kg/m²     SpO2 Readings from Last 6 Encounters:   03/30/23 96%   03/23/22 99%   07/19/16 96%   05/19/15 93%   05/11/15 97%    O2 Flow Rate (L/min): 0 l/min     Intake/Output Summary (Last 24 hours) at 3/30/2023 1330  Last data filed at 3/30/2023 0447  Gross per 24 hour   Intake 370 ml   Output --   Net 370 ml        Physical Exam:    Gen:  Obese, in no acute distress  HEENT:  Pink conjunctivae, PERRL, hearing intact to voice, moist mucous membranes  Neck:  Supple, without masses, thyroid non-tender  Resp:  No accessory muscle use, clear breath sounds without wheezes rales or rhonchi  Card:  No murmurs, normal S1, S2 without thrills, bruits or peripheral edema  Abd:  Soft, non-tender, non-distended, normoactive bowel sounds are present, no mass  Lymph:  No cervical or inguinal adenopathy  Musc:  No cyanosis or clubbing  Skin:  No rashes or ulcers, skin turgor is good  Neuro:  Cranial nerves are grossly intact, no focal motor weakness, follows commands appropriately  Psych:  Good insight, oriented to person, place and time, alert    Telemetry reviewed:   normal sinus rhythm  __________________________________________________________________  Medications Reviewed: (see below)  Medications:     Current Facility-Administered Medications   Medication Dose Route Frequency    methylPREDNISolone (PF) (Solu-MEDROL) injection 40 mg  40 mg IntraVENous Q12H    0.9% sodium chloride infusion  50 mL/hr IntraVENous CONTINUOUS    senna-docusate (PERICOLACE) 8.6-50 mg per tablet 1 Tablet  1 Tablet Oral BID    albuterol-ipratropium (DUO-NEB) 2.5 MG-0.5 MG/3 ML  3 mL Nebulization Q4H RT    albuterol-ipratropium (DUO-NEB) 2.5 MG-0.5 MG/3 ML  3 mL Nebulization Q4H PRN    bacitracin 500 unit/gram packet 1 Packet  1 Packet Topical EVERY OTHER DAY    dilTIAZem ER (CARDIZEM CD) capsule 300 mg  300 mg Oral DAILY    sodium chloride (OCEAN) 0.65 % nasal squeeze bottle 2 Spray  2 Spray Both Nostrils Q2H PRN    benzonatate (TESSALON) capsule 100 mg  100 mg Oral TID    hydrALAZINE (APRESOLINE) 20 mg/mL injection 10 mg  10 mg IntraVENous Q4H PRN    guaiFENesin ER (MUCINEX) tablet 1,200 mg  1,200 mg Oral Q12H    cloNIDine HCL (CATAPRES) tablet 0.1 mg  0.1 mg Oral BID    atorvastatin (LIPITOR) tablet 40 mg  40 mg Oral QPM    [Held by provider] valsartan (DIOVAN) tablet 40 mg  40 mg Oral DAILY    ezetimibe (ZETIA) tablet 10 mg  10 mg Oral QPM    arformoteroL (BROVANA) neb solution 15 mcg  15 mcg Nebulization BID RT    And    budesonide (PULMICORT) 500 mcg/2 ml nebulizer suspension  500 mcg Nebulization BID RT    [Held by provider] hydroCHLOROthiazide (HYDRODIURIL) tablet 25 mg  25 mg Oral DAILY    melatonin tablet 3 mg  3 mg Oral QHS PRN    montelukast (SINGULAIR) tablet 10 mg  10 mg Oral DAILY    pantoprazole (PROTONIX) tablet 40 mg  40 mg Oral ACB    insulin lispro (HUMALOG) injection   SubCUTAneous AC&HS    glucose chewable tablet 16 g  4 Tablet Oral PRN    glucagon (GLUCAGEN) injection 1 mg  1 mg IntraMUSCular PRN    dextrose 10% infusion 0-250 mL  0-250 mL IntraVENous PRN    sodium chloride (NS) flush 5-40 mL  5-40 mL IntraVENous Q8H    sodium chloride (NS) flush 5-40 mL  5-40 mL IntraVENous PRN    acetaminophen (TYLENOL) tablet 650 mg  650 mg Oral Q6H PRN    Or    acetaminophen (TYLENOL) suppository 650 mg  650 mg Rectal Q6H PRN    polyethylene glycol (MIRALAX) packet 17 g  17 g Oral DAILY PRN    ondansetron (ZOFRAN ODT) tablet 4 mg  4 mg Oral Q8H PRN    Or    ondansetron (ZOFRAN) injection 4 mg  4 mg IntraVENous Q6H PRN    enoxaparin (LOVENOX) injection 40 mg  40 mg SubCUTAneous DAILY        Lab Data Reviewed: (see below)  Lab Review:     Recent Labs     03/30/23  0316 03/29/23  0159 03/28/23  0135   WBC 17.2* 12.5* 10.4   HGB 10.8* 10.7* 10.4*   HCT 31.4* 31.4* 30.6*    222 224     Recent Labs     03/30/23  0316 03/29/23  0159 03/28/23  0135    140 138   K 3.9 4.1 4.1   * 113* 113*   CO2 21 20* 22   * 186* 166*   BUN 50* 53* 52*   CREA 0.98 1.08* 1.05*   CA 8.6 8.8 8.7   MG 1.8 1.8 1.9   ALB 2.4* 2.4* 2.4*   TBILI 0.6 0.8 0.5   ALT 57 55 58     Lab Results   Component Value Date/Time    Glucose (POC) 102 03/30/2023 12:06 PM    Glucose (POC) 160 (H) 03/30/2023 07:35 AM    Glucose (POC) 153 (H) 03/29/2023 09:24 PM    Glucose (POC) 109 03/29/2023 04:15 PM    Glucose (POC) 166 (H) 03/29/2023 12:35 PM     No results for input(s): PH, PCO2, PO2, HCO3, FIO2 in the last 72 hours. No results for input(s): INR, INREXT in the last 72 hours.   All Micro Results       Procedure Component Value Units Date/Time    CULTURE, RESPIRATORY/SPUTUM/BRONCH Valora Friday [[id number]] Collected: 03/29/23 0918    Order Status: Completed Specimen: Sputum from Bronch wash left upper lobe Updated: 03/29/23 2055     Special Requests: NO SPECIAL REQUESTS        GRAM STAIN OCCASIONAL WBCS SEEN               OCCASIONAL EPITHELIAL CELLS SEEN            OCCASIONAL BUDDING YEAST               OCCASIONAL Gram positive cocci IN PAIRS           Culture result: PENDING    CULTURE, SPUTUM/BRONCH/OTH [[id number]] Collected: 03/29/23 3939    Order Status: Completed Specimen: Sputum from Bronchial lavage Updated: 03/29/23 2008     Special Requests: NO SPECIAL REQUESTS        GRAM STAIN OCCASIONAL WBCS SEEN         NO EPITHELIAL CELLS SEEN         NO ORGANISMS SEEN        Culture result: PENDING    CULTURE, FUNGUS [[id number]] Collected: 03/29/23 0918    Order Status: Sent Specimen: Bronchial lavage Updated: 03/29/23 1817    CULTURE LEGIONELLA [[id number]] Collected: 03/29/23 0856    Order Status: Sent Specimen: Other Updated: 03/29/23 0955    AFB CULTURE & SMEAR W/REFLEX ID [[id number]] Collected: 03/29/23 0910    Order Status: Sent Updated: 03/29/23 0954    CULTURE, VIRAL [[id number]] Collected: 03/29/23 0856    Order Status: Sent Specimen: Other Updated: 03/29/23 0953    CULTURE, RESPIRATORY/SPUTUM/BRONCH Bethel Kirsty STAIN [[id number]] (Abnormal) Collected: 03/24/23 1525    Order Status: Completed Specimen: Sputum Updated: 03/26/23 1036     Special Requests: NO SPECIAL REQUESTS        GRAM STAIN 1+ EPITHELIAL CELLS SEEN         OCCASIONAL WBCS SEEN         3+ BUDDING YEAST               3+ BUDDING YEAST WITH PSEUDOHYPHAE           Culture result:       HEAVY  Yeast, (apparent Candida albicans/Candida dubliniensis)              SCANT NORMAL RESPIRATORY LACY          CULTURE, BLOOD, PAIRED [[id number]] Collected: 03/18/23 1002    Order Status: Completed Specimen: Blood Updated: 03/23/23 0543     Special Requests: NO SPECIAL REQUESTS        Culture result: NO GROWTH 5 DAYS       RAFY Ortiz, UR/CSF [[id number]] Collected: 03/19/23 1418    Order Status: Completed Specimen: Miscellaneous sample Updated: 03/22/23 1536     Source 2 Hour Urine        Specimen Urine     Streptococcus pneumoniae Ag Negative        Fluid culture Not indicated. Organism ID Not indicated. Please note Comment        Comment: (NOTE)  College of American Pathologists standards require a culture to be  performed on CSF specimens submitted for bacterial antigen testing. (CAP [id number]) Urine specimens will not be cultured. Performed At: [facility]  [facility] [address] [id number]  Sheralyn Hamman MD KV:[id number]         Lola Kvng [[id number]] Collected: 03/19/23 1418    Order Status: Completed Specimen: Urine Updated: 03/22/23 1336     Source URINE        L pneumophila S1 Ag, urine Negative        Comment: (NOTE)  Presumptive negative for L. pneumophila serogroup 1 antigen in urine,  suggesting no recent or current infection. Legionnaires' disease  cannot be ruled out since other serogroups and species may also  cause disease.   Performed At: [facility]  [facility] [address] [id number]  Sheralyn Hamman MD NL:[id number]         CULTURE, MRSA [[id number]] Collected: 03/18/23 0950    Order Status: Completed Specimen: Nasal from Nares Updated: 03/19/23 2106     Special Requests: NO SPECIAL REQUESTS        Culture result: MRSA NOT PRESENT               Screening of patient nares for MRSA is for surveillance purposes and, if positive, to facilitate isolation considerations in high risk settings. It is not intended for automatic decolonization interventions per se as regimens are not sufficiently effective to warrant routine use. CULTURE, RESPIRATORY/SPUTUM/BRONCH Primus Kole [[id number]]     Order Status: Canceled Specimen: Sputum     RESPIRATORY VIRUS PANEL W/COVID-19, PCR [[id number]] Collected: 03/18/23 0950    Order Status: Completed Specimen: Nasopharyngeal Updated: 03/18/23 1747     Adenovirus Not detected        Coronavirus 229E Not detected        Coronavirus HKU1 Not detected        Coronavirus CVNL63 Not detected        Coronavirus OC43 Not detected        SARS-CoV-2, PCR Not detected        Metapneumovirus Not detected        Rhinovirus and Enterovirus Not detected        Influenza A Not detected        Influenza B Not detected        Parainfluenza 1 Not detected        Parainfluenza 2 Not detected        Parainfluenza 3 Not detected        Parainfluenza virus 4 Not detected        RSV by PCR Not detected        B. parapertussis, PCR Not detected        Bordetella pertussis - PCR Not detected        Chlamydophila pneumoniae DNA, QL, PCR Not detected        Mycoplasma pneumoniae DNA, QL, PCR Not detected       CULTURE, BLOOD, PAIRED [[id number]]  (Abnormal) Collected: 03/15/23 1739    Order Status: Completed Specimen: Blood Updated: 03/18/23 1230     Special Requests: NO SPECIAL REQUESTS        Culture result:       STAPHYLOCOCCUS SPECIES, COAGULASE NEGATIVE GROWING IN 1 OF 4 BOTTLES DRAWN  SITE = LAC            (NOTE) PRELIMINARY REPORT OF [facility] CALLED TO AND READ BACK BY KRISTYN OBANDO RN AT 2047 ON 3/17/23.  Paco Bishop, UR/CSF [[id number]] Collected: 03/18/23 0156    Order Status: Canceled BLOOD CULTURE ID PANEL [[id number]]  (Abnormal) Collected: 03/15/23 1739    Order Status: Completed Specimen: Blood Updated: 03/17/23 2312     Acc. no. from Micro Order [id number]     Enterococcus faecalis Not detected        Enterococcus faecium Not detected        Listeria monocytogenes Not detected        Staphylococcus Detected        Staphylococcus aureus Not detected        Staph epidermidis Not detected        Staph lugdunensis Not detected        Streptococcus Not detected        Streptococcus agalactiae (Group B) Not detected        Streptococcus pneumoniae Not detected        Streptococcus pyogenes (Group A) Not detected        Acinetobacter calcoaceticus-baumanii complex Not detected        Bacteroides fragilis Not detected        Enterobacterales species Not detected        Enterobacter cloacae complex Not detected        Escherichia coli Not detected        Klebsiella aerogenes Not detected        Klebsiella oxytoca Not detected        Klebsiella pneumoniae group Not detected        Proteus Not detected        Salmonella Not detected        Serratia marcescens Not detected        Haemophilus influenzae Not detected        Neisseria meningitidis Not detected        Pseudomonas aeruginosa Not detected        Steno maltophilia Not detected        Candida albicans Not detected        Candida auris Not detected        Candida glabrata Not detected        Candida krusei Not detected        Candida parapsilosis Not detected        Candida tropicalis Not detected        Crypto neoformans/gattii Not detected        RESISTANT GENES:            Comment       False positive results may rarely occur.  Correlate with clinical,epidemiologic, and other laboratory findings           Comment: Please see BCID Interpretation Guide in EPIC Links       RESPIRATORY VIRUS PANEL W/COVID-19, PCR [[id number]]     Order Status: Canceled Specimen: NASOPHARYNGEAL Armando Bills [[id number]] Collected: 03/15/23 1739    Order Status: No result Updated: 03/17/23 1221    COVID-19 RAPID TEST [[id number]] Collected: 03/15/23 4539    Order Status: Completed Specimen: Nasopharyngeal Updated: 03/15/23 1817     Specimen source Nasopharyngeal        COVID-19 rapid test Not detected        Comment: Rapid Abbott ID Now       Rapid NAAT:  The specimen is NEGATIVE for SARS-CoV-2, the novel coronavirus associated with COVID-19. Negative results should be treated as presumptive and, if inconsistent with clinical signs and symptoms or necessary for patient management, should be tested with an alternative molecular assay. Negative results do not preclude SARS-CoV-2 infection and should not be used as the sole basis for patient management decisions. This test has been authorized by the FDA under an Emergency Use Authorization (EUA) for use by authorized laboratories. Fact sheet for Healthcare Providers:  http://www.smitha.zach/  Fact sheet for Patients: http://www.smitha.zach/       Methodology: Isothermal Nucleic Acid Amplification                 Other pertinent lab: none    Total time spent with patient: 30 Minutes I personally reviewed chart, notes, data and current medications in the medical record. I have personally examined and treated the patient at bedside during this period. To assist coordination of care and communication with nursing and staff, this note may be preliminary early in the day, but finalized by end of the day.                  Care Plan discussed with: Patient, Care Manager, Nursing Staff, Consultant/Specialist, and >50% of time spent in counseling and coordination of care    Discussed:  Care Plan and D/C Planning    Prophylaxis:  Lovenox and H2B/PPI    Disposition:  SNF/LTC           ___________________________________________________    Attending Physician: Robert Hansen MD

## 2023-03-30 NOTE — DISCHARGE INSTRUCTIONS
Patient Discharge Instructions    Jasmin Enamorado / [id number] : 1945    Admitted 3/15/2023 Discharged: 3/30/2023     Primary Diagnoses  @Rprob@    Take Home Medications     It is important that you take the medication exactly as they are prescribed. Keep your medication in the bottles provided by the pharmacist and keep a list of the medication names, dosages, and times to be taken in your wallet. Do not take other medications without consulting your doctor. What to do at Home    Recommended diet: Regular Diet    Recommended activity: Activity as tolerated    If you experience worse symptoms, please follow up with pulmonary. Follow-up with your PCP in a few weeks    [unfilled]     Information obtained by :  I understand that if any problems occur once I am at home I am to contact my physician. I understand and acknowledge receipt of the instructions indicated above.                                                                                                                                            Physician's or R.N.'s Signature                                                                  Date/Time                                                                                                                                              Patient or Representative Signature                                                          Date/Time

## 2023-03-30 NOTE — PROGRESS NOTES
Problem: Falls - Risk of  Goal: *Absence of Falls  Description: Document Dory Engel Fall Risk and appropriate interventions in the flowsheet. Outcome: Progressing Towards Goal  Note: Fall Risk Interventions:                                Problem: Patient Education: Go to Patient Education Activity  Goal: Patient/Family Education  Outcome: Progressing Towards Goal     Problem: Patient Education: Go to Patient Education Activity  Goal: Patient/Family Education  Outcome: Progressing Towards Goal     Problem: Activity Intolerance  Goal: *Oxygen saturation during activity within specified parameters  Outcome: Progressing Towards Goal  Goal: *Able to remain out of bed as prescribed  Outcome: Progressing Towards Goal     Problem: Patient Education: Go to Patient Education Activity  Goal: Patient/Family Education  Outcome: Progressing Towards Goal     Problem: Nutrition Deficit  Goal: *Optimize nutritional status  Outcome: Progressing Towards Goal     Problem: Risk for Spread of Infection  Goal: Prevent transmission of infectious organism to others  Description: Prevent the transmission of infectious organisms to other patients, staff members, and visitors.   Outcome: Progressing Towards Goal     Problem: Patient Education:  Go to Education Activity  Goal: Patient/Family Education  Outcome: Progressing Towards Goal

## 2023-03-31 VITALS
WEIGHT: 180.78 LBS | HEART RATE: 91 BPM | SYSTOLIC BLOOD PRESSURE: 189 MMHG | BODY MASS INDEX: 30.12 KG/M2 | DIASTOLIC BLOOD PRESSURE: 61 MMHG | HEIGHT: 65 IN | TEMPERATURE: 98.5 F | RESPIRATION RATE: 15 BRPM | OXYGEN SATURATION: 95 %

## 2023-03-31 LAB
ACID FAST STN SPEC: NEGATIVE
BACTERIA SPEC CULT: ABNORMAL
BASOPHILS # BLD: 0 K/UL (ref 0–0.1)
BASOPHILS NFR BLD: 0 % (ref 0–1)
DIFFERENTIAL METHOD BLD: ABNORMAL
EOSINOPHIL # BLD: 0 K/UL (ref 0–0.4)
EOSINOPHIL NFR BLD: 0 % (ref 0–7)
ERYTHROCYTE [DISTWIDTH] IN BLOOD BY AUTOMATED COUNT: 14.8 % (ref 11.5–14.5)
GLUCOSE BLD STRIP.AUTO-MCNC: 112 MG/DL (ref 65–117)
GLUCOSE BLD STRIP.AUTO-MCNC: 129 MG/DL (ref 65–117)
GLUCOSE BLD STRIP.AUTO-MCNC: 93 MG/DL (ref 65–117)
GRAM STN SPEC: ABNORMAL
HCT VFR BLD AUTO: 31.6 % (ref 35–47)
HGB BLD-MCNC: 10.6 G/DL (ref 11.5–16)
IMM GRANULOCYTES # BLD AUTO: 0.1 K/UL (ref 0–0.04)
IMM GRANULOCYTES NFR BLD AUTO: 1 % (ref 0–0.5)
L PNEUMO AG SPEC QL IF: NEGATIVE
LYMPHOCYTES # BLD: 0.3 K/UL (ref 0.8–3.5)
LYMPHOCYTES NFR BLD: 2 % (ref 12–49)
MAGNESIUM SERPL-MCNC: 1.6 MG/DL (ref 1.6–2.4)
MCH RBC QN AUTO: 31.5 PG (ref 26–34)
MCHC RBC AUTO-ENTMCNC: 33.5 G/DL (ref 30–36.5)
MCV RBC AUTO: 93.8 FL (ref 80–99)
MONOCYTES # BLD: 0.7 K/UL (ref 0–1)
MONOCYTES NFR BLD: 5 % (ref 5–13)
MYCOBACTERIUM SPEC QL CULT: NORMAL
NEUTS SEG # BLD: 12.7 K/UL (ref 1.8–8)
NEUTS SEG NFR BLD: 92 % (ref 32–75)
NRBC # BLD: 0 K/UL (ref 0–0.01)
NRBC BLD-RTO: 0 PER 100 WBC
PLATELET # BLD AUTO: 154 K/UL (ref 150–400)
PMV BLD AUTO: 9.9 FL (ref 8.9–12.9)
RBC # BLD AUTO: 3.37 M/UL (ref 3.8–5.2)
RBC MORPH BLD: ABNORMAL
RSV AG SPEC QL IF: NEGATIVE
SERVICE CMNT-IMP: ABNORMAL
SERVICE CMNT-IMP: NORMAL
SERVICE CMNT-IMP: NORMAL
SPECIMEN PREPARATION: NORMAL
SPECIMEN SOURCE: NORMAL
VIRUS SPEC CULT: NORMAL
WBC # BLD AUTO: 13.8 K/UL (ref 3.6–11)

## 2023-03-31 PROCEDURE — 74011250637 HC RX REV CODE- 250/637: Performed by: PHYSICIAN ASSISTANT

## 2023-03-31 PROCEDURE — 94664 DEMO&/EVAL PT USE INHALER: CPT

## 2023-03-31 PROCEDURE — 36415 COLL VENOUS BLD VENIPUNCTURE: CPT

## 2023-03-31 PROCEDURE — 94640 AIRWAY INHALATION TREATMENT: CPT

## 2023-03-31 PROCEDURE — 83735 ASSAY OF MAGNESIUM: CPT

## 2023-03-31 PROCEDURE — 94761 N-INVAS EAR/PLS OXIMETRY MLT: CPT

## 2023-03-31 PROCEDURE — 85025 COMPLETE CBC W/AUTO DIFF WBC: CPT

## 2023-03-31 PROCEDURE — 74011636637 HC RX REV CODE- 636/637: Performed by: PHYSICIAN ASSISTANT

## 2023-03-31 PROCEDURE — 74011250637 HC RX REV CODE- 250/637: Performed by: INTERNAL MEDICINE

## 2023-03-31 PROCEDURE — 82962 GLUCOSE BLOOD TEST: CPT

## 2023-03-31 PROCEDURE — 74011250637 HC RX REV CODE- 250/637: Performed by: NURSE PRACTITIONER

## 2023-03-31 PROCEDURE — 74011250636 HC RX REV CODE- 250/636: Performed by: INTERNAL MEDICINE

## 2023-03-31 PROCEDURE — 74011000250 HC RX REV CODE- 250: Performed by: PHYSICIAN ASSISTANT

## 2023-03-31 PROCEDURE — 94668 MNPJ CHEST WALL SBSQ: CPT

## 2023-03-31 PROCEDURE — 74011000250 HC RX REV CODE- 250: Performed by: INTERNAL MEDICINE

## 2023-03-31 RX ORDER — LEVOFLOXACIN 750 MG/1
750 TABLET ORAL EVERY 24 HOURS
Qty: 5 TABLET | Refills: 0 | Status: SHIPPED | OUTPATIENT
Start: 2023-03-31 | End: 2023-04-05

## 2023-03-31 RX ORDER — IPRATROPIUM BROMIDE AND ALBUTEROL SULFATE 2.5; .5 MG/3ML; MG/3ML
3 SOLUTION RESPIRATORY (INHALATION)
Status: DISCONTINUED | OUTPATIENT
Start: 2023-03-31 | End: 2023-03-31 | Stop reason: HOSPADM

## 2023-03-31 RX ORDER — LEVOFLOXACIN 750 MG/1
750 TABLET ORAL
Status: DISCONTINUED | OUTPATIENT
Start: 2023-04-01 | End: 2023-03-31

## 2023-03-31 RX ORDER — PREDNISONE 10 MG/1
10 TABLET ORAL
Status: DISCONTINUED
Start: 2023-04-06 | End: 2023-03-31 | Stop reason: HOSPADM

## 2023-03-31 RX ORDER — PREDNISONE 20 MG/1
40 TABLET ORAL
Status: DISCONTINUED
Start: 2023-03-31 | End: 2023-03-31 | Stop reason: HOSPADM

## 2023-03-31 RX ORDER — PREDNISONE 20 MG/1
20 TABLET ORAL
Status: DISCONTINUED
Start: 2023-04-03 | End: 2023-03-31 | Stop reason: HOSPADM

## 2023-03-31 RX ORDER — LEVOFLOXACIN 750 MG/1
750 TABLET ORAL EVERY 24 HOURS
Status: DISCONTINUED
Start: 2023-03-31 | End: 2023-03-31 | Stop reason: HOSPADM

## 2023-03-31 RX ADMIN — BENZONATATE 100 MG: 100 CAPSULE ORAL at 08:18

## 2023-03-31 RX ADMIN — PREDNISONE 40 MG: 20 TABLET ORAL at 11:37

## 2023-03-31 RX ADMIN — PANTOPRAZOLE SODIUM 40 MG: 40 TABLET, DELAYED RELEASE ORAL at 06:32

## 2023-03-31 RX ADMIN — BUDESONIDE 500 MCG: 0.5 INHALANT RESPIRATORY (INHALATION) at 07:23

## 2023-03-31 RX ADMIN — DOCUSATE SODIUM 50MG AND SENNOSIDES 8.6MG 1 TABLET: 8.6; 5 TABLET, FILM COATED ORAL at 08:17

## 2023-03-31 RX ADMIN — CLONIDINE HYDROCHLORIDE 0.1 MG: 0.1 TABLET ORAL at 08:17

## 2023-03-31 RX ADMIN — GUAIFENESIN 1200 MG: 600 TABLET ORAL at 08:17

## 2023-03-31 RX ADMIN — IPRATROPIUM BROMIDE AND ALBUTEROL SULFATE 3 ML: 2.5; .5 SOLUTION RESPIRATORY (INHALATION) at 11:18

## 2023-03-31 RX ADMIN — EZETIMIBE 10 MG: 10 TABLET ORAL at 17:48

## 2023-03-31 RX ADMIN — MONTELUKAST 10 MG: 10 TABLET, FILM COATED ORAL at 08:17

## 2023-03-31 RX ADMIN — DILTIAZEM HYDROCHLORIDE 300 MG: 180 CAPSULE, COATED, EXTENDED RELEASE ORAL at 08:17

## 2023-03-31 RX ADMIN — BENZONATATE 100 MG: 100 CAPSULE ORAL at 17:47

## 2023-03-31 RX ADMIN — LEVOFLOXACIN 750 MG: 750 TABLET, FILM COATED ORAL at 11:37

## 2023-03-31 RX ADMIN — IPRATROPIUM BROMIDE AND ALBUTEROL SULFATE 3 ML: 2.5; .5 SOLUTION RESPIRATORY (INHALATION) at 00:33

## 2023-03-31 RX ADMIN — IPRATROPIUM BROMIDE AND ALBUTEROL SULFATE 3 ML: 2.5; .5 SOLUTION RESPIRATORY (INHALATION) at 07:16

## 2023-03-31 RX ADMIN — VALSARTAN 40 MG: 80 TABLET ORAL at 08:17

## 2023-03-31 RX ADMIN — ENOXAPARIN SODIUM 40 MG: 100 INJECTION SUBCUTANEOUS at 08:18

## 2023-03-31 RX ADMIN — Medication 10 ML: at 14:00

## 2023-03-31 RX ADMIN — ATORVASTATIN CALCIUM 40 MG: 20 TABLET, FILM COATED ORAL at 17:47

## 2023-03-31 RX ADMIN — IPRATROPIUM BROMIDE AND ALBUTEROL SULFATE 3 ML: 2.5; .5 SOLUTION RESPIRATORY (INHALATION) at 05:16

## 2023-03-31 RX ADMIN — CLONIDINE HYDROCHLORIDE 0.1 MG: 0.1 TABLET ORAL at 17:48

## 2023-03-31 RX ADMIN — Medication 10 ML: at 05:12

## 2023-03-31 RX ADMIN — ARFORMOTEROL TARTRATE 15 MCG: 15 SOLUTION RESPIRATORY (INHALATION) at 07:23

## 2023-03-31 RX ADMIN — HYDRALAZINE HYDROCHLORIDE 10 MG: 20 INJECTION INTRAMUSCULAR; INTRAVENOUS at 02:06

## 2023-03-31 NOTE — PROGRESS NOTES
Medicare pt has received, reviewed, and signed 3 rd IM letter informing them of their right to appeal the discharge. Signed copy has been placed on pt bedside chart.   Margarito Rinaldi CMS

## 2023-03-31 NOTE — PROGRESS NOTES
TRANSFER - OUT REPORT:    Verbal report given to NOD (name) on Jeremias Locks  being transferred to [facility] (unit) for routine progression of care       Report consisted of patients Situation, Background, Assessment and   Recommendations(SBAR). Information from the following report(s) SBAR, Kardex, Intake/Output, MAR, and Recent Results was reviewed with the receiving nurse. Lines:       Opportunity for questions and clarification was provided. Patient transported by her family.

## 2023-03-31 NOTE — PROGRESS NOTES
Bedside and Verbal shift change report given to Taqueria Pablo (oncoming nurse) by Zack Harkins RN (offgoing nurse). Report included the following information SBAR, Kardex, ED Summary, MAR, Recent Results, and Med Rec Status.

## 2023-03-31 NOTE — PROGRESS NOTES
3/31/2023  Case Management Progress Note    11:22 AM  Patient is 69year old female admitted 3/16 with syncope  Patient's RUR is 12% green/low risk for readmission  Covid test: negative 3/18  Chart reviewed--patient discussed at interdisciplinary rounds  Patient is ready for discharge today, and she does have a bed at [facility]. However, due to them needing to wait for discharges/bed turnover, they would like her to leave from here around 6:30pm. RN will need to call report to [phone] or [phone]. The accepting physician is Dr Mik Langford. I have called and left her son a voicemail with the above information to confirm if he feels comfortable transporting patient. Will continue to follow and update.      Transition of Care Plan   Discharge today, order in  To [facility] @ 3045  Family likely to transport, VM left   will continue to follow    KIRSTIN Heaton

## 2023-03-31 NOTE — PROGRESS NOTES
Name: Rosamaria Rogers: [facility]   : 1945 Admit Date: 3/15/2023   Phone:   Room: 511/01   PCP: Curtis Crowder MD  MRN: [id number]   Date: 3/31/2023  Code: Full Code          Chart and notes reviewed. Data reviewed. I review the patient's current medications in the medical record at each encounter. I have evaluated and examined the patient. 69 y.o. F with history of asthma, seasonal/environmental allergies, GERD, HTN, pseudomonas PNA, JANET, and DM. Followed outpatient by Dr. Clarence Mcdonald. Has not been seen in about a year. She is on Advair and Spiriva as maintenance. She tends to develop bronchitis at the start of allergy season. Per daughter, she had been treated outpatient for bronchitis. She was placed on Levaquin and despite treatment, was worsening. She had a syncopal episode on the toilet and was brought to ED for further evaluation. She feels that cough is not as productive now, but more dry and painful. Cough causes some SOB now. Wheezing as well. Interval history  Afebrile  BP elevated  Oxygen saturations 94% on RA  WBC 13.8 - better  Hgb 10.6  Creat 0.98 - better  LFTs wnl; Hep panel neg  BC 3/15/23: positive / bottles coat negative staph  Repeat BC 3/18/23: ngtd x 5 days - final  3/24 resp cx: NRF and yeast  RVP negative  Mycoplasma, legionella, s.pneumo negative  3/29 bronch cx with pseudomonas    ECHO: EF 55-60%, RA mildly dilated    VQ 3/20: very low probability of PE    LE Dopplers 3/19/23: negative. CXR 3/15/23: left basilar airspace disease. PFT's 2021: normal spirometry. CT chest 3/22/2023: TOAN collapsed with areas of consolidation, bilateral patchy consolidation and areas of ggo    3/27 CXR: Left basilar atelectasis    ROS:  Feeling better after bronch. Still with cough. Denies fever or chills. Denies CP.     Past Medical History:   Diagnosis Date    Asthma     Diabetes ([address]     type 2    GERD (gastroesophageal reflux disease) Hypertension     Ill-defined condition     high cholesterol    Ill-defined condition     seasonal allergies    Ill-defined condition     gout       Past Surgical History:   Procedure Laterality Date    HX APPENDECTOMY      HX HEENT  2010    cataract     HX HYSTERECTOMY      HX LAP CHOLECYSTECTOMY      HX OOPHORECTOMY      benign ovarian tumor    HX OTHER SURGICAL  1993    bladder tack    HX ROTATOR CUFF REPAIR  2015    right    HX TONSILLECTOMY  1964       Family History   Problem Relation Age of Onset    Cancer Mother         uterine    Heart Disease Mother         afib    Heart Disease Father 48        MI    Hypertension Father        Social History     Tobacco Use    Smoking status: Former     Packs/day: 0.25     Types: Cigarettes     Quit date:      Years since quittin.2    Smokeless tobacco: Never    Tobacco comments:     smoked x 1 year   Substance Use Topics    Alcohol use: No       Allergies   Allergen Reactions    Aspirin Cough    Beta Blocker [Beta-Blockers (Beta-Adrenergic Blocking Agts)] Other (comments)     Was told by her PCP to report that she has a mutation- she does not recall a reaction    Codeine Rash       Current Facility-Administered Medications   Medication Dose Route Frequency    methylPREDNISolone (PF) (Solu-MEDROL) injection 40 mg  40 mg IntraVENous Q12H    0.9% sodium chloride infusion  50 mL/hr IntraVENous CONTINUOUS    senna-docusate (PERICOLACE) 8.6-50 mg per tablet 1 Tablet  1 Tablet Oral BID    albuterol-ipratropium (DUO-NEB) 2.5 MG-0.5 MG/3 ML  3 mL Nebulization Q4H RT    albuterol-ipratropium (DUO-NEB) 2.5 MG-0.5 MG/3 ML  3 mL Nebulization Q4H PRN    bacitracin 500 unit/gram packet 1 Packet  1 Packet Topical EVERY OTHER DAY    dilTIAZem ER (CARDIZEM CD) capsule 300 mg  300 mg Oral DAILY    sodium chloride (OCEAN) 0.65 % nasal squeeze bottle 2 Spray  2 Spray Both Nostrils Q2H PRN    benzonatate (TESSALON) capsule 100 mg  100 mg Oral TID    hydrALAZINE (APRESOLINE) 20 mg/mL injection 10 mg  10 mg IntraVENous Q4H PRN    guaiFENesin ER (MUCINEX) tablet 1,200 mg  1,200 mg Oral Q12H    cloNIDine HCL (CATAPRES) tablet 0.1 mg  0.1 mg Oral BID    atorvastatin (LIPITOR) tablet 40 mg  40 mg Oral QPM    valsartan (DIOVAN) tablet 40 mg  40 mg Oral DAILY    ezetimibe (ZETIA) tablet 10 mg  10 mg Oral QPM    arformoteroL (BROVANA) neb solution 15 mcg  15 mcg Nebulization BID RT    And    budesonide (PULMICORT) 500 mcg/2 ml nebulizer suspension  500 mcg Nebulization BID RT    [Held by provider] hydroCHLOROthiazide (HYDRODIURIL) tablet 25 mg  25 mg Oral DAILY    melatonin tablet 3 mg  3 mg Oral QHS PRN    montelukast (SINGULAIR) tablet 10 mg  10 mg Oral DAILY    pantoprazole (PROTONIX) tablet 40 mg  40 mg Oral ACB    insulin lispro (HUMALOG) injection   SubCUTAneous AC&HS    glucose chewable tablet 16 g  4 Tablet Oral PRN    glucagon (GLUCAGEN) injection 1 mg  1 mg IntraMUSCular PRN    dextrose 10% infusion 0-250 mL  0-250 mL IntraVENous PRN    sodium chloride (NS) flush 5-40 mL  5-40 mL IntraVENous Q8H    sodium chloride (NS) flush 5-40 mL  5-40 mL IntraVENous PRN    acetaminophen (TYLENOL) tablet 650 mg  650 mg Oral Q6H PRN    Or    acetaminophen (TYLENOL) suppository 650 mg  650 mg Rectal Q6H PRN    polyethylene glycol (MIRALAX) packet 17 g  17 g Oral DAILY PRN    ondansetron (ZOFRAN ODT) tablet 4 mg  4 mg Oral Q8H PRN    Or    ondansetron (ZOFRAN) injection 4 mg  4 mg IntraVENous Q6H PRN    enoxaparin (LOVENOX) injection 40 mg  40 mg SubCUTAneous DAILY         REVIEW OF SYSTEMS   12 point ROS negative except as stated in the HPI. Physical Exam:   Visit Vitals  BP (!) 176/68 (BP 1 Location: Left upper arm, BP Patient Position: At rest)   Pulse 100   Temp 98.4 °F (36.9 °C)   Resp 16   Ht 5' 5\" (1.651 m)   Wt 82 kg (180 lb 12.4 oz)   SpO2 94%   BMI 30.08 kg/m²       General:  Alert, cooperative, no distress, appears stated age.    Head:  Normocephalic, without obvious abnormality, atraumatic. Eyes:  Conjunctivae/corneas clear. Nose: Nares normal. Septum midline. Mucosa normal.    Throat: Lips, mucosa, and tongue normal.    Neck: Supple, symmetrical, trachea midline, no adenopathy. Lungs:   Mildly coarse, scattered rhonchi and minimal exp wheeze. Chest wall:  No tenderness or deformity. Heart:  Regular rate and rhythm, S1, S2 normal, no murmur, click, rub or gallop. Abdomen:   Soft, non-tender. Bowel sounds normal. No masses,  No organomegaly. Extremities: Extremities normal, atraumatic, no cyanosis or edema. Pulses: 2+ and symmetric all extremities. Skin: Skin color, texture, turgor normal. No rashes or lesions   Lymph nodes: Cervical, supraclavicular nodes normal.   Neurologic: Grossly nonfocal       Lab Results   Component Value Date/Time    Sodium 141 03/30/2023 03:16 AM    Potassium 3.9 03/30/2023 03:16 AM    Chloride 116 (H) 03/30/2023 03:16 AM    CO2 21 03/30/2023 03:16 AM    BUN 50 (H) 03/30/2023 03:16 AM    Creatinine 0.98 03/30/2023 03:16 AM    Glucose 165 (H) 03/30/2023 03:16 AM    Calcium 8.6 03/30/2023 03:16 AM    Magnesium 1.6 03/31/2023 02:38 AM    Lactic acid 1.2 03/15/2023 05:39 PM       Lab Results   Component Value Date/Time    WBC 13.8 (H) 03/31/2023 02:38 AM    HGB 10.6 (L) 03/31/2023 02:38 AM    PLATELET 969 88/13/8942 02:38 AM    MCV 93.8 03/31/2023 02:38 AM       Lab Results   Component Value Date/Time    Alk.  phosphatase 46 03/30/2023 03:16 AM    Protein, total 4.6 (L) 03/30/2023 03:16 AM    Albumin 2.4 (L) 03/30/2023 03:16 AM    Globulin 2.2 03/30/2023 03:16 AM       No results found for: IRON, FE, TIBC, IBCT, PSAT, FERR    No results found for: SR, CRP, JOAQUINA, ANAIGG, RA, RPR, RPRT, VDRLT, VDRLS, TSH, TSHEXT, TSHEXT     No results found for: PH, PHI, PCO2, PCO2I, PO2, PO2I, HCO3, HCO3I, FIO2, FIO2I    No results found for: CPK, RCK1, RCK2, RCK3, RCK4, CKNDX, CKND1, TROPT, TROIQ, BNPP, BNP     Lab Results   Component Value Date/Time Culture result: FEW PROBABLE Pseudomonas species (A) 03/29/2023 09:18 AM    Culture result: MODERATE NORMAL RESPIRATORY LACY 03/29/2023 09:18 AM    Culture result: LIGHT PROBABLE Pseudomonas species (A) 03/29/2023 09:18 AM    Culture result: MODERATE NORMAL RESPIRATORY LACY 03/29/2023 09:18 AM       Lab Results   Component Value Date/Time    Hepatitis B surface Ag <0.10 03/21/2023 04:30 PM       No results found for: VANCT, CPK    Lab Results   Component Value Date/Time    Color YELLOW/STRAW 07/19/2016 12:08 PM    Appearance CLEAR 07/19/2016 12:08 PM    pH (UA) 5.5 07/19/2016 12:08 PM    Protein NEGATIVE 07/19/2016 12:08 PM    Glucose NEGATIVE 07/19/2016 12:08 PM    Ketone NEGATIVE 07/19/2016 12:08 PM    Bilirubin NEGATIVE 07/19/2016 12:08 PM    Blood NEGATIVE 07/19/2016 12:08 PM    Urobilinogen 0.2 07/19/2016 12:08 PM    Nitrites NEGATIVE 07/19/2016 12:08 PM    Leukocyte Esterase NEGATIVE 07/19/2016 12:08 PM    WBC 0-4 07/19/2016 12:08 PM    RBC 5-10 07/19/2016 12:08 PM    Bacteria NEGATIVE 07/19/2016 12:08 PM       IMPRESSION  CAP; s/p bronch on 3/29 with thick, light yellow, concreted secretions in TOAN. Removed through repeated suctioning. No lesions noted in underlying subsegments. Right lung with no lesions or secretions down to secondary vinicio. Bronch cultures growing pseudomonas.    Asthma Exacerbation  Syncope  DIONISIO  HTN  DM  JANET  Hx of pseudomonas PNA (2021)    PLAN  Maintain oxygen saturations > 90%; currently on 2L  Fu final bronch cx/serologies/cytology  Pulmicort/Brovana nebs; DuoNebs q4h  Switch IV steroids to pred taper  Place on Levaquin 750 mg daily for 5 days  Completed Azithro/Rocephin  Chest PT q4h while awake  Continue Singulair and Mucinex  Chest PT  Flutter  Encourage IS and OOB as much as possible  DVT prophylaxis: Lovenox  GI prophylaxis: Protonix  Dispo: [facility], [address]

## 2023-03-31 NOTE — PROGRESS NOTES
[facility]  [address], [facility] [address]  [phone]    Hospitalist Progress Note      NAME: Mary Leyva   :  1945  MRM:  [id number]    Date/Time of service 3/31/2023  7:58 AM          Assessment and Plan:     Community acquired PNA / Leukocytosis - POA, Cx negative. Sputum just mixed adele and yeast. Pulmonary did bronch and washed out TOAN mucous. Completed ceftriaxone and azithromycin. Continue tessalon, Mucinex, flutter valve, incentive spirometry. Steroids are driving leukocytosis. The mixed adele included pseudomonas, and I will DC on 5 days of levaquin as a precaution    Debilitated patient - POA and worse than baseline. PT OT eval recommend IPR and that is being arranged. Acute asthma exacerbation - POA, triggered by PNA. Better after IV steroids, will DC on PO taper. Continue scheduled DuoNebs, Brovana, Pulmicort, Singulair, Incentive spirometry, flutter valve    Acute respiratory failure with hypoxia - POA due to PNA and Asthma. Now on room air     A-fib with RVR - POA, brief and resolved spontaneously. ECHO unremarkable. Continue diltiazem. Cardiology consulted. Holter at discharge. Syncope - POA, mild, due to IVVD. Head CT negative. Orthostatics now normal.  VQ low prob for PE.  ECHO bland. DIONISIO on CKD / Dehydration - POA, now better. Resume ARB. Stopped HCTZ     Hypertension - BP stable on diltiazem and clonidine. Resume diovan. Stopped HCTZ    Type 2 diabetes without complications - Diabetic diet and counseling. SSI per protocol. Not on home meds. Resume ARB. Elevated LFTs - POA, mild, better. Negative US and viral panel. Hyperlipidemia - POA, resume statin and zeita    Staph bacteremia - Contaminant. No treatment needed.        Subjective:     Chief Complaint:  feels better, ready to go to rehab     ROS:  (bold if positive, if negative)    Tolerating PT  Tolerating Diet        Objective:     Last 24hrs VS reviewed since prior progress note.  Most recent are:    Visit Vitals  BP (!) 147/69 (BP 1 Location: Left upper arm, BP Patient Position: At rest)   Pulse 76   Temp 97.7 °F (36.5 °C)   Resp 16   Ht 5' 5\" (1.651 m)   Wt 82 kg (180 lb 12.4 oz)   SpO2 96%   BMI 30.08 kg/m²     SpO2 Readings from Last 6 Encounters:   03/31/23 96%   03/23/22 99%   07/19/16 96%   05/19/15 93%   05/11/15 97%    O2 Flow Rate (L/min): 0 l/min     Intake/Output Summary (Last 24 hours) at 3/31/2023 0758  Last data filed at 3/30/2023 2014  Gross per 24 hour   Intake 650 ml   Output --   Net 650 ml          Physical Exam:    Gen:  Obese, in no acute distress  HEENT:  Pink conjunctivae, PERRL, hearing intact to voice, moist mucous membranes  Neck:  Supple, without masses, thyroid non-tender  Resp:  No accessory muscle use, clear breath sounds without wheezes rales or rhonchi  Card:  No murmurs, normal S1, S2 without thrills, bruits or peripheral edema  Abd:  Soft, non-tender, non-distended, normoactive bowel sounds are present, no mass  Lymph:  No cervical or inguinal adenopathy  Musc:  No cyanosis or clubbing  Skin:  No rashes or ulcers, skin turgor is good  Neuro:  Cranial nerves are grossly intact, general motor weakness, follows commands appropriately  Psych:  Good insight, oriented to person, place and time, alert    Telemetry reviewed:   normal sinus rhythm  __________________________________________________________________  Medications Reviewed: (see below)  Medications:     Current Facility-Administered Medications   Medication Dose Route Frequency    methylPREDNISolone (PF) (Solu-MEDROL) injection 40 mg  40 mg IntraVENous Q12H    0.9% sodium chloride infusion  50 mL/hr IntraVENous CONTINUOUS    senna-docusate (PERICOLACE) 8.6-50 mg per tablet 1 Tablet  1 Tablet Oral BID    albuterol-ipratropium (DUO-NEB) 2.5 MG-0.5 MG/3 ML  3 mL Nebulization Q4H RT    albuterol-ipratropium (DUO-NEB) 2.5 MG-0.5 MG/3 ML  3 mL Nebulization Q4H PRN    bacitracin 500 unit/gram packet 1 Packet  1 Packet Topical EVERY OTHER DAY    dilTIAZem ER (CARDIZEM CD) capsule 300 mg  300 mg Oral DAILY    sodium chloride (OCEAN) 0.65 % nasal squeeze bottle 2 Spray  2 Spray Both Nostrils Q2H PRN    benzonatate (TESSALON) capsule 100 mg  100 mg Oral TID    hydrALAZINE (APRESOLINE) 20 mg/mL injection 10 mg  10 mg IntraVENous Q4H PRN    guaiFENesin ER (MUCINEX) tablet 1,200 mg  1,200 mg Oral Q12H    cloNIDine HCL (CATAPRES) tablet 0.1 mg  0.1 mg Oral BID    atorvastatin (LIPITOR) tablet 40 mg  40 mg Oral QPM    [Held by provider] valsartan (DIOVAN) tablet 40 mg  40 mg Oral DAILY    ezetimibe (ZETIA) tablet 10 mg  10 mg Oral QPM    arformoteroL (BROVANA) neb solution 15 mcg  15 mcg Nebulization BID RT    And    budesonide (PULMICORT) 500 mcg/2 ml nebulizer suspension  500 mcg Nebulization BID RT    [Held by provider] hydroCHLOROthiazide (HYDRODIURIL) tablet 25 mg  25 mg Oral DAILY    melatonin tablet 3 mg  3 mg Oral QHS PRN    montelukast (SINGULAIR) tablet 10 mg  10 mg Oral DAILY    pantoprazole (PROTONIX) tablet 40 mg  40 mg Oral ACB    insulin lispro (HUMALOG) injection   SubCUTAneous AC&HS    glucose chewable tablet 16 g  4 Tablet Oral PRN    glucagon (GLUCAGEN) injection 1 mg  1 mg IntraMUSCular PRN    dextrose 10% infusion 0-250 mL  0-250 mL IntraVENous PRN    sodium chloride (NS) flush 5-40 mL  5-40 mL IntraVENous Q8H    sodium chloride (NS) flush 5-40 mL  5-40 mL IntraVENous PRN    acetaminophen (TYLENOL) tablet 650 mg  650 mg Oral Q6H PRN    Or    acetaminophen (TYLENOL) suppository 650 mg  650 mg Rectal Q6H PRN    polyethylene glycol (MIRALAX) packet 17 g  17 g Oral DAILY PRN    ondansetron (ZOFRAN ODT) tablet 4 mg  4 mg Oral Q8H PRN    Or    ondansetron (ZOFRAN) injection 4 mg  4 mg IntraVENous Q6H PRN    enoxaparin (LOVENOX) injection 40 mg  40 mg SubCUTAneous DAILY        Lab Data Reviewed: (see below)  Lab Review:     Recent Labs     03/31/23  0238 03/30/23  0316 03/29/23  0159   WBC 13.8* 17.2* 12.5*   HGB 10.6* 10.8* 10.7*   HCT 31.6* 31.4* 31.4*    177 222       Recent Labs     03/31/23  0238 03/30/23  0316 03/29/23  0159   NA  --  141 140   K  --  3.9 4.1   CL  --  116* 113*   CO2  --  21 20*   GLU  --  165* 186*   BUN  --  50* 53*   CREA  --  0.98 1.08*   CA  --  8.6 8.8   MG 1.6 1.8 1.8   ALB  --  2.4* 2.4*   TBILI  --  0.6 0.8   ALT  --  57 55       Lab Results   Component Value Date/Time    Glucose (POC) 135 (H) 03/30/2023 09:05 PM    Glucose (POC) 171 (H) 03/30/2023 04:21 PM    Glucose (POC) 102 03/30/2023 12:06 PM    Glucose (POC) 160 (H) 03/30/2023 07:35 AM    Glucose (POC) 153 (H) 03/29/2023 09:24 PM     No results for input(s): PH, PCO2, PO2, HCO3, FIO2 in the last 72 hours. No results for input(s): INR, INREXT, INREXT in the last 72 hours.   All Micro Results       Procedure Component Value Units Date/Time    CULTURE, RESPIRATORY/SPUTUM/BRONCH Florette Figures STAIN [[id number]]  (Abnormal) Collected: 03/29/23 0918    Order Status: Completed Specimen: Sputum from Bronch wash left upper lobe Updated: 03/30/23 1533     Special Requests: NO SPECIAL REQUESTS        GRAM STAIN OCCASIONAL WBCS SEEN               OCCASIONAL EPITHELIAL CELLS SEEN            OCCASIONAL BUDDING YEAST               OCCASIONAL Gram positive cocci IN PAIRS           Culture result:       LIGHT PROBABLE Pseudomonas species                  MODERATE NORMAL RESPIRATORY LACY          CULTURE, SPUTUM/BRONCH/OTH [[id number]]  (Abnormal) Collected: 03/29/23 0918    Order Status: Completed Specimen: Sputum from Bronchial lavage Updated: 03/30/23 1528     Special Requests: NO SPECIAL REQUESTS        GRAM STAIN OCCASIONAL WBCS SEEN         NO EPITHELIAL CELLS SEEN         NO ORGANISMS SEEN        Culture result:       FEW PROBABLE Pseudomonas species                  MODERATE NORMAL RESPIRATORY LACY          CULTURE, FUNGUS [[id number]] Collected: 03/29/23 6649    Order Status: Sent Specimen: Bronchial lavage Updated: 03/29/23 1817    CULTURE LEGIONELLA [[id number]] Collected: 03/29/23 0856    Order Status: Sent Specimen: Other Updated: 03/29/23 0955    AFB CULTURE & SMEAR W/REFLEX ID [[id number]] Collected: 03/29/23 0910    Order Status: Sent Updated: 03/29/23 0954    CULTURE, VIRAL [[id number]] Collected: 03/29/23 0856    Order Status: Sent Specimen: Other Updated: 03/29/23 0953    CULTURE, RESPIRATORY/SPUTUM/BRONCH Melody Quintana STAIN [[id number]]  (Abnormal) Collected: 03/24/23 1525    Order Status: Completed Specimen: Sputum Updated: 03/26/23 1036     Special Requests: NO SPECIAL REQUESTS        GRAM STAIN 1+ EPITHELIAL CELLS SEEN         OCCASIONAL WBCS SEEN         3+ BUDDING YEAST               3+ BUDDING YEAST WITH PSEUDOHYPHAE           Culture result:       HEAVY  Yeast, (apparent Candida albicans/Candida dubliniensis)              SCANT NORMAL RESPIRATORY LACY          CULTURE, BLOOD, PAIRED [[id number]] Collected: 03/18/23 1002    Order Status: Completed Specimen: Blood Updated: 03/23/23 0543     Special Requests: NO SPECIAL REQUESTS        Culture result: NO GROWTH 5 DAYS       S. Holly Miss, UR/CSF [[id number]] Collected: 03/19/23 1418    Order Status: Completed Specimen: Miscellaneous sample Updated: 03/22/23 1536     Source 2 Hour Urine        Specimen Urine     Streptococcus pneumoniae Ag Negative        Fluid culture Not indicated. Organism ID Not indicated. Please note Comment        Comment: (NOTE)  College of American [employer]ologists standards require a culture to be  performed on CSF specimens submitted for bacterial antigen testing. (CAP [id number]) Urine specimens will not be cultured.   Performed At: [facility] & [address] [id number]  Christopher Rodas MD AX:[id number]         Daya Rosales [[id number]] Collected: 03/19/23 1418    Order Status: Completed Specimen: Urine Updated: 03/22/23 1336     Source URINE        L pneumophila S1 Ag, urine Negative        Comment: (NOTE)  Presumptive negative for L. pneumophila serogroup 1 antigen in urine,  suggesting no recent or current infection. Legionnaires' disease  cannot be ruled out since other serogroups and species may also  cause disease. Performed At: [address] [id number]  Benja Smith MD GT:[id number]         CULTURE, MRSA [[id number]] Collected: 03/18/23 0950    Order Status: Completed Specimen: Nasal from Nares Updated: 03/19/23 2106     Special Requests: NO SPECIAL REQUESTS        Culture result: MRSA NOT PRESENT               Screening of patient nares for MRSA is for surveillance purposes and, if positive, to facilitate isolation considerations in high risk settings. It is not intended for automatic decolonization interventions per se as regimens are not sufficiently effective to warrant routine use.       CULTURE, RESPIRATORY[facility] [[id number]]     Order Status: Canceled Specimen: Sputum     RESPIRATORY VIRUS PANEL W/COVID-19, PCR [[id number]] Collected: 03/18/23 0950    Order Status: Completed Specimen: Nasopharyngeal Updated: 03/18/23 1747     Adenovirus Not detected        Coronavirus 229E Not detected        Coronavirus HKU1 Not detected        Coronavirus CVNL63 Not detected        Coronavirus OC43 Not detected        SARS-CoV-2, PCR Not detected        Metapneumovirus Not detected        Rhinovirus and Enterovirus Not detected        Influenza A Not detected        Influenza B Not detected        Parainfluenza 1 Not detected        Parainfluenza 2 Not detected        Parainfluenza 3 Not detected        Parainfluenza virus 4 Not detected        RSV by PCR Not detected        B. parapertussis, PCR Not detected        Bordetella pertussis - PCR Not detected        Chlamydophila pneumoniae DNA, QL, PCR Not detected        Mycoplasma pneumoniae DNA, QL, PCR Not detected       CULTURE, BLOOD, PAIRED [[id number]]  (Abnormal) Collected: 03/15/23 1739    Order Status: Completed Specimen: Blood Updated: 03/18/23 1230     Special Requests: NO SPECIAL REQUESTS        Culture result:       STAPHYLOCOCCUS SPECIES, COAGULASE NEGATIVE GROWING IN 1 OF 4 BOTTLES DRAWN  SITE = LAC            (NOTE) PRELIMINARY REPORT OF [facility] CALLED TO AND READ BACK BY KRISTYN OBANDO RN AT 2047 ON 3/17/23.  Camilo Raven, UR/CSF [[id number]] Collected: 03/18/23 0156    Order Status: Canceled     BLOOD CULTURE ID PANEL [[id number]]  (Abnormal) Collected: 03/15/23 1739    Order Status: Completed Specimen: Blood Updated: 03/17/23 2312     Acc. no. from Micro Order [id number]     Enterococcus faecalis Not detected        Enterococcus faecium Not detected        Listeria monocytogenes Not detected        Staphylococcus Detected        Staphylococcus aureus Not detected        Staph epidermidis Not detected        Staph lugdunensis Not detected        Streptococcus Not detected        Streptococcus agalactiae (Group B) Not detected        Streptococcus pneumoniae Not detected        Streptococcus pyogenes (Group A) Not detected        Acinetobacter calcoaceticus-baumanii complex Not detected        Bacteroides fragilis Not detected        Enterobacterales species Not detected        Enterobacter cloacae complex Not detected        Escherichia coli Not detected        Klebsiella aerogenes Not detected        Klebsiella oxytoca Not detected        Klebsiella pneumoniae group Not detected        Proteus Not detected        Salmonella Not detected        Serratia marcescens Not detected        Haemophilus influenzae Not detected        Neisseria meningitidis Not detected        Pseudomonas aeruginosa Not detected        Steno maltophilia Not detected        Candida albicans Not detected        Candida auris Not detected        Candida glabrata Not detected        Candida krusei Not detected        Candida parapsilosis Not detected        Candida tropicalis Not detected        Crypto neoformans/gattii Not detected        RESISTANT GENES:            Comment       False positive results may rarely occur. Correlate with clinical,epidemiologic, and other laboratory findings           Comment: Please see BCID Interpretation Guide in EPIC Links       RESPIRATORY VIRUS PANEL W/COVID-19, PCR [[id number]]     Order Status: Canceled Specimen: NASOPHARYNGEAL Lilliand Shala [[id number]] Collected: 03/15/23 1739    Order Status: No result Updated: 03/17/23 1221    COVID-19 RAPID TEST [[id number]] Collected: 03/15/23 1739    Order Status: Completed Specimen: Nasopharyngeal Updated: 03/15/23 1817     Specimen source Nasopharyngeal        COVID-19 rapid test Not detected        Comment: Rapid Abbott ID Now       Rapid NAAT:  The specimen is NEGATIVE for SARS-CoV-2, the novel coronavirus associated with COVID-19. Negative results should be treated as presumptive and, if inconsistent with clinical signs and symptoms or necessary for patient management, should be tested with an alternative molecular assay. Negative results do not preclude SARS-CoV-2 infection and should not be used as the sole basis for patient management decisions. This test has been authorized by the FDA under an Emergency Use Authorization (EUA) for use by authorized laboratories. Fact sheet for Healthcare Providers:  http://www.jeramie-karin.zach/  Fact sheet for Patients: http://www.jeramie-karin.bigina/       Methodology: Isothermal Nucleic Acid Amplification                 Other pertinent lab: none    Total time spent with patient: 30 Minutes I personally reviewed chart, notes, data and current medications in the medical record. I have personally examined and treated the patient at bedside during this period. To assist coordination of care and communication with nursing and staff, this note may be preliminary early in the day, but finalized by end of the day.                  Care Plan discussed with: Patient, Care Manager, Nursing Staff, Consultant/Specialist, and >50% of time spent in counseling and coordination of care    Discussed:  Care Plan and D/C Planning    Prophylaxis:  Lovenox and H2B/PPI    Disposition:  SNF/LTC           ___________________________________________________    Attending Physician: Shravan Olson MD

## 2023-04-03 LAB
SPECIMEN SOURCE: ABNORMAL
VIRUS SPEC CULT: ABNORMAL

## 2023-04-04 LAB
BACTERIA SPEC CULT: ABNORMAL
BACTERIA SPEC CULT: ABNORMAL
SERVICE CMNT-IMP: ABNORMAL

## 2023-04-16 ENCOUNTER — APPOINTMENT (OUTPATIENT)
Dept: GENERAL RADIOLOGY | Age: 78
DRG: 641 | End: 2023-04-16
Attending: STUDENT IN AN ORGANIZED HEALTH CARE EDUCATION/TRAINING PROGRAM
Payer: MEDICARE

## 2023-04-16 ENCOUNTER — HOSPITAL ENCOUNTER (INPATIENT)
Age: 78
LOS: 1 days | Discharge: HOME | DRG: 641 | End: 2023-04-17
Attending: STUDENT IN AN ORGANIZED HEALTH CARE EDUCATION/TRAINING PROGRAM | Admitting: STUDENT IN AN ORGANIZED HEALTH CARE EDUCATION/TRAINING PROGRAM
Payer: MEDICARE

## 2023-04-16 DIAGNOSIS — R07.9 CHEST PAIN, UNSPECIFIED TYPE: Primary | ICD-10-CM

## 2023-04-16 DIAGNOSIS — E83.42 HYPOMAGNESEMIA: ICD-10-CM

## 2023-04-16 DIAGNOSIS — E83.51 HYPOCALCEMIA: ICD-10-CM

## 2023-04-16 PROBLEM — E16.2 HYPOGLYCEMIA: Status: ACTIVE | Noted: 2023-04-16

## 2023-04-16 LAB
ALBUMIN SERPL-MCNC: 2.2 G/DL (ref 3.5–5)
ALBUMIN/GLOB SERPL: 0.6 (ref 1.1–2.2)
ALP SERPL-CCNC: 85 U/L (ref 45–117)
ALT SERPL-CCNC: 32 U/L (ref 12–78)
ANION GAP SERPL CALC-SCNC: 4 MMOL/L (ref 5–15)
ANION GAP SERPL CALC-SCNC: 5 MMOL/L (ref 5–15)
AST SERPL-CCNC: 19 U/L (ref 15–37)
BASOPHILS # BLD: 0 K/UL (ref 0–0.1)
BASOPHILS NFR BLD: 0 % (ref 0–1)
BILIRUB SERPL-MCNC: 0.5 MG/DL (ref 0.2–1)
BUN SERPL-MCNC: 13 MG/DL (ref 6–20)
BUN SERPL-MCNC: 13 MG/DL (ref 6–20)
BUN/CREAT SERPL: 13 (ref 12–20)
BUN/CREAT SERPL: 14 (ref 12–20)
CALCIUM SERPL-MCNC: 6.3 MG/DL (ref 8.5–10.1)
CALCIUM SERPL-MCNC: 6.4 MG/DL (ref 8.5–10.1)
CHLORIDE SERPL-SCNC: 110 MMOL/L (ref 97–108)
CHLORIDE SERPL-SCNC: 110 MMOL/L (ref 97–108)
CO2 SERPL-SCNC: 25 MMOL/L (ref 21–32)
CO2 SERPL-SCNC: 26 MMOL/L (ref 21–32)
COMMENT, HOLDF: NORMAL
CREAT SERPL-MCNC: 0.94 MG/DL (ref 0.55–1.02)
CREAT SERPL-MCNC: 0.97 MG/DL (ref 0.55–1.02)
DIFFERENTIAL METHOD BLD: ABNORMAL
EOSINOPHIL # BLD: 0 K/UL (ref 0–0.4)
EOSINOPHIL NFR BLD: 1 % (ref 0–7)
ERYTHROCYTE [DISTWIDTH] IN BLOOD BY AUTOMATED COUNT: 14.7 % (ref 11.5–14.5)
EST. AVERAGE GLUCOSE BLD GHB EST-MCNC: 120 MG/DL
GLOBULIN SER CALC-MCNC: 3.8 G/DL (ref 2–4)
GLUCOSE BLD STRIP.AUTO-MCNC: 93 MG/DL (ref 65–117)
GLUCOSE SERPL-MCNC: 118 MG/DL (ref 65–100)
GLUCOSE SERPL-MCNC: 98 MG/DL (ref 65–100)
HBA1C MFR BLD: 5.8 % (ref 4–5.6)
HCT VFR BLD AUTO: 31.9 % (ref 35–47)
HGB BLD-MCNC: 10.6 G/DL (ref 11.5–16)
IMM GRANULOCYTES # BLD AUTO: 0.1 K/UL (ref 0–0.04)
IMM GRANULOCYTES NFR BLD AUTO: 2 % (ref 0–0.5)
LYMPHOCYTES # BLD: 1.9 K/UL (ref 0.8–3.5)
LYMPHOCYTES NFR BLD: 36 % (ref 12–49)
MAGNESIUM SERPL-MCNC: 0.5 MG/DL (ref 1.6–2.4)
MCH RBC QN AUTO: 31.5 PG (ref 26–34)
MCHC RBC AUTO-ENTMCNC: 33.2 G/DL (ref 30–36.5)
MCV RBC AUTO: 94.7 FL (ref 80–99)
MONOCYTES # BLD: 0.9 K/UL (ref 0–1)
MONOCYTES NFR BLD: 17 % (ref 5–13)
NEUTS SEG # BLD: 2.5 K/UL (ref 1.8–8)
NEUTS SEG NFR BLD: 44 % (ref 32–75)
NRBC # BLD: 0 K/UL (ref 0–0.01)
NRBC BLD-RTO: 0 PER 100 WBC
PLATELET # BLD AUTO: 342 K/UL (ref 150–400)
PMV BLD AUTO: 8.9 FL (ref 8.9–12.9)
POTASSIUM SERPL-SCNC: 3.1 MMOL/L (ref 3.5–5.1)
POTASSIUM SERPL-SCNC: 3.1 MMOL/L (ref 3.5–5.1)
PROT SERPL-MCNC: 6 G/DL (ref 6.4–8.2)
RBC # BLD AUTO: 3.37 M/UL (ref 3.8–5.2)
SAMPLES BEING HELD,HOLD: NORMAL
SERVICE CMNT-IMP: NORMAL
SODIUM SERPL-SCNC: 140 MMOL/L (ref 136–145)
SODIUM SERPL-SCNC: 140 MMOL/L (ref 136–145)
TROPONIN I SERPL HS-MCNC: 30 NG/L (ref 0–51)
WBC # BLD AUTO: 5.5 K/UL (ref 3.6–11)

## 2023-04-16 PROCEDURE — 65270000046 HC RM TELEMETRY

## 2023-04-16 PROCEDURE — 74011250636 HC RX REV CODE- 250/636: Performed by: NURSE PRACTITIONER

## 2023-04-16 PROCEDURE — 94664 DEMO&/EVAL PT USE INHALER: CPT

## 2023-04-16 PROCEDURE — 96375 TX/PRO/DX INJ NEW DRUG ADDON: CPT

## 2023-04-16 PROCEDURE — 82962 GLUCOSE BLOOD TEST: CPT

## 2023-04-16 PROCEDURE — 74011250637 HC RX REV CODE- 250/637: Performed by: NURSE PRACTITIONER

## 2023-04-16 PROCEDURE — 85025 COMPLETE CBC W/AUTO DIFF WBC: CPT

## 2023-04-16 PROCEDURE — 96365 THER/PROPH/DIAG IV INF INIT: CPT

## 2023-04-16 PROCEDURE — 36415 COLL VENOUS BLD VENIPUNCTURE: CPT

## 2023-04-16 PROCEDURE — 83036 HEMOGLOBIN GLYCOSYLATED A1C: CPT

## 2023-04-16 PROCEDURE — 71046 X-RAY EXAM CHEST 2 VIEWS: CPT

## 2023-04-16 PROCEDURE — 65270000029 HC RM PRIVATE

## 2023-04-16 PROCEDURE — 74011250637 HC RX REV CODE- 250/637: Performed by: STUDENT IN AN ORGANIZED HEALTH CARE EDUCATION/TRAINING PROGRAM

## 2023-04-16 PROCEDURE — 74011250636 HC RX REV CODE- 250/636: Performed by: STUDENT IN AN ORGANIZED HEALTH CARE EDUCATION/TRAINING PROGRAM

## 2023-04-16 PROCEDURE — 74011000250 HC RX REV CODE- 250: Performed by: STUDENT IN AN ORGANIZED HEALTH CARE EDUCATION/TRAINING PROGRAM

## 2023-04-16 PROCEDURE — 80053 COMPREHEN METABOLIC PANEL: CPT

## 2023-04-16 PROCEDURE — 94640 AIRWAY INHALATION TREATMENT: CPT

## 2023-04-16 PROCEDURE — 99285 EMERGENCY DEPT VISIT HI MDM: CPT

## 2023-04-16 PROCEDURE — 93005 ELECTROCARDIOGRAM TRACING: CPT

## 2023-04-16 PROCEDURE — 83735 ASSAY OF MAGNESIUM: CPT

## 2023-04-16 PROCEDURE — 84484 ASSAY OF TROPONIN QUANT: CPT

## 2023-04-16 RX ORDER — MAGNESIUM SULFATE 1 G/100ML
1 INJECTION INTRAVENOUS ONCE
Status: COMPLETED | OUTPATIENT
Start: 2023-04-16 | End: 2023-04-16

## 2023-04-16 RX ORDER — CALCIUM GLUCONATE 20 MG/ML
2 INJECTION, SOLUTION INTRAVENOUS ONCE
Status: COMPLETED | OUTPATIENT
Start: 2023-04-16 | End: 2023-04-16

## 2023-04-16 RX ORDER — SODIUM CHLORIDE 0.9 % (FLUSH) 0.9 %
5-40 SYRINGE (ML) INJECTION EVERY 8 HOURS
Status: DISCONTINUED | OUTPATIENT
Start: 2023-04-16 | End: 2023-04-17 | Stop reason: HOSPADM

## 2023-04-16 RX ORDER — CLONIDINE HYDROCHLORIDE 0.1 MG/1
0.1 TABLET ORAL 2 TIMES DAILY
Status: DISCONTINUED | OUTPATIENT
Start: 2023-04-17 | End: 2023-04-17 | Stop reason: HOSPADM

## 2023-04-16 RX ORDER — ARFORMOTEROL TARTRATE 15 UG/2ML
15 SOLUTION RESPIRATORY (INHALATION)
Status: DISCONTINUED | OUTPATIENT
Start: 2023-04-16 | End: 2023-04-16

## 2023-04-16 RX ORDER — POTASSIUM CHLORIDE 750 MG/1
40 TABLET, FILM COATED, EXTENDED RELEASE ORAL
Status: COMPLETED | OUTPATIENT
Start: 2023-04-16 | End: 2023-04-16

## 2023-04-16 RX ORDER — IPRATROPIUM BROMIDE 0.5 MG/2.5ML
0.5 SOLUTION RESPIRATORY (INHALATION)
Status: DISCONTINUED | OUTPATIENT
Start: 2023-04-16 | End: 2023-04-17 | Stop reason: HOSPADM

## 2023-04-16 RX ORDER — IBUPROFEN 200 MG
4 TABLET ORAL AS NEEDED
Status: DISCONTINUED | OUTPATIENT
Start: 2023-04-16 | End: 2023-04-17 | Stop reason: HOSPADM

## 2023-04-16 RX ORDER — ENOXAPARIN SODIUM 100 MG/ML
40 INJECTION SUBCUTANEOUS DAILY
Status: DISCONTINUED | OUTPATIENT
Start: 2023-04-17 | End: 2023-04-17 | Stop reason: HOSPADM

## 2023-04-16 RX ORDER — FLUTICASONE PROPIONATE 50 MCG
2 SPRAY, SUSPENSION (ML) NASAL DAILY
Status: DISCONTINUED | OUTPATIENT
Start: 2023-04-17 | End: 2023-04-17 | Stop reason: HOSPADM

## 2023-04-16 RX ORDER — LOSARTAN POTASSIUM 50 MG/1
50 TABLET ORAL DAILY
Status: DISCONTINUED | OUTPATIENT
Start: 2023-04-17 | End: 2023-04-17

## 2023-04-16 RX ORDER — INSULIN LISPRO 100 [IU]/ML
INJECTION, SOLUTION INTRAVENOUS; SUBCUTANEOUS
Status: DISCONTINUED | OUTPATIENT
Start: 2023-04-16 | End: 2023-04-17 | Stop reason: HOSPADM

## 2023-04-16 RX ORDER — MONTELUKAST SODIUM 10 MG/1
10 TABLET ORAL DAILY
Status: DISCONTINUED | OUTPATIENT
Start: 2023-04-17 | End: 2023-04-17 | Stop reason: HOSPADM

## 2023-04-16 RX ORDER — FUROSEMIDE 20 MG/1
TABLET ORAL DAILY
COMMUNITY
End: 2023-04-17

## 2023-04-16 RX ORDER — DEXTROSE MONOHYDRATE 100 MG/ML
0-250 INJECTION, SOLUTION INTRAVENOUS AS NEEDED
Status: DISCONTINUED | OUTPATIENT
Start: 2023-04-16 | End: 2023-04-17 | Stop reason: HOSPADM

## 2023-04-16 RX ORDER — CALCIUM GLUCONATE 20 MG/ML
1 INJECTION, SOLUTION INTRAVENOUS ONCE
Status: COMPLETED | OUTPATIENT
Start: 2023-04-16 | End: 2023-04-16

## 2023-04-16 RX ORDER — ACETAMINOPHEN 325 MG/1
650 TABLET ORAL
Status: DISCONTINUED | OUTPATIENT
Start: 2023-04-16 | End: 2023-04-17 | Stop reason: HOSPADM

## 2023-04-16 RX ORDER — TRAZODONE HYDROCHLORIDE 50 MG/1
TABLET ORAL
COMMUNITY

## 2023-04-16 RX ORDER — SODIUM CHLORIDE 0.9 % (FLUSH) 0.9 %
5-40 SYRINGE (ML) INJECTION AS NEEDED
Status: DISCONTINUED | OUTPATIENT
Start: 2023-04-16 | End: 2023-04-17 | Stop reason: HOSPADM

## 2023-04-16 RX ORDER — PANTOPRAZOLE SODIUM 40 MG/1
40 TABLET, DELAYED RELEASE ORAL
Status: DISCONTINUED | OUTPATIENT
Start: 2023-04-17 | End: 2023-04-17 | Stop reason: HOSPADM

## 2023-04-16 RX ORDER — LOSARTAN POTASSIUM 50 MG/1
TABLET ORAL DAILY
COMMUNITY
End: 2023-04-17 | Stop reason: SDUPTHER

## 2023-04-16 RX ORDER — ATORVASTATIN CALCIUM 20 MG/1
40 TABLET, FILM COATED ORAL EVERY EVENING
Status: DISCONTINUED | OUTPATIENT
Start: 2023-04-16 | End: 2023-04-17 | Stop reason: HOSPADM

## 2023-04-16 RX ORDER — DILTIAZEM HYDROCHLORIDE 120 MG/1
240 CAPSULE, COATED, EXTENDED RELEASE ORAL DAILY
Status: DISCONTINUED | OUTPATIENT
Start: 2023-04-17 | End: 2023-04-17 | Stop reason: HOSPADM

## 2023-04-16 RX ORDER — BUDESONIDE 0.5 MG/2ML
250 INHALANT ORAL
Status: DISCONTINUED | OUTPATIENT
Start: 2023-04-16 | End: 2023-04-17 | Stop reason: HOSPADM

## 2023-04-16 RX ADMIN — IPRATROPIUM BROMIDE 0.5 MG: 0.5 SOLUTION RESPIRATORY (INHALATION) at 20:30

## 2023-04-16 RX ADMIN — MAGNESIUM SULFATE HEPTAHYDRATE 1 G: 1 INJECTION, SOLUTION INTRAVENOUS at 16:18

## 2023-04-16 RX ADMIN — BUDESONIDE 250 MCG: 0.5 INHALANT RESPIRATORY (INHALATION) at 20:35

## 2023-04-16 RX ADMIN — SODIUM CHLORIDE, PRESERVATIVE FREE 10 ML: 5 INJECTION INTRAVENOUS at 20:53

## 2023-04-16 RX ADMIN — ATORVASTATIN CALCIUM 40 MG: 20 TABLET, FILM COATED ORAL at 18:26

## 2023-04-16 RX ADMIN — CALCIUM GLUCONATE 2 G: 20 INJECTION, SOLUTION INTRAVENOUS at 20:52

## 2023-04-16 RX ADMIN — CALCIUM GLUCONATE 1000 MG: 20 INJECTION, SOLUTION INTRAVENOUS at 16:23

## 2023-04-16 RX ADMIN — MAGNESIUM SULFATE HEPTAHYDRATE 1 G: 1 INJECTION, SOLUTION INTRAVENOUS at 17:28

## 2023-04-16 RX ADMIN — ACETAMINOPHEN 650 MG: 325 TABLET ORAL at 22:19

## 2023-04-16 RX ADMIN — POTASSIUM CHLORIDE 40 MEQ: 750 TABLET, FILM COATED, EXTENDED RELEASE ORAL at 17:28

## 2023-04-16 NOTE — ED TRIAGE NOTES
Patient arrives with complaints of left side chest pain and nausea, started this morning, worsens with movement     To ER 22 to obtain EKG prior to completing triage

## 2023-04-16 NOTE — H&P
[address] [facility] [place] Adult  Hospitalist Group  History and Physical    Date of Service:  4/16/2023  Primary Care Provider: Hayden Long MD  Source of information: The patient and Chart review    Chief Complaint: Chest Pain      History of Presenting Illness:   John Mcdowell is a 69 y.o. female past medical history of asthma, A-fib, CKD, hypertension, diabetes, hyperlipidemia, recent admission for pneumonia, who presented to the ED with chest pain. History obtained per patient and chart review. Patient states that she was in her usual state of health until this morning when she felt positional, non pleuritic chest pain on the left side of her chest.  Endorses it to be worse with movement of her left arm. Not exacerbated by activity, has persisted throughout the day. Otherwise, denied any fevers, chills, lightheadedness, syncope, shortness of breath, palpitations, nausea or vomiting, abdominal pain, leg swelling, or other symptoms. Of note, patient recently discharged on 3/31 after hospitalization for pneumonia and asthma exacerbation. Also saw her PCP two weeks ago for leg swelling and was prescribed lasix which she has been taking daily with improvement of leg swelling. In the ED, vital signs are stable. Labs notable for no leukocytosis, K 3.1, calcium 6.3, magnesium 0.5, negative troponin, otherwise unremarkable. Chest x-ray showed no acute cardiopulmonary findings. Patient being admitted to medicine for further management of electrolyte derangement. REVIEW OF SYSTEMS:  A comprehensive review of systems was negative except for that written in the History of Present Illness.      Past Medical History:   Diagnosis Date    Asthma     Diabetes ([address]     type 2    GERD (gastroesophageal reflux disease)     Hypertension     Ill-defined condition     high cholesterol    Ill-defined condition     seasonal allergies    Ill-defined condition     gout      Past Surgical History:   Procedure Laterality Date    HX APPENDECTOMY      HX HEENT  2010    cataract     HX HYSTERECTOMY  1975    HX LAP CHOLECYSTECTOMY  1985    HX OOPHORECTOMY  1996    benign ovarian tumor    HX OTHER SURGICAL  1993    bladder tack    HX ROTATOR CUFF REPAIR  2015    right    HX TONSILLECTOMY  1964     Prior to Admission medications    Medication Sig Start Date End Date Taking? Authorizing Provider   furosemide (LASIX) 20 mg tablet Take  by mouth daily. Yes Other, MD Jose   losartan (Cozaar) 50 mg tablet Take  by mouth daily. Yes Other, MD Jose   traZODone (DESYREL) 50 mg tablet Take  by mouth nightly. Yes Other, MD Jose   dilTIAZem ER (CARDIZEM CD) 300 mg capsule Take 1 Capsule by mouth daily for 30 days. Patient taking differently: Take 240 mg by mouth daily. 3/31/23 4/30/23  Milena Burks MD   predniSONE [facility] DS) 10 mg dose pack See administration instruction per 10mg dose pack 3/30/23   Milena Burks MD   omeprazole (PRILOSEC OTC) 20 mg tablet Take 20 mg by mouth two (2) times a day. Provider, Historical   azilsartan medoxomiL (EDARBI) 80 mg tab tablet Take 40 mg by mouth daily. Patient not taking: Reported on 4/16/2023    Provider, Historical   cloNIDine HCL (CATAPRES) 0.1 mg tablet Take 0.1 mg by mouth two (2) times a day. Provider, Historical   evolocumab (Repatha SureClick) pen injection 538 mg by SubCUTAneous route every fourteen (14) days. Provider, Historical   folic acid/multivit-min/lutein (CENTRUM SILVER PO) Take 1 Tablet by mouth daily. Provider, Historical   glucosamine HCl/chondroitin rico (GLUCOSAMINE-CHONDROITIN PO) Take 1 Capsule by mouth two (2) times a day. 1500/1000    Provider, Historical   MAGNESIUM CHLORIDE PO Take 225 mg by mouth two (2) times a day. Provider, Historical   tiotropium bromide (SPIRIVA RESPIMAT) 1.25 mcg/actuation inhaler Take 2 Puffs by inhalation daily.     Provider, Historical   fluticasone propion-salmeteroL (ADVAIR/WIXELA) 500-50 mcg/dose diskus inhaler Take 2 Puffs by inhalation every twelve (12) hours. Provider, Historical   melatonin 3 mg cap capsule Take 6 mg by mouth nightly. Provider, Historical   Qxbldjql-Ovbg-Vsrhco-Hyalur Ac [id number] mg cap Take  by mouth two (2) times a day. Provider, Historical   therapeutic multivitamin (THERAGRAN) tablet Take 1 Tab by mouth daily. Provider, Historical   albuterol sulfate (PROVENTIL;VENTOLIN) 2.5 mg/0.5 mL nebu nebulizer solution by Nebulization route every four (4) hours as needed for Wheezing. Provider, Historical   SITagliptin-metFORMIN (JANUMET)  mg per tablet Take 1 Tab by mouth daily. Provider, Historical   fluticasone propionate (FLONASE) 50 mcg/actuation nasal spray 2 Sprays by Both Nostrils route daily. Provider, Historical   ezetimibe (ZETIA) 10 mg tablet Take 10 mg by mouth every evening. Provider, Historical   atorvastatin (LIPITOR) 80 mg tablet Take 40 mg by mouth every evening. Provider, Historical   montelukast (SINGULAIR) 10 mg tablet Take 10 mg by mouth daily. Provider, Historical     Allergies   Allergen Reactions    Aspirin Cough    Beta Blocker [Beta-Blockers (Beta-Adrenergic Blocking Agts)] Other (comments)     Was told by her PCP to report that she has a mutation- she does not recall a reaction    Codeine Rash      Family History   Problem Relation Age of Onset    Cancer Mother         uterine    Heart Disease Mother         afib    Heart Disease Father 48        MI    Hypertension Father       Social History:  reports that she quit smoking about 59 years ago. She smoked an average of .25 packs per day. She has never used smokeless tobacco. She reports that she does not drink alcohol and does not use drugs.    Social Determinants of Health     Tobacco Use: Medium Risk    Smoking Tobacco Use: Former    Smokeless Tobacco Use: Never    Passive Exposure: Not on file   Alcohol Use: Not on file   Financial Resource Strain: Not on file   Food Insecurity: Not on file   Transportation Needs: Not on file   Physical Activity: Not on file   Stress: Not on file   Social Connections: Not on file   Intimate Partner Violence: Not on file   Depression: Not on file   Housing Stability: Not on file        Family and social history were personally reviewed, all pertinent and relevant details are outlined as above. Objective:   Visit Vitals  /63 (BP 1 Location: Right upper arm, BP Patient Position: Sitting)   Pulse 85   Temp 98.5 °F (36.9 °C)   Resp 17   Ht 5' 5\" (1.651 m)   SpO2 95%   BMI 30.08 kg/m²      O2 Device: None (Room air)    PHYSICAL EXAM:   General: Alert x oriented x 3, awake, no acute distress, resting in bed  HEENT: PEERL, EOMI, moist mucus membranes  Neck: Supple, no JVD, no meningeal signs  Chest: Mildly bilateral wheezing  CVS: RRR, S1 S2 heard, no murmurs/rubs/gallops  Abd: Soft, non-tender, non-distended, +bowel sounds   Ext: No clubbing, no cyanosis, no edema  Neuro/Psych: Pleasant mood and affect, CN 2-12 grossly intact  Cap refill: Brisk, less than 3 seconds  Pulses: 2+, symmetric in all extremities  Skin: Warm, dry, without rashes or lesions    Data Review: All diagnostic labs and studies have been reviewed. Abnormal Labs Reviewed   CBC WITH AUTOMATED DIFF - Abnormal; Notable for the following components:       Result Value    RBC 3.37 (*)     HGB 10.6 (*)     HCT 31.9 (*)     RDW 14.7 (*)     MONOCYTES 17 (*)     IMMATURE GRANULOCYTES 2 (*)     ABS. IMM.  GRANS. 0.1 (*)     All other components within normal limits   METABOLIC PANEL, COMPREHENSIVE - Abnormal; Notable for the following components:    Potassium 3.1 (*)     Chloride 110 (*)     Calcium 6.3 (*)     Protein, total 6.0 (*)     Albumin 2.2 (*)     A-G Ratio 0.6 (*)     All other components within normal limits   MAGNESIUM - Abnormal; Notable for the following components:    Magnesium 0.5 (*)     All other components within normal limits       All Micro Results       None IMAGING:   XR CHEST PA LAT   Final Result   No acute cardiopulmonary findings. ECG/ECHO:    Results for orders placed or performed during the hospital encounter of 03/15/23   EKG, 12 LEAD, INITIAL   Result Value Ref Range    Ventricular Rate 107 BPM    Atrial Rate 107 BPM    P-R Interval 190 ms    QRS Duration 94 ms    Q-T Interval 326 ms    QTC Calculation (Bezet) 435 ms    Calculated P Axis 21 degrees    Calculated R Axis -28 degrees    Calculated T Axis 45 degrees    Diagnosis       Sinus tachycardia with occasional premature ventricular complexes  Inferior infarct , age undetermined  Poor R-wave Progression  Abnormal ECG  When compared with ECG of 11-MAY-2015 11:08,  premature ventricular complexes are now present  Inferior infarct is now present  Confirmed by Jesse Cannon MD., Jordyn Boyer ([id number]) on 3/19/2023 5:06:31 PM          Assessment:   Given the patient's current clinical presentation, there is a high level of concern for decompensation if discharged from the emergency department. Complex decision making was performed, which includes reviewing the patient's available past medical records, laboratory results, and imaging studies. Active Problems:    Hypoglycemia (4/16/2023)        Plan:   Kenny Cardoso is a 69 y.o. female past medical history of asthma, A-fib, CKD, hypertension, diabetes, hyperlipidemia, recent admission for pneumonia, who presented to the ED with chest pain found to have electrolyte derangement.      #Hypocalcemia  #Hypocalcemia  #Hypomagnesemia  - Suspect in the setting of newly prescribed lasix  - Aggressive repletion, monitor BMP q4h  - Hold lasix and albuterol   - Monitor closely on telemetry     #Non cardiac chest pain   - Negative troponin and ECG, normal chest xray  - Suspect MSK, perhaps in setting of electrolyte derangement  - Low threshold to repeat ECG and troponin if symptoms recur     #hx of Asthma  - Discharged last month for PNA and exacerbation; completed taper  - Denies worsening sob, use of inhalers, states she is at baseline   - Continue, pulmicort, Singulair, hold home albuterol due to hypokalemia     #hx of A fib  - Rate controlled  - Recent echo unremarkable  - Continue home diltiazem     #hx of CKD  - Creatinine at baseline     #hx of HTN  - Stable, continue home diltiazem, losartan, and clonidine     #hx of HLD  - Continue home statin and zeita     #hx of DM  - Check A1C  - ISS   - hold home oral meds    Code: Full   DVT prophylaxis: subq lovenox   Diet: Diabetic       CRITICAL CARE WAS PERFORMED FOR THIS ENCOUNTER: NO.      Signed By: Dov Davies MD     April 16, 2023         Please note that this dictation may have been completed with Dragon, the computer voice recognition software. Quite often unanticipated grammatical, syntax, homophones, and other interpretive errors are inadvertently transcribed by the computer software. Please disregard these errors. Please excuse any errors that have escaped final proofreading.

## 2023-04-16 NOTE — H&P
[facility] Adult  Hospitalist Group  History and Physical    Date of Service:  4/16/2023  Primary Care Provider: Jhony Yates MD  Source of information: The patient and Chart review    Chief Complaint: Chest Pain      History of Presenting Illness:   Sally Estrella is a 77 y.o. female past medical history of asthma, A-fib, CKD, hypertension, diabetes, hyperlipidemia, recent admission for pneumonia, who presented to the ED with chest pain.    History obtained per patient and chart review.  Patient states that she was in her usual state of health until this morning when she felt positional, non pleuritic chest pain on the left side of her chest.  Endorses it to be worse with movement of her left arm.  Not exacerbated by activity, has persisted throughout the day.  Otherwise, denied any fevers, chills, lightheadedness, syncope, shortness of breath, palpitations, nausea or vomiting, abdominal pain, leg swelling, or other symptoms.  Of note, patient recently discharged on 3/31 after hospitalization for pneumonia and asthma exacerbation. Also saw her PCP two weeks ago for leg swelling and was prescribed lasix which she has been taking daily with improvement of leg swelling.    In the ED, vital signs are stable.  Labs notable for no leukocytosis, K 3.1, calcium 6.3, magnesium 0.5, negative troponin, otherwise unremarkable.  Chest x-ray showed no acute cardiopulmonary findings.  Patient being admitted to medicine for further management of electrolyte derangement.     REVIEW OF SYSTEMS:  A comprehensive review of systems was negative except for that written in the History of Present Illness.     Past Medical History:   Diagnosis Date    Asthma     Diabetes (HCC)     type 2    GERD (gastroesophageal reflux disease)     Hypertension     Ill-defined condition     high cholesterol    Ill-defined condition     seasonal allergies    Ill-defined condition     gout      Past Surgical History:   Procedure  Laterality Date    HX APPENDECTOMY      HX HEENT  2010    cataract     HX HYSTERECTOMY  1975    HX LAP CHOLECYSTECTOMY  1985    HX OOPHORECTOMY  1996    benign ovarian tumor    HX OTHER SURGICAL  1993    bladder tack    HX ROTATOR CUFF REPAIR  2015    right    HX TONSILLECTOMY  1964     Prior to Admission medications    Medication Sig Start Date End Date Taking? Authorizing Provider   furosemide (LASIX) 20 mg tablet Take  by mouth daily.   Yes Other, MD Jose   losartan (Cozaar) 50 mg tablet Take  by mouth daily.   Yes Other, MD Jose   traZODone (DESYREL) 50 mg tablet Take  by mouth nightly.   Yes Other, MD Jose   dilTIAZem ER (CARDIZEM CD) 300 mg capsule Take 1 Capsule by mouth daily for 30 days.  Patient taking differently: Take 240 mg by mouth daily. 3/31/23 4/30/23  Micky Hood MD   predniSONE (STERAPRED DS) 10 mg dose pack See administration instruction per 10mg dose pack 3/30/23   Micky Hood MD   omeprazole (PRILOSEC OTC) 20 mg tablet Take 20 mg by mouth two (2) times a day.    Provider, Historical   azilsartan medoxomiL (EDARBI) 80 mg tab tablet Take 40 mg by mouth daily.  Patient not taking: Reported on 4/16/2023    Provider, Historical   cloNIDine HCL (CATAPRES) 0.1 mg tablet Take 0.1 mg by mouth two (2) times a day.    Provider, Historical   evolocumab (Repatha SureClick) pen injection 140 mg by SubCUTAneous route every fourteen (14) days.    Provider, Historical   folic acid/multivit-min/lutein (CENTRUM SILVER PO) Take 1 Tablet by mouth daily.    Provider, Historical   glucosamine HCl/chondroitin rico (GLUCOSAMINE-CHONDROITIN PO) Take 1 Capsule by mouth two (2) times a day. 1500/1000    Provider, Historical   MAGNESIUM CHLORIDE PO Take 225 mg by mouth two (2) times a day.    Provider, Historical   tiotropium bromide (SPIRIVA RESPIMAT) 1.25 mcg/actuation inhaler Take 2 Puffs by inhalation daily.    Provider, Historical   fluticasone propion-salmeteroL (ADVAIR/WIXELA) 500-50 mcg/dose diskus  inhaler Take 2 Puffs by inhalation every twelve (12) hours.    Provider, Historical   melatonin 3 mg cap capsule Take 6 mg by mouth nightly.    Provider, Historical   Cplvpvlj-Vkbg-Hgfsfe-Hyalur Ac [id number] mg cap Take  by mouth two (2) times a day.    Provider, Historical   therapeutic multivitamin (THERAGRAN) tablet Take 1 Tab by mouth daily.    Provider, Historical   albuterol sulfate (PROVENTIL;VENTOLIN) 2.5 mg/0.5 mL nebu nebulizer solution by Nebulization route every four (4) hours as needed for Wheezing.    Provider, Historical   SITagliptin-metFORMIN (JANUMET)  mg per tablet Take 1 Tab by mouth daily.    Provider, Historical   fluticasone propionate (FLONASE) 50 mcg/actuation nasal spray 2 Sprays by Both Nostrils route daily.    Provider, Historical   ezetimibe (ZETIA) 10 mg tablet Take 10 mg by mouth every evening.    Provider, Historical   atorvastatin (LIPITOR) 80 mg tablet Take 40 mg by mouth every evening.    Provider, Historical   montelukast (SINGULAIR) 10 mg tablet Take 10 mg by mouth daily.    Provider, Historical     Allergies   Allergen Reactions    Aspirin Cough    Beta Blocker [Beta-Blockers (Beta-Adrenergic Blocking Agts)] Other (comments)     Was told by her PCP to report that she has a mutation- she does not recall a reaction    Codeine Rash      Family History   Problem Relation Age of Onset    Cancer Mother         uterine    Heart Disease Mother         afib    Heart Disease Father 53        MI    Hypertension Father       Social History:  reports that she quit smoking about 59 years ago. She smoked an average of .25 packs per day. She has never used smokeless tobacco. She reports that she does not drink alcohol and does not use drugs.   Social Determinants of Health     Tobacco Use: Medium Risk    Smoking Tobacco Use: Former    Smokeless Tobacco Use: Never    Passive Exposure: Not on file   Alcohol Use: Not on file   Financial Resource Strain: Not on file   Food Insecurity: Not  on file   Transportation Needs: Not on file   Physical Activity: Not on file   Stress: Not on file   Social Connections: Not on file   Intimate Partner Violence: Not on file   Depression: Not on file   Housing Stability: Not on file        Family and social history were personally reviewed, all pertinent and relevant details are outlined as above.    Objective:   Visit Vitals  /63 (BP 1 Location: Right upper arm, BP Patient Position: Sitting)   Pulse 85   Temp 98.5 °F (36.9 °C)   Resp 17   Ht 5' 5\" (1.651 m)   SpO2 95%   BMI 30.08 kg/m²      O2 Device: None (Room air)    PHYSICAL EXAM:   General: Alert x oriented x 3, awake, no acute distress, resting in bed  HEENT: PEERL, EOMI, moist mucus membranes  Neck: Supple, no JVD, no meningeal signs  Chest: Mildly bilateral wheezing  CVS: RRR, S1 S2 heard, no murmurs/rubs/gallops  Abd: Soft, non-tender, non-distended, +bowel sounds   Ext: No clubbing, no cyanosis, no edema  Neuro/Psych: Pleasant mood and affect, CN 2-12 grossly intact  Cap refill: Brisk, less than 3 seconds  Pulses: 2+, symmetric in all extremities  Skin: Warm, dry, without rashes or lesions    Data Review:   All diagnostic labs and studies have been reviewed.    Abnormal Labs Reviewed   CBC WITH AUTOMATED DIFF - Abnormal; Notable for the following components:       Result Value    RBC 3.37 (*)     HGB 10.6 (*)     HCT 31.9 (*)     RDW 14.7 (*)     MONOCYTES 17 (*)     IMMATURE GRANULOCYTES 2 (*)     ABS. IMM. GRANS. 0.1 (*)     All other components within normal limits   METABOLIC PANEL, COMPREHENSIVE - Abnormal; Notable for the following components:    Potassium 3.1 (*)     Chloride 110 (*)     Calcium 6.3 (*)     Protein, total 6.0 (*)     Albumin 2.2 (*)     A-G Ratio 0.6 (*)     All other components within normal limits   MAGNESIUM - Abnormal; Notable for the following components:    Magnesium 0.5 (*)     All other components within normal limits       All Micro Results       None             IMAGING:   XR CHEST PA LAT   Final Result   No acute cardiopulmonary findings.                    ECG/ECHO:    Results for orders placed or performed during the hospital encounter of 03/15/23   EKG, 12 LEAD, INITIAL   Result Value Ref Range    Ventricular Rate 107 BPM    Atrial Rate 107 BPM    P-R Interval 190 ms    QRS Duration 94 ms    Q-T Interval 326 ms    QTC Calculation (Bezet) 435 ms    Calculated P Axis 21 degrees    Calculated R Axis -28 degrees    Calculated T Axis 45 degrees    Diagnosis       Sinus tachycardia with occasional premature ventricular complexes  Inferior infarct , age undetermined  Poor R-wave Progression  Abnormal ECG  When compared with ECG of 11-MAY-2015 11:08,  premature ventricular complexes are now present  Inferior infarct is now present  Confirmed by Allegra Marrero MD. ([id number]) on 3/19/2023 5:06:31 PM          Assessment:   Given the patient's current clinical presentation, there is a high level of concern for decompensation if discharged from the emergency department. Complex decision making was performed, which includes reviewing the patient's available past medical records, laboratory results, and imaging studies.    Active Problems:    Hypoglycemia (4/16/2023)        Plan:   Sally Estrella is a 77 y.o. female past medical history of asthma, A-fib, CKD, hypertension, diabetes, hyperlipidemia, recent admission for pneumonia, who presented to the ED with chest pain found to have electrolyte derangement.     #Hypocalcemia  #Hypocalcemia  #Hypomagnesemia  - Suspect in the setting of newly prescribed lasix  - Aggressive repletion, monitor BMP q4h  - Hold lasix and albuterol   - Monitor closely on telemetry     #Non cardiac chest pain   - Negative troponin and ECG, normal chest xray  - Suspect MSK, perhaps in setting of electrolyte derangement  - Low threshold to repeat ECG and troponin if symptoms recur     #hx of Asthma  - Discharged last month for PNA and exacerbation; completed  taper  - Denies worsening sob, use of inhalers, states she is at baseline   - Continue, pulmicort, Singulair, hold home albuterol due to hypokalemia     #hx of A fib  - Rate controlled  - Recent echo unremarkable  - Continue home diltiazem     #hx of CKD  - Creatinine at baseline     #hx of HTN  - Stable, continue home diltiazem, losartan, and clonidine     #hx of HLD  - Continue home statin and zeita     #hx of DM  - Check A1C  - ISS   - hold home oral meds    Code: Full   DVT prophylaxis: subq lovenox   Diet: Diabetic       CRITICAL CARE WAS PERFORMED FOR THIS ENCOUNTER: NO.      Signed By: Randall Corona MD     April 16, 2023         Please note that this dictation may have been completed with Dragon, the computer voice recognition software.  Quite often unanticipated grammatical, syntax, homophones, and other interpretive errors are inadvertently transcribed by the computer software.  Please disregard these errors.  Please excuse any errors that have escaped final proofreading.

## 2023-04-16 NOTE — ED PROVIDER NOTES
Patient is a 77-year-old female present emergency department with left-sided chest pain, nausea.  Patient had recently been admitted to the hospital for pneumonia have been discharged had been doing well except for a dry nonproductive cough that she feels is gotten worse over the last several days patient states that the left-sided chest pain is worse with movement she denies any diaphoresis is experiencing nausea without vomiting or diarrhea.  Patient also denies any fevers.       Past Medical History:   Diagnosis Date    Asthma     Diabetes (HCC)     type 2    GERD (gastroesophageal reflux disease)     Hypertension     Ill-defined condition     high cholesterol    Ill-defined condition     seasonal allergies    Ill-defined condition     gout       Past Surgical History:   Procedure Laterality Date    HX APPENDECTOMY      HX HEENT      cataract     HX HYSTERECTOMY      HX LAP CHOLECYSTECTOMY      HX OOPHORECTOMY      benign ovarian tumor    HX OTHER SURGICAL      bladder tack    HX ROTATOR CUFF REPAIR  2015    right    HX TONSILLECTOMY  1964         Family History:   Problem Relation Age of Onset    Cancer Mother         uterine    Heart Disease Mother         afib    Heart Disease Father 53        MI    Hypertension Father        Social History     Socioeconomic History    Marital status:      Spouse name: Not on file    Number of children: Not on file    Years of education: Not on file    Highest education level: Not on file   Occupational History    Not on file   Tobacco Use    Smoking status: Former     Packs/day: 0.25     Types: Cigarettes     Quit date:      Years since quittin.3    Smokeless tobacco: Never    Tobacco comments:     smoked x 1 year   Vaping Use    Vaping Use: Never used   Substance and Sexual Activity    Alcohol use: No    Drug use: No    Sexual activity: Not on file   Other Topics Concern    Not on file   Social History Narrative    Not on file     Social  Determinants of Health     Financial Resource Strain: Not on file   Food Insecurity: Not on file   Transportation Needs: Not on file   Physical Activity: Not on file   Stress: Not on file   Social Connections: Not on file   Intimate Partner Violence: Not on file   Housing Stability: Not on file         ALLERGIES: Aspirin, Beta blocker [beta-blockers (beta-adrenergic blocking agts)], and Codeine    Review of Systems   Respiratory:  Positive for cough. Negative for shortness of breath.    Cardiovascular:  Positive for chest pain.   Gastrointestinal:  Positive for nausea.   All other systems reviewed and are negative.    Vitals:    04/16/23 1331   BP: 113/63   Pulse: 85   Resp: 17   Temp: 98.5 °F (36.9 °C)   SpO2: 95%   Height: 5' 5\" (1.651 m)            Physical Exam  Vitals and nursing note reviewed.   Constitutional:       Appearance: She is well-developed.   HENT:      Head: Normocephalic and atraumatic.   Eyes:      Extraocular Movements: Extraocular movements intact.      Pupils: Pupils are equal, round, and reactive to light.   Cardiovascular:      Rate and Rhythm: Normal rate and regular rhythm.      Heart sounds: Normal heart sounds.   Pulmonary:      Effort: Pulmonary effort is normal.      Breath sounds: Normal breath sounds.      Comments: Dry nonproductive cough  Abdominal:      Palpations: Abdomen is soft.   Musculoskeletal:         General: Normal range of motion.      Cervical back: Normal range of motion and neck supple.   Skin:     General: Skin is warm and dry.   Neurological:      General: No focal deficit present.      Mental Status: She is alert and oriented to person, place, and time.   Psychiatric:         Mood and Affect: Mood normal.         Behavior: Behavior normal.        Medical Decision Making  Hypocalcemia, hypomagnesia, atypical chest pain.  77-year-old female present emergency department for symptoms of chest pain left-sided worse with movement EKG nonischemic less concerning for ACS  however during work-up patient found to be hypocalcemic and hypomagnesia likely secondary to diuretic use due to patient's extremely critical levels of calcium and magnesium patient would benefit from IV infusions cardiac monitoring and admission.    Amount and/or Complexity of Data Reviewed  Labs: ordered.  Radiology: ordered.  ECG/medicine tests: ordered.    Risk  Prescription drug management.  Decision regarding hospitalization.      ED Course as of 04/16/23 1624   Sun Apr 16, 2023   1544 EKG shows a normal sinus rhythm with a rate of 87 no ST or T wave abnormalities to suggest ischemia or infarct.  Normal intervals, normal axis. [DA]      ED Course User Index  [DA] Jey Muller MD       Procedures      Perfect Serve Consult for Admission  4:25 PM    ED Room Number: ER10/10  Patient Name and age:  Sally CASTRO Estrella 77 y.o.  female  Working Diagnosis:   1. Chest pain, unspecified type    2. Hypocalcemia    3. Hypomagnesemia        COVID-19 Suspicion:  no  Sepsis present:  no  Reassessment needed: no  Code Status:  Full Code  Readmission: yes  Isolation Requirements:  no  Recommended Level of Care:  telemetry  Department: [place] [facility] - [phone]    Total critical care time spent exclusive of procedures:  53 minutes.

## 2023-04-16 NOTE — ED PROVIDER NOTES
Patient is a 78-year-old female present emergency department with left-sided chest pain, nausea. Patient had recently been admitted to the hospital for pneumonia have been discharged had been doing well except for a dry nonproductive cough that she feels is gotten worse over the last several days patient states that the left-sided chest pain is worse with movement she denies any diaphoresis is experiencing nausea without vomiting or diarrhea. Patient also denies any fevers.        Past Medical History:   Diagnosis Date    Asthma     Diabetes ([address]     type 2    GERD (gastroesophageal reflux disease)     Hypertension     Ill-defined condition     high cholesterol    Ill-defined condition     seasonal allergies    Ill-defined condition     gout       Past Surgical History:   Procedure Laterality Date    HX APPENDECTOMY      HX HEENT  2010    cataract     HX HYSTERECTOMY      HX LAP CHOLECYSTECTOMY      HX OOPHORECTOMY      benign ovarian tumor    HX OTHER SURGICAL  1993    bladder tack    HX ROTATOR CUFF REPAIR  2015    right    HX TONSILLECTOMY  1964         Family History:   Problem Relation Age of Onset    Cancer Mother         uterine    Heart Disease Mother         afib    Heart Disease Father 48        MI    Hypertension Father        Social History     Socioeconomic History    Marital status:      Spouse name: Not on file    Number of children: Not on file    Years of education: Not on file    Highest education level: Not on file   Occupational History    Not on file   Tobacco Use    Smoking status: Former     Packs/day: 0.25     Types: Cigarettes     Quit date:      Years since quittin.3    Smokeless tobacco: Never    Tobacco comments:     smoked x 1 year   Vaping Use    Vaping Use: Never used   Substance and Sexual Activity    Alcohol use: No    Drug use: No    Sexual activity: Not on file   Other Topics Concern    Not on file   Social History Narrative    Not on file     Social Determinants of Health     Financial Resource Strain: Not on file   Food Insecurity: Not on file   Transportation Needs: Not on file   Physical Activity: Not on file   Stress: Not on file   Social Connections: Not on file   Intimate Partner Violence: Not on file   Housing Stability: Not on file         ALLERGIES: Aspirin, Beta blocker [beta-blockers (beta-adrenergic blocking agts)], and Codeine    Review of Systems   Respiratory:  Positive for cough. Negative for shortness of breath. Cardiovascular:  Positive for chest pain. Gastrointestinal:  Positive for nausea. All other systems reviewed and are negative. Vitals:    04/16/23 1331   BP: 113/63   Pulse: 85   Resp: 17   Temp: 98.5 °F (36.9 °C)   SpO2: 95%   Height: 5' 5\" (1.651 m)            Physical Exam  Vitals and nursing note reviewed. Constitutional:       Appearance: She is well-developed. HENT:      Head: Normocephalic and atraumatic. Eyes:      Extraocular Movements: Extraocular movements intact. Pupils: Pupils are equal, round, and reactive to light. Cardiovascular:      Rate and Rhythm: Normal rate and regular rhythm. Heart sounds: Normal heart sounds. Pulmonary:      Effort: Pulmonary effort is normal.      Breath sounds: Normal breath sounds. Comments: Dry nonproductive cough  Abdominal:      Palpations: Abdomen is soft. Musculoskeletal:         General: Normal range of motion. Cervical back: Normal range of motion and neck supple. Skin:     General: Skin is warm and dry. Neurological:      General: No focal deficit present. Mental Status: She is alert and oriented to person, place, and time. Psychiatric:         Mood and Affect: Mood normal.         Behavior: Behavior normal.        Medical Decision Making  Hypocalcemia, hypomagnesia, atypical chest pain.   78-year-old female present emergency department for symptoms of chest pain left-sided worse with movement EKG nonischemic less concerning for ACS however during work-up patient found to be hypocalcemic and hypomagnesia likely secondary to diuretic use due to patient's extremely critical levels of calcium and magnesium patient would benefit from IV infusions cardiac monitoring and admission. Amount and/or Complexity of Data Reviewed  Labs: ordered. Radiology: ordered. ECG/medicine tests: ordered. Risk  Prescription drug management. Decision regarding hospitalization. ED Course as of 04/16/23 1624   Sun Apr 16, 2023   1544 EKG shows a normal sinus rhythm with a rate of 87 no ST or T wave abnormalities to suggest ischemia or infarct. Normal intervals, normal axis. [DA]      ED Course User Index  [DA] Ani Mccloud MD       Procedures      Perfect Serve Consult for Admission  4:25 PM    ED Room Number: ER10/10  Patient Name and age:  Maxime Hart 69 y.o.  female  Working Diagnosis:   1. Chest pain, unspecified type    2. Hypocalcemia    3. Hypomagnesemia        COVID-19 Suspicion:  no  Sepsis present:  no  Reassessment needed: no  Code Status:  Full Code  Readmission: yes  Isolation Requirements:  no  Recommended Level of Care:  telemetry  Department: [facility] ED - [phone]    Total critical care time spent exclusive of procedures:  53 minutes.

## 2023-04-17 VITALS
BODY MASS INDEX: 30.08 KG/M2 | RESPIRATION RATE: 21 BRPM | DIASTOLIC BLOOD PRESSURE: 63 MMHG | HEIGHT: 65 IN | SYSTOLIC BLOOD PRESSURE: 131 MMHG | TEMPERATURE: 98.1 F | HEART RATE: 75 BPM | OXYGEN SATURATION: 97 %

## 2023-04-17 LAB
ANION GAP SERPL CALC-SCNC: 5 MMOL/L (ref 5–15)
ANION GAP SERPL CALC-SCNC: 6 MMOL/L (ref 5–15)
ATRIAL RATE: 87 BPM
BUN SERPL-MCNC: 13 MG/DL (ref 6–20)
BUN SERPL-MCNC: 14 MG/DL (ref 6–20)
BUN/CREAT SERPL: 15 (ref 12–20)
BUN/CREAT SERPL: 17 (ref 12–20)
CALCIUM SERPL-MCNC: 7 MG/DL (ref 8.5–10.1)
CALCIUM SERPL-MCNC: 7.3 MG/DL (ref 8.5–10.1)
CALCULATED R AXIS, ECG10: -25 DEGREES
CALCULATED T AXIS, ECG11: 64 DEGREES
CHLORIDE SERPL-SCNC: 110 MMOL/L (ref 97–108)
CHLORIDE SERPL-SCNC: 111 MMOL/L (ref 97–108)
CO2 SERPL-SCNC: 23 MMOL/L (ref 21–32)
CO2 SERPL-SCNC: 24 MMOL/L (ref 21–32)
CREAT SERPL-MCNC: 0.81 MG/DL (ref 0.55–1.02)
CREAT SERPL-MCNC: 0.84 MG/DL (ref 0.55–1.02)
DIAGNOSIS, 93000: NORMAL
ERYTHROCYTE [DISTWIDTH] IN BLOOD BY AUTOMATED COUNT: 14.6 % (ref 11.5–14.5)
GLUCOSE BLD STRIP.AUTO-MCNC: 128 MG/DL (ref 65–117)
GLUCOSE BLD STRIP.AUTO-MCNC: 94 MG/DL (ref 65–117)
GLUCOSE SERPL-MCNC: 99 MG/DL (ref 65–100)
GLUCOSE SERPL-MCNC: 99 MG/DL (ref 65–100)
HCT VFR BLD AUTO: 30 % (ref 35–47)
HGB BLD-MCNC: 9.9 G/DL (ref 11.5–16)
MAGNESIUM SERPL-MCNC: 1.1 MG/DL (ref 1.6–2.4)
MCH RBC QN AUTO: 31.3 PG (ref 26–34)
MCHC RBC AUTO-ENTMCNC: 33 G/DL (ref 30–36.5)
MCV RBC AUTO: 94.9 FL (ref 80–99)
NRBC # BLD: 0 K/UL (ref 0–0.01)
NRBC BLD-RTO: 0 PER 100 WBC
P-R INTERVAL, ECG05: 154 MS
PLATELET # BLD AUTO: 330 K/UL (ref 150–400)
PMV BLD AUTO: 9.2 FL (ref 8.9–12.9)
POTASSIUM SERPL-SCNC: 3.3 MMOL/L (ref 3.5–5.1)
POTASSIUM SERPL-SCNC: 3.4 MMOL/L (ref 3.5–5.1)
Q-T INTERVAL, ECG07: 398 MS
QRS DURATION, ECG06: 96 MS
QTC CALCULATION (BEZET), ECG08: 478 MS
RBC # BLD AUTO: 3.16 M/UL (ref 3.8–5.2)
SERVICE CMNT-IMP: ABNORMAL
SERVICE CMNT-IMP: NORMAL
SODIUM SERPL-SCNC: 139 MMOL/L (ref 136–145)
SODIUM SERPL-SCNC: 140 MMOL/L (ref 136–145)
VENTRICULAR RATE, ECG03: 87 BPM
WBC # BLD AUTO: 5.2 K/UL (ref 3.6–11)

## 2023-04-17 PROCEDURE — 74011000250 HC RX REV CODE- 250: Performed by: STUDENT IN AN ORGANIZED HEALTH CARE EDUCATION/TRAINING PROGRAM

## 2023-04-17 PROCEDURE — 74011250637 HC RX REV CODE- 250/637: Performed by: INTERNAL MEDICINE

## 2023-04-17 PROCEDURE — 74011250637 HC RX REV CODE- 250/637: Performed by: NURSE PRACTITIONER

## 2023-04-17 PROCEDURE — 74011250636 HC RX REV CODE- 250/636: Performed by: INTERNAL MEDICINE

## 2023-04-17 PROCEDURE — 83735 ASSAY OF MAGNESIUM: CPT

## 2023-04-17 PROCEDURE — 94761 N-INVAS EAR/PLS OXIMETRY MLT: CPT

## 2023-04-17 PROCEDURE — 74011250637 HC RX REV CODE- 250/637: Performed by: STUDENT IN AN ORGANIZED HEALTH CARE EDUCATION/TRAINING PROGRAM

## 2023-04-17 PROCEDURE — 94640 AIRWAY INHALATION TREATMENT: CPT

## 2023-04-17 PROCEDURE — 85027 COMPLETE CBC AUTOMATED: CPT

## 2023-04-17 PROCEDURE — 74011250636 HC RX REV CODE- 250/636: Performed by: STUDENT IN AN ORGANIZED HEALTH CARE EDUCATION/TRAINING PROGRAM

## 2023-04-17 PROCEDURE — 80048 BASIC METABOLIC PNL TOTAL CA: CPT

## 2023-04-17 PROCEDURE — 82962 GLUCOSE BLOOD TEST: CPT

## 2023-04-17 PROCEDURE — 36415 COLL VENOUS BLD VENIPUNCTURE: CPT

## 2023-04-17 RX ORDER — POTASSIUM CHLORIDE 750 MG/1
40 TABLET, FILM COATED, EXTENDED RELEASE ORAL
Status: COMPLETED | OUTPATIENT
Start: 2023-04-17 | End: 2023-04-17

## 2023-04-17 RX ORDER — LOSARTAN POTASSIUM 50 MG/1
100 TABLET ORAL DAILY
Status: DISCONTINUED | OUTPATIENT
Start: 2023-04-18 | End: 2023-04-17 | Stop reason: HOSPADM

## 2023-04-17 RX ORDER — MAGNESIUM SULFATE HEPTAHYDRATE 40 MG/ML
4 INJECTION, SOLUTION INTRAVENOUS ONCE
Status: COMPLETED | OUTPATIENT
Start: 2023-04-17 | End: 2023-04-17

## 2023-04-17 RX ORDER — HYDRALAZINE HYDROCHLORIDE 20 MG/ML
10 INJECTION INTRAMUSCULAR; INTRAVENOUS
Status: DISCONTINUED | OUTPATIENT
Start: 2023-04-17 | End: 2023-04-17 | Stop reason: HOSPADM

## 2023-04-17 RX ORDER — LOSARTAN POTASSIUM 50 MG/1
100 TABLET ORAL DAILY
Qty: 60 TABLET | Refills: 0 | Status: SHIPPED
Start: 2023-04-17

## 2023-04-17 RX ADMIN — POTASSIUM CHLORIDE 40 MEQ: 750 TABLET, FILM COATED, EXTENDED RELEASE ORAL at 13:02

## 2023-04-17 RX ADMIN — IPRATROPIUM BROMIDE 0.5 MG: 0.5 SOLUTION RESPIRATORY (INHALATION) at 13:21

## 2023-04-17 RX ADMIN — MONTELUKAST 10 MG: 10 TABLET, FILM COATED ORAL at 08:34

## 2023-04-17 RX ADMIN — LOSARTAN POTASSIUM 50 MG: 50 TABLET, FILM COATED ORAL at 08:34

## 2023-04-17 RX ADMIN — PANTOPRAZOLE SODIUM 40 MG: 40 TABLET, DELAYED RELEASE ORAL at 08:34

## 2023-04-17 RX ADMIN — ENOXAPARIN SODIUM 40 MG: 100 INJECTION SUBCUTANEOUS at 08:34

## 2023-04-17 RX ADMIN — IPRATROPIUM BROMIDE 0.5 MG: 0.5 SOLUTION RESPIRATORY (INHALATION) at 07:55

## 2023-04-17 RX ADMIN — ACETAMINOPHEN 650 MG: 325 TABLET ORAL at 04:52

## 2023-04-17 RX ADMIN — ACETAMINOPHEN 650 MG: 325 TABLET ORAL at 13:02

## 2023-04-17 RX ADMIN — MAGNESIUM SULFATE HEPTAHYDRATE 4 G: 40 INJECTION, SOLUTION INTRAVENOUS at 13:02

## 2023-04-17 RX ADMIN — CLONIDINE HYDROCHLORIDE 0.1 MG: 0.1 TABLET ORAL at 08:34

## 2023-04-17 RX ADMIN — ACETAMINOPHEN 650 MG: 325 TABLET ORAL at 08:40

## 2023-04-17 RX ADMIN — BUDESONIDE 250 MCG: 0.5 INHALANT RESPIRATORY (INHALATION) at 08:05

## 2023-04-17 RX ADMIN — DILTIAZEM HYDROCHLORIDE 240 MG: 120 CAPSULE, COATED, EXTENDED RELEASE ORAL at 08:34

## 2023-04-17 NOTE — ED NOTES
I have reviewed discharge instructions with the patient. The patient verbalized understanding. Patient IV taken out, helped with dressing, patient wheeled out to vehicle by PCT.

## 2023-04-17 NOTE — ED NOTES
Report received from [address] [place] [address],[address]. Care assumed of the patient, report that the patient will be discharged post magnesium infusion. Patient noted to be in no acute distress at this time.

## 2023-04-17 NOTE — DISCHARGE SUMMARY
Hospitalist Discharge Summary     Patient ID:    Mary Leyva  [id number]  49 y.o.  1945    Admit date of service: 4/16/2023    Discharge date of service: 4/17/2023    Admission Diagnoses: Hypoglycemia [E16.2]    Chronic Diagnoses:    Problem List as of 4/17/2023 Never Reviewed            Codes Class Noted - Resolved    Hypoglycemia ICD-10-CM: E16.2  ICD-9-CM: 251.2  4/16/2023 - Present        Sepsis ([address] ICD-10-CM: A41.9  ICD-9-CM: 038.9, 995.91  3/16/2023 - Present        Vasovagal syncope ICD-10-CM: R55  ICD-9-CM: 780.2  3/15/2023 - Present        Rotator cuff rupture ICD-10-CM: M75.100  ICD-9-CM: 840.4  5/19/2015 - Present           Discharge Medications:   Current Discharge Medication List        CONTINUE these medications which have CHANGED    Details   losartan (Cozaar) 50 mg tablet Take 2 Tablets by mouth daily. Qty: 60 Tablet, Refills: 0           CONTINUE these medications which have NOT CHANGED    Details   traZODone (DESYREL) 50 mg tablet Take  by mouth nightly. dilTIAZem ER (CARDIZEM CD) 300 mg capsule Take 1 Capsule by mouth daily for 30 days. Qty: 30 Capsule, Refills: 0      omeprazole (PRILOSEC OTC) 20 mg tablet Take 20 mg by mouth two (2) times a day. cloNIDine HCL (CATAPRES) 0.1 mg tablet Take 0.1 mg by mouth two (2) times a day. evolocumab (Repatha SureClick) pen injection 596 mg by SubCUTAneous route every fourteen (14) days. folic acid/multivit-min/lutein (CENTRUM SILVER PO) Take 1 Tablet by mouth daily. glucosamine HCl/chondroitin rico (GLUCOSAMINE-CHONDROITIN PO) Take 1 Capsule by mouth two (2) times a day. 1500/1000      MAGNESIUM CHLORIDE PO Take 225 mg by mouth two (2) times a day. tiotropium bromide (SPIRIVA RESPIMAT) 1.25 mcg/actuation inhaler Take 2 Puffs by inhalation daily. fluticasone propion-salmeteroL (ADVAIR/WIXELA) 500-50 mcg/dose diskus inhaler Take 2 Puffs by inhalation every twelve (12) hours.       melatonin 3 mg cap capsule Take 6 mg by mouth nightly. Vaakwskb-Aamf-Vgydzh-Hyalur Ac 225-358-33-2 mg cap Take  by mouth two (2) times a day. therapeutic multivitamin (THERAGRAN) tablet Take 1 Tab by mouth daily. albuterol sulfate (PROVENTIL;VENTOLIN) 2.5 mg/0.5 mL nebu nebulizer solution by Nebulization route every four (4) hours as needed for Wheezing. SITagliptin-metFORMIN (JANUMET)  mg per tablet Take 1 Tab by mouth daily. fluticasone propionate (FLONASE) 50 mcg/actuation nasal spray 2 Sprays by Both Nostrils route daily. ezetimibe (ZETIA) 10 mg tablet Take 10 mg by mouth every evening. atorvastatin (LIPITOR) 80 mg tablet Take 40 mg by mouth every evening.      montelukast (SINGULAIR) 10 mg tablet Take 10 mg by mouth daily. STOP taking these medications       furosemide (LASIX) 20 mg tablet Comments:   Reason for Stopping:         predniSONE (STERAPRED DS) 10 mg dose pack Comments:   Reason for Stopping:         azilsartan medoxomiL (EDARBI) 80 mg tab tablet Comments:   Reason for Stopping: Follow up Care:    1. Nicole White MD in 1-2 weeks  2. Diet:  Cardiac Diet    Disposition:  Home. Advanced Directive:    Discharge Exam:  See today's note. CONSULTATIONS: None    Significant Diagnostic Studies:   Recent Labs     04/17/23  0049 04/16/23  1330   WBC 5.2 5.5   HGB 9.9* 10.6*   HCT 30.0* 31.9*    342     Recent Labs     04/17/23  0458 04/17/23  0049 04/16/23  1929 04/16/23  1330    140 140 140   K 3.4* 3.3* 3.1* 3.1*   * 111* 110* 110*   CO2 24 23 26 25   BUN 14 13 13 13   CREA 0.81 0.84 0.97 0.94   GLU 99 99 118* 98   CA 7.3* 7.0* 6.4* 6.3*   MG  --  1.1*  --  0.5*     Recent Labs     04/16/23  1330   ALT 32   AP 85   TBILI 0.5   TP 6.0*   ALB 2.2*   GLOB 3.8     No results for input(s): INR, PTP, APTT, INREXT in the last 72 hours. No results for input(s): FE, TIBC, PSAT, FERR in the last 72 hours.    No results for input(s): PH, PCO2, PO2 in the last 72 hours. No results for input(s): CPK, CKMB in the last 72 hours. No lab exists for component: TROPONINI  Lab Results   Component Value Date/Time    Glucose (POC) 128 (H) 04/17/2023 11:30 AM    Glucose (POC) 94 04/17/2023 07:25 AM    Glucose (POC) 93 04/16/2023 10:37 PM    Glucose (POC) 112 03/31/2023 05:06 PM    Glucose (POC) 93 03/31/2023 11:28 AM             HOSPITAL COURSE & TREATMENT RENDERED:   Hypokalemia/Hypocalcemia/ Hypomagnesemia. replete. Monitor BMP. Stop lasix, which she has been taking for leg swelling. Telemetry monitor. 2.  LT shoulder pain. Likely MSK pain. Negative troponin and ECG, normal chest xray     3.  chronic A fib. Rate controlled. Recent echo unremarkable. Continue home diltiazem      4. Asthma. Not on exacerbation. Continue, pulmicort, Singulair,     5. HTN. Stable, continue home diltiazem, losartan, and clonidine      6. HLD. Continue home statin and zeita      7. DM. A1C 5.8. cont home meds        Discharged in improved condition.     Spent 35 minutes    Signed:  Sarbjit Nunez MD  4/17/2023  1:25 PM

## 2023-04-17 NOTE — ED NOTES
Bedside shift change report given to Katie Tavera RN (oncoming nurse) by Kelli Maurer RN (offgoing nurse). Report included the following information SBAR, Kardex, MAR, and Med Rec Status.

## 2023-04-17 NOTE — ED NOTES
Bedside shift change report given to Sincere Macias RN (oncoming nurse) by [facility] [place], RN (offgoing nurse). Report included the following information SBAR, Kardex, MAR, and Med Rec Status.

## 2023-04-17 NOTE — DISCHARGE INSTRUCTIONS
ACUTE DIAGNOSES:  Hypoglycemia [E16.2]    CHRONIC MEDICAL DIAGNOSES:  Problem List as of 4/17/2023 Never Reviewed            Codes Class Noted - Resolved    Hypoglycemia ICD-10-CM: E16.2  ICD-9-CM: 251.2  4/16/2023 - Present        Sepsis ([facility] [address] ICD-10-CM: A41.9  ICD-9-CM: 038.9, 995.91  3/16/2023 - Present        Vasovagal syncope ICD-10-CM: R55  ICD-9-CM: 780.2  3/15/2023 - Present        Rotator cuff rupture ICD-10-CM: M75.100  ICD-9-CM: 840.4  5/19/2015 - Present           DISCHARGE MEDICATIONS:          It is important that you take the medication exactly as they are prescribed. Keep your medication in the bottles provided by the pharmacist and keep a list of the medication names, dosages, and times to be taken in your wallet. Do not take other medications without consulting your doctor. DIET:  Cardiac Diet    ACTIVITY: Activity as tolerated    ADDITIONAL INFORMATION: If you experience any of the following symptoms then please call your primary care physician or return to the emergency room if you cannot get hold of your doctor: Fever, chills, nausea, vomiting, diarrhea, change in mentation, falling, bleeding, shortness of breath. FOLLOW UP CARE:  Dr. Vijay Downey MD  you are to call and set up an appointment to see them in 5 days. Information obtained by :  I understand that if any problems occur once I am at home I am to contact my physician. I understand and acknowledge receipt of the instructions indicated above.                                                                                                                                            Physician's or R.N.'s Signature                                                                  Date/Time                                                                                                                                              Patient or Representative Signature Date/Time

## 2023-04-17 NOTE — DISCHARGE INSTRUCTIONS
ACUTE DIAGNOSES:  Hypoglycemia [E16.2]    CHRONIC MEDICAL DIAGNOSES:  Problem List as of 4/17/2023 Never Reviewed            Codes Class Noted - Resolved    Hypoglycemia ICD-10-CM: E16.2  ICD-9-CM: 251.2  4/16/2023 - Present        Sepsis ([facility] [address] ICD-10-CM: A41.9  ICD-9-CM: 038.9, 995.91  3/16/2023 - Present        Vasovagal syncope ICD-10-CM: R55  ICD-9-CM: 780.2  3/15/2023 - Present        Rotator cuff rupture ICD-10-CM: M75.100  ICD-9-CM: 840.4  5/19/2015 - Present           DISCHARGE MEDICATIONS:          It is important that you take the medication exactly as they are prescribed. Keep your medication in the bottles provided by the pharmacist and keep a list of the medication names, dosages, and times to be taken in your wallet. Do not take other medications without consulting your doctor. DIET:  Cardiac Diet    ACTIVITY: Activity as tolerated    ADDITIONAL INFORMATION: If you experience any of the following symptoms then please call your primary care physician or return to the emergency room if you cannot get hold of your doctor: Fever, chills, nausea, vomiting, diarrhea, change in mentation, falling, bleeding, shortness of breath. FOLLOW UP CARE:  Dr. Rachana Mccall MD  you are to call and set up an appointment to see them in 5 days. Information obtained by :  I understand that if any problems occur once I am at home I am to contact my physician. I understand and acknowledge receipt of the instructions indicated above.                                                                                                                                            Physician's or R.N.'s Signature                                                                  Date/Time                                                                                                                                              Patient or Representative Signature Date/Time

## 2023-04-17 NOTE — PROGRESS NOTES
4/17/2023  9:35 AM  Case management note    Reason for Readmission:     hypoglycemia     /15/2023 -3/31/2023  syncope    Patient came to hospital for chest pain and nausea. Patient discharged to [facility]. She was discharged 1 week ago. She has [facility] with [facility]. Patient has history of asthma, afib, CKD, HTN, DM and HDL. [facility] @ [facility]         RUR Score/Risk Level:     14%    PCP: First and Last name:  Olinda Lilly   Name of Practice:     Are you a current patient: Yes/No:    Approximate date of last visit: 3 months ago   Can you participate in a virtual visit with your PCP:     Is a Care Conference indicated:       Did you attend your follow up appointment (s): If not, why not: appointment this week         Resources/supports as identified by patient/family:   spouse at bedside       Top Challenges facing patient (as identified by patient/family and CM): Finances/Medication cost?       Transportation        Support system or lack thereof? Living arrangements? Self-care/ADLs/Cognition? Current Advanced Directive/Advance Care Plan:   AD on file              Plan for utilizing home health:   [facility]             Transition of Care Plan:    Based on readmission, the patient's previous Plan of Care   has been evaluated and/or modified. The current Transition of Care Plan is:                             Home with family assistance  PCP follow up  Resumption of   CM to follow for discharge needs    Care Management Interventions  PCP Verified by CM:  Yes Olinda Lilly MD)  Support Systems: Spouse/Significant Other  Confirm Follow Up Transport: Family  The Plan for Transition of Care is Related to the Following Treatment Goals : hypoglycemia  Discharge Location  Patient Expects to be Discharged to[de-identified] Home with family assistance    Readmission Assessment  Number of days since last admission?: 8-30 days  Previous disposition: Acute Rehab  Who is being interviewed?: Patient  What was the patient's/caregiver's perception as to why they think they needed to return back to the hospital?: Other (Comment) (chest pain)  Did you visit your Primary Care Physician after you left the hospital, before you returned this time?: No (appointment is scheduled for this week, only home 1 week after rehab)  Why weren't you able to visit your PCP?: Other (Comment)  Did you see a specialist, such as Cardiac, Pulmonary, Orthopedic Physician, etc. after you left the hospital?: No  Who advised the patient to return to the hospital?: Self-referral  Does the patient report anything that got in the way of taking their medications?: No  John HANLEYRT

## 2023-04-17 NOTE — PROGRESS NOTES
Jeffrey Cleary [facility] [place] [facility]  [address] [facility], [place], [address] [place] [address]  [phone]      Hospitalist Progress Note      NAME: Kenny Cardoso   :  1945  MRM:  [id number]    Date of service: 2023  12:16 PM       Assessment and Plan:   Hypokalemia/Hypocalcemia/ Hypomagnesemia. replete. Monitor BMP. Stop lasix, which she is taking for leg swelling. Telemetry monitor. 2.  Non cardiac chest pain. Likely MSK pain. Negative troponin and ECG, normal chest xray     3.  chronic A fib. Rate controlled. Recent echo unremarkable. Continue home diltiazem      4. Asthma. Not on exacerbation. Continue, pulmicort, Singulair,     5. HTN. Stable, continue home diltiazem, losartan, and clonidine      6. HLD. Continue home statin and zeita      7. DM. A1C 5.8. cont SSI         Subjective:     Chief Complaint[de-identified] Patient was seen and examined as a follow up for electrolytes abnormalities. Chart was reviewed. fells well     ROS:  (bold if positive, if negative)    Tolerating PT  Tolerating Diet        Objective:     Last 24hrs VS reviewed since prior progress note.  Most recent are:    Visit Vitals  BP (!) 172/77   Pulse 93   Temp 98.3 °F (36.8 °C)   Resp 25   Ht 5' 5\" (1.651 m)   SpO2 95%   BMI 30.08 kg/m²     SpO2 Readings from Last 6 Encounters:   23 95%   23 95%   22 99%   16 96%   05/19/15 93%   05/11/15 97%          Intake/Output Summary (Last 24 hours) at 2023 1216  Last data filed at 2023 0800  Gross per 24 hour   Intake 200 ml   Output --   Net 200 ml        Physical Exam:    Gen:  Well-developed, well-nourished, in no acute distress  HEENT:  Pink conjunctivae, PERRL, hearing intact to voice, moist mucous membranes  Neck:  Supple, without masses, thyroid non-tender  Resp:  No accessory muscle use, clear breath sounds without wheezes rales or rhonchi  Card:  No murmurs, normal S1, S2 without thrills, bruits or peripheral edema  Abd:  Soft, non-tender, non-distended, normoactive bowel sounds are present, no palpable organomegaly and no detectable hernias  Lymph:  No cervical or inguinal adenopathy  Musc:  No cyanosis or clubbing  Skin:  No rashes or ulcers, skin turgor is good  Neuro:  Cranial nerves are grossly intact, no focal motor weakness, follows commands appropriately  Psych:  Good insight, oriented to person, place and time, alert  __________________________________________________________________  Medications Reviewed: (see below)  Medications:     Current Facility-Administered Medications   Medication Dose Route Frequency    potassium chloride SR (KLOR-CON 10) tablet 40 mEq  40 mEq Oral NOW    [START ON 4/18/2023] losartan (COZAAR) tablet 100 mg  100 mg Oral DAILY    magnesium sulfate 4 g/100 mL IVPB  4 g IntraVENous ONCE    sodium chloride (NS) flush 5-40 mL  5-40 mL IntraVENous Q8H    sodium chloride (NS) flush 5-40 mL  5-40 mL IntraVENous PRN    atorvastatin (LIPITOR) tablet 40 mg  40 mg Oral QPM    cloNIDine HCL (CATAPRES) tablet 0.1 mg  0.1 mg Oral BID    dilTIAZem ER (CARDIZEM CD) capsule 240 mg  240 mg Oral DAILY    budesonide (PULMICORT) 500 mcg/2 ml nebulizer suspension  250 mcg Nebulization BID RT    fluticasone propionate (FLONASE) 50 mcg/actuation nasal spray 2 Spray  2 Spray Both Nostrils DAILY    montelukast (SINGULAIR) tablet 10 mg  10 mg Oral DAILY    pantoprazole (PROTONIX) tablet 40 mg  40 mg Oral ACB    insulin lispro (HUMALOG) injection   SubCUTAneous AC&HS    glucose chewable tablet 16 g  4 Tablet Oral PRN    glucagon (GLUCAGEN) injection 1 mg  1 mg IntraMUSCular PRN    dextrose 10% infusion 0-250 mL  0-250 mL IntraVENous PRN    enoxaparin (LOVENOX) injection 40 mg  40 mg SubCUTAneous DAILY    ipratropium (ATROVENT) 0.02 % nebulizer solution 0.5 mg  0.5 mg Nebulization Q6HWA RT    acetaminophen (TYLENOL) tablet 650 mg  650 mg Oral Q4H PRN     Current Outpatient Medications   Medication Sig    furosemide (LASIX) 20 mg tablet Take  by mouth daily. losartan (Cozaar) 50 mg tablet Take  by mouth daily. traZODone (DESYREL) 50 mg tablet Take  by mouth nightly. dilTIAZem ER (CARDIZEM CD) 300 mg capsule Take 1 Capsule by mouth daily for 30 days. (Patient taking differently: Take 240 mg by mouth daily.)    predniSONE (STERAPRED DS) 10 mg dose pack See administration instruction per 10mg dose pack    omeprazole (PRILOSEC OTC) 20 mg tablet Take 20 mg by mouth two (2) times a day. azilsartan medoxomiL (EDARBI) 80 mg tab tablet Take 40 mg by mouth daily. (Patient not taking: Reported on 4/16/2023)    cloNIDine HCL (CATAPRES) 0.1 mg tablet Take 0.1 mg by mouth two (2) times a day. evolocumab (Repatha SureClick) pen injection 706 mg by SubCUTAneous route every fourteen (14) days. folic acid/multivit-min/lutein (CENTRUM SILVER PO) Take 1 Tablet by mouth daily. glucosamine HCl/chondroitin rico (GLUCOSAMINE-CHONDROITIN PO) Take 1 Capsule by mouth two (2) times a day. 1500/1000    MAGNESIUM CHLORIDE PO Take 225 mg by mouth two (2) times a day. tiotropium bromide (SPIRIVA RESPIMAT) 1.25 mcg/actuation inhaler Take 2 Puffs by inhalation daily. fluticasone propion-salmeteroL (ADVAIR/WIXELA) 500-50 mcg/dose diskus inhaler Take 2 Puffs by inhalation every twelve (12) hours. melatonin 3 mg cap capsule Take 6 mg by mouth nightly. Pwqvqaxk-Lipd-Usfdms-Hyalur Ac [id number] mg cap Take  by mouth two (2) times a day. therapeutic multivitamin (THERAGRAN) tablet Take 1 Tab by mouth daily. albuterol sulfate (PROVENTIL;VENTOLIN) 2.5 mg/0.5 mL nebu nebulizer solution by Nebulization route every four (4) hours as needed for Wheezing. SITagliptin-metFORMIN (JANUMET)  mg per tablet Take 1 Tab by mouth daily. fluticasone propionate (FLONASE) 50 mcg/actuation nasal spray 2 Sprays by Both Nostrils route daily. ezetimibe (ZETIA) 10 mg tablet Take 10 mg by mouth every evening.     atorvastatin (LIPITOR) 80 mg tablet Take 40 mg by mouth every evening.    montelukast (SINGULAIR) 10 mg tablet Take 10 mg by mouth daily. Lab Data Reviewed: (see below)  Lab Review:     Recent Labs     04/17/23  0049 04/16/23  1330   WBC 5.2 5.5   HGB 9.9* 10.6*   HCT 30.0* 31.9*    342     Recent Labs     04/17/23  0458 04/17/23  0049 04/16/23  1929 04/16/23  1330    140 140 140   K 3.4* 3.3* 3.1* 3.1*   * 111* 110* 110*   CO2 24 23 26 25   GLU 99 99 118* 98   BUN 14 13 13 13   CREA 0.81 0.84 0.97 0.94   CA 7.3* 7.0* 6.4* 6.3*   MG  --  1.1*  --  0.5*   ALB  --   --   --  2.2*   TBILI  --   --   --  0.5   ALT  --   --   --  32     Lab Results   Component Value Date/Time    Glucose (POC) 128 (H) 04/17/2023 11:30 AM    Glucose (POC) 94 04/17/2023 07:25 AM    Glucose (POC) 93 04/16/2023 10:37 PM    Glucose (POC) 112 03/31/2023 05:06 PM    Glucose (POC) 93 03/31/2023 11:28 AM     No results for input(s): PH, PCO2, PO2, HCO3, FIO2 in the last 72 hours. No results for input(s): INR, INREXT in the last 72 hours. All Micro Results       None            I have reviewed notes of prior 24hr. Other pertinent lab: Total time: -35- minutes **I personally saw and examined the patient during this time period**    I personally reviewed chart, notes, data and current medications in the medical record. I have personally examined and treated the patient at bedside during this period.                  Care Plan discussed with: Patient, Nursing Staff, and >50% of time spent in counseling and coordination of care    Discussed:  Care Plan    Prophylaxis:  Lovenox    Disposition:  Home w/Family           ___________________________________________________    Attending Physician: Tuan Kaur MD

## 2023-04-17 NOTE — ED NOTES
Report received from [address] [place] [address],[address]. Care assumed of the patient, report that the patient will be discharged post magnesium infusion. Patient noted to be in no acute distress at this time.

## 2023-04-17 NOTE — PROGRESS NOTES
4/17/2023  9:35 AM  Case management note    Reason for Readmission:     hypoglycemia     /15/2023 -3/31/2023  syncope    Patient came to hospital for chest pain and nausea. Patient discharged to [facility]. She was discharged 1 week ago. She has [facility] with [facility]. Patient has history of asthma, afib, CKD, HTN, DM and HDL. [facility]S @ [facility]         RUR Score/Risk Level:     14%    PCP: First and Last name:  Diana Holder   Name of Practice:     Are you a current patient: Yes/No:    Approximate date of last visit: 3 months ago   Can you participate in a virtual visit with your PCP:     Is a Care Conference indicated:       Did you attend your follow up appointment (s): If not, why not: appointment this week         Resources/supports as identified by patient/family:   spouse at bedside       Top Challenges facing patient (as identified by patient/family and CM): Finances/Medication cost?       Transportation        Support system or lack thereof? Living arrangements? Self-care/ADLs/Cognition? Current Advanced Directive/Advance Care Plan:   AD on file              Plan for utilizing home health:   [facility]             Transition of Care Plan:    Based on readmission, the patient's previous Plan of Care   has been evaluated and/or modified. The current Transition of Care Plan is:                             Home with family assistance  PCP follow up  Resumption of   CM to follow for discharge needs    Care Management Interventions  PCP Verified by CM:  Yes Diana Holder MD)  Support Systems: Spouse/Significant Other  Confirm Follow Up Transport: Family  The Plan for Transition of Care is Related to the Following Treatment Goals : hypoglycemia  Discharge Location  Patient Expects to be Discharged to[de-identified] Home with family assistance    Readmission Assessment  Number of days since last admission?: 8-30 days  Previous disposition: Acute Rehab  Who is being interviewed?: Patient  What was the patient's/caregiver's perception as to why they think they needed to return back to the hospital?: Other (Comment) (chest pain)  Did you visit your Primary Care Physician after you left the hospital, before you returned this time?: No (appointment is scheduled for this week, only home 1 week after rehab)  Why weren't you able to visit your PCP?: Other (Comment)  Did you see a specialist, such as Cardiac, Pulmonary, Orthopedic Physician, etc. after you left the hospital?: No  Who advised the patient to return to the hospital?: Self-referral  Does the patient report anything that got in the way of taking their medications?: No  Kathya BENITEZ home

## 2023-04-17 NOTE — DISCHARGE SUMMARY
Hospitalist Discharge Summary     Patient ID:    Jesus Kelley  [id number]  65 y.o.  1945    Admit date of service: 4/16/2023    Discharge date of service: 4/17/2023    Admission Diagnoses: Hypoglycemia [E16.2]    Chronic Diagnoses:    Problem List as of 4/17/2023 Never Reviewed            Codes Class Noted - Resolved    Hypoglycemia ICD-10-CM: E16.2  ICD-9-CM: 251.2  4/16/2023 - Present        Sepsis ([address] ICD-10-CM: A41.9  ICD-9-CM: 038.9, 995.91  3/16/2023 - Present        Vasovagal syncope ICD-10-CM: R55  ICD-9-CM: 780.2  3/15/2023 - Present        Rotator cuff rupture ICD-10-CM: M75.100  ICD-9-CM: 840.4  5/19/2015 - Present           Discharge Medications:   Current Discharge Medication List        CONTINUE these medications which have CHANGED    Details   losartan (Cozaar) 50 mg tablet Take 2 Tablets by mouth daily. Qty: 60 Tablet, Refills: 0           CONTINUE these medications which have NOT CHANGED    Details   traZODone (DESYREL) 50 mg tablet Take  by mouth nightly. dilTIAZem ER (CARDIZEM CD) 300 mg capsule Take 1 Capsule by mouth daily for 30 days. Qty: 30 Capsule, Refills: 0      omeprazole (PRILOSEC OTC) 20 mg tablet Take 20 mg by mouth two (2) times a day. cloNIDine HCL (CATAPRES) 0.1 mg tablet Take 0.1 mg by mouth two (2) times a day. evolocumab (Repatha SureClick) pen injection 067 mg by SubCUTAneous route every fourteen (14) days. folic acid/multivit-min/lutein (CENTRUM SILVER PO) Take 1 Tablet by mouth daily. glucosamine HCl/chondroitin rico (GLUCOSAMINE-CHONDROITIN PO) Take 1 Capsule by mouth two (2) times a day. 1500/1000      MAGNESIUM CHLORIDE PO Take 225 mg by mouth two (2) times a day. tiotropium bromide (SPIRIVA RESPIMAT) 1.25 mcg/actuation inhaler Take 2 Puffs by inhalation daily. fluticasone propion-salmeteroL (ADVAIR/WIXELA) 500-50 mcg/dose diskus inhaler Take 2 Puffs by inhalation every twelve (12) hours.       melatonin 3 mg cap capsule Take 6 mg by mouth nightly. Ccryxngx-Fipm-Vvvjqh-Hyalur Ac [id number] mg cap Take  by mouth two (2) times a day. therapeutic multivitamin (THERAGRAN) tablet Take 1 Tab by mouth daily. albuterol sulfate (PROVENTIL;VENTOLIN) 2.5 mg/0.5 mL nebu nebulizer solution by Nebulization route every four (4) hours as needed for Wheezing. SITagliptin-metFORMIN (JANUMET)  mg per tablet Take 1 Tab by mouth daily. fluticasone propionate (FLONASE) 50 mcg/actuation nasal spray 2 Sprays by Both Nostrils route daily. ezetimibe (ZETIA) 10 mg tablet Take 10 mg by mouth every evening. atorvastatin (LIPITOR) 80 mg tablet Take 40 mg by mouth every evening.      montelukast (SINGULAIR) 10 mg tablet Take 10 mg by mouth daily. STOP taking these medications       furosemide (LASIX) 20 mg tablet Comments:   Reason for Stopping:         predniSONE (STERAPRED DS) 10 mg dose pack Comments:   Reason for Stopping:         azilsartan medoxomiL (EDARBI) 80 mg tab tablet Comments:   Reason for Stopping: Follow up Care:    1. Konstantin Oquendo MD in 1-2 weeks  2. Diet:  Cardiac Diet    Disposition:  Home. Advanced Directive:    Discharge Exam:  See today's note. CONSULTATIONS: None    Significant Diagnostic Studies:   Recent Labs     04/17/23  0049 04/16/23  1330   WBC 5.2 5.5   HGB 9.9* 10.6*   HCT 30.0* 31.9*    342     Recent Labs     04/17/23  0458 04/17/23  0049 04/16/23  1929 04/16/23  1330    140 140 140   K 3.4* 3.3* 3.1* 3.1*   * 111* 110* 110*   CO2 24 23 26 25   BUN 14 13 13 13   CREA 0.81 0.84 0.97 0.94   GLU 99 99 118* 98   CA 7.3* 7.0* 6.4* 6.3*   MG  --  1.1*  --  0.5*     Recent Labs     04/16/23  1330   ALT 32   AP 85   TBILI 0.5   TP 6.0*   ALB 2.2*   GLOB 3.8     No results for input(s): INR, PTP, APTT, INREXT in the last 72 hours. No results for input(s): FE, TIBC, PSAT, FERR in the last 72 hours.    No results for input(s): PH, PCO2, PO2 in the last 72 hours. No results for input(s): CPK, CKMB in the last 72 hours. No lab exists for component: TROPONINI  Lab Results   Component Value Date/Time    Glucose (POC) 128 (H) 04/17/2023 11:30 AM    Glucose (POC) 94 04/17/2023 07:25 AM    Glucose (POC) 93 04/16/2023 10:37 PM    Glucose (POC) 112 03/31/2023 05:06 PM    Glucose (POC) 93 03/31/2023 11:28 AM             HOSPITAL COURSE & TREATMENT RENDERED:   Hypokalemia/Hypocalcemia/ Hypomagnesemia. replete. Monitor BMP. Stop lasix, which she has been taking for leg swelling. Telemetry monitor. 2.  LT shoulder pain. Likely MSK pain. Negative troponin and ECG, normal chest xray     3.  chronic A fib. Rate controlled. Recent echo unremarkable. Continue home diltiazem      4. Asthma. Not on exacerbation. Continue, pulmicort, Singulair,     5. HTN. Stable, continue home diltiazem, losartan, and clonidine      6. HLD. Continue home statin and zeita      7. DM. A1C 5.8. cont home meds        Discharged in improved condition.     Spent 35 minutes    Signed:  Jack Fournier MD  4/17/2023  1:25 PM

## 2023-04-17 NOTE — PROGRESS NOTES
Jeffrey Cleary [facility]  [address] [facility], [place], [address] [place] [address]  [phone]      Hospitalist Progress Note      NAME: Audrey Pulido   :  1945  MRM:  [id number]    Date of service: 2023  12:16 PM       Assessment and Plan:   Hypokalemia/Hypocalcemia/ Hypomagnesemia. replete. Monitor BMP. Stop lasix, which she is taking for leg swelling. Telemetry monitor. 2.  Non cardiac chest pain. Likely MSK pain. Negative troponin and ECG, normal chest xray     3.  chronic A fib. Rate controlled. Recent echo unremarkable. Continue home diltiazem      4. Asthma. Not on exacerbation. Continue, pulmicort, Singulair,     5. HTN. Stable, continue home diltiazem, losartan, and clonidine      6. HLD. Continue home statin and zeita      7. DM. A1C 5.8. cont SSI         Subjective:     Chief Complaint[de-identified] Patient was seen and examined as a follow up for electrolytes abnormalities. Chart was reviewed. fells well     ROS:  (bold if positive, if negative)    Tolerating PT  Tolerating Diet        Objective:     Last 24hrs VS reviewed since prior progress note.  Most recent are:    Visit Vitals  BP (!) 172/77   Pulse 93   Temp 98.3 °F (36.8 °C)   Resp 25   Ht 5' 5\" (1.651 m)   SpO2 95%   BMI 30.08 kg/m²     SpO2 Readings from Last 6 Encounters:   23 95%   23 95%   22 99%   16 96%   05/19/15 93%   05/11/15 97%          Intake/Output Summary (Last 24 hours) at 2023 1216  Last data filed at 2023 0800  Gross per 24 hour   Intake 200 ml   Output --   Net 200 ml        Physical Exam:    Gen:  Well-developed, well-nourished, in no acute distress  HEENT:  Pink conjunctivae, PERRL, hearing intact to voice, moist mucous membranes  Neck:  Supple, without masses, thyroid non-tender  Resp:  No accessory muscle use, clear breath sounds without wheezes rales or rhonchi  Card:  No murmurs, normal S1, S2 without thrills, bruits or peripheral edema  Abd:  Soft, non-tender, non-distended, normoactive bowel sounds are present, no palpable organomegaly and no detectable hernias  Lymph:  No cervical or inguinal adenopathy  Musc:  No cyanosis or clubbing  Skin:  No rashes or ulcers, skin turgor is good  Neuro:  Cranial nerves are grossly intact, no focal motor weakness, follows commands appropriately  Psych:  Good insight, oriented to person, place and time, alert  __________________________________________________________________  Medications Reviewed: (see below)  Medications:     Current Facility-Administered Medications   Medication Dose Route Frequency    potassium chloride SR (KLOR-CON 10) tablet 40 mEq  40 mEq Oral NOW    [START ON 4/18/2023] losartan (COZAAR) tablet 100 mg  100 mg Oral DAILY    magnesium sulfate 4 g/100 mL IVPB  4 g IntraVENous ONCE    sodium chloride (NS) flush 5-40 mL  5-40 mL IntraVENous Q8H    sodium chloride (NS) flush 5-40 mL  5-40 mL IntraVENous PRN    atorvastatin (LIPITOR) tablet 40 mg  40 mg Oral QPM    cloNIDine HCL (CATAPRES) tablet 0.1 mg  0.1 mg Oral BID    dilTIAZem ER (CARDIZEM CD) capsule 240 mg  240 mg Oral DAILY    budesonide (PULMICORT) 500 mcg/2 ml nebulizer suspension  250 mcg Nebulization BID RT    fluticasone propionate (FLONASE) 50 mcg/actuation nasal spray 2 Spray  2 Spray Both Nostrils DAILY    montelukast (SINGULAIR) tablet 10 mg  10 mg Oral DAILY    pantoprazole (PROTONIX) tablet 40 mg  40 mg Oral ACB    insulin lispro (HUMALOG) injection   SubCUTAneous AC&HS    glucose chewable tablet 16 g  4 Tablet Oral PRN    glucagon (GLUCAGEN) injection 1 mg  1 mg IntraMUSCular PRN    dextrose 10% infusion 0-250 mL  0-250 mL IntraVENous PRN    enoxaparin (LOVENOX) injection 40 mg  40 mg SubCUTAneous DAILY    ipratropium (ATROVENT) 0.02 % nebulizer solution 0.5 mg  0.5 mg Nebulization Q6HWA RT    acetaminophen (TYLENOL) tablet 650 mg  650 mg Oral Q4H PRN     Current Outpatient Medications   Medication Sig    furosemide (LASIX) 20 mg tablet Take  by mouth daily. losartan (Cozaar) 50 mg tablet Take  by mouth daily. traZODone (DESYREL) 50 mg tablet Take  by mouth nightly. dilTIAZem ER (CARDIZEM CD) 300 mg capsule Take 1 Capsule by mouth daily for 30 days. (Patient taking differently: Take 240 mg by mouth daily.)    predniSONE (STERAPRED DS) 10 mg dose pack See administration instruction per 10mg dose pack    omeprazole (PRILOSEC OTC) 20 mg tablet Take 20 mg by mouth two (2) times a day. azilsartan medoxomiL (EDARBI) 80 mg tab tablet Take 40 mg by mouth daily. (Patient not taking: Reported on 4/16/2023)    cloNIDine HCL (CATAPRES) 0.1 mg tablet Take 0.1 mg by mouth two (2) times a day. evolocumab (Repatha SureClick) pen injection 310 mg by SubCUTAneous route every fourteen (14) days. folic acid/multivit-min/lutein (CENTRUM SILVER PO) Take 1 Tablet by mouth daily. glucosamine HCl/chondroitin rico (GLUCOSAMINE-CHONDROITIN PO) Take 1 Capsule by mouth two (2) times a day. 1500/1000    MAGNESIUM CHLORIDE PO Take 225 mg by mouth two (2) times a day. tiotropium bromide (SPIRIVA RESPIMAT) 1.25 mcg/actuation inhaler Take 2 Puffs by inhalation daily. fluticasone propion-salmeteroL (ADVAIR/WIXELA) 500-50 mcg/dose diskus inhaler Take 2 Puffs by inhalation every twelve (12) hours. melatonin 3 mg cap capsule Take 6 mg by mouth nightly. Peqnlvfw-Sock-Fcfaob-Hyalur Ac [id number] mg cap Take  by mouth two (2) times a day. therapeutic multivitamin (THERAGRAN) tablet Take 1 Tab by mouth daily. albuterol sulfate (PROVENTIL;VENTOLIN) 2.5 mg/0.5 mL nebu nebulizer solution by Nebulization route every four (4) hours as needed for Wheezing. SITagliptin-metFORMIN (JANUMET)  mg per tablet Take 1 Tab by mouth daily. fluticasone propionate (FLONASE) 50 mcg/actuation nasal spray 2 Sprays by Both Nostrils route daily. ezetimibe (ZETIA) 10 mg tablet Take 10 mg by mouth every evening.     atorvastatin (LIPITOR) 80 mg tablet Take 40 mg by mouth every evening.    montelukast (SINGULAIR) 10 mg tablet Take 10 mg by mouth daily. Lab Data Reviewed: (see below)  Lab Review:     Recent Labs     04/17/23  0049 04/16/23  1330   WBC 5.2 5.5   HGB 9.9* 10.6*   HCT 30.0* 31.9*    342     Recent Labs     04/17/23  0458 04/17/23  0049 04/16/23  1929 04/16/23  1330    140 140 140   K 3.4* 3.3* 3.1* 3.1*   * 111* 110* 110*   CO2 24 23 26 25   GLU 99 99 118* 98   BUN 14 13 13 13   CREA 0.81 0.84 0.97 0.94   CA 7.3* 7.0* 6.4* 6.3*   MG  --  1.1*  --  0.5*   ALB  --   --   --  2.2*   TBILI  --   --   --  0.5   ALT  --   --   --  32     Lab Results   Component Value Date/Time    Glucose (POC) 128 (H) 04/17/2023 11:30 AM    Glucose (POC) 94 04/17/2023 07:25 AM    Glucose (POC) 93 04/16/2023 10:37 PM    Glucose (POC) 112 03/31/2023 05:06 PM    Glucose (POC) 93 03/31/2023 11:28 AM     No results for input(s): PH, PCO2, PO2, HCO3, FIO2 in the last 72 hours. No results for input(s): INR, INREXT in the last 72 hours. All Micro Results       None            I have reviewed notes of prior 24hr. Other pertinent lab: Total time: -35- minutes **I personally saw and examined the patient during this time period**    I personally reviewed chart, notes, data and current medications in the medical record. I have personally examined and treated the patient at bedside during this period.                  Care Plan discussed with: Patient, Nursing Staff, and >50% of time spent in counseling and coordination of care    Discussed:  Care Plan    Prophylaxis:  Lovenox    Disposition:  Home w/Family           ___________________________________________________    Attending Physician: Mark Matias MD

## 2023-04-22 ENCOUNTER — HOSPITAL ENCOUNTER (EMERGENCY)
Age: 78
Discharge: HOME | End: 2023-04-22
Attending: STUDENT IN AN ORGANIZED HEALTH CARE EDUCATION/TRAINING PROGRAM
Payer: MEDICARE

## 2023-04-22 ENCOUNTER — APPOINTMENT (OUTPATIENT)
Dept: VASCULAR SURGERY | Age: 78
End: 2023-04-22
Attending: STUDENT IN AN ORGANIZED HEALTH CARE EDUCATION/TRAINING PROGRAM
Payer: MEDICARE

## 2023-04-22 VITALS
WEIGHT: 171 LBS | HEART RATE: 86 BPM | DIASTOLIC BLOOD PRESSURE: 75 MMHG | SYSTOLIC BLOOD PRESSURE: 116 MMHG | RESPIRATION RATE: 18 BRPM | HEIGHT: 65 IN | BODY MASS INDEX: 28.49 KG/M2 | OXYGEN SATURATION: 97 % | TEMPERATURE: 98.4 F

## 2023-04-22 DIAGNOSIS — I82.711: Primary | ICD-10-CM

## 2023-04-22 PROCEDURE — 99284 EMERGENCY DEPT VISIT MOD MDM: CPT

## 2023-04-22 PROCEDURE — 96372 THER/PROPH/DIAG INJ SC/IM: CPT

## 2023-04-22 PROCEDURE — 74011250636 HC RX REV CODE- 250/636: Performed by: STUDENT IN AN ORGANIZED HEALTH CARE EDUCATION/TRAINING PROGRAM

## 2023-04-22 PROCEDURE — 93971 EXTREMITY STUDY: CPT

## 2023-04-22 RX ORDER — ACETAMINOPHEN 500 MG
1000 TABLET ORAL
Status: DISCONTINUED | OUTPATIENT
Start: 2023-04-22 | End: 2023-04-22

## 2023-04-22 RX ORDER — IBUPROFEN 800 MG/1
800 TABLET ORAL
Status: DISCONTINUED | OUTPATIENT
Start: 2023-04-22 | End: 2023-04-22 | Stop reason: HOSPADM

## 2023-04-22 RX ORDER — KETOROLAC TROMETHAMINE 30 MG/ML
15 INJECTION, SOLUTION INTRAMUSCULAR; INTRAVENOUS
Status: DISCONTINUED | OUTPATIENT
Start: 2023-04-22 | End: 2023-04-22

## 2023-04-22 RX ORDER — FAMOTIDINE 20 MG/1
40 TABLET, FILM COATED ORAL
Qty: 90 TABLET | Refills: 0 | Status: SHIPPED | OUTPATIENT
Start: 2023-04-22 | End: 2023-06-06

## 2023-04-22 RX ORDER — ENOXAPARIN SODIUM 100 MG/ML
40 INJECTION SUBCUTANEOUS
Status: COMPLETED | OUTPATIENT
Start: 2023-04-22 | End: 2023-04-22

## 2023-04-22 RX ORDER — IBUPROFEN 400 MG/1
400 TABLET ORAL
Qty: 20 TABLET | Refills: 0 | Status: SHIPPED | OUTPATIENT
Start: 2023-04-22 | End: 2023-04-22

## 2023-04-22 RX ORDER — ENOXAPARIN SODIUM 100 MG/ML
40 INJECTION SUBCUTANEOUS DAILY
Qty: 18 ML | Refills: 0 | Status: SHIPPED | OUTPATIENT
Start: 2023-04-22 | End: 2023-06-06

## 2023-04-22 RX ADMIN — ENOXAPARIN SODIUM 40 MG: 100 INJECTION SUBCUTANEOUS at 14:55

## 2023-04-22 NOTE — ED PROVIDER NOTES
Chief Complaint   Patient presents with    Arm swelling       INITIAL ASSESSMENT & PLAN   12:54 PM  Differential diagnosis includes but is not limited to ***  ***    I have obtained additional independent history from:   I have personally reviewed patient's NON-[facility] ED external prior records from:  --[employer] summarizing: ***    MEDICAL DECISION MAKING     In summary, Nneka Chen is a 69 y.o. female presented to the ED with ***    Results independently interpreted below, notably:  ***      HISTORY OF PRESENT ILLNESS   Additional independent historian:      Arm swelling     ***    REVIEW OF SYSTEMS  Review of Systems  {ROS:1234:p:\"Limited 2/2 the above\", \"I performed a 10-point review of systems which have been otherwise included in the HPI as documented, otherwise all other systems have been reviewed and are negative. \"}    MEDICAL HISTORY  Past Medical History:   Diagnosis Date    Asthma     Diabetes ([facility] [address]     type 2    GERD (gastroesophageal reflux disease)     Hypertension     Ill-defined condition     high cholesterol    Ill-defined condition     seasonal allergies    Ill-defined condition     gout     Past Surgical History:   Procedure Laterality Date    HX APPENDECTOMY      HX HEENT      cataract     HX HYSTERECTOMY  1975    HX LAP CHOLECYSTECTOMY  1985    HX OOPHORECTOMY      benign ovarian tumor    HX OTHER SURGICAL  1993    bladder tack    HX ROTATOR CUFF REPAIR  2015    right    HX TONSILLECTOMY  1964     Family History:   Problem Relation Age of Onset    Cancer Mother         uterine    Heart Disease Mother         afib    Heart Disease Father 48        MI    Hypertension Father      Social History     Tobacco Use    Smoking status: Former     Packs/day: 0.25     Types: Cigarettes     Quit date:      Years since quittin.3    Smokeless tobacco: Never    Tobacco comments:     smoked x 1 year   Vaping Use    Vaping Use: Never used   Substance Use Topics    Alcohol use:  No Drug use: No     ALLERGIES: Aspirin, Beta blocker [beta-blockers (beta-adrenergic blocking agts)], and Codeine  Medications  No current facility-administered medications on file prior to encounter. Current Outpatient Medications on File Prior to Encounter   Medication Sig Dispense Refill    losartan (Cozaar) 50 mg tablet Take 2 Tablets by mouth daily. 60 Tablet 0    traZODone (DESYREL) 50 mg tablet Take  by mouth nightly. dilTIAZem ER (CARDIZEM CD) 300 mg capsule Take 1 Capsule by mouth daily for 30 days. 30 Capsule 0    omeprazole (PRILOSEC OTC) 20 mg tablet Take 20 mg by mouth two (2) times a day. cloNIDine HCL (CATAPRES) 0.1 mg tablet Take 0.1 mg by mouth two (2) times a day. evolocumab (Repatha SureClick) pen injection 393 mg by SubCUTAneous route every fourteen (14) days. folic acid/multivit-min/lutein (CENTRUM SILVER PO) Take 1 Tablet by mouth daily. glucosamine HCl/chondroitin rico (GLUCOSAMINE-CHONDROITIN PO) Take 1 Capsule by mouth two (2) times a day. 1500/1000      MAGNESIUM CHLORIDE PO Take 225 mg by mouth two (2) times a day. tiotropium bromide (SPIRIVA RESPIMAT) 1.25 mcg/actuation inhaler Take 2 Puffs by inhalation daily. fluticasone propion-salmeteroL (ADVAIR/WIXELA) 500-50 mcg/dose diskus inhaler Take 2 Puffs by inhalation every twelve (12) hours. melatonin 3 mg cap capsule Take 6 mg by mouth nightly. Mpelvgwc-Xtmo-Qfzgqe-Hyalur Ac 750-625-80-2 mg cap Take  by mouth two (2) times a day. therapeutic multivitamin (THERAGRAN) tablet Take 1 Tab by mouth daily. albuterol sulfate (PROVENTIL;VENTOLIN) 2.5 mg/0.5 mL nebu nebulizer solution by Nebulization route every four (4) hours as needed for Wheezing. SITagliptin-metFORMIN (JANUMET)  mg per tablet Take 1 Tab by mouth daily. fluticasone propionate (FLONASE) 50 mcg/actuation nasal spray 2 Sprays by Both Nostrils route daily.       ezetimibe (ZETIA) 10 mg tablet Take 10 mg by mouth every evening. atorvastatin (LIPITOR) 80 mg tablet Take 40 mg by mouth every evening.      montelukast (SINGULAIR) 10 mg tablet Take 10 mg by mouth daily. PHYSICAL EXAM     Vitals:    04/22/23 1239   BP: 116/75   Pulse: (!) 108   Resp: 18   Temp: 98.4 °F (36.9 °C)   SpO2: 97%   Weight: 77.6 kg (171 lb)   Height: 5' 5\" (1.651 m)     Physical Exam    DIAGNOSTIC STUDIES     12:54 PM: EKG: *** bpm, rhythm: ***, normal axis, no acute ST segment or T wave changes. Interpreted independently by me. Laboratory Results: If not already interpreted as below in ED course, I have personally ordered, reviewed, and independently interpreted all laboratory results, notable for:  --  No results found for this or any previous visit (from the past 24 hour(s)). Imaging Results: If not already interpreted as below in ED course, I have personally ordered, reviewed, and interpreted the following radiology results:    No orders to display       ED COURSE        PROCEDURES  Procedures    Medications Ordered/Administered in ED  Medications - No data to display    ADDITIONAL CONSIDERATIONS   I opted against but considered ordering the following:  --CT  -- ***  Additional relevant contributory comorbidities managed:  ***  Social Determinants of Health addressed:  ***  FINAL ASSESSMENT AND DISPOSITION     ED DIAGNOSIS  No diagnosis found.     DISPOSITION  ***    Labs/Imaging Studies Ordered/Performed in ED  Orders Placed This Encounter    Hold Sample       Electronically signed by

## 2023-04-22 NOTE — ED TRIAGE NOTES
Pt arrives to the ER for complaints of left arm swelling that started last night. Swelling is specifically to forearm. Reports slight pain. States she was seen by dispatch health and was told to come to the ER. Denies chest pain or shortness of breath. Denies any injury or trauma. Reports that she does take plavix. States she has a 50% blockage in an artery but is unsure where. PMH of asthma, diabetes, GERD, high cholesterol.

## 2023-04-22 NOTE — DISCHARGE INSTRUCTIONS
You need to follow up with your primary care physician in 2 weeks to evaluate if you need additional blood thinners with a possible repeat ultrasound.

## 2023-05-06 ENCOUNTER — HOSPITAL ENCOUNTER (EMERGENCY)
Facility: HOSPITAL | Age: 78
Discharge: HOME | End: 2023-05-06
Attending: EMERGENCY MEDICINE
Payer: MEDICARE

## 2023-05-06 ENCOUNTER — APPOINTMENT (OUTPATIENT)
Facility: HOSPITAL | Age: 78
End: 2023-05-06
Payer: MEDICARE

## 2023-05-06 VITALS
WEIGHT: 170 LBS | SYSTOLIC BLOOD PRESSURE: 151 MMHG | OXYGEN SATURATION: 99 % | HEIGHT: 64 IN | HEART RATE: 89 BPM | DIASTOLIC BLOOD PRESSURE: 66 MMHG | TEMPERATURE: 97.8 F | BODY MASS INDEX: 29.02 KG/M2 | RESPIRATION RATE: 16 BRPM

## 2023-05-06 DIAGNOSIS — S81.811A SKIN TEAR OF RIGHT LOWER LEG WITHOUT COMPLICATION, INITIAL ENCOUNTER: ICD-10-CM

## 2023-05-06 DIAGNOSIS — S09.90XA INJURY OF HEAD, INITIAL ENCOUNTER: Primary | ICD-10-CM

## 2023-05-06 DIAGNOSIS — S80.00XA CONTUSION OF KNEE, UNSPECIFIED LATERALITY, INITIAL ENCOUNTER: ICD-10-CM

## 2023-05-06 PROCEDURE — 99284 EMERGENCY DEPT VISIT MOD MDM: CPT

## 2023-05-06 PROCEDURE — 70450 CT HEAD/BRAIN W/O DYE: CPT

## 2023-05-06 PROCEDURE — 73562 X-RAY EXAM OF KNEE 3: CPT

## 2023-05-06 ASSESSMENT — ENCOUNTER SYMPTOMS
VOMITING: 0
SORE THROAT: 0
NAUSEA: 0
EYE DISCHARGE: 0
BLURRED VISION: 0
DOUBLE VISION: 0
DIARRHEA: 0
EYE PAIN: 0
SHORTNESS OF BREATH: 0
DIFFICULTY BREATHING: 0
ABDOMINAL PAIN: 0
COUGH: 0
FACIAL SWELLING: 0
CHEST TIGHTNESS: 0
BACK PAIN: 0

## 2023-05-06 NOTE — ED NOTES
Dressings applied to RFA and R knee skin tears. Pt provided w/DC instructions. Denied questions/concerns. Well appearing, ambulatory on DC w/FWW and steady gait.      Pedro Sears RN  05/06/23 8993

## 2023-05-06 NOTE — ED PROVIDER NOTES
[facility] EMERGENCY DEPT  EMERGENCY DEPARTMENT ENCOUNTER      Pt Name: Talmage Angelucci  MRN: [id number]  [place] 1945  Date of evaluation: 5/6/2023  Provider: Tammie Galindo MD    [address] [place] [address]       Chief Complaint   Patient presents with    Fall    Headache    Knee Pain         HISTORY OF PRESENT ILLNESS   (Location/Symptom, Timing/Onset, Context/Setting, Quality, Duration, Modifying Factors, Severity)  Note limiting factors. 78-year-old female fell at her residence today. She has an injury with bruising and swelling and hematoma to the right forehead as well as skin tears to the right wrist and right knee. She has no bony tenderness of extremities. No deformity. She has no loss of consciousness or altered mental status since the fall. No nausea or vomiting. The history is provided by the patient. Head Injury  Location:  Frontal  Time since incident:  2 hours  Mechanism of injury: fall    Fall:     Fall occurred:  Tripped    Impact surface:  Hard floor    Point of impact:  Head    Entrapped after fall: no    Pain details:     Quality:  Dull    Radiates to: Face    Severity:  Mild    Timing:  Constant    Progression:  Improving  Relieved by:  Nothing  Worsened by:  Nothing  Associated symptoms: no blurred vision, no difficulty breathing, no disorientation, no double vision, no focal weakness, no headaches, no hearing loss, no loss of consciousness, no memory loss, no nausea and no vomiting      Additional history from independent historians:     Review of External Medical Records:     Nursing Notes were reviewed. REVIEW OF SYSTEMS    (2-9 systems for level 4, 10 or more for level 5)     Review of Systems   Constitutional:  Negative for activity change, appetite change, chills and diaphoresis. HENT:  Negative for congestion, ear pain, facial swelling, hearing loss and sore throat. Eyes:  Negative for blurred vision, double vision, pain, discharge and visual disturbance.    Respiratory:

## 2023-05-06 NOTE — ED TRIAGE NOTES
Pt reports tripping and falling this AM.  Striking R side of her head and her R knee. -LOC. 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

## 2023-05-08 RX ORDER — TRAZODONE HYDROCHLORIDE 50 MG/1
TABLET ORAL
COMMUNITY

## 2023-05-08 RX ORDER — LOSARTAN POTASSIUM 50 MG/1
100 TABLET ORAL DAILY
COMMUNITY
Start: 2023-04-17

## 2023-05-08 RX ORDER — FAMOTIDINE 20 MG/1
40 TABLET, FILM COATED ORAL
COMMUNITY
Start: 2023-04-22 | End: 2023-06-06

## 2023-05-08 RX ORDER — ENOXAPARIN SODIUM 100 MG/ML
40 INJECTION SUBCUTANEOUS DAILY
Qty: 12 ML | Refills: 1 | COMMUNITY
Start: 2023-04-22 | End: 2023-06-06

## 2023-05-16 LAB
ACID FAST STN SPEC: NEGATIVE
MYCOBACTERIUM SPEC QL CULT: NEGATIVE
SPECIMEN PREPARATION: NORMAL
SPECIMEN SOURCE: NORMAL

## 2023-08-26 ENCOUNTER — HOSPITAL ENCOUNTER (EMERGENCY)
Facility: HOSPITAL | Age: 78
Discharge: HOME | End: 2023-08-26
Attending: STUDENT IN AN ORGANIZED HEALTH CARE EDUCATION/TRAINING PROGRAM
Payer: MEDICARE

## 2023-08-26 VITALS
BODY MASS INDEX: 27.49 KG/M2 | SYSTOLIC BLOOD PRESSURE: 126 MMHG | DIASTOLIC BLOOD PRESSURE: 42 MMHG | WEIGHT: 165 LBS | OXYGEN SATURATION: 99 % | HEART RATE: 78 BPM | TEMPERATURE: 98.7 F | RESPIRATION RATE: 16 BRPM | HEIGHT: 65 IN

## 2023-08-26 DIAGNOSIS — L03.116 CELLULITIS OF LEFT LOWER EXTREMITY: Primary | ICD-10-CM

## 2023-08-26 PROCEDURE — 99283 EMERGENCY DEPT VISIT LOW MDM: CPT

## 2023-08-26 PROCEDURE — 6370000000 HC RX 637 (ALT 250 FOR IP): Performed by: STUDENT IN AN ORGANIZED HEALTH CARE EDUCATION/TRAINING PROGRAM

## 2023-08-26 RX ORDER — CEPHALEXIN 500 MG/1
500 CAPSULE ORAL 4 TIMES DAILY
Qty: 28 CAPSULE | Refills: 0 | Status: SHIPPED | OUTPATIENT
Start: 2023-08-26 | End: 2023-09-02

## 2023-08-26 RX ORDER — CEPHALEXIN 250 MG/1
500 CAPSULE ORAL
Status: COMPLETED | OUTPATIENT
Start: 2023-08-26 | End: 2023-08-26

## 2023-08-26 RX ADMIN — CEPHALEXIN 500 MG: 250 CAPSULE ORAL at 23:27

## 2023-08-26 ASSESSMENT — ENCOUNTER SYMPTOMS
COUGH: 0
NAUSEA: 0
VOMITING: 0
EYE DISCHARGE: 0
EYE REDNESS: 0
DIARRHEA: 0

## 2023-08-26 ASSESSMENT — LIFESTYLE VARIABLES
HOW OFTEN DO YOU HAVE A DRINK CONTAINING ALCOHOL: NEVER
HOW MANY STANDARD DRINKS CONTAINING ALCOHOL DO YOU HAVE ON A TYPICAL DAY: PATIENT DOES NOT DRINK

## 2023-08-27 NOTE — ED TRIAGE NOTES
Pt presents after having a dog scratch on L lower leg on Thursday and leg is now red, swollen, painful to touch. Pt took tylenol around 1930. Pain is 7/10 throbbing.

## 2023-08-27 NOTE — DISCHARGE INSTRUCTIONS
Take antibiotics as prescribed. Take tylenol or motrin as needed for pain. Return to the ER if your symptoms are worsening despite antibiotics or any other concern/problems.

## 2023-08-27 NOTE — ED PROVIDER NOTES
[facility] EMERGENCY DEPT  EMERGENCY DEPARTMENT ENCOUNTER      Pt Name: Alexei Pike  MRN: [id number]  [address] [facility] 1945  Date of evaluation: 8/26/2023  Provider: Thad Beckwith DO    CHIEF COMPLAINT       Chief Complaint   Patient presents with    Leg Pain     L leg         HISTORY OF PRESENT ILLNESS    HPI    Alexei Pike is a 69 y.o. female who presents to the emergency department for evaluation of leg pain and redness. Patient sustained a fracture from her dog to her left leg on Thursday. Tonight started having increasing pain and redness to the area. No fevers. No other trauma or injury. No other recent illness. Nursing Notes were reviewed. REVIEW OF SYSTEMS       Review of Systems   Constitutional:  Negative for chills and fever. Eyes:  Negative for discharge and redness. Respiratory:  Negative for cough. Gastrointestinal:  Negative for diarrhea, nausea and vomiting. Skin:  Positive for wound. Neurological:  Negative for speech difficulty. Psychiatric/Behavioral:  Negative for agitation.           PAST MEDICAL HISTORY     Past Medical History:   Diagnosis Date    Asthma     Diabetes ([address])     type 2    GERD (gastroesophageal reflux disease)     Hypertension     Ill-defined condition     seasonal allergies    Ill-defined condition     gout    Ill-defined condition     high cholesterol         SURGICAL HISTORY       Past Surgical History:   Procedure Laterality Date    APPENDECTOMY      CHOLECYSTECTOMY, LAPAROSCOPIC  1985    HEENT  2010    cataract     HYSTERECTOMY (CERVIX STATUS UNKNOWN)  1975    OTHER SURGICAL HISTORY  1993    bladder tack    OVARY REMOVAL  1996    benign ovarian tumor    ROTATOR CUFF REPAIR  2015    right    TONSILLECTOMY  1964         CURRENT MEDICATIONS       Previous Medications    ALBUTEROL (PROVENTIL) (5 MG/ML) 0.5% NEBULIZER SOLUTION    Inhale into the lungs every 4 hours as needed    ATORVASTATIN (LIPITOR) 80 MG TABLET    Take 0.5 tablets by mouth every

## 2023-08-27 NOTE — ED NOTES
Pt discharged home, verbalized understanding of discharge instructions and prescription e-scribed, pt ambulated out of dept with steady gait accompanied by family.      Ethan Lehman RN  08/26/23 8199

## 2023-11-28 ENCOUNTER — HOSPITAL ENCOUNTER (OUTPATIENT)
Facility: HOSPITAL | Age: 78
Discharge: HOME | End: 2023-12-01
Attending: FAMILY MEDICINE
Payer: MEDICARE

## 2023-11-28 DIAGNOSIS — R10.9 ABDOMINAL PAIN, UNSPECIFIED ABDOMINAL LOCATION: ICD-10-CM

## 2023-11-28 PROCEDURE — 74177 CT ABD & PELVIS W/CONTRAST: CPT

## 2023-11-28 PROCEDURE — 6360000004 HC RX CONTRAST MEDICATION: Performed by: FAMILY MEDICINE

## 2023-11-28 PROCEDURE — 82565 ASSAY OF CREATININE: CPT

## 2023-11-28 RX ADMIN — IOPAMIDOL 100 ML: 755 INJECTION, SOLUTION INTRAVENOUS at 13:21

## 2023-11-29 LAB — CREAT BLD-MCNC: 1.1 MG/DL (ref 0.6–1.3)

## 2024-02-23 ENCOUNTER — TRANSCRIBE ORDERS (OUTPATIENT)
Facility: HOSPITAL | Age: 79
End: 2024-02-23

## 2024-02-23 ENCOUNTER — HOSPITAL ENCOUNTER (OUTPATIENT)
Facility: HOSPITAL | Age: 79
End: 2024-02-23
Payer: MEDICARE

## 2024-02-23 DIAGNOSIS — J18.9 PNEUMONIA DUE TO INFECTIOUS ORGANISM, UNSPECIFIED LATERALITY, UNSPECIFIED PART OF LUNG: Primary | ICD-10-CM

## 2024-02-23 DIAGNOSIS — J18.9 PNEUMONIA DUE TO INFECTIOUS ORGANISM, UNSPECIFIED LATERALITY, UNSPECIFIED PART OF LUNG: ICD-10-CM

## 2024-02-23 PROCEDURE — 71046 X-RAY EXAM CHEST 2 VIEWS: CPT

## 2024-03-09 ENCOUNTER — HOSPITAL ENCOUNTER (INPATIENT)
Facility: HOSPITAL | Age: 79
LOS: 4 days | Discharge: HOME | DRG: 202 | End: 2024-03-13
Attending: EMERGENCY MEDICINE | Admitting: HOSPITALIST
Payer: MEDICARE

## 2024-03-09 ENCOUNTER — APPOINTMENT (OUTPATIENT)
Facility: HOSPITAL | Age: 79
DRG: 202 | End: 2024-03-09
Payer: MEDICARE

## 2024-03-09 DIAGNOSIS — R09.02 HYPOXIA: ICD-10-CM

## 2024-03-09 DIAGNOSIS — N28.9 ACUTE RENAL INSUFFICIENCY: ICD-10-CM

## 2024-03-09 DIAGNOSIS — J45.901 EXACERBATION OF ASTHMA, UNSPECIFIED ASTHMA SEVERITY, UNSPECIFIED WHETHER PERSISTENT: Primary | ICD-10-CM

## 2024-03-09 DIAGNOSIS — J40 BRONCHITIS: ICD-10-CM

## 2024-03-09 DIAGNOSIS — R55 NEAR SYNCOPE: ICD-10-CM

## 2024-03-09 LAB
ALBUMIN SERPL-MCNC: 3.2 G/DL (ref 3.5–5)
ALBUMIN/GLOB SERPL: 1.1 (ref 1.1–2.2)
ALP SERPL-CCNC: 82 U/L (ref 45–117)
ALT SERPL-CCNC: 34 U/L (ref 12–78)
ANION GAP SERPL CALC-SCNC: 11 MMOL/L (ref 5–15)
AST SERPL-CCNC: 13 U/L (ref 15–37)
BASOPHILS # BLD: 0 K/UL (ref 0–0.1)
BASOPHILS NFR BLD: 0 % (ref 0–1)
BILIRUB SERPL-MCNC: 0.2 MG/DL (ref 0.2–1)
BUN SERPL-MCNC: 37 MG/DL (ref 6–20)
BUN/CREAT SERPL: 27 (ref 12–20)
CALCIUM SERPL-MCNC: 8.9 MG/DL (ref 8.5–10.1)
CHLORIDE SERPL-SCNC: 108 MMOL/L (ref 97–108)
CO2 SERPL-SCNC: 24 MMOL/L (ref 21–32)
COMMENT:: NORMAL
CREAT SERPL-MCNC: 1.36 MG/DL (ref 0.55–1.02)
DIFFERENTIAL METHOD BLD: ABNORMAL
EOSINOPHIL # BLD: 0 K/UL (ref 0–0.4)
EOSINOPHIL NFR BLD: 0 % (ref 0–7)
ERYTHROCYTE [DISTWIDTH] IN BLOOD BY AUTOMATED COUNT: 15 % (ref 11.5–14.5)
GLOBULIN SER CALC-MCNC: 3 G/DL (ref 2–4)
GLUCOSE SERPL-MCNC: 152 MG/DL (ref 65–100)
HCT VFR BLD AUTO: 31.7 % (ref 35–47)
HGB BLD-MCNC: 10.9 G/DL (ref 11.5–16)
IMM GRANULOCYTES # BLD AUTO: 0.1 K/UL (ref 0–0.04)
IMM GRANULOCYTES NFR BLD AUTO: 1 % (ref 0–0.5)
LYMPHOCYTES # BLD: 0.9 K/UL (ref 0.8–3.5)
LYMPHOCYTES NFR BLD: 14 % (ref 12–49)
MCH RBC QN AUTO: 33.9 PG (ref 26–34)
MCHC RBC AUTO-ENTMCNC: 34.4 G/DL (ref 30–36.5)
MCV RBC AUTO: 98.4 FL (ref 80–99)
MONOCYTES # BLD: 0.5 K/UL (ref 0–1)
MONOCYTES NFR BLD: 7 % (ref 5–13)
NEUTS SEG # BLD: 5.1 K/UL (ref 1.8–8)
NEUTS SEG NFR BLD: 78 % (ref 32–75)
NRBC # BLD: 0 K/UL (ref 0–0.01)
NRBC BLD-RTO: 0 PER 100 WBC
PLATELET # BLD AUTO: 264 K/UL (ref 150–400)
PMV BLD AUTO: 9.2 FL (ref 8.9–12.9)
POTASSIUM SERPL-SCNC: 4.5 MMOL/L (ref 3.5–5.1)
PROT SERPL-MCNC: 6.2 G/DL (ref 6.4–8.2)
RBC # BLD AUTO: 3.22 M/UL (ref 3.8–5.2)
SARS-COV-2 RNA RESP QL NAA+PROBE: NOT DETECTED
SODIUM SERPL-SCNC: 143 MMOL/L (ref 136–145)
SOURCE: NORMAL
SPECIMEN HOLD: NORMAL
TROPONIN I SERPL HS-MCNC: 8 NG/L (ref 0–51)
WBC # BLD AUTO: 6.6 K/UL (ref 3.6–11)

## 2024-03-09 PROCEDURE — 6360000002 HC RX W HCPCS: Performed by: EMERGENCY MEDICINE

## 2024-03-09 PROCEDURE — 80053 COMPREHEN METABOLIC PANEL: CPT

## 2024-03-09 PROCEDURE — 85025 COMPLETE CBC W/AUTO DIFF WBC: CPT

## 2024-03-09 PROCEDURE — 71046 X-RAY EXAM CHEST 2 VIEWS: CPT

## 2024-03-09 PROCEDURE — 93005 ELECTROCARDIOGRAM TRACING: CPT | Performed by: EMERGENCY MEDICINE

## 2024-03-09 PROCEDURE — 6370000000 HC RX 637 (ALT 250 FOR IP): Performed by: EMERGENCY MEDICINE

## 2024-03-09 PROCEDURE — 71250 CT THORAX DX C-: CPT

## 2024-03-09 PROCEDURE — 2580000003 HC RX 258: Performed by: EMERGENCY MEDICINE

## 2024-03-09 PROCEDURE — 87635 SARS-COV-2 COVID-19 AMP PRB: CPT

## 2024-03-09 PROCEDURE — 94761 N-INVAS EAR/PLS OXIMETRY MLT: CPT

## 2024-03-09 PROCEDURE — 94640 AIRWAY INHALATION TREATMENT: CPT

## 2024-03-09 PROCEDURE — 99285 EMERGENCY DEPT VISIT HI MDM: CPT

## 2024-03-09 PROCEDURE — 84484 ASSAY OF TROPONIN QUANT: CPT

## 2024-03-09 PROCEDURE — 1100000000 HC RM PRIVATE

## 2024-03-09 RX ORDER — ALLOPURINOL 300 MG/1
300 TABLET ORAL DAILY
COMMUNITY

## 2024-03-09 RX ORDER — LEVOCETIRIZINE DIHYDROCHLORIDE 5 MG/1
5 TABLET, FILM COATED ORAL NIGHTLY
COMMUNITY

## 2024-03-09 RX ORDER — CLOPIDOGREL BISULFATE 75 MG/1
75 TABLET ORAL DAILY
COMMUNITY

## 2024-03-09 RX ORDER — FLUTICASONE PROPIONATE AND SALMETEROL 250; 50 UG/1; UG/1
1 POWDER RESPIRATORY (INHALATION) 2 TIMES DAILY
COMMUNITY

## 2024-03-09 RX ORDER — LANOLIN ALCOHOL/MO/W.PET/CERES
1000 CREAM (GRAM) TOPICAL DAILY
COMMUNITY

## 2024-03-09 RX ORDER — IPRATROPIUM BROMIDE AND ALBUTEROL SULFATE 2.5; .5 MG/3ML; MG/3ML
1 SOLUTION RESPIRATORY (INHALATION)
Status: COMPLETED | OUTPATIENT
Start: 2024-03-09 | End: 2024-03-09

## 2024-03-09 RX ORDER — OLMESARTAN MEDOXOMIL 40 MG/1
40 TABLET ORAL DAILY
COMMUNITY

## 2024-03-09 RX ORDER — POTASSIUM CHLORIDE 750 MG/1
10 TABLET, EXTENDED RELEASE ORAL DAILY
Status: ON HOLD | COMMUNITY
End: 2024-03-13 | Stop reason: HOSPADM

## 2024-03-09 RX ORDER — FOLIC ACID 1 MG/1
1 TABLET ORAL DAILY
COMMUNITY

## 2024-03-09 RX ORDER — LANOLIN ALCOHOL/MO/W.PET/CERES
400 CREAM (GRAM) TOPICAL DAILY
Status: ON HOLD | COMMUNITY
End: 2024-03-13 | Stop reason: HOSPADM

## 2024-03-09 RX ORDER — PHENOL 1.4 %
1 AEROSOL, SPRAY (ML) MUCOUS MEMBRANE DAILY
COMMUNITY

## 2024-03-09 RX ADMIN — WATER 40 MG: 1 INJECTION INTRAMUSCULAR; INTRAVENOUS; SUBCUTANEOUS at 22:20

## 2024-03-09 RX ADMIN — IPRATROPIUM BROMIDE AND ALBUTEROL SULFATE 1 DOSE: .5; 3 SOLUTION RESPIRATORY (INHALATION) at 22:19

## 2024-03-09 ASSESSMENT — PAIN - FUNCTIONAL ASSESSMENT: PAIN_FUNCTIONAL_ASSESSMENT: NONE - DENIES PAIN

## 2024-03-10 LAB
ANION GAP SERPL CALC-SCNC: 4 MMOL/L (ref 5–15)
B PERT DNA SPEC QL NAA+PROBE: NOT DETECTED
BASOPHILS # BLD: 0 K/UL (ref 0–0.1)
BASOPHILS NFR BLD: 0 % (ref 0–1)
BORDETELLA PARAPERTUSSIS BY PCR: NOT DETECTED
BUN SERPL-MCNC: 34 MG/DL (ref 6–20)
BUN/CREAT SERPL: 26 (ref 12–20)
C PNEUM DNA SPEC QL NAA+PROBE: NOT DETECTED
CALCIUM SERPL-MCNC: 8.9 MG/DL (ref 8.5–10.1)
CHLORIDE SERPL-SCNC: 115 MMOL/L (ref 97–108)
CO2 SERPL-SCNC: 20 MMOL/L (ref 21–32)
CREAT SERPL-MCNC: 1.33 MG/DL (ref 0.55–1.02)
DIFFERENTIAL METHOD BLD: ABNORMAL
EKG ATRIAL RATE: 95 BPM
EKG DIAGNOSIS: NORMAL
EKG P AXIS: 46 DEGREES
EKG P-R INTERVAL: 172 MS
EKG Q-T INTERVAL: 338 MS
EKG QRS DURATION: 104 MS
EKG QTC CALCULATION (BAZETT): 424 MS
EKG R AXIS: -23 DEGREES
EKG T AXIS: 76 DEGREES
EKG VENTRICULAR RATE: 95 BPM
EOSINOPHIL # BLD: 0 K/UL (ref 0–0.4)
EOSINOPHIL NFR BLD: 0 % (ref 0–7)
ERYTHROCYTE [DISTWIDTH] IN BLOOD BY AUTOMATED COUNT: 15.3 % (ref 11.5–14.5)
FERRITIN SERPL-MCNC: 93 NG/ML (ref 8–252)
FLUAV SUBTYP SPEC NAA+PROBE: NOT DETECTED
FLUBV RNA SPEC QL NAA+PROBE: NOT DETECTED
GLUCOSE BLD STRIP.AUTO-MCNC: 114 MG/DL (ref 65–117)
GLUCOSE BLD STRIP.AUTO-MCNC: 116 MG/DL (ref 65–117)
GLUCOSE BLD STRIP.AUTO-MCNC: 118 MG/DL (ref 65–117)
GLUCOSE BLD STRIP.AUTO-MCNC: 121 MG/DL (ref 65–117)
GLUCOSE BLD STRIP.AUTO-MCNC: 218 MG/DL (ref 65–117)
GLUCOSE SERPL-MCNC: 137 MG/DL (ref 65–100)
HADV DNA SPEC QL NAA+PROBE: NOT DETECTED
HCOV 229E RNA SPEC QL NAA+PROBE: NOT DETECTED
HCOV HKU1 RNA SPEC QL NAA+PROBE: NOT DETECTED
HCOV NL63 RNA SPEC QL NAA+PROBE: NOT DETECTED
HCOV OC43 RNA SPEC QL NAA+PROBE: NOT DETECTED
HCT VFR BLD AUTO: 34.6 % (ref 35–47)
HGB BLD-MCNC: 11.4 G/DL (ref 11.5–16)
HMPV RNA SPEC QL NAA+PROBE: NOT DETECTED
HPIV1 RNA SPEC QL NAA+PROBE: NOT DETECTED
HPIV2 RNA SPEC QL NAA+PROBE: NOT DETECTED
HPIV3 RNA SPEC QL NAA+PROBE: NOT DETECTED
HPIV4 RNA SPEC QL NAA+PROBE: NOT DETECTED
IMM GRANULOCYTES # BLD AUTO: 0.1 K/UL (ref 0–0.04)
IMM GRANULOCYTES NFR BLD AUTO: 2 % (ref 0–0.5)
IRON SATN MFR SERPL: 21 % (ref 20–50)
IRON SERPL-MCNC: 70 UG/DL (ref 35–150)
LYMPHOCYTES # BLD: 0.7 K/UL (ref 0.8–3.5)
LYMPHOCYTES NFR BLD: 12 % (ref 12–49)
M PNEUMO DNA SPEC QL NAA+PROBE: NOT DETECTED
MCH RBC QN AUTO: 32.9 PG (ref 26–34)
MCHC RBC AUTO-ENTMCNC: 32.9 G/DL (ref 30–36.5)
MCV RBC AUTO: 100 FL (ref 80–99)
MONOCYTES # BLD: 0.1 K/UL (ref 0–1)
MONOCYTES NFR BLD: 2 % (ref 5–13)
NEUTS SEG # BLD: 5.3 K/UL (ref 1.8–8)
NEUTS SEG NFR BLD: 84 % (ref 32–75)
NRBC # BLD: 0 K/UL (ref 0–0.01)
NRBC BLD-RTO: 0 PER 100 WBC
PLATELET # BLD AUTO: 263 K/UL (ref 150–400)
PMV BLD AUTO: 9.2 FL (ref 8.9–12.9)
POTASSIUM SERPL-SCNC: 4.6 MMOL/L (ref 3.5–5.1)
PROCALCITONIN SERPL-MCNC: <0.05 NG/ML
RBC # BLD AUTO: 3.46 M/UL (ref 3.8–5.2)
RBC MORPH BLD: ABNORMAL
RSV RNA SPEC QL NAA+PROBE: NOT DETECTED
RV+EV RNA SPEC QL NAA+PROBE: NOT DETECTED
SARS-COV-2 RNA RESP QL NAA+PROBE: NOT DETECTED
SERVICE CMNT-IMP: ABNORMAL
SERVICE CMNT-IMP: NORMAL
SERVICE CMNT-IMP: NORMAL
SODIUM SERPL-SCNC: 139 MMOL/L (ref 136–145)
TIBC SERPL-MCNC: 337 UG/DL (ref 250–450)
TSH SERPL DL<=0.05 MIU/L-ACNC: 0.33 UIU/ML (ref 0.36–3.74)
VIT B12 SERPL-MCNC: >2000 PG/ML (ref 193–986)
WBC # BLD AUTO: 6.2 K/UL (ref 3.6–11)

## 2024-03-10 PROCEDURE — 6370000000 HC RX 637 (ALT 250 FOR IP): Performed by: HOSPITALIST

## 2024-03-10 PROCEDURE — 6360000002 HC RX W HCPCS: Performed by: HOSPITALIST

## 2024-03-10 PROCEDURE — 83550 IRON BINDING TEST: CPT

## 2024-03-10 PROCEDURE — 82728 ASSAY OF FERRITIN: CPT

## 2024-03-10 PROCEDURE — 84443 ASSAY THYROID STIM HORMONE: CPT

## 2024-03-10 PROCEDURE — 84145 PROCALCITONIN (PCT): CPT

## 2024-03-10 PROCEDURE — 6370000000 HC RX 637 (ALT 250 FOR IP): Performed by: STUDENT IN AN ORGANIZED HEALTH CARE EDUCATION/TRAINING PROGRAM

## 2024-03-10 PROCEDURE — 94669 MECHANICAL CHEST WALL OSCILL: CPT

## 2024-03-10 PROCEDURE — 94761 N-INVAS EAR/PLS OXIMETRY MLT: CPT

## 2024-03-10 PROCEDURE — 83540 ASSAY OF IRON: CPT

## 2024-03-10 PROCEDURE — 94640 AIRWAY INHALATION TREATMENT: CPT

## 2024-03-10 PROCEDURE — 1100000000 HC RM PRIVATE

## 2024-03-10 PROCEDURE — 6360000002 HC RX W HCPCS: Performed by: NURSE PRACTITIONER

## 2024-03-10 PROCEDURE — 82962 GLUCOSE BLOOD TEST: CPT

## 2024-03-10 PROCEDURE — 6370000000 HC RX 637 (ALT 250 FOR IP): Performed by: NURSE PRACTITIONER

## 2024-03-10 PROCEDURE — 80048 BASIC METABOLIC PNL TOTAL CA: CPT

## 2024-03-10 PROCEDURE — 93010 ELECTROCARDIOGRAM REPORT: CPT | Performed by: SPECIALIST

## 2024-03-10 PROCEDURE — 0202U NFCT DS 22 TRGT SARS-COV-2: CPT

## 2024-03-10 PROCEDURE — 85025 COMPLETE CBC W/AUTO DIFF WBC: CPT

## 2024-03-10 PROCEDURE — 2580000003 HC RX 258: Performed by: HOSPITALIST

## 2024-03-10 PROCEDURE — 82607 VITAMIN B-12: CPT

## 2024-03-10 PROCEDURE — 2580000003 HC RX 258: Performed by: NURSE PRACTITIONER

## 2024-03-10 PROCEDURE — 36415 COLL VENOUS BLD VENIPUNCTURE: CPT

## 2024-03-10 RX ORDER — SODIUM CHLORIDE 9 MG/ML
INJECTION, SOLUTION INTRAVENOUS PRN
Status: DISCONTINUED | OUTPATIENT
Start: 2024-03-10 | End: 2024-03-13 | Stop reason: HOSPADM

## 2024-03-10 RX ORDER — EZETIMIBE 10 MG/1
10 TABLET ORAL EVERY EVENING
Status: DISCONTINUED | OUTPATIENT
Start: 2024-03-10 | End: 2024-03-13 | Stop reason: HOSPADM

## 2024-03-10 RX ORDER — GUAIFENESIN 600 MG/1
1200 TABLET, EXTENDED RELEASE ORAL 2 TIMES DAILY
Status: DISCONTINUED | OUTPATIENT
Start: 2024-03-10 | End: 2024-03-13 | Stop reason: HOSPADM

## 2024-03-10 RX ORDER — INSULIN LISPRO 100 [IU]/ML
0-4 INJECTION, SOLUTION INTRAVENOUS; SUBCUTANEOUS NIGHTLY
Status: DISCONTINUED | OUTPATIENT
Start: 2024-03-10 | End: 2024-03-13 | Stop reason: HOSPADM

## 2024-03-10 RX ORDER — LOSARTAN POTASSIUM 50 MG/1
100 TABLET ORAL DAILY
Status: DISCONTINUED | OUTPATIENT
Start: 2024-03-10 | End: 2024-03-13 | Stop reason: HOSPADM

## 2024-03-10 RX ORDER — LANOLIN ALCOHOL/MO/W.PET/CERES
400 CREAM (GRAM) TOPICAL DAILY
Status: DISCONTINUED | OUTPATIENT
Start: 2024-03-10 | End: 2024-03-13 | Stop reason: HOSPADM

## 2024-03-10 RX ORDER — IPRATROPIUM BROMIDE AND ALBUTEROL SULFATE 2.5; .5 MG/3ML; MG/3ML
1 SOLUTION RESPIRATORY (INHALATION)
Status: DISCONTINUED | OUTPATIENT
Start: 2024-03-10 | End: 2024-03-10

## 2024-03-10 RX ORDER — CHOLECALCIFEROL (VITAMIN D3) 125 MCG
1000 CAPSULE ORAL DAILY
Status: DISCONTINUED | OUTPATIENT
Start: 2024-03-10 | End: 2024-03-13 | Stop reason: HOSPADM

## 2024-03-10 RX ORDER — SODIUM CHLORIDE 9 MG/ML
INJECTION, SOLUTION INTRAVENOUS CONTINUOUS
Status: DISCONTINUED | OUTPATIENT
Start: 2024-03-10 | End: 2024-03-13 | Stop reason: HOSPADM

## 2024-03-10 RX ORDER — ALBUTEROL SULFATE 2.5 MG/3ML
2.5 SOLUTION RESPIRATORY (INHALATION) EVERY 6 HOURS PRN
Status: DISCONTINUED | OUTPATIENT
Start: 2024-03-10 | End: 2024-03-13 | Stop reason: HOSPADM

## 2024-03-10 RX ORDER — ACETAMINOPHEN 325 MG/1
650 TABLET ORAL EVERY 6 HOURS PRN
Status: DISCONTINUED | OUTPATIENT
Start: 2024-03-10 | End: 2024-03-13 | Stop reason: HOSPADM

## 2024-03-10 RX ORDER — ONDANSETRON 2 MG/ML
4 INJECTION INTRAMUSCULAR; INTRAVENOUS EVERY 6 HOURS PRN
Status: DISCONTINUED | OUTPATIENT
Start: 2024-03-10 | End: 2024-03-13 | Stop reason: HOSPADM

## 2024-03-10 RX ORDER — GUAIFENESIN 600 MG/1
600 TABLET, EXTENDED RELEASE ORAL 2 TIMES DAILY
Status: DISCONTINUED | OUTPATIENT
Start: 2024-03-10 | End: 2024-03-10

## 2024-03-10 RX ORDER — ACETAMINOPHEN 650 MG/1
650 SUPPOSITORY RECTAL EVERY 6 HOURS PRN
Status: DISCONTINUED | OUTPATIENT
Start: 2024-03-10 | End: 2024-03-13 | Stop reason: HOSPADM

## 2024-03-10 RX ORDER — MAGNESIUM SULFATE IN WATER 40 MG/ML
2000 INJECTION, SOLUTION INTRAVENOUS PRN
Status: DISCONTINUED | OUTPATIENT
Start: 2024-03-10 | End: 2024-03-13 | Stop reason: HOSPADM

## 2024-03-10 RX ORDER — POLYETHYLENE GLYCOL 3350 17 G/17G
17 POWDER, FOR SOLUTION ORAL DAILY PRN
Status: DISCONTINUED | OUTPATIENT
Start: 2024-03-10 | End: 2024-03-13 | Stop reason: HOSPADM

## 2024-03-10 RX ORDER — CLOPIDOGREL BISULFATE 75 MG/1
75 TABLET ORAL DAILY
Status: DISCONTINUED | OUTPATIENT
Start: 2024-03-10 | End: 2024-03-13 | Stop reason: HOSPADM

## 2024-03-10 RX ORDER — ALLOPURINOL 300 MG/1
300 TABLET ORAL DAILY
Status: DISCONTINUED | OUTPATIENT
Start: 2024-03-10 | End: 2024-03-13 | Stop reason: HOSPADM

## 2024-03-10 RX ORDER — PREDNISONE 20 MG/1
40 TABLET ORAL DAILY
Status: DISCONTINUED | OUTPATIENT
Start: 2024-03-10 | End: 2024-03-10

## 2024-03-10 RX ORDER — PANTOPRAZOLE SODIUM 40 MG/1
40 TABLET, DELAYED RELEASE ORAL
Status: DISCONTINUED | OUTPATIENT
Start: 2024-03-10 | End: 2024-03-13 | Stop reason: HOSPADM

## 2024-03-10 RX ORDER — DEXTROSE MONOHYDRATE 100 MG/ML
INJECTION, SOLUTION INTRAVENOUS CONTINUOUS PRN
Status: DISCONTINUED | OUTPATIENT
Start: 2024-03-10 | End: 2024-03-13 | Stop reason: HOSPADM

## 2024-03-10 RX ORDER — ONDANSETRON 4 MG/1
4 TABLET, ORALLY DISINTEGRATING ORAL EVERY 8 HOURS PRN
Status: DISCONTINUED | OUTPATIENT
Start: 2024-03-10 | End: 2024-03-13 | Stop reason: HOSPADM

## 2024-03-10 RX ORDER — POTASSIUM CHLORIDE 7.45 MG/ML
10 INJECTION INTRAVENOUS PRN
Status: DISCONTINUED | OUTPATIENT
Start: 2024-03-10 | End: 2024-03-13 | Stop reason: HOSPADM

## 2024-03-10 RX ORDER — INSULIN LISPRO 100 [IU]/ML
0-8 INJECTION, SOLUTION INTRAVENOUS; SUBCUTANEOUS
Status: DISCONTINUED | OUTPATIENT
Start: 2024-03-10 | End: 2024-03-13 | Stop reason: HOSPADM

## 2024-03-10 RX ORDER — ALOGLIPTIN 6.25 MG/1
12.5 TABLET, FILM COATED ORAL DAILY
Status: DISCONTINUED | OUTPATIENT
Start: 2024-03-10 | End: 2024-03-13 | Stop reason: HOSPADM

## 2024-03-10 RX ORDER — CETIRIZINE HYDROCHLORIDE 10 MG/1
10 TABLET ORAL DAILY
Status: DISCONTINUED | OUTPATIENT
Start: 2024-03-10 | End: 2024-03-13 | Stop reason: HOSPADM

## 2024-03-10 RX ORDER — POTASSIUM CHLORIDE 750 MG/1
40 TABLET, FILM COATED, EXTENDED RELEASE ORAL PRN
Status: DISCONTINUED | OUTPATIENT
Start: 2024-03-10 | End: 2024-03-13 | Stop reason: HOSPADM

## 2024-03-10 RX ORDER — BUDESONIDE AND FORMOTEROL FUMARATE DIHYDRATE 160; 4.5 UG/1; UG/1
2 AEROSOL RESPIRATORY (INHALATION)
Status: DISCONTINUED | OUTPATIENT
Start: 2024-03-10 | End: 2024-03-10 | Stop reason: CLARIF

## 2024-03-10 RX ORDER — TRAZODONE HYDROCHLORIDE 50 MG/1
50 TABLET ORAL NIGHTLY
Status: DISCONTINUED | OUTPATIENT
Start: 2024-03-10 | End: 2024-03-13 | Stop reason: HOSPADM

## 2024-03-10 RX ORDER — ENOXAPARIN SODIUM 100 MG/ML
40 INJECTION SUBCUTANEOUS DAILY
Status: DISCONTINUED | OUTPATIENT
Start: 2024-03-10 | End: 2024-03-13 | Stop reason: HOSPADM

## 2024-03-10 RX ORDER — IPRATROPIUM BROMIDE AND ALBUTEROL SULFATE 2.5; .5 MG/3ML; MG/3ML
1 SOLUTION RESPIRATORY (INHALATION)
Status: DISCONTINUED | OUTPATIENT
Start: 2024-03-10 | End: 2024-03-13 | Stop reason: HOSPADM

## 2024-03-10 RX ORDER — SODIUM CHLORIDE 0.9 % (FLUSH) 0.9 %
5-40 SYRINGE (ML) INJECTION EVERY 12 HOURS SCHEDULED
Status: DISCONTINUED | OUTPATIENT
Start: 2024-03-10 | End: 2024-03-13 | Stop reason: HOSPADM

## 2024-03-10 RX ORDER — FLUTICASONE PROPIONATE 50 MCG
2 SPRAY, SUSPENSION (ML) NASAL DAILY
Status: DISCONTINUED | OUTPATIENT
Start: 2024-03-10 | End: 2024-03-13 | Stop reason: HOSPADM

## 2024-03-10 RX ORDER — MONTELUKAST SODIUM 10 MG/1
10 TABLET ORAL NIGHTLY
Status: DISCONTINUED | OUTPATIENT
Start: 2024-03-10 | End: 2024-03-13 | Stop reason: HOSPADM

## 2024-03-10 RX ORDER — FOLIC ACID 1 MG/1
1 TABLET ORAL DAILY
Status: DISCONTINUED | OUTPATIENT
Start: 2024-03-10 | End: 2024-03-13 | Stop reason: HOSPADM

## 2024-03-10 RX ORDER — DILTIAZEM HYDROCHLORIDE 240 MG/1
240 CAPSULE, COATED, EXTENDED RELEASE ORAL DAILY
Status: DISCONTINUED | OUTPATIENT
Start: 2024-03-10 | End: 2024-03-13 | Stop reason: HOSPADM

## 2024-03-10 RX ORDER — SODIUM CHLORIDE 0.9 % (FLUSH) 0.9 %
5-40 SYRINGE (ML) INJECTION PRN
Status: DISCONTINUED | OUTPATIENT
Start: 2024-03-10 | End: 2024-03-13 | Stop reason: HOSPADM

## 2024-03-10 RX ADMIN — WATER 40 MG: 1 INJECTION INTRAMUSCULAR; INTRAVENOUS; SUBCUTANEOUS at 17:56

## 2024-03-10 RX ADMIN — MONTELUKAST 10 MG: 10 TABLET, FILM COATED ORAL at 21:20

## 2024-03-10 RX ADMIN — CETIRIZINE HYDROCHLORIDE 10 MG: 10 TABLET, FILM COATED ORAL at 08:36

## 2024-03-10 RX ADMIN — ARFORMOTEROL TARTRATE: 15 SOLUTION RESPIRATORY (INHALATION) at 20:10

## 2024-03-10 RX ADMIN — FLUTICASONE PROPIONATE 2 SPRAY: 50 SPRAY, METERED NASAL at 10:00

## 2024-03-10 RX ADMIN — TRAZODONE HYDROCHLORIDE 50 MG: 50 TABLET ORAL at 21:20

## 2024-03-10 RX ADMIN — SODIUM CHLORIDE, PRESERVATIVE FREE 10 ML: 5 INJECTION INTRAVENOUS at 21:21

## 2024-03-10 RX ADMIN — ENOXAPARIN SODIUM 40 MG: 100 INJECTION SUBCUTANEOUS at 08:33

## 2024-03-10 RX ADMIN — ALOGLIPTIN 12.5 MG: 6.25 TABLET, FILM COATED ORAL at 08:34

## 2024-03-10 RX ADMIN — DILTIAZEM HYDROCHLORIDE 240 MG: 240 CAPSULE, EXTENDED RELEASE ORAL at 08:34

## 2024-03-10 RX ADMIN — PIPERACILLIN SODIUM AND TAZOBACTAM SODIUM 4500 MG: 4; .5 INJECTION, POWDER, LYOPHILIZED, FOR SOLUTION INTRAVENOUS at 00:35

## 2024-03-10 RX ADMIN — IPRATROPIUM BROMIDE AND ALBUTEROL SULFATE 1 DOSE: .5; 3 SOLUTION RESPIRATORY (INHALATION) at 20:05

## 2024-03-10 RX ADMIN — CLOPIDOGREL BISULFATE 75 MG: 75 TABLET ORAL at 08:34

## 2024-03-10 RX ADMIN — Medication 400 MG: at 08:34

## 2024-03-10 RX ADMIN — GUAIFENESIN 1200 MG: 600 TABLET ORAL at 21:20

## 2024-03-10 RX ADMIN — WATER 40 MG: 1 INJECTION INTRAMUSCULAR; INTRAVENOUS; SUBCUTANEOUS at 12:35

## 2024-03-10 RX ADMIN — CYANOCOBALAMIN TAB 500 MCG 1000 MCG: 500 TAB at 08:34

## 2024-03-10 RX ADMIN — GUAIFENESIN 600 MG: 600 TABLET ORAL at 00:35

## 2024-03-10 RX ADMIN — SODIUM CHLORIDE: 9 INJECTION, SOLUTION INTRAVENOUS at 16:33

## 2024-03-10 RX ADMIN — TRAZODONE HYDROCHLORIDE 50 MG: 50 TABLET ORAL at 00:36

## 2024-03-10 RX ADMIN — GUAIFENESIN 600 MG: 600 TABLET ORAL at 08:34

## 2024-03-10 RX ADMIN — PIPERACILLIN AND TAZOBACTAM 3375 MG: 3; .375 INJECTION, POWDER, FOR SOLUTION INTRAVENOUS at 05:25

## 2024-03-10 RX ADMIN — PANTOPRAZOLE SODIUM 40 MG: 40 TABLET, DELAYED RELEASE ORAL at 05:24

## 2024-03-10 RX ADMIN — EZETIMIBE 10 MG: 10 TABLET ORAL at 17:56

## 2024-03-10 RX ADMIN — IPRATROPIUM BROMIDE AND ALBUTEROL SULFATE 1 DOSE: .5; 3 SOLUTION RESPIRATORY (INHALATION) at 13:25

## 2024-03-10 RX ADMIN — ALLOPURINOL 300 MG: 300 TABLET ORAL at 08:34

## 2024-03-10 RX ADMIN — FOLIC ACID 1 MG: 1 TABLET ORAL at 08:34

## 2024-03-10 RX ADMIN — SODIUM CHLORIDE, PRESERVATIVE FREE 10 ML: 5 INJECTION INTRAVENOUS at 08:36

## 2024-03-10 RX ADMIN — SODIUM CHLORIDE: 9 INJECTION, SOLUTION INTRAVENOUS at 00:37

## 2024-03-10 RX ADMIN — PREDNISONE 40 MG: 20 TABLET ORAL at 08:34

## 2024-03-10 RX ADMIN — PIPERACILLIN AND TAZOBACTAM 3375 MG: 3; .375 INJECTION, POWDER, FOR SOLUTION INTRAVENOUS at 14:49

## 2024-03-10 RX ADMIN — LOSARTAN POTASSIUM 100 MG: 50 TABLET, FILM COATED ORAL at 08:34

## 2024-03-10 RX ADMIN — METFORMIN HYDROCHLORIDE 500 MG: 500 TABLET ORAL at 08:34

## 2024-03-10 NOTE — ED NOTES
TRANSFER - OUT REPORT:    Verbal report given to Wood PHELAN on Neisha Guardado  being transferred to  4th Floor for routine progression of patient care       Report consisted of patient's Situation, Background, Assessment and   Recommendations(SBAR).     Information from the following report(s) Nurse Handoff Report was reviewed with the receiving nurse.    [place] Fall Assessment:                           Lines:   Peripheral IV 03/09/24 Right Antecubital (Active)   Site Assessment Clean, dry & intact 03/09/24 2106   Dressing Status Clean, dry & intact 03/09/24 2106   Dressing Type Transparent 03/09/24 2106        Opportunity for questions and clarification was provided.      Patient transported with:  Monitor

## 2024-03-10 NOTE — PLAN OF CARE
Problem: Safety - Adult  Goal: Free from fall injury  Outcome: Progressing  Flowsheets (Taken 3/10/2024 0405)  Free From Fall Injury: Instruct family/caregiver on patient safety

## 2024-03-10 NOTE — ED TRIAGE NOTES
Pt sts feeling SOB with tachycardia, family sts ambulatory o2 was in the high 70's and sitting o2 was in high 80's, hx of asthma and was at pcp on feb 22, got CXR and steroids/abx, z-neal took to completion today has inhaler/nebs at home that she has been using as well. No pain, 95 RA.

## 2024-03-10 NOTE — CONSULTS
\"HBCM\", \"HBSAG\", \"HAAB\", \"HCAB1\", \"G6PD\", \"HIVR\", \"HIV1\", \"HIV12\", \"HIVPC\", \"HIVRPI\"    No results found for: \"CPK\"    No components found for: \"COLOR\", \"APPRN\", \"SPGRU\", \"BRYANT\", \"PROTU\", \"GLUCU\", \"KETU\", \"BILU\", \"BLDU\", \"UROU\", \"XIOMARA\", \"LEUKU\", \"WBCU\", \"RBCU\", \"UEPI\", \"BACTU\", \"CASTS\", \"UCRY\"    IMPRESSION  Acute Asthma Exacerbation  Abnormal Chest CT with left lower lobe mucus plugging  BATSHEVA; not treated  GERD on PPI    PLAN  Supplemental oxygen as needed to keep sats >88%  Brovana/Pulmicort, add Duo-Nebs  Mucinex, flutter valve,chest PT  Start IV steroids, stop prednisone  Check sputum culture if able, RVP, add procalcitonin  If she doesn't improve, may need bronschoscopy  Singulair  Pantoprazole  Needs outpatient sleep follow-up to address untreated BATSHEVA  Lovenox for ppx  Will need repeat CT in 6-8 weeks    Thank you very much for the consult.      Carmencita Platt, ARLEEN - NP

## 2024-03-10 NOTE — ED PROVIDER NOTES
Consult for Admission  9:59 PM    ED Room Number: WER04/04  Patient Name and age:  Neisha Guardado 78 y.o.  female  Working Diagnosis:   1. Exacerbation of asthma, unspecified asthma severity, unspecified whether persistent    2. Bronchitis    3. Acute renal insufficiency    4. Hypoxia    5. Near syncope        COVID-19 Suspicion: No  Sepsis present:  No  Reassessment needed: No  Code Status:  Full Code  Readmission: No  Isolation Requirements: no  Recommended Level of Care: telemetry  Department: [facility] - [phone]  Consulting Provider:     Other:  cough, wheezing, sob for 3 weeks.  No improvement after abx, steroids, nebs.  Pt light-headed with exertion.  O2 sats ambulating at home today 77%.  93% at rest on room air here.   Labs and CXR unremarkable.  Not on home O2.      Total critical care time spent exclusive of procedures:  35 minutes.     PATIENT REFERRED TO:  No follow-up provider specified.    DISCHARGE MEDICATIONS:  New Prescriptions    No medications on file         (Please note that portions of this note were completed with a voice recognition program.  Efforts were made to edit the dictations but occasionally words are mis-transcribed.)    Max Li MD (electronically signed)  Emergency Attending Physician / Physician Assistant / Nurse Practitioner             Max Li MD  03/09/24 7596

## 2024-03-10 NOTE — PLAN OF CARE
Problem: Discharge Planning  Goal: Discharge to home or other facility with appropriate resources  Outcome: HH/HSPC Progressing     Problem: Safety - Adult  Goal: Free from fall injury  3/10/2024 0926 by Allyson Flor RN  Outcome: HH/HSPC Progressing  3/10/2024 0405 by Wood Santos RN  Outcome: Progressing  Flowsheets (Taken 3/10/2024 0405)  Free From Fall Injury: Instruct family/caregiver on patient safety     Problem: ABCDS Injury Assessment  Goal: Absence of physical injury  Outcome: HH/HSPC Progressing     Problem: Chronic Conditions and Co-morbidities  Goal: Patient's chronic conditions and co-morbidity symptoms are monitored and maintained or improved  Outcome: HH/HSPC Progressing     Problem: Respiratory - Adult  Goal: Achieves optimal ventilation and oxygenation  3/10/2024 0926 by Allyson Flor RN  Outcome: HH/HSPC Progressing  3/10/2024 0841 by Paola Ruiz, RT  Outcome: Progressing

## 2024-03-10 NOTE — ED NOTES
TRANSFER - OUT REPORT:    Verbal report given to [facility] on Neisha Guardado  being transferred to  4th Floor for routine progression of patient care       Report consisted of patient's Situation, Background, Assessment and   Recommendations(SBAR).     Information from the following report(s) Nurse Handoff Report, ED Encounter Summary, and ED SBAR was reviewed with the receiving nurse.    [place] Fall Assessment:                           Lines:   Peripheral IV 03/09/24 Right Antecubital (Active)   Site Assessment Clean, dry & intact 03/09/24 2106   Dressing Status Clean, dry & intact 03/09/24 2106   Dressing Type Transparent 03/09/24 2106        Opportunity for questions and clarification was provided.      Patient transported with:  Monitor

## 2024-03-10 NOTE — H&P
and upper abdominal organs.    FINDINGS:    CHEST WALL: No mass or axillary lymphadenopathy.  THYROID: No nodule.  MEDIASTINUM: No mass or lymphadenopathy.  PEARL: No mass or lymphadenopathy.  THORACIC AORTA: No aneurysm.  MAIN PULMONARY ARTERY: Normal in caliber.  TRACHEA/BRONCHI: Endobronchial debris in the left lower lobe airways with  downstream partial collapse of the left lower lobe.  ESOPHAGUS: No wall thickening or dilatation.  HEART: Normal in size. Coronary artery calcium: present  PLEURA: No effusion or pneumothorax.  LUNGS: Left lower lobe collapse. The remainder of the lungs are clear.  INCIDENTALLY IMAGED UPPER ABDOMEN: No significant abnormality in the  incidentally imaged upper abdomen.  BONES: No destructive bone lesion.    Impression  Endobronchial mucous plugging in the left lower lobe airways with left lower  lobe collapse suggestive of aspiration.        Xray Result (most recent):  XR CHEST STANDARD TWO VW 03/09/2024    Narrative  EXAM: XR CHEST (2 VW)    CLINICAL HISTORY: sob  INDICATION:   sob  COMPARISON: 2024    FINDINGS:  PA and lateral views of the chest are obtained.  The cardiopericardial silhouette is prominent. Aortic atherosclerotic change..  There is no pleural effusion, pneumothorax or focal consolidation present.    Impression  No acute intrathoracic disease.      ________________________________________________________________________  Signed: Jacqueline Evans MD        Procedures: see electronic medical records for all procedures/Xrays/labs and details which were not copied into this note but were reviewed prior to creation of Plan.

## 2024-03-11 LAB
ANION GAP SERPL CALC-SCNC: 7 MMOL/L (ref 5–15)
BUN SERPL-MCNC: 30 MG/DL (ref 6–20)
BUN/CREAT SERPL: 25 (ref 12–20)
CALCIUM SERPL-MCNC: 8.9 MG/DL (ref 8.5–10.1)
CHLORIDE SERPL-SCNC: 114 MMOL/L (ref 97–108)
CO2 SERPL-SCNC: 20 MMOL/L (ref 21–32)
CREAT SERPL-MCNC: 1.2 MG/DL (ref 0.55–1.02)
GLUCOSE BLD STRIP.AUTO-MCNC: 123 MG/DL (ref 65–117)
GLUCOSE BLD STRIP.AUTO-MCNC: 135 MG/DL (ref 65–117)
GLUCOSE BLD STRIP.AUTO-MCNC: 159 MG/DL (ref 65–117)
GLUCOSE BLD STRIP.AUTO-MCNC: 170 MG/DL (ref 65–117)
GLUCOSE SERPL-MCNC: 132 MG/DL (ref 65–100)
POTASSIUM SERPL-SCNC: 4.4 MMOL/L (ref 3.5–5.1)
SERVICE CMNT-IMP: ABNORMAL
SODIUM SERPL-SCNC: 141 MMOL/L (ref 136–145)

## 2024-03-11 PROCEDURE — 82962 GLUCOSE BLOOD TEST: CPT

## 2024-03-11 PROCEDURE — 92610 EVALUATE SWALLOWING FUNCTION: CPT

## 2024-03-11 PROCEDURE — 6360000002 HC RX W HCPCS: Performed by: HOSPITALIST

## 2024-03-11 PROCEDURE — 2580000003 HC RX 258: Performed by: NURSE PRACTITIONER

## 2024-03-11 PROCEDURE — 6370000000 HC RX 637 (ALT 250 FOR IP): Performed by: HOSPITALIST

## 2024-03-11 PROCEDURE — 80048 BASIC METABOLIC PNL TOTAL CA: CPT

## 2024-03-11 PROCEDURE — 36415 COLL VENOUS BLD VENIPUNCTURE: CPT

## 2024-03-11 PROCEDURE — 94640 AIRWAY INHALATION TREATMENT: CPT

## 2024-03-11 PROCEDURE — 94761 N-INVAS EAR/PLS OXIMETRY MLT: CPT

## 2024-03-11 PROCEDURE — 6370000000 HC RX 637 (ALT 250 FOR IP): Performed by: NURSE PRACTITIONER

## 2024-03-11 PROCEDURE — 1100000000 HC RM PRIVATE

## 2024-03-11 PROCEDURE — 2580000003 HC RX 258: Performed by: HOSPITALIST

## 2024-03-11 PROCEDURE — 94669 MECHANICAL CHEST WALL OSCILL: CPT

## 2024-03-11 PROCEDURE — 6370000000 HC RX 637 (ALT 250 FOR IP): Performed by: STUDENT IN AN ORGANIZED HEALTH CARE EDUCATION/TRAINING PROGRAM

## 2024-03-11 PROCEDURE — 6360000002 HC RX W HCPCS: Performed by: NURSE PRACTITIONER

## 2024-03-11 RX ADMIN — ENOXAPARIN SODIUM 40 MG: 100 INJECTION SUBCUTANEOUS at 08:54

## 2024-03-11 RX ADMIN — ARFORMOTEROL TARTRATE: 15 SOLUTION RESPIRATORY (INHALATION) at 20:24

## 2024-03-11 RX ADMIN — ALLOPURINOL 300 MG: 300 TABLET ORAL at 09:20

## 2024-03-11 RX ADMIN — TRAZODONE HYDROCHLORIDE 50 MG: 50 TABLET ORAL at 21:49

## 2024-03-11 RX ADMIN — ARFORMOTEROL TARTRATE: 15 SOLUTION RESPIRATORY (INHALATION) at 08:40

## 2024-03-11 RX ADMIN — PIPERACILLIN AND TAZOBACTAM 3375 MG: 3; .375 INJECTION, POWDER, FOR SOLUTION INTRAVENOUS at 00:04

## 2024-03-11 RX ADMIN — FOLIC ACID 1 MG: 1 TABLET ORAL at 08:54

## 2024-03-11 RX ADMIN — CETIRIZINE HYDROCHLORIDE 10 MG: 10 TABLET, FILM COATED ORAL at 08:53

## 2024-03-11 RX ADMIN — PANTOPRAZOLE SODIUM 40 MG: 40 TABLET, DELAYED RELEASE ORAL at 06:04

## 2024-03-11 RX ADMIN — GUAIFENESIN 1200 MG: 600 TABLET ORAL at 21:49

## 2024-03-11 RX ADMIN — PIPERACILLIN AND TAZOBACTAM 3375 MG: 3; .375 INJECTION, POWDER, FOR SOLUTION INTRAVENOUS at 22:50

## 2024-03-11 RX ADMIN — WATER 40 MG: 1 INJECTION INTRAMUSCULAR; INTRAVENOUS; SUBCUTANEOUS at 12:18

## 2024-03-11 RX ADMIN — ALOGLIPTIN 12.5 MG: 6.25 TABLET, FILM COATED ORAL at 08:54

## 2024-03-11 RX ADMIN — SODIUM CHLORIDE: 9 INJECTION, SOLUTION INTRAVENOUS at 06:01

## 2024-03-11 RX ADMIN — GUAIFENESIN 1200 MG: 600 TABLET ORAL at 08:54

## 2024-03-11 RX ADMIN — EZETIMIBE 10 MG: 10 TABLET ORAL at 18:48

## 2024-03-11 RX ADMIN — WATER 40 MG: 1 INJECTION INTRAMUSCULAR; INTRAVENOUS; SUBCUTANEOUS at 06:04

## 2024-03-11 RX ADMIN — LOSARTAN POTASSIUM 100 MG: 50 TABLET, FILM COATED ORAL at 08:53

## 2024-03-11 RX ADMIN — SODIUM CHLORIDE: 9 INJECTION, SOLUTION INTRAVENOUS at 21:25

## 2024-03-11 RX ADMIN — IPRATROPIUM BROMIDE AND ALBUTEROL SULFATE 1 DOSE: .5; 3 SOLUTION RESPIRATORY (INHALATION) at 08:35

## 2024-03-11 RX ADMIN — WATER 40 MG: 1 INJECTION INTRAMUSCULAR; INTRAVENOUS; SUBCUTANEOUS at 00:04

## 2024-03-11 RX ADMIN — WATER 40 MG: 1 INJECTION INTRAMUSCULAR; INTRAVENOUS; SUBCUTANEOUS at 18:48

## 2024-03-11 RX ADMIN — IPRATROPIUM BROMIDE AND ALBUTEROL SULFATE 1 DOSE: .5; 3 SOLUTION RESPIRATORY (INHALATION) at 14:04

## 2024-03-11 RX ADMIN — IPRATROPIUM BROMIDE AND ALBUTEROL SULFATE 1 DOSE: .5; 3 SOLUTION RESPIRATORY (INHALATION) at 20:14

## 2024-03-11 RX ADMIN — CLOPIDOGREL BISULFATE 75 MG: 75 TABLET ORAL at 08:54

## 2024-03-11 RX ADMIN — PIPERACILLIN AND TAZOBACTAM 3375 MG: 3; .375 INJECTION, POWDER, FOR SOLUTION INTRAVENOUS at 06:08

## 2024-03-11 RX ADMIN — PIPERACILLIN AND TAZOBACTAM 3375 MG: 3; .375 INJECTION, POWDER, FOR SOLUTION INTRAVENOUS at 16:28

## 2024-03-11 RX ADMIN — SODIUM CHLORIDE, PRESERVATIVE FREE 10 ML: 5 INJECTION INTRAVENOUS at 08:56

## 2024-03-11 RX ADMIN — MONTELUKAST 10 MG: 10 TABLET, FILM COATED ORAL at 21:49

## 2024-03-11 RX ADMIN — Medication 400 MG: at 08:54

## 2024-03-11 RX ADMIN — CYANOCOBALAMIN TAB 500 MCG 1000 MCG: 500 TAB at 08:54

## 2024-03-11 RX ADMIN — SODIUM CHLORIDE, PRESERVATIVE FREE 10 ML: 5 INJECTION INTRAVENOUS at 21:50

## 2024-03-11 RX ADMIN — FLUTICASONE PROPIONATE 2 SPRAY: 50 SPRAY, METERED NASAL at 08:55

## 2024-03-11 RX ADMIN — METFORMIN HYDROCHLORIDE 500 MG: 500 TABLET ORAL at 08:54

## 2024-03-11 RX ADMIN — DILTIAZEM HYDROCHLORIDE 240 MG: 240 CAPSULE, EXTENDED RELEASE ORAL at 08:54

## 2024-03-11 NOTE — PLAN OF CARE
Problem: Respiratory - Adult  Goal: Achieves optimal ventilation and oxygenation  3/11/2024 0117 by Heike Winter RN  Outcome: Progressing  Note: Patient is on room air and her O2 saturations are remaining above 90%

## 2024-03-11 NOTE — CARE COORDINATION
3/11/2024 3:59 PM   Case Management Assessment  Initial Evaluation    Date/Time of Evaluation: 3/11/2024 3:59 PM  Assessment Completed by: Ny Voss    If patient is discharged prior to next notation, then this note serves as note for discharge by case management.    Patient Name: Neisha Guardado                   YOB: 1945  Diagnosis: Bronchitis [J40]  Acute renal insufficiency [N28.9]  Hypoxia [R09.02]  Near syncope [R55]  Acute asthma exacerbation [J45.901]  Exacerbation of asthma, unspecified asthma severity, unspecified whether persistent [J45.901]                   Date / Time: 3/9/2024  7:46 PM    Patient Admission Status: Inpatient   Readmission Risk (Low < 19, Mod (19-27), High > 27): Readmission Risk Score: 13.8    Current PCP: Gonzalo Raygoza MD  PCP verified by CM? (P) Yes    Chart Reviewed: Yes      History Provided by: (P) Patient  Patient Orientation: (P) Alert and Oriented    Patient Cognition: (P) Alert    Hospitalization in the last 30 days (Readmission):  No    If yes, Readmission Assessment in CM Navigator will be completed.    Advance Directives:      Code Status: Full Code   Patient's Primary Decision Maker is: (P) Named in Scanned ACP Document    Primary Decision Maker: Amol Guardado - Glenda - [phone]    Discharge Planning:    Patient lives with: (P) Children Type of Home: (P) House  Primary Care Giver: (P) Self  Patient Support Systems include: (P) Children   Current Financial resources: (P) Medicare  Current community resources: (P) None  Current services prior to admission: (P) None            Current DME:              Type of Home Care services:  (P) None    ADLS  Prior functional level: (P) Independent in ADLs/IADLs  Current functional level: (P) Independent in ADLs/IADLs    PT AM-PAC:   /24  OT AM-PAC:   /24    Family can provide assistance at DC: (P) Yes  Would you like Case Management to discuss the discharge plan with any other family members/significant

## 2024-03-12 LAB
ANION GAP SERPL CALC-SCNC: 6 MMOL/L (ref 5–15)
BUN SERPL-MCNC: 39 MG/DL (ref 6–20)
BUN/CREAT SERPL: 25 (ref 12–20)
CALCIUM SERPL-MCNC: 8.9 MG/DL (ref 8.5–10.1)
CHLORIDE SERPL-SCNC: 117 MMOL/L (ref 97–108)
CO2 SERPL-SCNC: 19 MMOL/L (ref 21–32)
CREAT SERPL-MCNC: 1.55 MG/DL (ref 0.55–1.02)
GLUCOSE BLD STRIP.AUTO-MCNC: 106 MG/DL (ref 65–117)
GLUCOSE BLD STRIP.AUTO-MCNC: 121 MG/DL (ref 65–117)
GLUCOSE BLD STRIP.AUTO-MCNC: 167 MG/DL (ref 65–117)
GLUCOSE SERPL-MCNC: 140 MG/DL (ref 65–100)
POTASSIUM SERPL-SCNC: 4.2 MMOL/L (ref 3.5–5.1)
SERVICE CMNT-IMP: ABNORMAL
SERVICE CMNT-IMP: ABNORMAL
SERVICE CMNT-IMP: NORMAL
SODIUM SERPL-SCNC: 142 MMOL/L (ref 136–145)

## 2024-03-12 PROCEDURE — 36415 COLL VENOUS BLD VENIPUNCTURE: CPT

## 2024-03-12 PROCEDURE — 3600007502: Performed by: INTERNAL MEDICINE

## 2024-03-12 PROCEDURE — 99152 MOD SED SAME PHYS/QHP 5/>YRS: CPT | Performed by: INTERNAL MEDICINE

## 2024-03-12 PROCEDURE — 6370000000 HC RX 637 (ALT 250 FOR IP): Performed by: STUDENT IN AN ORGANIZED HEALTH CARE EDUCATION/TRAINING PROGRAM

## 2024-03-12 PROCEDURE — 6360000002 HC RX W HCPCS: Performed by: INTERNAL MEDICINE

## 2024-03-12 PROCEDURE — 6360000002 HC RX W HCPCS: Performed by: NURSE PRACTITIONER

## 2024-03-12 PROCEDURE — 6370000000 HC RX 637 (ALT 250 FOR IP)

## 2024-03-12 PROCEDURE — 2709999900 HC NON-CHARGEABLE SUPPLY: Performed by: INTERNAL MEDICINE

## 2024-03-12 PROCEDURE — 7100000010 HC PHASE II RECOVERY - FIRST 15 MIN: Performed by: INTERNAL MEDICINE

## 2024-03-12 PROCEDURE — 2580000003 HC RX 258: Performed by: NURSE PRACTITIONER

## 2024-03-12 PROCEDURE — 94640 AIRWAY INHALATION TREATMENT: CPT

## 2024-03-12 PROCEDURE — 3600007512: Performed by: INTERNAL MEDICINE

## 2024-03-12 PROCEDURE — 6360000002 HC RX W HCPCS: Performed by: HOSPITALIST

## 2024-03-12 PROCEDURE — 94669 MECHANICAL CHEST WALL OSCILL: CPT

## 2024-03-12 PROCEDURE — 1100000000 HC RM PRIVATE

## 2024-03-12 PROCEDURE — 94761 N-INVAS EAR/PLS OXIMETRY MLT: CPT

## 2024-03-12 PROCEDURE — 99153 MOD SED SAME PHYS/QHP EA: CPT | Performed by: INTERNAL MEDICINE

## 2024-03-12 PROCEDURE — 7100000011 HC PHASE II RECOVERY - ADDTL 15 MIN: Performed by: INTERNAL MEDICINE

## 2024-03-12 PROCEDURE — 82962 GLUCOSE BLOOD TEST: CPT

## 2024-03-12 PROCEDURE — 2580000003 HC RX 258: Performed by: HOSPITALIST

## 2024-03-12 PROCEDURE — 6370000000 HC RX 637 (ALT 250 FOR IP): Performed by: NURSE PRACTITIONER

## 2024-03-12 PROCEDURE — 0BJ08ZZ INSPECTION OF TRACHEOBRONCHIAL TREE, VIA NATURAL OR ARTIFICIAL OPENING ENDOSCOPIC: ICD-10-PCS | Performed by: INTERNAL MEDICINE

## 2024-03-12 PROCEDURE — 6370000000 HC RX 637 (ALT 250 FOR IP): Performed by: HOSPITALIST

## 2024-03-12 PROCEDURE — 80048 BASIC METABOLIC PNL TOTAL CA: CPT

## 2024-03-12 PROCEDURE — 6360000002 HC RX W HCPCS

## 2024-03-12 RX ORDER — ACETYLCYSTEINE 100 MG/ML
SOLUTION ORAL; RESPIRATORY (INHALATION)
Status: COMPLETED
Start: 2024-03-12 | End: 2024-03-12

## 2024-03-12 RX ORDER — FLUMAZENIL 0.1 MG/ML
INJECTION INTRAVENOUS
Status: DISCONTINUED
Start: 2024-03-12 | End: 2024-03-12 | Stop reason: WASHOUT

## 2024-03-12 RX ORDER — LIDOCAINE HYDROCHLORIDE 40 MG/ML
SOLUTION TOPICAL
Status: COMPLETED
Start: 2024-03-12 | End: 2024-03-12

## 2024-03-12 RX ORDER — MIDAZOLAM HYDROCHLORIDE 1 MG/ML
INJECTION INTRAMUSCULAR; INTRAVENOUS
Status: DISPENSED
Start: 2024-03-12 | End: 2024-03-12

## 2024-03-12 RX ORDER — LIDOCAINE HYDROCHLORIDE 40 MG/ML
SOLUTION TOPICAL ONCE
Status: COMPLETED | OUTPATIENT
Start: 2024-03-12 | End: 2024-03-12

## 2024-03-12 RX ORDER — NALOXONE HYDROCHLORIDE 0.4 MG/ML
0.2 INJECTION, SOLUTION INTRAMUSCULAR; INTRAVENOUS; SUBCUTANEOUS PRN
Status: DISCONTINUED | OUTPATIENT
Start: 2024-03-12 | End: 2024-03-13 | Stop reason: HOSPADM

## 2024-03-12 RX ORDER — LIDOCAINE HYDROCHLORIDE 20 MG/ML
INJECTION, SOLUTION INFILTRATION; PERINEURAL
Status: DISPENSED
Start: 2024-03-12 | End: 2024-03-12

## 2024-03-12 RX ORDER — LIDOCAINE HYDROCHLORIDE AND EPINEPHRINE BITARTRATE 20; .01 MG/ML; MG/ML
2 INJECTION, SOLUTION SUBCUTANEOUS PRN
Status: DISCONTINUED | OUTPATIENT
Start: 2024-03-12 | End: 2024-03-13 | Stop reason: HOSPADM

## 2024-03-12 RX ORDER — LIDOCAINE HYDROCHLORIDE 20 MG/ML
2 INJECTION, SOLUTION INFILTRATION; PERINEURAL ONCE
Status: DISCONTINUED | OUTPATIENT
Start: 2024-03-12 | End: 2024-03-13 | Stop reason: HOSPADM

## 2024-03-12 RX ORDER — MIDAZOLAM HYDROCHLORIDE 1 MG/ML
INJECTION INTRAMUSCULAR; INTRAVENOUS PRN
Status: DISCONTINUED | OUTPATIENT
Start: 2024-03-12 | End: 2024-03-12 | Stop reason: ALTCHOICE

## 2024-03-12 RX ORDER — FENTANYL CITRATE 0.05 MG/ML
INJECTION, SOLUTION INTRAMUSCULAR; INTRAVENOUS PRN
Status: DISCONTINUED | OUTPATIENT
Start: 2024-03-12 | End: 2024-03-12 | Stop reason: ALTCHOICE

## 2024-03-12 RX ORDER — FENTANYL CITRATE 50 UG/ML
INJECTION, SOLUTION INTRAMUSCULAR; INTRAVENOUS
Status: DISPENSED
Start: 2024-03-12 | End: 2024-03-12

## 2024-03-12 RX ORDER — ACETYLCYSTEINE 200 MG/ML
600 SOLUTION ORAL; RESPIRATORY (INHALATION) ONCE
Status: DISCONTINUED | OUTPATIENT
Start: 2024-03-12 | End: 2024-03-13 | Stop reason: HOSPADM

## 2024-03-12 RX ORDER — MIDAZOLAM HYDROCHLORIDE 1 MG/ML
5 INJECTION, SOLUTION INTRAMUSCULAR; INTRAVENOUS PRN
Status: DISCONTINUED | OUTPATIENT
Start: 2024-03-12 | End: 2024-03-13 | Stop reason: HOSPADM

## 2024-03-12 RX ORDER — NALOXONE HYDROCHLORIDE 0.4 MG/ML
INJECTION, SOLUTION INTRAMUSCULAR; INTRAVENOUS; SUBCUTANEOUS
Status: DISCONTINUED
Start: 2024-03-12 | End: 2024-03-12 | Stop reason: WASHOUT

## 2024-03-12 RX ORDER — FENTANYL CITRATE 50 UG/ML
25 INJECTION, SOLUTION INTRAMUSCULAR; INTRAVENOUS PRN
Status: DISCONTINUED | OUTPATIENT
Start: 2024-03-12 | End: 2024-03-13 | Stop reason: HOSPADM

## 2024-03-12 RX ORDER — ACETYLCYSTEINE 200 MG/ML
800 SOLUTION ORAL; RESPIRATORY (INHALATION) PRN
Status: DISCONTINUED | OUTPATIENT
Start: 2024-03-12 | End: 2024-03-13 | Stop reason: HOSPADM

## 2024-03-12 RX ORDER — SODIUM CHLORIDE 9 MG/ML
INJECTION, SOLUTION INTRAVENOUS CONTINUOUS
Status: DISCONTINUED | OUTPATIENT
Start: 2024-03-12 | End: 2024-03-12

## 2024-03-12 RX ORDER — FLUMAZENIL 0.1 MG/ML
0.2 INJECTION INTRAVENOUS PRN
Status: DISCONTINUED | OUTPATIENT
Start: 2024-03-12 | End: 2024-03-13 | Stop reason: HOSPADM

## 2024-03-12 RX ADMIN — MONTELUKAST 10 MG: 10 TABLET, FILM COATED ORAL at 23:37

## 2024-03-12 RX ADMIN — WATER 40 MG: 1 INJECTION INTRAMUSCULAR; INTRAVENOUS; SUBCUTANEOUS at 12:19

## 2024-03-12 RX ADMIN — WATER 40 MG: 1 INJECTION INTRAMUSCULAR; INTRAVENOUS; SUBCUTANEOUS at 17:34

## 2024-03-12 RX ADMIN — EZETIMIBE 10 MG: 10 TABLET ORAL at 17:34

## 2024-03-12 RX ADMIN — ACETYLCYSTEINE 2 ML: 100 SOLUTION ORAL; RESPIRATORY (INHALATION) at 08:44

## 2024-03-12 RX ADMIN — IPRATROPIUM BROMIDE AND ALBUTEROL SULFATE 1 DOSE: .5; 3 SOLUTION RESPIRATORY (INHALATION) at 19:50

## 2024-03-12 RX ADMIN — GUAIFENESIN 1200 MG: 600 TABLET ORAL at 23:37

## 2024-03-12 RX ADMIN — IPRATROPIUM BROMIDE AND ALBUTEROL SULFATE 1 DOSE: .5; 3 SOLUTION RESPIRATORY (INHALATION) at 14:09

## 2024-03-12 RX ADMIN — WATER 40 MG: 1 INJECTION INTRAMUSCULAR; INTRAVENOUS; SUBCUTANEOUS at 00:14

## 2024-03-12 RX ADMIN — LIDOCAINE HYDROCHLORIDE 10 ML: 40 SOLUTION TOPICAL at 07:49

## 2024-03-12 RX ADMIN — TRAZODONE HYDROCHLORIDE 50 MG: 50 TABLET ORAL at 23:37

## 2024-03-12 RX ADMIN — WATER 40 MG: 1 INJECTION INTRAMUSCULAR; INTRAVENOUS; SUBCUTANEOUS at 06:45

## 2024-03-12 RX ADMIN — SODIUM CHLORIDE, PRESERVATIVE FREE 10 ML: 5 INJECTION INTRAVENOUS at 23:26

## 2024-03-12 RX ADMIN — WATER 40 MG: 1 INJECTION INTRAMUSCULAR; INTRAVENOUS; SUBCUTANEOUS at 23:19

## 2024-03-12 RX ADMIN — ARFORMOTEROL TARTRATE: 15 SOLUTION RESPIRATORY (INHALATION) at 19:56

## 2024-03-12 RX ADMIN — LIDOCAINE HYDROCHLORIDE 10 ML: 40 SOLUTION ORAL at 07:49

## 2024-03-12 ASSESSMENT — PAIN SCALES - GENERAL
PAINLEVEL_OUTOF10: 0

## 2024-03-12 ASSESSMENT — PAIN - FUNCTIONAL ASSESSMENT: PAIN_FUNCTIONAL_ASSESSMENT: 0-10

## 2024-03-12 NOTE — PROCEDURES
[facility] [address]  [address]  [place], [place] [address] [phone]    Neisha CHRISTINE [facility]  1945  [id number]      Date of Procedure: 3/12/24    Preoperative diagnosis: Mucus plugging of bronchi [T17.500A]    Procedure: Procedure(s):  BRONCHOSCOPY    Indication: Mucus plugging    :  Dr.Cecilia Karolyn MD    Assistant(s): Circulator: Dorys Harrison RN  Circulator Assist: Abby Milner RN  Endoscopy Technician: Dread Gonzales    Anesthesia/Sedation:  Versed 4 mg IV and Fentanyl 100 mcg IV      Procedure Details:  After infomed consent was obtained for the procedure, with all risks and benefits of procedure explained the patient was taken to the endoscopy suite and placed in the supine position.  Following sequential administration of sedation as per above, the bronchoscope was inserted into the mouth and advanced under direct vision to the tracheobronchial tree      Findings:   Normal appearing vocal cords. 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 Moderate to severe bronchomalacia in left king stem and moderate bronchomalacia in right maintstem. Normal appearing mucosa.  Large amounts of thick white mucus in the left lower lobe. Suctioned and removed with multiple passes due to mucus being to thick to suction through therapeutic bronchoscope. Acetylcysteine applied to help break up secretions.     Therapies:   See above    Specimens:   None           Complications:   None noted; patient tolerated the procedure well.    EBL:  Minimal           Impression:    mucus plugging and tracheomalacia    Recommendations:   Chest PT and may need scheduled acetylcysteine nebs      Rosalinda Parr MD  3/12/2024  9:05 AM

## 2024-03-12 NOTE — CARE COORDINATION
3/12/2024 11:21 AM   Care Management Progress Note    Bronchitis [J40]  Acute renal insufficiency [N28.9]  Hypoxia [R09.02]  Near syncope [R55]  Acute asthma exacerbation [J45.901]  Exacerbation of asthma, unspecified asthma severity, unspecified whether persistent [J45.901]    RUR:  14%  Risk Level: [x]Low []Moderate []High    Transition of care plan:  Ongoing medical management  Pulm consulted, pt under went a bronchoscopy today  PT and OT consulted: No, pt independent with ambulating in the room and hallways   Anticipated discharge plan: Home with family  Outpatient follow-up.  Discharge transport: Pt's children

## 2024-03-12 NOTE — PERIOP NOTE
Bronchoscopy completed, and patient brought to PACU. No specimens collected for laboratory processing during procedure. Pt has no complaints at this time and tolerated procedure well. Bronchoscope was pre-cleaned at the bedside by Jason Gonzales immediately following procedure. Post recovery report given to Bertha Nguyen.

## 2024-03-12 NOTE — PERIOP NOTE
TRANSFER - OUT REPORT:    Verbal report given to Alvarado 4th Floor RN on Neisha Guardado  being transferred to 4th Floor - Room 426 for routine progression of patient care       Report consisted of patient's Situation, Background, Assessment and   Recommendations(SBAR).     Information from the following report(s) Nurse Handoff Report, Index, MAR, Med Rec Status, Cardiac Rhythm NSR, Pre Procedure Checklist, Procedure Verification, and Neuro Assessment was reviewed with the receiving nurse.           Lines:   Peripheral IV 03/09/24 Right Antecubital (Active)   Site Assessment Clean, dry & intact 03/12/24 0859   Line Status Infusing 03/12/24 0859   Line Care Connections checked and tightened 03/12/24 0859   Phlebitis Assessment No symptoms 03/12/24 0859   Infiltration Assessment 0 03/12/24 0859   Alcohol Cap Used Yes 03/12/24 0859   Dressing Status Clean, dry & intact 03/12/24 0859   Dressing Type Transparent 03/12/24 0859        Opportunity for questions and clarification was provided.      Patient transported with:  Tech

## 2024-03-13 VITALS
HEART RATE: 87 BPM | DIASTOLIC BLOOD PRESSURE: 70 MMHG | OXYGEN SATURATION: 100 % | SYSTOLIC BLOOD PRESSURE: 149 MMHG | TEMPERATURE: 97.9 F | BODY MASS INDEX: 27.49 KG/M2 | RESPIRATION RATE: 18 BRPM | HEIGHT: 64 IN | WEIGHT: 161 LBS

## 2024-03-13 LAB
ANION GAP SERPL CALC-SCNC: 6 MMOL/L (ref 5–15)
BUN SERPL-MCNC: 39 MG/DL (ref 6–20)
BUN/CREAT SERPL: 33 (ref 12–20)
CALCIUM SERPL-MCNC: 8.9 MG/DL (ref 8.5–10.1)
CHLORIDE SERPL-SCNC: 116 MMOL/L (ref 97–108)
CO2 SERPL-SCNC: 21 MMOL/L (ref 21–32)
CREAT SERPL-MCNC: 1.18 MG/DL (ref 0.55–1.02)
GLUCOSE BLD STRIP.AUTO-MCNC: 118 MG/DL (ref 65–117)
GLUCOSE BLD STRIP.AUTO-MCNC: 190 MG/DL (ref 65–117)
GLUCOSE SERPL-MCNC: 141 MG/DL (ref 65–100)
POTASSIUM SERPL-SCNC: 4 MMOL/L (ref 3.5–5.1)
SERVICE CMNT-IMP: ABNORMAL
SERVICE CMNT-IMP: ABNORMAL
SODIUM SERPL-SCNC: 143 MMOL/L (ref 136–145)

## 2024-03-13 PROCEDURE — 36415 COLL VENOUS BLD VENIPUNCTURE: CPT

## 2024-03-13 PROCEDURE — 6360000002 HC RX W HCPCS: Performed by: HOSPITALIST

## 2024-03-13 PROCEDURE — 80048 BASIC METABOLIC PNL TOTAL CA: CPT

## 2024-03-13 PROCEDURE — 94640 AIRWAY INHALATION TREATMENT: CPT

## 2024-03-13 PROCEDURE — 6370000000 HC RX 637 (ALT 250 FOR IP): Performed by: STUDENT IN AN ORGANIZED HEALTH CARE EDUCATION/TRAINING PROGRAM

## 2024-03-13 PROCEDURE — 2580000003 HC RX 258: Performed by: HOSPITALIST

## 2024-03-13 PROCEDURE — 6370000000 HC RX 637 (ALT 250 FOR IP): Performed by: HOSPITALIST

## 2024-03-13 PROCEDURE — 94761 N-INVAS EAR/PLS OXIMETRY MLT: CPT

## 2024-03-13 PROCEDURE — 2580000003 HC RX 258: Performed by: NURSE PRACTITIONER

## 2024-03-13 PROCEDURE — 82962 GLUCOSE BLOOD TEST: CPT

## 2024-03-13 PROCEDURE — 94669 MECHANICAL CHEST WALL OSCILL: CPT

## 2024-03-13 PROCEDURE — 6370000000 HC RX 637 (ALT 250 FOR IP): Performed by: NURSE PRACTITIONER

## 2024-03-13 PROCEDURE — 6360000002 HC RX W HCPCS: Performed by: NURSE PRACTITIONER

## 2024-03-13 RX ORDER — PREDNISONE 10 MG/1
TABLET ORAL
Qty: 21 TABLET | Refills: 0 | Status: SHIPPED | OUTPATIENT
Start: 2024-03-13 | End: 2024-03-22

## 2024-03-13 RX ADMIN — CLOPIDOGREL BISULFATE 75 MG: 75 TABLET ORAL at 08:31

## 2024-03-13 RX ADMIN — WATER 40 MG: 1 INJECTION INTRAMUSCULAR; INTRAVENOUS; SUBCUTANEOUS at 06:19

## 2024-03-13 RX ADMIN — FOLIC ACID 1 MG: 1 TABLET ORAL at 08:30

## 2024-03-13 RX ADMIN — ENOXAPARIN SODIUM 40 MG: 100 INJECTION SUBCUTANEOUS at 08:30

## 2024-03-13 RX ADMIN — SODIUM CHLORIDE, PRESERVATIVE FREE 5 ML: 5 INJECTION INTRAVENOUS at 08:49

## 2024-03-13 RX ADMIN — DILTIAZEM HYDROCHLORIDE 240 MG: 240 CAPSULE, EXTENDED RELEASE ORAL at 08:30

## 2024-03-13 RX ADMIN — ALLOPURINOL 300 MG: 300 TABLET ORAL at 08:30

## 2024-03-13 RX ADMIN — GUAIFENESIN 1200 MG: 600 TABLET ORAL at 08:30

## 2024-03-13 RX ADMIN — ALOGLIPTIN 12.5 MG: 6.25 TABLET, FILM COATED ORAL at 08:30

## 2024-03-13 RX ADMIN — Medication 400 MG: at 08:30

## 2024-03-13 RX ADMIN — ARFORMOTEROL TARTRATE: 15 SOLUTION RESPIRATORY (INHALATION) at 08:18

## 2024-03-13 RX ADMIN — IPRATROPIUM BROMIDE AND ALBUTEROL SULFATE 1 DOSE: .5; 3 SOLUTION RESPIRATORY (INHALATION) at 13:35

## 2024-03-13 RX ADMIN — CETIRIZINE HYDROCHLORIDE 10 MG: 10 TABLET, FILM COATED ORAL at 08:30

## 2024-03-13 RX ADMIN — IPRATROPIUM BROMIDE AND ALBUTEROL SULFATE 1 DOSE: .5; 3 SOLUTION RESPIRATORY (INHALATION) at 08:17

## 2024-03-13 RX ADMIN — CYANOCOBALAMIN TAB 500 MCG 1000 MCG: 500 TAB at 08:30

## 2024-03-13 RX ADMIN — LOSARTAN POTASSIUM 100 MG: 50 TABLET, FILM COATED ORAL at 08:30

## 2024-03-13 RX ADMIN — PANTOPRAZOLE SODIUM 40 MG: 40 TABLET, DELAYED RELEASE ORAL at 06:19

## 2024-03-13 NOTE — PROGRESS NOTES
Hospitalist Progress Note      NAME:  Neisha Guardado   :  1945  MRM:  [id number]    Date/Time: 3/10/2024  10:38 AM           Assessment / Plan:     78 y.o.  female with PMHx asthma, hypertension, diabetes, hyperlipidemia presented to the ER with 3 weeks of cough it was initially productive of white sputum but has been nonproductive for the past 2 weeks, wheezing and shortness of breath worse with exertion and associated with midsternal chest tightness, CT chest was positive for left lower lobe mucous plugging patient was admitted for further workup    #Left lower lobe mucous plugging  #Persistent cough x 3 weeks  #[place]  #Asthma with acute exacerbation  --failed OP levaquin by pcp prescribed , and Z-Neo and prednisone prescribed by pulmonologist on .  -- CT chest with above findings.    Plan  Nasal cannula O2  Put on Zosyn, Mucinex, prednisone.   Consult speech to evaluate swallow and potential silent aspiration.  Consult pulm/patient has reported she had a bronchoscopy last year for similar mucous plug  --Viral panel pending  -- Continue home Spiriva, Wixela, Singulair.  Add albuterol nebulizer as needed     Mild NEENA creatinine of 1.36 on admission, baseline 1  Plan  --Normal saline 75 MLS per hour x 1 day.    Hold HCTZ and potassium  --Recheck creatinine     Diabetes type 2  -- Continue Janumet.    Add moderate dose sliding scale since on prednisone     GERD, controlled  -- Continue PPI     Hypertension, history of A-fib 2023  Continue Cardizem, Plavix, olmesartan  -- Holding HCTZ while giving some IV fluid     Hyperlipidemia  -- On Repatha injections twice monthly     Gout  Continue allopurinol    I have personally reviewed the radiographs, laboratory data in Epic and decisions and statements above are based partially on this personal interpretation.                 Care Plan discussed with: Patient    Discussed:  Care Plan    Prophylaxis:  Lovenox    Disposition:  Home 
[facility]  [address] [place] [address] [phone]      Hospitalist Progress Note      NAME: Neisha Guardado   :  1945  MRM:  [id number]    Date of service: 3/12/2024  11:48 AM       Assessment and Plan:   Left lower lobe mucous plugging s/p bronchoscopy and removal of thick white sputum.      2.  Asthma with acute exacerbation. Improving after bronch. Viral pane is negative. Cont steroid, nebs. Off O2      3.  NEENA. Pt stated that she has kidney issue in the past. ? CKD. Worsening Cr. Cont IVF and if worsening will consult nephrology. Avoid nephrotoxic drugs.      4.  Diabetes type 2. Cont SSI. Hold metformin due to elevated Cr.      5.  GERD, controlled. Continue PPI     6.  Hypertension/ Hx of A-fib 2023. Continue Cardizem, Plavix     7.  Hyperlipidemia. On Repatha injections twice monthly     8.  Gout. Continue allopurinol         Subjective:     Chief Complaint:: Patient was seen and examined as a follow up for asthma exacerbation.  Chart was reviewed. feels much better after bronchoscopy     ROS:  (bold if positive, if negative)    Tolerating PT  Tolerating Diet        Objective:     Last 24hrs VS reviewed since prior progress note. Most recent are:    Vitals:    24 1125   BP: (!) 168/71   Pulse: 71   Resp: 17   Temp: 97.9 °F (36.6 °C)   SpO2: 99%     SpO2 Readings from Last 6 Encounters:   24 99%   23 99%   23 99%        No intake or output data in the 24 hours ending 24 1148     Physical Exam:    Gen:  Well-developed, well-nourished, in no acute distress  HEENT:  Pink conjunctivae, PERRL, hearing intact to voice, moist mucous membranes  Neck:  Supple, without masses, thyroid non-tender  Resp:  No accessory muscle use, clear breath sounds without wheezes rales or rhonchi  Card:  No murmurs, normal S1, S2 without thrills, bruits or peripheral edema  Abd:  Soft, non-tender, non-distended, normoactive bowel sounds are 
Neisha CHRISTINE [facility]  1945  [id number]    Situation:  Verbal report received from:   ZHANE Saul   Procedure: Procedure(s):  BRONCHOSCOPY    Background:    Preoperative diagnosis: Mucus plugging of bronchi [T17.500A]  Postoperative diagnosis: * No post-op diagnosis entered *    :  Dr. Parr   Assistant(s): Circulator: Dorys Harrison RN  Circulator Assist: Abby Milner RN  Endoscopy Technician: rDead Gonzales    Specimens: * No specimens in log *  H. Pylori    no    Assessment:  Intra-procedure medications   Versed 4  mg  Fentanyl 100 mcg      Anesthesia gave intra-procedure sedation and medications, see anesthesia flow sheet   n/a     Intravenous fluids: NS@ KVO     Vital signs stable   yes    Abdominal assessment: round and soft   yes    Recommendation:  Discharge patient per MD order  inpatient.  Return to floor  yes  Family or Friend   family   Permission to share finding with family or friend   yes    
Patient has telemetry order but no tele box available per telemetry tech.  
Speech LAnguage Pathology EVALUATION/DISCHARGE    Patient: Neisha Guardado (78 y.o. female)  Date: 3/11/2024  Primary Diagnosis: Bronchitis [J40]  Acute renal insufficiency [N28.9]  Hypoxia [R09.02]  Near syncope [R55]  Acute asthma exacerbation [J45.901]  Exacerbation of asthma, unspecified asthma severity, unspecified whether persistent [J45.901]  Procedure(s) (LRB):  BRONCHOSCOPY (N/A)     Precautions: reflux                    ASSESSMENT :  Based on the objective data described below, the patient presents with normal oral-pharyngeal swallow.  She does report reflux and is on meds .    Admitte 3-9-24 with cough, wheeze, SOB< tachycardia, LLL mucous plug.   PMH: asthma, DM, GERD, HTN, BATSHEVA, XOL , s-fib,  gout, h/o tobacco    Patient will benefit from skilled intervention to address the above impairments.     PLAN :  Recommendations and Planned Interventions:  Diet: Regular and thin liquids       Acute SLP Services: No, patient will be discharged from acute skilled speech-language pathology at this time.    Discharge Recommendations: No, additional SLP treatment not indicated at discharge     SUBJECTIVE:   Patient stated, “I don't have any trouble swallowing.”    OBJECTIVE:     Past Medical History:   Diagnosis Date    Asthma     Diabetes (HCC)     type 2    GERD (gastroesophageal reflux disease)     Hypertension     Ill-defined condition     seasonal allergies    Ill-defined condition     gout    Ill-defined condition     high cholesterol     Past Surgical History:   Procedure Laterality Date    APPENDECTOMY      CHOLECYSTECTOMY, LAPAROSCOPIC  1985    HEENT  2010    cataract     HYSTERECTOMY (CERVIX STATUS UNKNOWN)  1975    OTHER SURGICAL HISTORY  1993    bladder tack    OVARY REMOVAL  1996    benign ovarian tumor    ROTATOR CUFF REPAIR  2015    right    TONSILLECTOMY  1964     Prior Level of Function/Home Situation:        Baseline Assessment:  Current Diet : Regular  Current Liquid Diet : Thin          Cognitive 
Vital signs stable,  patient alert and oriented.  Denies pain.  Patient returned to room 426 post procedure.    
    No results for input(s): \"WBC\", \"HGB\", \"HCT\", \"PLT\" in the last 72 hours.    Recent Labs     03/11/24  0342 03/12/24  0346 03/13/24  0415    142 143   K 4.4 4.2 4.0   * 117* 116*   CO2 20* 19* 21   BUN 30* 39* 39*       No results found for: \"GLUCPOC\"  No results for input(s): \"PH\", \"PCO2\", \"PO2\", \"HCO3\", \"FIO2\" in the last 72 hours.  No results for input(s): \"INR\" in the last 72 hours.  [unfilled]    I have reviewed notes of prior 24hr.    Other pertinent lab:     Total time: -35- minutes. I personally saw and examined the patient during this time period.  Greater than 50% of this time was spent in counseling and coordination of care.    I personally reviewed chart, notes, data and current medications in the medical record.  I have personally examined and treated the patient at bedside during this period.                 Care Plan discussed with: Patient, Nursing Staff, and >50% of time spent in counseling and coordination of care    Discussed:  Care Plan    Prophylaxis:  Lovenox    Disposition:  Home w/Family           ___________________________________________________    Attending Physician: Jacob Jaimes MD      
Oral Daily with breakfast    arformoterol 15 mcg-budesonide 0.5 mg neb solution   Nebulization BID RT    insulin lispro (HUMALOG) injection vial 0-8 Units  0-8 Units SubCUTAneous TID WC    insulin lispro (HUMALOG) injection vial 0-4 Units  0-4 Units SubCUTAneous Nightly    glucose chewable tablet 16 g  4 tablet Oral PRN    dextrose bolus 10% 125 mL  125 mL IntraVENous PRN    Or    dextrose bolus 10% 250 mL  250 mL IntraVENous PRN    glucagon injection 1 mg  1 mg SubCUTAneous PRN    dextrose 10 % infusion   IntraVENous Continuous PRN    guaiFENesin (MUCINEX) extended release tablet 1,200 mg  1,200 mg Oral BID    methylPREDNISolone sodium succ (SOLU-MEDROL) 40 mg in sterile water 1 mL injection  40 mg IntraVENous Q6H    ipratropium 0.5 mg-albuterol 2.5 mg (DUONEB) nebulizer solution 1 Dose  1 Dose Inhalation Q6H WA RT    piperacillin-tazobactam (ZOSYN) 3,375 mg in sodium chloride 0.9 % 50 mL IVPB (mini-bag)  3,375 mg IntraVENous q8h            Lab Review:     Recent Labs     03/09/24  2104 03/10/24  0610   WBC 6.6 6.2   HGB 10.9* 11.4*   HCT 31.7* 34.6*    263       Recent Labs     03/09/24  2104 03/10/24  0610 03/11/24  0342    139 141   K 4.5 4.6 4.4    115* 114*   CO2 24 20* 20*   BUN 37* 34* 30*   ALT 34  --   --        No components found for: \"GLPOC\"      
results found for: \"CMV\", \"RPR\", \"HBCM\", \"HBSAG\", \"HAAB\", \"HCAB1\", \"G6PD\", \"HIVR\", \"HIV1\", \"HIV12\", \"HIVPC\", \"HIVRPI\"    No results found for: \"CPK\"    No components found for: \"COLOR\", \"APPRN\", \"SPGRU\", \"BRYANT\", \"PROTU\", \"GLUCU\", \"KETU\", \"BILU\", \"BLDU\", \"UROU\", \"XIOMARA\", \"LEUKU\", \"WBCU\", \"RBCU\", \"UEPI\", \"BACTU\", \"CASTS\", \"UCRY\"    IMPRESSION  Acute Asthma Exacerbation  Abnormal Chest CT with left lower lobe mucus plugging and collapse  BATSHEVA; not treated  GERD on PPI    PLAN  Supplemental oxygen as needed to keep sats >88%  Continue Brovana/Pulmicort nebs and Duo-Nebs  Mucinex, flutter valve,chest PT  Continue IV steroids at current dose  Obtain sputum culture if able  Given lack of improvement, will plan for bronchoscopy tomorrow morning at 8:30.  NPO after midnight.  Singulair  Pantoprazole  Needs outpatient sleep follow-up to address untreated BATSHEVA  Lovenox for ppx  Will need repeat CT in 6-8 weeks      VICTORIA Landa  
follow up as above.  Please call with questions.      VICTORIA Landa

## 2024-03-13 NOTE — DISCHARGE SUMMARY
Hospitalist Discharge Summary     Patient ID:    Neisha Guardado  [id number]  78 y.o.  1945    Admit date of service: 3/9/2024    Discharge date of service: 3/13/2024    Admission Diagnoses: Bronchitis [J40]  Acute renal insufficiency [N28.9]  Hypoxia [R09.02]  Near syncope [R55]  Acute asthma exacerbation [J45.901]  Exacerbation of asthma, unspecified asthma severity, unspecified whether persistent [J45.901]    Chronic Diagnoses:      Discharge Medications:   Current Discharge Medication List        START taking these medications    Details   predniSONE (DELTASONE) 10 MG tablet Take 4 tablets by mouth daily for 3 days, THEN 2 tablets daily for 3 days, THEN 1 tablet daily for 3 days.  Qty: 21 tablet, Refills: 0           CONTINUE these medications which have NOT CHANGED    Details   clopidogrel (PLAVIX) 75 MG tablet Take 1 tablet by mouth daily      olmesartan (BENICAR) 40 MG tablet Take 1 tablet by mouth daily      allopurinol (ZYLOPRIM) 300 MG tablet Take 1 tablet by mouth daily      levocetirizine (XYZAL) 5 MG tablet Take 1 tablet by mouth nightly      calcium carbonate 600 MG TABS tablet Take 1 tablet by mouth daily      vitamin B-12 (CYANOCOBALAMIN) 1000 MCG tablet Take 1 tablet by mouth daily      folic acid (FOLVITE) 1 MG tablet Take 1 tablet by mouth daily      fluticasone-salmeterol (WIXELA INHUB) 250-50 MCG/ACT AEPB diskus inhaler Inhale 1 puff into the lungs 2 times daily      traZODone (DESYREL) 50 MG tablet Take 1 tablet by mouth nightly      albuterol (PROVENTIL) (5 MG/ML) 0.5% nebulizer solution Inhale into the lungs every 4 hours as needed      dilTIAZem (TIAZAC) 240 MG extended release capsule Take 1 capsule by mouth daily      Evolocumab (REPATHA SURECLICK) 140 MG/ML SOAJ Inject 140 mg into the skin every 14 days      ezetimibe (ZETIA) 10 MG tablet Take 1 tablet by mouth every evening      fluticasone (FLONASE) 50 MCG/ACT nasal spray 2 sprays by Nasal route daily      montelukast (SINGULAIR) 10

## 2024-03-13 NOTE — PLAN OF CARE
Problem: Discharge Planning  Goal: Discharge to home or other facility with appropriate resources  3/13/2024 1607 by Rafael Richard RN  Outcome: Completed  3/13/2024 0248 by Ita Haney LPN  Outcome: Progressing     Problem: Safety - Adult  Goal: Free from fall injury  3/13/2024 1607 by Rafael Richard RN  Outcome: Completed  3/13/2024 0248 by Ita Haney LPN  Outcome: Progressing     Problem: ABCDS Injury Assessment  Goal: Absence of physical injury  3/13/2024 1607 by Rafael Richard, RN  Outcome: Completed  3/13/2024 0248 by Ita Haney LPN  Outcome: Progressing     Problem: Chronic Conditions and Co-morbidities  Goal: Patient's chronic conditions and co-morbidity symptoms are monitored and maintained or improved  3/13/2024 1607 by Rafael Richard RN  Outcome: Completed  3/13/2024 0248 by Ita Haney LPN  Outcome: Progressing     Problem: Respiratory - Adult  Goal: Achieves optimal ventilation and oxygenation  3/13/2024 1607 by Rafael Richard RN  Outcome: Completed  3/13/2024 0827 by Dorys Barajas RCP  Outcome: Progressing  3/13/2024 0248 by Ita Haney LPN  Outcome: Progressing     Problem: Pain  Goal: Verbalizes/displays adequate comfort level or baseline comfort level  3/13/2024 1607 by Rafael Richard RN  Outcome: Completed  3/13/2024 0248 by Ita Haney LPN  Outcome: Progressing

## 2024-03-13 NOTE — PLAN OF CARE
Problem: Discharge Planning  Goal: Discharge to home or other facility with appropriate resources  Outcome: Progressing     Problem: Safety - Adult  Goal: Free from fall injury  Outcome: Progressing     Problem: ABCDS Injury Assessment  Goal: Absence of physical injury  Outcome: Progressing     Problem: Chronic Conditions and Co-morbidities  Goal: Patient's chronic conditions and co-morbidity symptoms are monitored and maintained or improved  Outcome: Progressing     Problem: Respiratory - Adult  Goal: Achieves optimal ventilation and oxygenation  3/13/2024 0248 by Ita Haney LPN  Outcome: Progressing  3/13/2024 0141 by Sheryl Portillo, RT  Outcome: Progressing  Flowsheets (Taken 3/13/2024 0141)  Achieves optimal ventilation and oxygenation:   Assess for changes in respiratory status   Respiratory therapy support as indicated     Problem: Pain  Goal: Verbalizes/displays adequate comfort level or baseline comfort level  Outcome: Progressing

## 2024-03-13 NOTE — DISCHARGE INSTRUCTIONS
ACUTE DIAGNOSES:  Bronchitis [J40]  Acute renal insufficiency [N28.9]  Hypoxia [R09.02]  Near syncope [R55]  Acute asthma exacerbation [J45.901]  Exacerbation of asthma, unspecified asthma severity, unspecified whether persistent [J45.901]    CHRONIC MEDICAL DIAGNOSES:  [unfilled]    DISCHARGE MEDICATIONS:   [unfilled]    It is important that you take the medication exactly as they are prescribed.   Keep your medication in the bottles provided by the pharmacist and keep a list of the medication names, dosages, and times to be taken in your wallet.   Do not take other medications without consulting your doctor.       DIET:  diabetic diet    ACTIVITY: activity as tolerated    ADDITIONAL INFORMATION: If you experience any of the following symptoms then please call your primary care physician or return to the emergency room if you cannot get hold of your doctor: Fever, chills, nausea, vomiting, diarrhea, change in mentation, falling, bleeding, shortness of breath.    FOLLOW UP CARE:   @PCP@  you are to call and set up an appointment to see them in 5 days.    Follow-up  Pulmonary       Information obtained by :  I understand that if any problems occur once I am at home I am to contact my physician.    I understand and acknowledge receipt of the instructions indicated above.                                                                                                                                           Physician's or R.N.'s Signature                                                                  Date/Time                                                                                                                                              Patient or Representative Signature                                                          Date/Time

## 2024-05-08 ENCOUNTER — HOSPITAL ENCOUNTER (OUTPATIENT)
Facility: HOSPITAL | Age: 79
Discharge: HOME | End: 2024-05-11
Attending: INTERNAL MEDICINE
Payer: MEDICARE

## 2024-05-08 DIAGNOSIS — T17.500A MUCUS PLUGGING OF BRONCHI: ICD-10-CM

## 2024-05-08 PROCEDURE — 71250 CT THORAX DX C-: CPT

## 2024-05-25 ENCOUNTER — HOSPITAL ENCOUNTER (EMERGENCY)
Facility: HOSPITAL | Age: 79
Discharge: HOME | End: 2024-05-25
Attending: EMERGENCY MEDICINE
Payer: MEDICARE

## 2024-05-25 VITALS
BODY MASS INDEX: 28.42 KG/M2 | TEMPERATURE: 97.8 F | HEART RATE: 93 BPM | DIASTOLIC BLOOD PRESSURE: 62 MMHG | RESPIRATION RATE: 18 BRPM | WEIGHT: 166.45 LBS | HEIGHT: 64 IN | OXYGEN SATURATION: 97 % | SYSTOLIC BLOOD PRESSURE: 143 MMHG

## 2024-05-25 DIAGNOSIS — S16.1XXA ACUTE STRAIN OF NECK MUSCLE, INITIAL ENCOUNTER: Primary | ICD-10-CM

## 2024-05-25 PROCEDURE — 99283 EMERGENCY DEPT VISIT LOW MDM: CPT

## 2024-05-25 RX ORDER — CYCLOBENZAPRINE HCL 10 MG
10 TABLET ORAL 3 TIMES DAILY PRN
Qty: 21 TABLET | Refills: 0 | Status: SHIPPED | OUTPATIENT
Start: 2024-05-25 | End: 2024-06-04

## 2024-05-25 ASSESSMENT — ENCOUNTER SYMPTOMS
SORE THROAT: 0
ABDOMINAL PAIN: 0
BOWEL INCONTINENCE: 0
BACK PAIN: 0
NAUSEA: 0
FACIAL SWELLING: 0
EYE PAIN: 0
CHEST TIGHTNESS: 0
EYE DISCHARGE: 0
COUGH: 0
DIARRHEA: 0
VOMITING: 0
SHORTNESS OF BREATH: 0
PHOTOPHOBIA: 0
VISUAL CHANGE: 0

## 2024-05-25 ASSESSMENT — PAIN DESCRIPTION - DESCRIPTORS: DESCRIPTORS: SORE

## 2024-05-25 ASSESSMENT — PAIN SCALES - GENERAL: PAINLEVEL_OUTOF10: 8

## 2024-05-25 ASSESSMENT — PAIN DESCRIPTION - ONSET: ONSET: ON-GOING

## 2024-05-25 ASSESSMENT — PAIN DESCRIPTION - LOCATION: LOCATION: NECK

## 2024-05-25 NOTE — ED NOTES
Patient discharged to home at this time. All discharge instructions provided to patient. All questions answered. No distress noted at time of discharge.

## 2024-05-25 NOTE — ED TRIAGE NOTES
Pt presents to ED with neck pain that started on Thursday afternoon and has progressively gotten worse. Pt denies specific injury. States she took tylenol around 10 AM today and rates pain 8/10. Pt denies dizziness. States she feels nauseated due to pain.

## 2024-05-25 NOTE — ED PROVIDER NOTES
[facility] EMERGENCY DEPT  EMERGENCY DEPARTMENT ENCOUNTER      Pt Name: Neisha Guardado  MRN: [id number]  Birthdate 1945  Date of evaluation: 5/25/2024  Provider: Antonio Macedo MD    CHIEF COMPLAINT       Chief Complaint   Patient presents with    Neck Pain         HISTORY OF PRESENT ILLNESS   (Location/Symptom, Timing/Onset, Context/Setting, Quality, Duration, Modifying Factors, Severity)  Note limiting factors.   78-year-old female with left neck pain.  No traumatic injury or fall.  No arm numbness or tingling.  No chest pains or shortness of breath.  She has had pain for several days.  She is allergic to aspirin and cannot take ibuprofen so she takes Tylenol for pain.    The history is provided by the patient.   Neck Pain  Pain location:  L side  Quality:  Aching and cramping  Pain radiates to:  Does not radiate  Pain severity:  Mild  Onset quality:  Gradual  Duration:  3 days  Timing:  Constant  Progression:  Unchanged  Chronicity:  New  Context: not fall, not jumping from heights, not lifting a heavy object, not MCA, not MVC, not pedestrian accident and not recent injury    Relieved by:  Nothing  Worsened by:  Nothing  Ineffective treatments:  None tried  Associated symptoms: no bladder incontinence, no bowel incontinence, no chest pain, no fever, no headaches, no leg pain, no paresis, no photophobia, no tingling, no visual change and no weakness          Review of External Medical Records:     Nursing Notes were reviewed.    REVIEW OF SYSTEMS    (2-9 systems for level 4, 10 or more for level 5)     Review of Systems   Constitutional:  Negative for activity change, appetite change, chills, diaphoresis and fever.   HENT:  Negative for congestion, ear pain, facial swelling and sore throat.    Eyes:  Negative for photophobia, pain, discharge and visual disturbance.   Respiratory:  Negative for cough, chest tightness and shortness of breath.    Cardiovascular:  Negative for chest pain and palpitations.  Physician who either signs or Co-signs this chart in the absence of a cardiologist.        RADIOLOGY:   Non-plain film images such as CT, Ultrasound and MRI are read by the radiologist. Plain radiographic images are visualized and preliminarily interpreted by the emergency physician with the below findings:        Interpretation per the Radiologist below, if available at the time of this note:    No orders to display        LABS:  Labs Reviewed - No data to display    All other labs were within normal range or not returned as of this dictation.    EMERGENCY DEPARTMENT COURSE and DIFFERENTIAL DIAGNOSIS/MDM:   Vitals:    Vitals:    05/25/24 1100 05/25/24 1105   BP: (!) 143/62    Pulse: 93    Resp: 18    Temp: 97.8 °F (36.6 °C)    TempSrc: Oral    SpO2: 97%    Weight:  75.5 kg (166 lb 7.2 oz)   Height:  1.626 m (5' 4\")           Medical Decision Making  78-year-old female with left trapezius pain.  No midline neck pain and no radicular pain.  Will prescribe Flexeril and discharged home.    Risk  Prescription drug management.            REASSESSMENT            CONSULTS:  None    PROCEDURES:  Unless otherwise noted below, none     Procedures      FINAL IMPRESSION      1. Acute strain of neck muscle, initial encounter          DISPOSITION/PLAN   DISPOSITION Decision To Discharge 05/25/2024 11:15:14 AM      PATIENT REFERRED TO:  Gonzalo Raygoza MD  [address] [facility] [address] [phone]    Call       [facility] EMERGENCY DEPT  [address] [place], [place] [address]y [facility] [address]  [facility] [address] [phone]    As needed, If symptoms worsen      DISCHARGE MEDICATIONS:  New Prescriptions    CYCLOBENZAPRINE (FLEXERIL) 10 MG TABLET    Take 1 tablet by mouth 3 times daily as needed for Muscle spasms         (Please note that portions of this note were completed with a voice recognition program.  Efforts were made to edit the dictations but occasionally words are mis-transcribed.)    Antonio Macedo MD

## 2024-06-06 ENCOUNTER — HOSPITAL ENCOUNTER (OUTPATIENT)
Facility: HOSPITAL | Age: 79
Setting detail: OUTPATIENT SURGERY
Discharge: HOSPICE | End: 2024-06-06
Attending: INTERNAL MEDICINE | Admitting: INTERNAL MEDICINE
Payer: MEDICARE

## 2024-06-06 VITALS
DIASTOLIC BLOOD PRESSURE: 48 MMHG | TEMPERATURE: 97.8 F | RESPIRATION RATE: 17 BRPM | HEART RATE: 82 BPM | HEIGHT: 64 IN | SYSTOLIC BLOOD PRESSURE: 134 MMHG | WEIGHT: 165.12 LBS | BODY MASS INDEX: 28.19 KG/M2 | OXYGEN SATURATION: 97 %

## 2024-06-06 LAB
APPEARANCE FLD: CLEAR
COLOR FLD: COLORLESS
GLUCOSE BLD STRIP.AUTO-MCNC: 93 MG/DL (ref 65–117)
NUC CELL # FLD: 2 /CU MM
RBC # FLD: 7 /CU MM
SERVICE CMNT-IMP: NORMAL
SPECIMEN SOURCE FLD: ABNORMAL

## 2024-06-06 PROCEDURE — 87070 CULTURE OTHR SPECIMN AEROBIC: CPT

## 2024-06-06 PROCEDURE — 6360000002 HC RX W HCPCS: Performed by: INTERNAL MEDICINE

## 2024-06-06 PROCEDURE — 87205 SMEAR GRAM STAIN: CPT

## 2024-06-06 PROCEDURE — 3600007502: Performed by: INTERNAL MEDICINE

## 2024-06-06 PROCEDURE — 87102 FUNGUS ISOLATION CULTURE: CPT

## 2024-06-06 PROCEDURE — 87186 SC STD MICRODIL/AGAR DIL: CPT

## 2024-06-06 PROCEDURE — 88112 CYTOPATH CELL ENHANCE TECH: CPT

## 2024-06-06 PROCEDURE — 2709999900 HC NON-CHARGEABLE SUPPLY: Performed by: INTERNAL MEDICINE

## 2024-06-06 PROCEDURE — 7100000010 HC PHASE II RECOVERY - FIRST 15 MIN: Performed by: INTERNAL MEDICINE

## 2024-06-06 PROCEDURE — 87252 VIRUS INOCULATION TISSUE: CPT

## 2024-06-06 PROCEDURE — 87077 CULTURE AEROBIC IDENTIFY: CPT

## 2024-06-06 PROCEDURE — 2580000003 HC RX 258: Performed by: INTERNAL MEDICINE

## 2024-06-06 PROCEDURE — 3600007512: Performed by: INTERNAL MEDICINE

## 2024-06-06 PROCEDURE — 2500000003 HC RX 250 WO HCPCS: Performed by: INTERNAL MEDICINE

## 2024-06-06 PROCEDURE — 89050 BODY FLUID CELL COUNT: CPT

## 2024-06-06 PROCEDURE — 6370000000 HC RX 637 (ALT 250 FOR IP)

## 2024-06-06 PROCEDURE — 87206 SMEAR FLUORESCENT/ACID STAI: CPT

## 2024-06-06 PROCEDURE — 87116 MYCOBACTERIA CULTURE: CPT

## 2024-06-06 PROCEDURE — 82962 GLUCOSE BLOOD TEST: CPT

## 2024-06-06 RX ORDER — FLUMAZENIL 0.1 MG/ML
INJECTION INTRAVENOUS
Status: DISCONTINUED
Start: 2024-06-06 | End: 2024-06-06 | Stop reason: WASHOUT

## 2024-06-06 RX ORDER — LIDOCAINE HYDROCHLORIDE 20 MG/ML
INJECTION, SOLUTION INFILTRATION; PERINEURAL
Status: DISCONTINUED
Start: 2024-06-06 | End: 2024-06-06 | Stop reason: HOSPADM

## 2024-06-06 RX ORDER — NALOXONE HYDROCHLORIDE 0.4 MG/ML
INJECTION, SOLUTION INTRAMUSCULAR; INTRAVENOUS; SUBCUTANEOUS
Status: DISCONTINUED
Start: 2024-06-06 | End: 2024-06-06 | Stop reason: WASHOUT

## 2024-06-06 RX ORDER — MIDAZOLAM HYDROCHLORIDE 1 MG/ML
INJECTION INTRAMUSCULAR; INTRAVENOUS
Status: DISCONTINUED
Start: 2024-06-06 | End: 2024-06-06 | Stop reason: HOSPADM

## 2024-06-06 RX ORDER — FENTANYL CITRATE 50 UG/ML
INJECTION, SOLUTION INTRAMUSCULAR; INTRAVENOUS
Status: DISCONTINUED
Start: 2024-06-06 | End: 2024-06-06 | Stop reason: HOSPADM

## 2024-06-06 RX ORDER — LIDOCAINE HYDROCHLORIDE 20 MG/ML
2 INJECTION, SOLUTION INFILTRATION; PERINEURAL ONCE
Status: COMPLETED | OUTPATIENT
Start: 2024-06-06 | End: 2024-06-06

## 2024-06-06 RX ORDER — ACETYLCYSTEINE 200 MG/ML
600 SOLUTION ORAL; RESPIRATORY (INHALATION) ONCE
Status: DISCONTINUED | OUTPATIENT
Start: 2024-06-06 | End: 2024-06-06 | Stop reason: HOSPADM

## 2024-06-06 RX ORDER — ALBUTEROL SULFATE 90 UG/1
AEROSOL, METERED RESPIRATORY (INHALATION)
COMMUNITY
Start: 2024-04-08

## 2024-06-06 RX ORDER — ACETYLCYSTEINE 100 MG/ML
SOLUTION ORAL; RESPIRATORY (INHALATION)
COMMUNITY
Start: 2023-04-21

## 2024-06-06 RX ORDER — MIDAZOLAM HYDROCHLORIDE 1 MG/ML
INJECTION INTRAMUSCULAR; INTRAVENOUS PRN
Status: DISCONTINUED | OUTPATIENT
Start: 2024-06-06 | End: 2024-06-06 | Stop reason: ALTCHOICE

## 2024-06-06 RX ORDER — ACETYLCYSTEINE 200 MG/ML
800 INJECTION INTRAVENOUS PRN
Status: DISCONTINUED | OUTPATIENT
Start: 2024-06-06 | End: 2024-06-06 | Stop reason: HOSPADM

## 2024-06-06 RX ORDER — MIDAZOLAM HYDROCHLORIDE 5 MG/5ML
5 INJECTION, SOLUTION INTRAMUSCULAR; INTRAVENOUS PRN
Status: DISCONTINUED | OUTPATIENT
Start: 2024-06-06 | End: 2024-06-06 | Stop reason: HOSPADM

## 2024-06-06 RX ORDER — NALOXONE HYDROCHLORIDE 0.4 MG/ML
0.2 INJECTION, SOLUTION INTRAMUSCULAR; INTRAVENOUS; SUBCUTANEOUS PRN
Status: DISCONTINUED | OUTPATIENT
Start: 2024-06-06 | End: 2024-06-06 | Stop reason: HOSPADM

## 2024-06-06 RX ORDER — LIDOCAINE HYDROCHLORIDE AND EPINEPHRINE BITARTRATE 20; .01 MG/ML; MG/ML
2 INJECTION, SOLUTION SUBCUTANEOUS PRN
Status: DISCONTINUED | OUTPATIENT
Start: 2024-06-06 | End: 2024-06-06 | Stop reason: HOSPADM

## 2024-06-06 RX ORDER — SODIUM CHLORIDE 9 MG/ML
INJECTION, SOLUTION INTRAVENOUS CONTINUOUS
Status: DISCONTINUED | OUTPATIENT
Start: 2024-06-06 | End: 2024-06-06 | Stop reason: HOSPADM

## 2024-06-06 RX ORDER — FENTANYL CITRATE 0.05 MG/ML
INJECTION, SOLUTION INTRAMUSCULAR; INTRAVENOUS PRN
Status: DISCONTINUED | OUTPATIENT
Start: 2024-06-06 | End: 2024-06-06 | Stop reason: ALTCHOICE

## 2024-06-06 RX ORDER — LIDOCAINE HYDROCHLORIDE AND EPINEPHRINE BITARTRATE 20; .01 MG/ML; MG/ML
INJECTION, SOLUTION SUBCUTANEOUS
Status: DISCONTINUED
Start: 2024-06-06 | End: 2024-06-06 | Stop reason: WASHOUT

## 2024-06-06 RX ORDER — FENTANYL CITRATE 50 UG/ML
25 INJECTION, SOLUTION INTRAMUSCULAR; INTRAVENOUS PRN
Status: DISCONTINUED | OUTPATIENT
Start: 2024-06-06 | End: 2024-06-06 | Stop reason: HOSPADM

## 2024-06-06 RX ORDER — LIDOCAINE HYDROCHLORIDE 40 MG/ML
SOLUTION TOPICAL
Status: COMPLETED
Start: 2024-06-06 | End: 2024-06-06

## 2024-06-06 RX ORDER — POTASSIUM CHLORIDE 750 MG/1
10 TABLET, FILM COATED, EXTENDED RELEASE ORAL DAILY
COMMUNITY

## 2024-06-06 RX ORDER — TRAZODONE HYDROCHLORIDE 50 MG/1
TABLET ORAL
COMMUNITY

## 2024-06-06 RX ORDER — FLUTICASONE FUROATE, UMECLIDINIUM BROMIDE AND VILANTEROL TRIFENATATE 200; 62.5; 25 UG/1; UG/1; UG/1
POWDER RESPIRATORY (INHALATION)
COMMUNITY
Start: 2024-05-10

## 2024-06-06 RX ORDER — FLUMAZENIL 0.1 MG/ML
0.2 INJECTION INTRAVENOUS PRN
Status: DISCONTINUED | OUTPATIENT
Start: 2024-06-06 | End: 2024-06-06 | Stop reason: HOSPADM

## 2024-06-06 RX ORDER — LIDOCAINE HYDROCHLORIDE 40 MG/ML
SOLUTION TOPICAL ONCE
Status: COMPLETED | OUTPATIENT
Start: 2024-06-06 | End: 2024-06-06

## 2024-06-06 RX ADMIN — LIDOCAINE HYDROCHLORIDE: 40 SOLUTION TOPICAL at 07:41

## 2024-06-06 RX ADMIN — SODIUM CHLORIDE: 9 INJECTION, SOLUTION INTRAVENOUS at 07:46

## 2024-06-06 RX ADMIN — LIDOCAINE HYDROCHLORIDE: 40 SOLUTION ORAL at 07:41

## 2024-06-06 RX ADMIN — LIDOCAINE HYDROCHLORIDE 12 ML: 20 INJECTION, SOLUTION INFILTRATION; PERINEURAL at 08:19

## 2024-06-06 ASSESSMENT — PAIN SCALES - GENERAL
PAINLEVEL_OUTOF10: 0

## 2024-06-06 ASSESSMENT — PAIN - FUNCTIONAL ASSESSMENT: PAIN_FUNCTIONAL_ASSESSMENT: 0-10

## 2024-06-06 NOTE — PROGRESS NOTES
Neisha CHRISTINE [facility]  1945  [id number]    Situation:  Verbal report received from: Omer  Procedure: Procedure(s):  BRONCHOSCOPY (CON SED)    Background:    Preoperative diagnosis: Mucus plugging of bronchi [T17.500A]  Postoperative diagnosis: * No post-op diagnosis entered *    :  Dr. Parr  Assistant(s): Circulator: Mark Sanchez, RN  Endoscopy Technician: Katheryn Gay    Specimens: * No specimens in log *  H. Pylori  No    Assessment:  Intra-procedure medications   Versed  Yes2 mg  Fentanyl **Yes*100 mcg      Anesthesia gave intra-procedure sedation and medications, see anesthesia flow sheet Yes    Intravenous fluids: NS@ KVO     Vital signs stable yes    Abdominal assessment: round and soft yes    Recommendation:  Discharge patient per MD order -yes.  Return to floor -no going home  Family or Friend franc billings (son)  Permission to share finding with family or friend Yes

## 2024-06-06 NOTE — DISCHARGE INSTRUCTIONS
Neisha CHRISTINE [facility]  [id number]  1945      BRONCHOSCOPY / EUS / EBUS DISCHARGE INSTRUCTIONS  Discomfort:  Sore throat- throat lozenges or warm salt water gargle  redness at IV site- apply warm compress to area; if redness or soreness persist- contact your physician  Gaseous discomfort- walking, belching will help relieve any discomfort  You may not operate a vehicle for 12 hours  You may not engage in an occupation involving machinery or appliances for rest of today  You may not drink alcoholic beverages for at least 12 hours  Avoid making any critical decisions for at least 24 hour  Blood tinged secretions - this should stop within 2-3 hours  DIET  Nothing by mouth- do not eat or drink for two hours.  You may eat and drink after 10:30 am   You may resume your regular diet - however -  remember your colon is empty and a heavy meal will produce gas.   Avoid these foods:  vegetables, fried / greasy foods, carbonated drinks  ACTIVITY  You may resume your normal daily activities however it is recommended that you spend the remainder of the day resting -  avoid any strenuous activity.  CALL M.D.  ANY SIGN OF   Increasing pain, nausea, vomiting  Abdominal distension (swelling)  New increased bleeding (oral or rectal)  Fever (chills)  Pain in chest area  Bloody discharge from nose or mouth  Shortness of breath    Call physician’s office for following  Results of procedure / biopsy in 7 days  Follow up with Dr. Parr

## 2024-06-06 NOTE — PROCEDURES
[facility] [address]  [address]  [place], [place] [address] [phone]    Neisha CHRISTINE [facility]  1945  [id number]      Date of Procedure: [unfilled]    Preoperative diagnosis: Mucus plugging of bronchi [T17.500A]    Procedure: Procedure(s):  BRONCHOSCOPY (CON SED)    Indication: Mucus plugging    :  Dr.Cecilia Karolyn MD    Assistant(s): Circulator: Mark Sanchez RN  Endoscopy Technician: Katheryn Gay    Anesthesia/Sedation:  Versed 4 mg IV and Fentanyl 50 mcg IV      Procedure Details:  After infomed consent was obtained for the procedure, with all risks and benefits of procedure explained the patient was taken to the endoscopy suite and placed in the supine position.  Following sequential administration of sedation as per above, the bronchoscope was inserted into the mouth and advanced under direct vision to the tracheobronchial tree.       Findings:   Normal appearing vocal cords. Normal appearing trachea (mild tracheomalacia). Normal appearing left and right maintstem.   Moderate amount of mucus plug in the left mainstem. Small amounts of mucus in the left lower lobe- 90% tracheomalacia in the LLL subsegment.. DEBI normal appearing, no mucus. Lingula normal appearing. RLL with mild tracheomalacia. RUL and RML normal appearing.   Right lung with     Therapies:   BAL done in LLL 30 cc inserted x3, 20 cc of clear fluid return.    Specimens:   See above           Complications:   None noted; patient tolerated the procedure well.    EBL:  Minimal           Impression:    Small amounts of mucus plugging.     Recommendations:   Follow up sputum cx and AFB      Rosalinda Parr MD  6/6/2024  8:34 AM

## 2024-06-06 NOTE — H&P
79 yo presenting for bronchoscopy for mucus plug removal. Feels a little better than she did a few years ago.     Gen: awake, alert, in no distress  Lungs: CTA, no w/r/r  CV: RRR, no m/g/r  Abd: soft/nt/nd  LE: no edema    CT chest reviewed    A/P: will proceed with bronch as planned

## 2024-06-06 NOTE — PROGRESS NOTES
Endoscopy discharge instructions have been reviewed and given to patient.  The patient verbalized understanding and acceptance of instructions.  Patient understands meal and drink can be eaten after 1030 am     Dr. Parr discussed with patient procedure findings and next steps.

## 2024-06-06 NOTE — DISCHARGE SUMMARY
Neisha CHRISTINE [facility]  [id number]  1945      BRONCHOSCOPY / EUS / EBUS DISCHARGE INSTRUCTIONS  Discomfort:  Sore throat- throat lozenges or warm salt water gargle  redness at IV site- apply warm compress to area; if redness or soreness persist- contact your physician  Gaseous discomfort- walking, belching will help relieve any discomfort  You may not operate a vehicle for 12 hours  You may not engage in an occupation involving machinery or appliances for rest of today  You may not drink alcoholic beverages for at least 12 hours  Avoid making any critical decisions for at least 24 hour  Blood tinged secretions - this should stop within 2-3 hours  DIET  Nothing by mouth- do not eat or drink for two hours.  You may eat and drink after 10:30 am   You may resume your regular diet - however -  remember your colon is empty and a heavy meal will produce gas.   Avoid these foods:  vegetables, fried / greasy foods, carbonated drinks  ACTIVITY  You may resume your normal daily activities however it is recommended that you spend the remainder of the day resting -  avoid any strenuous activity.  CALL M.D.  ANY SIGN OF   Increasing pain, nausea, vomiting  Abdominal distension (swelling)  New increased bleeding (oral or rectal)  Fever (chills)  Pain in chest area  Bloody discharge from nose or mouth  Shortness of breath    Call physician’s office for following  Results of procedure / biopsy in 7 days  Follow up with Dr. Parr

## 2024-06-09 LAB
ACID FAST STN SPEC: NEGATIVE
ACID FAST STN SPEC: NEGATIVE
BACTERIA SPEC CULT: ABNORMAL
GRAM STN SPEC: ABNORMAL
MYCOBACTERIUM SPEC QL CULT: NORMAL
MYCOBACTERIUM SPEC QL CULT: NORMAL
SERVICE CMNT-IMP: ABNORMAL
SERVICE CMNT-IMP: ABNORMAL
SPECIMEN PREPARATION: NORMAL
SPECIMEN PREPARATION: NORMAL
SPECIMEN SOURCE: NORMAL
VIRUS SPEC CULT: NORMAL

## 2024-06-10 LAB
BACTERIA SPEC CULT: NORMAL
SERVICE CMNT-IMP: NORMAL

## 2024-06-11 LAB — CYTOLOGY-NON GYN: NORMAL

## 2024-06-12 LAB
BACTERIA SPEC CULT: ABNORMAL
GRAM STN SPEC: ABNORMAL
SERVICE CMNT-IMP: ABNORMAL
SPECIMEN SOURCE: NORMAL
VIRUS SPEC CULT: NORMAL

## 2024-06-17 LAB
BACTERIA SPEC CULT: NORMAL
SERVICE CMNT-IMP: NORMAL
SPECIMEN SOURCE: NORMAL
VIRUS SPEC CULT: NORMAL

## 2024-06-18 LAB
LEGIONELLA SPEC CULT: NORMAL
LEGIONELLA SPEC CULT: NORMAL
SPECIMEN SOURCE: NORMAL

## 2024-06-24 LAB
BACTERIA SPEC CULT: NORMAL
SERVICE CMNT-IMP: NORMAL

## 2024-07-01 LAB
BACTERIA SPEC CULT: NORMAL
SERVICE CMNT-IMP: NORMAL

## 2024-07-08 LAB
BACTERIA SPEC CULT: NORMAL
SERVICE CMNT-IMP: NORMAL

## 2024-07-17 ENCOUNTER — HOSPITAL ENCOUNTER (INPATIENT)
Facility: HOSPITAL | Age: 79
LOS: 3 days | Discharge: HOME | End: 2024-07-20
Attending: EMERGENCY MEDICINE | Admitting: STUDENT IN AN ORGANIZED HEALTH CARE EDUCATION/TRAINING PROGRAM
Payer: MEDICARE

## 2024-07-17 ENCOUNTER — APPOINTMENT (OUTPATIENT)
Facility: HOSPITAL | Age: 79
End: 2024-07-17
Payer: MEDICARE

## 2024-07-17 DIAGNOSIS — E87.5 HYPERKALEMIA: ICD-10-CM

## 2024-07-17 DIAGNOSIS — J18.9 MULTIFOCAL PNEUMONIA: Primary | ICD-10-CM

## 2024-07-17 LAB
ALBUMIN SERPL-MCNC: 3.4 G/DL (ref 3.5–5)
ALBUMIN/GLOB SERPL: 1 (ref 1.1–2.2)
ALP SERPL-CCNC: 99 U/L (ref 45–117)
ALT SERPL-CCNC: 26 U/L (ref 12–78)
ANION GAP SERPL CALC-SCNC: 7 MMOL/L (ref 5–15)
AST SERPL-CCNC: 18 U/L (ref 15–37)
BASOPHILS # BLD: 0.1 K/UL (ref 0–0.1)
BASOPHILS NFR BLD: 1 % (ref 0–1)
BILIRUB SERPL-MCNC: 0.2 MG/DL (ref 0.2–1)
BUN SERPL-MCNC: 26 MG/DL (ref 6–20)
BUN/CREAT SERPL: 17 (ref 12–20)
CALCIUM SERPL-MCNC: 9.6 MG/DL (ref 8.5–10.1)
CHLORIDE SERPL-SCNC: 106 MMOL/L (ref 97–108)
CO2 SERPL-SCNC: 25 MMOL/L (ref 21–32)
CREAT SERPL-MCNC: 1.57 MG/DL (ref 0.55–1.02)
DIFFERENTIAL METHOD BLD: ABNORMAL
EOSINOPHIL # BLD: 0.2 K/UL (ref 0–0.4)
EOSINOPHIL NFR BLD: 3 % (ref 0–7)
ERYTHROCYTE [DISTWIDTH] IN BLOOD BY AUTOMATED COUNT: 14.5 % (ref 11.5–14.5)
GLOBULIN SER CALC-MCNC: 3.3 G/DL (ref 2–4)
GLUCOSE SERPL-MCNC: 83 MG/DL (ref 65–100)
HCT VFR BLD AUTO: 32.8 % (ref 35–47)
HGB BLD-MCNC: 10.9 G/DL (ref 11.5–16)
IMM GRANULOCYTES # BLD AUTO: 0 K/UL (ref 0–0.04)
IMM GRANULOCYTES NFR BLD AUTO: 0 % (ref 0–0.5)
LYMPHOCYTES # BLD: 2 K/UL (ref 0.8–3.5)
LYMPHOCYTES NFR BLD: 34 % (ref 12–49)
MCH RBC QN AUTO: 32.5 PG (ref 26–34)
MCHC RBC AUTO-ENTMCNC: 33.2 G/DL (ref 30–36.5)
MCV RBC AUTO: 97.9 FL (ref 80–99)
MONOCYTES # BLD: 0.5 K/UL (ref 0–1)
MONOCYTES NFR BLD: 8 % (ref 5–13)
NEUTS SEG # BLD: 3.2 K/UL (ref 1.8–8)
NEUTS SEG NFR BLD: 54 % (ref 32–75)
NRBC # BLD: 0 K/UL (ref 0–0.01)
NRBC BLD-RTO: 0 PER 100 WBC
NT PRO BNP: 125 PG/ML (ref 0–450)
PLATELET # BLD AUTO: 285 K/UL (ref 150–400)
PMV BLD AUTO: 9.1 FL (ref 8.9–12.9)
POTASSIUM SERPL-SCNC: 6.3 MMOL/L (ref 3.5–5.1)
PROT SERPL-MCNC: 6.7 G/DL (ref 6.4–8.2)
RBC # BLD AUTO: 3.35 M/UL (ref 3.8–5.2)
SODIUM SERPL-SCNC: 138 MMOL/L (ref 136–145)
WBC # BLD AUTO: 6 K/UL (ref 3.6–11)

## 2024-07-17 PROCEDURE — 2580000003 HC RX 258: Performed by: EMERGENCY MEDICINE

## 2024-07-17 PROCEDURE — 96374 THER/PROPH/DIAG INJ IV PUSH: CPT

## 2024-07-17 PROCEDURE — 71250 CT THORAX DX C-: CPT

## 2024-07-17 PROCEDURE — 6360000002 HC RX W HCPCS: Performed by: EMERGENCY MEDICINE

## 2024-07-17 PROCEDURE — 99285 EMERGENCY DEPT VISIT HI MDM: CPT

## 2024-07-17 PROCEDURE — 93005 ELECTROCARDIOGRAM TRACING: CPT | Performed by: EMERGENCY MEDICINE

## 2024-07-17 PROCEDURE — 6370000000 HC RX 637 (ALT 250 FOR IP): Performed by: EMERGENCY MEDICINE

## 2024-07-17 PROCEDURE — 83880 ASSAY OF NATRIURETIC PEPTIDE: CPT

## 2024-07-17 PROCEDURE — 1100000000 HC RM PRIVATE

## 2024-07-17 PROCEDURE — 36415 COLL VENOUS BLD VENIPUNCTURE: CPT

## 2024-07-17 PROCEDURE — 2500000003 HC RX 250 WO HCPCS: Performed by: EMERGENCY MEDICINE

## 2024-07-17 PROCEDURE — 85025 COMPLETE CBC W/AUTO DIFF WBC: CPT

## 2024-07-17 PROCEDURE — 80053 COMPREHEN METABOLIC PANEL: CPT

## 2024-07-17 RX ORDER — TRAZODONE HYDROCHLORIDE 50 MG/1
50 TABLET ORAL NIGHTLY
Status: DISCONTINUED | OUTPATIENT
Start: 2024-07-18 | End: 2024-07-20 | Stop reason: HOSPADM

## 2024-07-17 RX ORDER — ONDANSETRON 2 MG/ML
4 INJECTION INTRAMUSCULAR; INTRAVENOUS EVERY 6 HOURS PRN
Status: DISCONTINUED | OUTPATIENT
Start: 2024-07-17 | End: 2024-07-20 | Stop reason: HOSPADM

## 2024-07-17 RX ORDER — IPRATROPIUM BROMIDE AND ALBUTEROL SULFATE 2.5; .5 MG/3ML; MG/3ML
1 SOLUTION RESPIRATORY (INHALATION) EVERY 20 MIN
Status: COMPLETED | OUTPATIENT
Start: 2024-07-17 | End: 2024-07-17

## 2024-07-17 RX ORDER — IPRATROPIUM BROMIDE AND ALBUTEROL SULFATE 2.5; .5 MG/3ML; MG/3ML
1 SOLUTION RESPIRATORY (INHALATION)
Status: ACTIVE | OUTPATIENT
Start: 2024-07-17 | End: 2024-07-18

## 2024-07-17 RX ORDER — EZETIMIBE 10 MG/1
10 TABLET ORAL EVERY EVENING
Status: DISCONTINUED | OUTPATIENT
Start: 2024-07-18 | End: 2024-07-20 | Stop reason: HOSPADM

## 2024-07-17 RX ORDER — PANTOPRAZOLE SODIUM 40 MG/1
40 TABLET, DELAYED RELEASE ORAL
Status: DISCONTINUED | OUTPATIENT
Start: 2024-07-18 | End: 2024-07-20 | Stop reason: HOSPADM

## 2024-07-17 RX ORDER — SODIUM CHLORIDE 0.9 % (FLUSH) 0.9 %
5-40 SYRINGE (ML) INJECTION PRN
Status: DISCONTINUED | OUTPATIENT
Start: 2024-07-17 | End: 2024-07-20 | Stop reason: HOSPADM

## 2024-07-17 RX ORDER — ACETAMINOPHEN 650 MG/1
650 SUPPOSITORY RECTAL EVERY 6 HOURS PRN
Status: DISCONTINUED | OUTPATIENT
Start: 2024-07-17 | End: 2024-07-20 | Stop reason: HOSPADM

## 2024-07-17 RX ORDER — CLOPIDOGREL BISULFATE 75 MG/1
75 TABLET ORAL DAILY
Status: DISCONTINUED | OUTPATIENT
Start: 2024-07-18 | End: 2024-07-20 | Stop reason: HOSPADM

## 2024-07-17 RX ORDER — ALLOPURINOL 300 MG/1
300 TABLET ORAL DAILY
Status: DISCONTINUED | OUTPATIENT
Start: 2024-07-18 | End: 2024-07-20 | Stop reason: HOSPADM

## 2024-07-17 RX ORDER — INSULIN LISPRO 100 [IU]/ML
0-8 INJECTION, SOLUTION INTRAVENOUS; SUBCUTANEOUS
Status: DISCONTINUED | OUTPATIENT
Start: 2024-07-18 | End: 2024-07-18

## 2024-07-17 RX ORDER — SULFAMETHOXAZOLE AND TRIMETHOPRIM 800; 160 MG/1; MG/1
1 TABLET ORAL 2 TIMES DAILY
Status: ON HOLD | COMMUNITY
End: 2024-07-20 | Stop reason: HOSPADM

## 2024-07-17 RX ORDER — POLYETHYLENE GLYCOL 3350 17 G/17G
17 POWDER, FOR SOLUTION ORAL DAILY PRN
Status: DISCONTINUED | OUTPATIENT
Start: 2024-07-17 | End: 2024-07-20 | Stop reason: HOSPADM

## 2024-07-17 RX ORDER — DILTIAZEM HYDROCHLORIDE 240 MG/1
240 CAPSULE, COATED, EXTENDED RELEASE ORAL DAILY
Status: DISCONTINUED | OUTPATIENT
Start: 2024-07-18 | End: 2024-07-20 | Stop reason: HOSPADM

## 2024-07-17 RX ORDER — DEXTROSE MONOHYDRATE 100 MG/ML
INJECTION, SOLUTION INTRAVENOUS CONTINUOUS PRN
Status: DISCONTINUED | OUTPATIENT
Start: 2024-07-17 | End: 2024-07-18 | Stop reason: SDUPTHER

## 2024-07-17 RX ORDER — ACETAMINOPHEN 325 MG/1
650 TABLET ORAL EVERY 6 HOURS PRN
Status: DISCONTINUED | OUTPATIENT
Start: 2024-07-17 | End: 2024-07-20 | Stop reason: HOSPADM

## 2024-07-17 RX ORDER — SODIUM CHLORIDE 0.9 % (FLUSH) 0.9 %
5-40 SYRINGE (ML) INJECTION EVERY 12 HOURS SCHEDULED
Status: DISCONTINUED | OUTPATIENT
Start: 2024-07-17 | End: 2024-07-20 | Stop reason: HOSPADM

## 2024-07-17 RX ORDER — ACETYLCYSTEINE 100 MG/ML
4 SOLUTION ORAL; RESPIRATORY (INHALATION)
Status: DISCONTINUED | OUTPATIENT
Start: 2024-07-18 | End: 2024-07-18

## 2024-07-17 RX ORDER — CALCIUM GLUCONATE 20 MG/ML
1000 INJECTION, SOLUTION INTRAVENOUS ONCE
Status: COMPLETED | OUTPATIENT
Start: 2024-07-17 | End: 2024-07-17

## 2024-07-17 RX ORDER — CALCIUM CARBONATE 500 MG/1
500 TABLET, CHEWABLE ORAL DAILY
Status: DISCONTINUED | OUTPATIENT
Start: 2024-07-18 | End: 2024-07-20 | Stop reason: HOSPADM

## 2024-07-17 RX ORDER — ONDANSETRON 4 MG/1
4 TABLET, ORALLY DISINTEGRATING ORAL EVERY 8 HOURS PRN
Status: DISCONTINUED | OUTPATIENT
Start: 2024-07-17 | End: 2024-07-20 | Stop reason: HOSPADM

## 2024-07-17 RX ORDER — MONTELUKAST SODIUM 10 MG/1
10 TABLET ORAL DAILY
Status: DISCONTINUED | OUTPATIENT
Start: 2024-07-18 | End: 2024-07-20 | Stop reason: HOSPADM

## 2024-07-17 RX ORDER — FOLIC ACID 1 MG/1
1 TABLET ORAL DAILY
Status: DISCONTINUED | OUTPATIENT
Start: 2024-07-18 | End: 2024-07-20 | Stop reason: HOSPADM

## 2024-07-17 RX ORDER — IPRATROPIUM BROMIDE AND ALBUTEROL SULFATE 2.5; .5 MG/3ML; MG/3ML
1 SOLUTION RESPIRATORY (INHALATION) EVERY 4 HOURS PRN
Status: DISCONTINUED | OUTPATIENT
Start: 2024-07-17 | End: 2024-07-20 | Stop reason: HOSPADM

## 2024-07-17 RX ORDER — FLUTICASONE PROPIONATE 50 MCG
2 SPRAY, SUSPENSION (ML) NASAL DAILY
Status: DISCONTINUED | OUTPATIENT
Start: 2024-07-18 | End: 2024-07-20 | Stop reason: HOSPADM

## 2024-07-17 RX ORDER — INSULIN LISPRO 100 [IU]/ML
0-4 INJECTION, SOLUTION INTRAVENOUS; SUBCUTANEOUS NIGHTLY
Status: DISCONTINUED | OUTPATIENT
Start: 2024-07-17 | End: 2024-07-18

## 2024-07-17 RX ORDER — SODIUM CHLORIDE 9 MG/ML
INJECTION, SOLUTION INTRAVENOUS PRN
Status: DISCONTINUED | OUTPATIENT
Start: 2024-07-17 | End: 2024-07-20 | Stop reason: HOSPADM

## 2024-07-17 RX ORDER — 0.9 % SODIUM CHLORIDE 0.9 %
1000 INTRAVENOUS SOLUTION INTRAVENOUS ONCE
Status: COMPLETED | OUTPATIENT
Start: 2024-07-17 | End: 2024-07-17

## 2024-07-17 RX ORDER — CETIRIZINE HYDROCHLORIDE 10 MG/1
5 TABLET ORAL NIGHTLY
Status: DISCONTINUED | OUTPATIENT
Start: 2024-07-17 | End: 2024-07-20 | Stop reason: HOSPADM

## 2024-07-17 RX ADMIN — IPRATROPIUM BROMIDE AND ALBUTEROL SULFATE 1 DOSE: .5; 3 SOLUTION RESPIRATORY (INHALATION) at 20:12

## 2024-07-17 RX ADMIN — CEFEPIME 2000 MG: 2 INJECTION, POWDER, FOR SOLUTION INTRAVENOUS at 22:12

## 2024-07-17 RX ADMIN — SODIUM CHLORIDE 1000 ML: 9 INJECTION, SOLUTION INTRAVENOUS at 22:21

## 2024-07-17 RX ADMIN — HUMAN INSULIN 5 UNITS: 100 INJECTION, SOLUTION SUBCUTANEOUS at 21:01

## 2024-07-17 RX ADMIN — CALCIUM GLUCONATE 1000 MG: 20 INJECTION, SOLUTION INTRAVENOUS at 20:40

## 2024-07-17 RX ADMIN — IPRATROPIUM BROMIDE AND ALBUTEROL SULFATE 1 DOSE: .5; 3 SOLUTION RESPIRATORY (INHALATION) at 19:19

## 2024-07-17 RX ADMIN — IPRATROPIUM BROMIDE AND ALBUTEROL SULFATE 1 DOSE: .5; 3 SOLUTION RESPIRATORY (INHALATION) at 19:30

## 2024-07-17 RX ADMIN — DEXTROSE MONOHYDRATE 250 ML: 100 INJECTION, SOLUTION INTRAVENOUS at 21:07

## 2024-07-17 ASSESSMENT — LIFESTYLE VARIABLES
HOW MANY STANDARD DRINKS CONTAINING ALCOHOL DO YOU HAVE ON A TYPICAL DAY: 1 OR 2
HOW OFTEN DO YOU HAVE A DRINK CONTAINING ALCOHOL: MONTHLY OR LESS

## 2024-07-17 ASSESSMENT — PAIN SCALES - GENERAL: PAINLEVEL_OUTOF10: 0

## 2024-07-17 NOTE — ED NOTES
Post neb reeval: increased air movement bilateral coarse rhonchi throughout greater in the bases. Pt reports improvement.

## 2024-07-17 NOTE — ED TRIAGE NOTES
Patient presents ambulatory to treatment area with a steady gait.  Patient has history of asthma.  Patient states she saw pulmonology Friday and was put on Bactrim; also uses nebs at home.  States today, her blood pressure has been low and her oxygen levels were 88% today.  States this dropped to 78% with ambulation.

## 2024-07-18 LAB
ANION GAP SERPL CALC-SCNC: 3 MMOL/L (ref 5–15)
BASOPHILS # BLD: 0.1 K/UL (ref 0–0.1)
BASOPHILS NFR BLD: 1 % (ref 0–1)
BUN SERPL-MCNC: 22 MG/DL (ref 6–20)
BUN/CREAT SERPL: 16 (ref 12–20)
CALCIUM SERPL-MCNC: 9.4 MG/DL (ref 8.5–10.1)
CHLORIDE SERPL-SCNC: 114 MMOL/L (ref 97–108)
CO2 SERPL-SCNC: 21 MMOL/L (ref 21–32)
CREAT SERPL-MCNC: 1.36 MG/DL (ref 0.55–1.02)
DIFFERENTIAL METHOD BLD: ABNORMAL
EKG ATRIAL RATE: 84 BPM
EKG DIAGNOSIS: NORMAL
EKG P AXIS: 23 DEGREES
EKG P-R INTERVAL: 192 MS
EKG Q-T INTERVAL: 362 MS
EKG QRS DURATION: 108 MS
EKG QTC CALCULATION (BAZETT): 427 MS
EKG R AXIS: -31 DEGREES
EKG T AXIS: 60 DEGREES
EKG VENTRICULAR RATE: 84 BPM
EOSINOPHIL # BLD: 0.3 K/UL (ref 0–0.4)
EOSINOPHIL NFR BLD: 5 % (ref 0–7)
ERYTHROCYTE [DISTWIDTH] IN BLOOD BY AUTOMATED COUNT: 14.7 % (ref 11.5–14.5)
EST. AVERAGE GLUCOSE BLD GHB EST-MCNC: 94 MG/DL
FERRITIN SERPL-MCNC: 176 NG/ML (ref 26–388)
FOLATE SERPL-MCNC: 58.9 NG/ML (ref 5–21)
GLUCOSE BLD STRIP.AUTO-MCNC: 158 MG/DL (ref 65–117)
GLUCOSE BLD STRIP.AUTO-MCNC: 178 MG/DL (ref 65–117)
GLUCOSE BLD STRIP.AUTO-MCNC: 239 MG/DL (ref 65–117)
GLUCOSE BLD STRIP.AUTO-MCNC: 90 MG/DL (ref 65–117)
GLUCOSE BLD STRIP.AUTO-MCNC: 95 MG/DL (ref 65–117)
GLUCOSE SERPL-MCNC: 84 MG/DL (ref 65–100)
HBA1C MFR BLD: 4.9 % (ref 4–5.6)
HCT VFR BLD AUTO: 32.9 % (ref 35–47)
HGB BLD-MCNC: 10.8 G/DL (ref 11.5–16)
IMM GRANULOCYTES # BLD AUTO: 0 K/UL (ref 0–0.04)
IMM GRANULOCYTES NFR BLD AUTO: 0 % (ref 0–0.5)
IRON SATN MFR SERPL: 29 % (ref 20–50)
IRON SERPL-MCNC: 87 UG/DL (ref 35–150)
LYMPHOCYTES # BLD: 2.4 K/UL (ref 0.8–3.5)
LYMPHOCYTES NFR BLD: 41 % (ref 12–49)
MAGNESIUM SERPL-MCNC: 1.8 MG/DL (ref 1.6–2.4)
MCH RBC QN AUTO: 33.2 PG (ref 26–34)
MCHC RBC AUTO-ENTMCNC: 32.8 G/DL (ref 30–36.5)
MCV RBC AUTO: 101.2 FL (ref 80–99)
MONOCYTES # BLD: 0.5 K/UL (ref 0–1)
MONOCYTES NFR BLD: 8 % (ref 5–13)
NEUTS SEG # BLD: 2.7 K/UL (ref 1.8–8)
NEUTS SEG NFR BLD: 45 % (ref 32–75)
NRBC # BLD: 0 K/UL (ref 0–0.01)
NRBC BLD-RTO: 0 PER 100 WBC
PERIPHERAL SMEAR, MD REVIEW: NORMAL
PHOSPHATE SERPL-MCNC: 3.8 MG/DL (ref 2.6–4.7)
PLATELET # BLD AUTO: 279 K/UL (ref 150–400)
PMV BLD AUTO: 9.2 FL (ref 8.9–12.9)
POTASSIUM SERPL-SCNC: 5.2 MMOL/L (ref 3.5–5.1)
RBC # BLD AUTO: 3.25 M/UL (ref 3.8–5.2)
RETICS # AUTO: 0.05 M/UL (ref 0.02–0.08)
RETICS/RBC NFR AUTO: 1.5 % (ref 0.7–2.1)
SERVICE CMNT-IMP: ABNORMAL
SERVICE CMNT-IMP: NORMAL
SERVICE CMNT-IMP: NORMAL
SODIUM SERPL-SCNC: 138 MMOL/L (ref 136–145)
TIBC SERPL-MCNC: 303 UG/DL (ref 250–450)
VIT B12 SERPL-MCNC: 1783 PG/ML (ref 193–986)
WBC # BLD AUTO: 6 K/UL (ref 3.6–11)

## 2024-07-18 PROCEDURE — 82746 ASSAY OF FOLIC ACID SERUM: CPT

## 2024-07-18 PROCEDURE — 85045 AUTOMATED RETICULOCYTE COUNT: CPT

## 2024-07-18 PROCEDURE — 6360000002 HC RX W HCPCS: Performed by: STUDENT IN AN ORGANIZED HEALTH CARE EDUCATION/TRAINING PROGRAM

## 2024-07-18 PROCEDURE — 6370000000 HC RX 637 (ALT 250 FOR IP): Performed by: STUDENT IN AN ORGANIZED HEALTH CARE EDUCATION/TRAINING PROGRAM

## 2024-07-18 PROCEDURE — 85025 COMPLETE CBC W/AUTO DIFF WBC: CPT

## 2024-07-18 PROCEDURE — 83550 IRON BINDING TEST: CPT

## 2024-07-18 PROCEDURE — 84100 ASSAY OF PHOSPHORUS: CPT

## 2024-07-18 PROCEDURE — 94640 AIRWAY INHALATION TREATMENT: CPT

## 2024-07-18 PROCEDURE — 2060000000 HC ICU INTERMEDIATE R&B

## 2024-07-18 PROCEDURE — 2580000003 HC RX 258: Performed by: INTERNAL MEDICINE

## 2024-07-18 PROCEDURE — 83735 ASSAY OF MAGNESIUM: CPT

## 2024-07-18 PROCEDURE — 6370000000 HC RX 637 (ALT 250 FOR IP): Performed by: INTERNAL MEDICINE

## 2024-07-18 PROCEDURE — 2580000003 HC RX 258: Performed by: STUDENT IN AN ORGANIZED HEALTH CARE EDUCATION/TRAINING PROGRAM

## 2024-07-18 PROCEDURE — 83540 ASSAY OF IRON: CPT

## 2024-07-18 PROCEDURE — 93010 ELECTROCARDIOGRAM REPORT: CPT | Performed by: SPECIALIST

## 2024-07-18 PROCEDURE — 6360000002 HC RX W HCPCS: Performed by: INTERNAL MEDICINE

## 2024-07-18 PROCEDURE — 83036 HEMOGLOBIN GLYCOSYLATED A1C: CPT

## 2024-07-18 PROCEDURE — 94664 DEMO&/EVAL PT USE INHALER: CPT

## 2024-07-18 PROCEDURE — 94761 N-INVAS EAR/PLS OXIMETRY MLT: CPT

## 2024-07-18 PROCEDURE — 82962 GLUCOSE BLOOD TEST: CPT

## 2024-07-18 PROCEDURE — 82728 ASSAY OF FERRITIN: CPT

## 2024-07-18 PROCEDURE — 93005 ELECTROCARDIOGRAM TRACING: CPT | Performed by: STUDENT IN AN ORGANIZED HEALTH CARE EDUCATION/TRAINING PROGRAM

## 2024-07-18 PROCEDURE — 82607 VITAMIN B-12: CPT

## 2024-07-18 PROCEDURE — 80048 BASIC METABOLIC PNL TOTAL CA: CPT

## 2024-07-18 PROCEDURE — 36415 COLL VENOUS BLD VENIPUNCTURE: CPT

## 2024-07-18 RX ORDER — DIPHENHYDRAMINE HYDROCHLORIDE AND LIDOCAINE HYDROCHLORIDE AND ALUMINUM HYDROXIDE AND MAGNESIUM HYDRO
5 KIT 4 TIMES DAILY PRN
Status: DISCONTINUED | OUTPATIENT
Start: 2024-07-18 | End: 2024-07-20 | Stop reason: HOSPADM

## 2024-07-18 RX ORDER — INSULIN LISPRO 100 [IU]/ML
0-8 INJECTION, SOLUTION INTRAVENOUS; SUBCUTANEOUS EVERY 4 HOURS
Status: DISCONTINUED | OUTPATIENT
Start: 2024-07-18 | End: 2024-07-20 | Stop reason: HOSPADM

## 2024-07-18 RX ORDER — SODIUM CHLORIDE FOR INHALATION 7 %
4 VIAL, NEBULIZER (ML) INHALATION
Status: DISCONTINUED | OUTPATIENT
Start: 2024-07-18 | End: 2024-07-20 | Stop reason: HOSPADM

## 2024-07-18 RX ORDER — GUAIFENESIN 600 MG/1
1200 TABLET, EXTENDED RELEASE ORAL 2 TIMES DAILY
Status: DISCONTINUED | OUTPATIENT
Start: 2024-07-18 | End: 2024-07-20 | Stop reason: HOSPADM

## 2024-07-18 RX ORDER — DEXTROSE MONOHYDRATE 100 MG/ML
INJECTION, SOLUTION INTRAVENOUS CONTINUOUS PRN
Status: DISCONTINUED | OUTPATIENT
Start: 2024-07-18 | End: 2024-07-20 | Stop reason: HOSPADM

## 2024-07-18 RX ADMIN — WATER 40 MG: 1 INJECTION INTRAMUSCULAR; INTRAVENOUS; SUBCUTANEOUS at 05:36

## 2024-07-18 RX ADMIN — TRAZODONE HYDROCHLORIDE 50 MG: 50 TABLET ORAL at 21:40

## 2024-07-18 RX ADMIN — CETIRIZINE HYDROCHLORIDE 5 MG: 10 TABLET, FILM COATED ORAL at 21:40

## 2024-07-18 RX ADMIN — DILTIAZEM HYDROCHLORIDE 240 MG: 240 CAPSULE, EXTENDED RELEASE ORAL at 09:50

## 2024-07-18 RX ADMIN — SODIUM ZIRCONIUM CYCLOSILICATE 10 G: 10 POWDER, FOR SUSPENSION ORAL at 00:59

## 2024-07-18 RX ADMIN — IPRATROPIUM BROMIDE AND ALBUTEROL SULFATE 1 DOSE: .5; 3 SOLUTION RESPIRATORY (INHALATION) at 11:18

## 2024-07-18 RX ADMIN — CETIRIZINE HYDROCHLORIDE 5 MG: 10 TABLET, FILM COATED ORAL at 00:59

## 2024-07-18 RX ADMIN — WATER 40 MG: 1 INJECTION INTRAMUSCULAR; INTRAVENOUS; SUBCUTANEOUS at 14:53

## 2024-07-18 RX ADMIN — WATER 40 MG: 1 INJECTION INTRAMUSCULAR; INTRAVENOUS; SUBCUTANEOUS at 21:41

## 2024-07-18 RX ADMIN — TRAZODONE HYDROCHLORIDE 50 MG: 50 TABLET ORAL at 00:59

## 2024-07-18 RX ADMIN — IPRATROPIUM BROMIDE AND ALBUTEROL SULFATE 1 DOSE: .5; 3 SOLUTION RESPIRATORY (INHALATION) at 07:22

## 2024-07-18 RX ADMIN — INSULIN LISPRO 2 UNITS: 100 INJECTION, SOLUTION INTRAVENOUS; SUBCUTANEOUS at 17:42

## 2024-07-18 RX ADMIN — Medication 4 ML: at 20:24

## 2024-07-18 RX ADMIN — ACETAMINOPHEN 650 MG: 325 TABLET ORAL at 21:46

## 2024-07-18 RX ADMIN — ACETAMINOPHEN 650 MG: 325 TABLET ORAL at 00:59

## 2024-07-18 RX ADMIN — SODIUM ZIRCONIUM CYCLOSILICATE 5 G: 5 POWDER, FOR SUSPENSION ORAL at 14:53

## 2024-07-18 RX ADMIN — EZETIMIBE 10 MG: 10 TABLET ORAL at 00:59

## 2024-07-18 RX ADMIN — GUAIFENESIN 1200 MG: 600 TABLET ORAL at 10:11

## 2024-07-18 RX ADMIN — ANTACID TABLETS 500 MG: 500 TABLET, CHEWABLE ORAL at 09:50

## 2024-07-18 RX ADMIN — SODIUM CHLORIDE, PRESERVATIVE FREE 10 ML: 5 INJECTION INTRAVENOUS at 21:41

## 2024-07-18 RX ADMIN — FLUTICASONE PROPIONATE 2 SPRAY: 50 SPRAY, METERED NASAL at 10:05

## 2024-07-18 RX ADMIN — PANTOPRAZOLE SODIUM 40 MG: 40 TABLET, DELAYED RELEASE ORAL at 05:36

## 2024-07-18 RX ADMIN — SODIUM CHLORIDE, PRESERVATIVE FREE 10 ML: 5 INJECTION INTRAVENOUS at 01:06

## 2024-07-18 RX ADMIN — EZETIMIBE 10 MG: 10 TABLET ORAL at 17:41

## 2024-07-18 RX ADMIN — GUAIFENESIN 1200 MG: 600 TABLET ORAL at 21:40

## 2024-07-18 RX ADMIN — IPRATROPIUM BROMIDE AND ALBUTEROL SULFATE 1 DOSE: .5; 3 SOLUTION RESPIRATORY (INHALATION) at 20:24

## 2024-07-18 RX ADMIN — CEFEPIME 1000 MG: 1 INJECTION, POWDER, FOR SOLUTION INTRAMUSCULAR; INTRAVENOUS at 10:04

## 2024-07-18 RX ADMIN — MONTELUKAST 10 MG: 10 TABLET, FILM COATED ORAL at 09:50

## 2024-07-18 RX ADMIN — ALLOPURINOL 300 MG: 300 TABLET ORAL at 09:50

## 2024-07-18 RX ADMIN — CEFTAZIDIME 2000 MG: 2 INJECTION, POWDER, FOR SOLUTION INTRAVENOUS at 14:59

## 2024-07-18 RX ADMIN — SODIUM CHLORIDE, PRESERVATIVE FREE 10 ML: 5 INJECTION INTRAVENOUS at 10:11

## 2024-07-18 RX ADMIN — FOLIC ACID 1 MG: 1 TABLET ORAL at 09:50

## 2024-07-18 ASSESSMENT — PAIN SCALES - GENERAL
PAINLEVEL_OUTOF10: 0
PAINLEVEL_OUTOF10: 0
PAINLEVEL_OUTOF10: 3
PAINLEVEL_OUTOF10: 0
PAINLEVEL_OUTOF10: 0

## 2024-07-18 ASSESSMENT — PAIN DESCRIPTION - LOCATION: LOCATION: HEAD

## 2024-07-18 NOTE — ED NOTES
Pt given Fig Haider and ginger ale per request. Awaiting transport. Pt undated on room and transport ETA.

## 2024-07-18 NOTE — ED PROVIDER NOTES
[facility] EMERGENCY DEPT  EMERGENCY DEPARTMENT ENCOUNTER      Pt Name: Neisha Guardado  MRN: [id number]  Birthdate 1945  Date of evaluation: 7/17/2024  Provider: Ramon Abarca MD      HISTORY OF PRESENT ILLNESS      [facility]  78-year-old female with a past medical history of type 2 diabetes, hypertension, and asthma presenting to the emergency department due to cough.  For about the past week patient has had a cough and she feels like she has a bunch of phlegm in her chest.  She cannot get much mucus up.  She has had mucous plugging requiring bronchoscopy in the past and as such follow-up with her pulmonologist.  She was started on Bactrim but her symptoms have worsened since Friday when she was started on antibiotics.  No fevers.  She says she is mildly short of breath compared to her baseline.  She has been using albuterol nebulizers with no relief.  No chest pain.      Nursing Notes were reviewed.    REVIEW OF SYSTEMS         Review of Systems  A complete review of systems was performed and all systems reviewed are negative less otherwise document in the HPI      PAST MEDICAL HISTORY     Past Medical History:   Diagnosis Date    Asthma     Diabetes (HCC)     type 2    GERD (gastroesophageal reflux disease)     Hypertension     Ill-defined condition     seasonal allergies    Ill-defined condition     gout    Ill-defined condition     high cholesterol         SURGICAL HISTORY       Past Surgical History:   Procedure Laterality Date    APPENDECTOMY      BRONCHOSCOPY N/A 3/12/2024    BRONCHOSCOPY performed by Rosalinda Parr MD at [facility] ENDOSCOPY    BRONCHOSCOPY N/A 6/6/2024    BRONCHOSCOPY (CON SED) performed by Rosalinda Parr MD at [facility] ENDOSCOPY    BRONCHOSCOPY Left 6/6/2024    BRONCHOSCOPY ALVEOLAR LAVAGE performed by Rosalinda Parr MD at [facility] ENDOSCOPY    BRONCHOSCOPY  6/6/2024    BRONCHOSCOPY DIAGNOSTIC OR CELL WASH ONLY performed by Rosalinda Parr MD at [facility] ENDOSCOPY    CHOLECYSTECTOMY, LAPAROSCOPIC

## 2024-07-18 NOTE — ACP (ADVANCE CARE PLANNING)
Advance Care Planning (ACP) Provider Note - Comprehensive     Date of ACP Conversation: 07/18/24  Persons included in Conversation:  patient  Length of ACP Conversation in minutes:  16 minutes      Diagnosis T2DM  Authorized Decision Maker (if patient is incapable of making informed decisions):   This person is:  Next of Kin by law (only applies in absence of above)          General ACP for ALL Patients with Decision Making Capacity:   Importance of advance care planning, including choosing a healthcare agent to communicate patient's healthcare decisions if patient lost the ability to make decisions, such as after a sudden illness or accident  Understanding of the healthcare agent role was assessed and information provided  Exploration of values, goals, and preferences if recovery is not expected, even with continued medical treatment in the event of: Imminent death  Severe, permanent brain injury  \"In these circumstances, what matters most to you?\"  Care focused more on comfort or quality of life.  Opportunity offered to explore how cultural, Gnosticism, spiritual, or personal beliefs would affect decisions for future care         For Serious or Chronic Illness:  Understanding of medical condition    Understanding of CPR, goals and expected outcomes, benefits and burdens discussed.  Information on CPR success rates provided (e.g. for CPR in hospital, survival to d/c at two weeks is 22%, for chronically ill or elderly/frail survival is less than 3%); Individual asked to communicate understanding of information in his/her own words.  Explored fears and concerns regarding CPR or possible outcomes    Interventions Provided:  Recommended completion of Advance Directive form after review of ACP materials and conversation with prospective healthcare agent

## 2024-07-18 NOTE — PROGRESS NOTES
Quick check in on patient this afternoon.  She continues to reports feeling much better compared to a few days ago.  Still with some cough, but much less and not bringing much phlegm up.    She is sitting up in bed, appears comfortable.  Lung are clear upon inspiration and with trace rhonchi/minimal end expiration cough.  No wheeze or rales.  Will continue current regimen of nebs and abx.  No plans for bronch at this time.  Will reevaluate again tomorrow morning.

## 2024-07-18 NOTE — H&P
History and Physical    Date of Service:  7/17/2024  Primary Care Provider: Gonzalo Raygoza MD  Source of information: patient     Chief Complaint: Asthma      History of Presenting Illness:   Neisha Guardado is a 78 y.o. female with past medical history of T2DM, HTN, GERD seen  Allergies-Pseudomonas pneumonia, BATSHEVA not on Asthma who presents with cough and shortness of breath.  She has had a cough for about 1 week and feels like she has a lot of phlegm in her chest but she cannot bring anything up.  She was started on Bactrim  On 7/12/2024 by pulmonology NP but her symptoms are continuing to worsen prompting return to the ER.  Patient has a history of multiple bronchoscopies for mucous plugging and has history of multidrug-resistant organisms and bronchoscopy  June 2024.           REVIEW OF SYSTEMS:       I am not able to complete the review of systems because:   The patient is intubated and sedated    The patient has altered mental status due to his acute medical problems    The patient has baseline aphasia from prior stroke(s)    The patient has baseline dementia and is not reliable historian    The patient is in acute medical distress and unable to provide information           Total of 12 systems reviewed as follows:       POSITIVE= underlined text  Negative = text not underlined  General:  fever, chills, sweats, generalized weakness, weight loss/gain,      loss of appetite   Eyes:    blurred vision, eye pain, loss of vision, double vision  ENT:    rhinorrhea, pharyngitis   Respiratory:   cough, sputum production, SOB, EMERSON, wheezing, pleuritic pain   Cardiology:   chest pain, palpitations, orthopnea, PND, edema, syncope   Gastrointestinal:  abdominal pain , N/V, diarrhea, dysphagia, constipation, bleeding   Genitourinary:  frequency, urgency, dysuria, hematuria, incontinence   Muskuloskeletal :  arthralgia, myalgia, back pain  Hematology:  easy bruising, nose or gum bleeding, lymphadenopathy  have been completed with Dragon, the computer voice recognition software.  Quite often unanticipated grammatical, syntax, homophones, and other interpretive errors are inadvertently transcribed by the computer software.  Please disregard these errors.  Please excuse any errors that have escaped final proofreading.

## 2024-07-18 NOTE — ED NOTES
Pt alert and oriented and able to speak in full sentences. Respirations are regular and unlabored.

## 2024-07-18 NOTE — CONSULTS
Consulting service: Hospitalist   Reason for consult: Asthma exacerbation    CC: Feeling better    HPI: 78 year old female with severe persistent asthma, recurrent pneumonia/mucus plugging with history of MDR organisms, BATSHEVA, DM, GERD who presented to the ER yesterday with several days of worsening cough and dyspnea.     Patient has had a difficult course over the past couple of years after an episode of pneumonia. She has required several bronchoscopy for airway clearance and has more recently started to grow MDR organisms (bronch early June with achromobacter). She felt well after her bronch about 6 weeks ago until a couple of weeks later when cough returned. She was seen within the past two weeks in our office as a result and given a course of bactrim. Called back yesterday stating she still wasn't feeling better and advised to come in. Chest CT on arrival with left basilar mucus plugging and slightly increased LLL consolidation compared to prior.    Patient seen resting comfortably in bed on room air. States she is feeling better already; thinks stacked nebs and cefepime have helped. Cough is significantly improved. No real dyspnea or wheeze currently. Confirms she uses her chest PT vest and saline nebs at home BID and is compliant with all other home medications.     PMH:  Severe persistent asthma  Pneumonia, recurrent LLL mucus plugging, MDR organisms  DM  HTN  GERD  BATSHEVA     SH: Nonsmoker    FH: Noncontributory to current presentation    ROS: 12 point review of systems completed and negative other than as noted in the HPI    Vitals:    07/18/24 0754   BP: (!) 145/56   Pulse: 79   Resp: 16   Temp: 97.9 °F (36.6 °C)   SpO2: 93%     Gen: Alert, well appearing, no distress  HEENT: NCAT  CV: Regular  Lungs: Few rhonci, no wheeze, cough with deep inspiration, room air  GI: Soft  Ext: Moves all, no edema  Neuro: Alert, oriented, no focal deficits    Lab Results   Component Value Date    WBC 6.0 07/18/2024    HGB 10.8

## 2024-07-18 NOTE — ED NOTES
TRANSFER - OUT REPORT:    Verbal report given to [facility] medic on Neisha Guardado  being transferred to [facility] for routine progression of patient care       Report consisted of patient's Situation, Background, Assessment and   Recommendations(SBAR).     Information from the following report(s) ED SBAR was reviewed with the receiving nurse.    [place] Fall Assessment:    Presents to emergency department  because of falls (Syncope, seizure, or loss of consciousness): No  Age > 70: Yes  Altered Mental Status, Intoxication with alcohol or substance confusion (Disorientation, impaired judgment, poor safety awaremess, or inability to follow instructions): No  Impaired Mobility: Ambulates or transfers with assistive devices or assistance; Unable to ambulate or transer.: No  Nursing Judgement: Yes          Lines:   Peripheral IV 07/17/24 Right Antecubital (Active)   Site Assessment Clean, dry & intact 07/17/24 1921   Line Status Blood return noted;Flushed;Specimen collected 07/17/24 1921   Phlebitis Assessment No symptoms 07/17/24 1921   Infiltration Assessment 0 07/17/24 1921   Dressing Status Clean, dry & intact 07/17/24 1921   Dressing Type Transparent 07/17/24 1921        Opportunity for questions and clarification was provided.      Patient transported with:  Monitor

## 2024-07-18 NOTE — ED NOTES
TRANSFER - OUT REPORT:    Verbal report given to Ruslan PHELAN on Neisha Guardado  being transferred to [facility]   for routine progression of patient care       Report consisted of patient's Situation, Background, Assessment and   Recommendations(SBAR).     Information from the following report(s) ED SBAR was reviewed with the receiving nurse.    [place] Fall Assessment:    Presents to emergency department  because of falls (Syncope, seizure, or loss of consciousness): No  Age > 70: Yes  Altered Mental Status, Intoxication with alcohol or substance confusion (Disorientation, impaired judgment, poor safety awaremess, or inability to follow instructions): No  Impaired Mobility: Ambulates or transfers with assistive devices or assistance; Unable to ambulate or transer.: No  Nursing Judgement: Yes          Lines:   Peripheral IV 07/17/24 Right Antecubital (Active)   Site Assessment Clean, dry & intact 07/17/24 1921   Line Status Blood return noted;Flushed;Specimen collected 07/17/24 1921   Phlebitis Assessment No symptoms 07/17/24 1921   Infiltration Assessment 0 07/17/24 1921   Dressing Status Clean, dry & intact 07/17/24 1921   Dressing Type Transparent 07/17/24 1921        Opportunity for questions and clarification was provided.      Patient transported with:  Monitor

## 2024-07-18 NOTE — PROGRESS NOTES
this patient and independently examined them on 7/18/2024 as outlined below:          General : alert x 3, awake, no acute distress,   HEENT: PEERL, EOMI, moist mucus membrane, TM clear  Neck: supple, no JVD, no meningeal signs  Chest: Clear to auscultation bilaterally   CVS: S1 S2 heard, Capillary refill less than 2 seconds  Abd: soft/ non tender, non distended, BS physiological,   Ext: no clubbing, no cyanosis, no edema, brisk 2+ DP pulses  Neuro/Psych: pleasant mood and affect, CN 2-12 grossly intact, sensory grossly within normal limit, Strength 5/5 in all extremities, DTR 1+ x 4  Skin: warm     Data Review:    Review and/or order of clinical lab test  Review and/or order of tests in the radiology section of CPT  Review and/or order of tests in the medicine section of CPT  Discussion of test results with performing physician      I have personally and independently reviewed all pertinent labs, diagnostic studies, imaging, and have provided independent interpretation of the same.     Labs:     Recent Labs     07/17/24 1924 07/18/24  0537   WBC 6.0 6.0   HGB 10.9* 10.8*   HCT 32.8* 32.9*    279     Recent Labs     07/17/24 1924 07/18/24  0537    138   K 6.3* 5.2*    114*   CO2 25 21   BUN 26* 22*   MG  --  1.8   PHOS  --  3.8     Recent Labs     07/17/24 1924   ALT 26   GLOB 3.3     No results for input(s): \"INR\", \"APTT\" in the last 72 hours.    Invalid input(s): \"PTP\"   Recent Labs     07/18/24  0537   TIBC 303      No results found for: \"RBCF\"   No results for input(s): \"PH\", \"PCO2\", \"PO2\" in the last 72 hours.  No results for input(s): \"CPK\" in the last 72 hours.    Invalid input(s): \"CPKMB\", \"CKNDX\", \"TROIQ\"  No results found for: \"CHOL\", \"CHLST\", \"CHOLV\", \"HDL\", \"HDLC\", \"LDL\"  No results found for: \"GLUCPOC\"  [unfilled]    Notes reviewed from all clinical/nonclinical/nursing services involved in patient's clinical care. Care coordination discussions were held with appropriate

## 2024-07-18 NOTE — PLAN OF CARE
Problem: Pain  Goal: Verbalizes/displays adequate comfort level or baseline comfort level  Outcome: Progressing  Flowsheets (Taken 7/18/2024 1105)  Verbalizes/displays adequate comfort level or baseline comfort level:   Encourage patient to monitor pain and request assistance   Assess pain using appropriate pain scale   Administer analgesics based on type and severity of pain and evaluate response

## 2024-07-18 NOTE — CARE COORDINATION
Care Management Initial Assessment  7/18/2024 4:24 PM  If patient is discharged prior to next notation, then this note serves as note for discharge by case management.    Reason for Admission:   Hyperkalemia [E87.5]  Pneumonia due to organism [J18.9]  Multifocal pneumonia [J18.9]         Patient Admission Status: Inpatient  RUR: Readmission Risk Score: 14.2    Hospitalization in the last 30 days (Readmission):  No        Advance Care Planning:  Code Status: Full Code  Primary Healthcare Decision Maker: Legal Next of Kin  Primary Decision Maker: Amol Guardado - Child - [phone]        __________________________________________________________________________  Assessment:      07/18/24 1623   Service Assessment   Patient Orientation Alert and Oriented   Cognition Alert   History Provided By Patient   Primary Caregiver Self   Support Systems Children   Patient's Healthcare Decision Maker is: Legal Next of Kin   PCP Verified by CM Yes   Last Visit to PCP Within last 6 months   Prior Functional Level Independent in ADLs/IADLs   Current Functional Level Independent in ADLs/IADLs   Can patient return to prior living arrangement Yes   Ability to make needs known: Good   Family able to assist with home care needs: Yes   Financial Resources Medicare   Community Resources None   Social/Functional History   Lives With Alone   Type of Home House   Receives Help From Family   ADL Assistance Independent   Homemaking Assistance Independent   Ambulation Assistance Independent   Transfer Assistance Independent   Discharge Planning   Type of Residence House   Living Arrangements Alone   Current Services Prior To Admission None   Potential Assistance Purchasing Medications No   Type of Home Care Services None   Patient expects to be discharged to: House       Comments:     CM met face to face with pt to explain role and confirm charted demographics- pt reports that she lives alone and is independent at baseline. Her son is

## 2024-07-19 LAB
ALBUMIN SERPL-MCNC: 3.3 G/DL (ref 3.5–5)
ALBUMIN/GLOB SERPL: 1.1 (ref 1.1–2.2)
ALP SERPL-CCNC: 90 U/L (ref 45–117)
ALT SERPL-CCNC: 37 U/L (ref 12–78)
ANION GAP SERPL CALC-SCNC: 6 MMOL/L (ref 5–15)
AST SERPL-CCNC: 30 U/L (ref 15–37)
BASOPHILS # BLD: 0 K/UL (ref 0–0.1)
BASOPHILS NFR BLD: 0 % (ref 0–1)
BILIRUB SERPL-MCNC: 0.3 MG/DL (ref 0.2–1)
BUN SERPL-MCNC: 25 MG/DL (ref 6–20)
BUN/CREAT SERPL: 22 (ref 12–20)
CALCIUM SERPL-MCNC: 9.4 MG/DL (ref 8.5–10.1)
CHLORIDE SERPL-SCNC: 112 MMOL/L (ref 97–108)
CO2 SERPL-SCNC: 19 MMOL/L (ref 21–32)
COMMENT:: NORMAL
CREAT SERPL-MCNC: 1.16 MG/DL (ref 0.55–1.02)
DIFFERENTIAL METHOD BLD: ABNORMAL
EOSINOPHIL # BLD: 0 K/UL (ref 0–0.4)
EOSINOPHIL NFR BLD: 0 % (ref 0–7)
ERYTHROCYTE [DISTWIDTH] IN BLOOD BY AUTOMATED COUNT: 14.6 % (ref 11.5–14.5)
GLOBULIN SER CALC-MCNC: 3.1 G/DL (ref 2–4)
GLUCOSE BLD STRIP.AUTO-MCNC: 120 MG/DL (ref 65–117)
GLUCOSE BLD STRIP.AUTO-MCNC: 121 MG/DL (ref 65–117)
GLUCOSE BLD STRIP.AUTO-MCNC: 127 MG/DL (ref 65–117)
GLUCOSE BLD STRIP.AUTO-MCNC: 139 MG/DL (ref 65–117)
GLUCOSE BLD STRIP.AUTO-MCNC: 168 MG/DL (ref 65–117)
GLUCOSE SERPL-MCNC: 152 MG/DL (ref 65–100)
HCT VFR BLD AUTO: 32.4 % (ref 35–47)
HGB BLD-MCNC: 10.9 G/DL (ref 11.5–16)
IMM GRANULOCYTES # BLD AUTO: 0.1 K/UL (ref 0–0.04)
IMM GRANULOCYTES NFR BLD AUTO: 1 % (ref 0–0.5)
LYMPHOCYTES # BLD: 0.7 K/UL (ref 0.8–3.5)
LYMPHOCYTES NFR BLD: 9 % (ref 12–49)
MCH RBC QN AUTO: 33.6 PG (ref 26–34)
MCHC RBC AUTO-ENTMCNC: 33.6 G/DL (ref 30–36.5)
MCV RBC AUTO: 100 FL (ref 80–99)
MONOCYTES # BLD: 0.2 K/UL (ref 0–1)
MONOCYTES NFR BLD: 3 % (ref 5–13)
NEUTS SEG # BLD: 6.8 K/UL (ref 1.8–8)
NEUTS SEG NFR BLD: 87 % (ref 32–75)
NRBC # BLD: 0 K/UL (ref 0–0.01)
NRBC BLD-RTO: 0 PER 100 WBC
PLATELET # BLD AUTO: 295 K/UL (ref 150–400)
PMV BLD AUTO: 9.1 FL (ref 8.9–12.9)
POTASSIUM SERPL-SCNC: 4.7 MMOL/L (ref 3.5–5.1)
PROT SERPL-MCNC: 6.4 G/DL (ref 6.4–8.2)
RBC # BLD AUTO: 3.24 M/UL (ref 3.8–5.2)
RBC MORPH BLD: ABNORMAL
SERVICE CMNT-IMP: ABNORMAL
SODIUM SERPL-SCNC: 137 MMOL/L (ref 136–145)
SPECIMEN HOLD: NORMAL
WBC # BLD AUTO: 7.8 K/UL (ref 3.6–11)

## 2024-07-19 PROCEDURE — 2580000003 HC RX 258: Performed by: STUDENT IN AN ORGANIZED HEALTH CARE EDUCATION/TRAINING PROGRAM

## 2024-07-19 PROCEDURE — 85025 COMPLETE CBC W/AUTO DIFF WBC: CPT

## 2024-07-19 PROCEDURE — 6370000000 HC RX 637 (ALT 250 FOR IP): Performed by: INTERNAL MEDICINE

## 2024-07-19 PROCEDURE — 36415 COLL VENOUS BLD VENIPUNCTURE: CPT

## 2024-07-19 PROCEDURE — 6360000002 HC RX W HCPCS: Performed by: INTERNAL MEDICINE

## 2024-07-19 PROCEDURE — 2580000003 HC RX 258: Performed by: INTERNAL MEDICINE

## 2024-07-19 PROCEDURE — 6370000000 HC RX 637 (ALT 250 FOR IP): Performed by: PHYSICIAN ASSISTANT

## 2024-07-19 PROCEDURE — 94669 MECHANICAL CHEST WALL OSCILL: CPT

## 2024-07-19 PROCEDURE — 80053 COMPREHEN METABOLIC PANEL: CPT

## 2024-07-19 PROCEDURE — 1100000000 HC RM PRIVATE

## 2024-07-19 PROCEDURE — 94640 AIRWAY INHALATION TREATMENT: CPT

## 2024-07-19 PROCEDURE — 94761 N-INVAS EAR/PLS OXIMETRY MLT: CPT

## 2024-07-19 PROCEDURE — 6370000000 HC RX 637 (ALT 250 FOR IP): Performed by: STUDENT IN AN ORGANIZED HEALTH CARE EDUCATION/TRAINING PROGRAM

## 2024-07-19 PROCEDURE — 6360000002 HC RX W HCPCS: Performed by: STUDENT IN AN ORGANIZED HEALTH CARE EDUCATION/TRAINING PROGRAM

## 2024-07-19 PROCEDURE — 82962 GLUCOSE BLOOD TEST: CPT

## 2024-07-19 RX ORDER — PREDNISONE 20 MG/1
20 TABLET ORAL DAILY
Status: DISCONTINUED | OUTPATIENT
Start: 2024-07-23 | End: 2024-07-20 | Stop reason: HOSPADM

## 2024-07-19 RX ORDER — PREDNISONE 10 MG/1
10 TABLET ORAL DAILY
Status: DISCONTINUED | OUTPATIENT
Start: 2024-07-26 | End: 2024-07-20 | Stop reason: HOSPADM

## 2024-07-19 RX ORDER — PREDNISONE 20 MG/1
40 TABLET ORAL DAILY
Status: DISCONTINUED | OUTPATIENT
Start: 2024-07-19 | End: 2024-07-20 | Stop reason: HOSPADM

## 2024-07-19 RX ADMIN — Medication 4 ML: at 21:31

## 2024-07-19 RX ADMIN — SODIUM CHLORIDE 5 ML/HR: 9 INJECTION, SOLUTION INTRAVENOUS at 17:25

## 2024-07-19 RX ADMIN — IPRATROPIUM BROMIDE AND ALBUTEROL SULFATE 1 DOSE: .5; 3 SOLUTION RESPIRATORY (INHALATION) at 09:40

## 2024-07-19 RX ADMIN — IPRATROPIUM BROMIDE AND ALBUTEROL SULFATE 1 DOSE: .5; 3 SOLUTION RESPIRATORY (INHALATION) at 21:31

## 2024-07-19 RX ADMIN — CEFTAZIDIME 2000 MG: 2 INJECTION, POWDER, FOR SOLUTION INTRAVENOUS at 17:27

## 2024-07-19 RX ADMIN — PREDNISONE 40 MG: 20 TABLET ORAL at 18:27

## 2024-07-19 RX ADMIN — PANTOPRAZOLE SODIUM 40 MG: 40 TABLET, DELAYED RELEASE ORAL at 05:43

## 2024-07-19 RX ADMIN — WATER 40 MG: 1 INJECTION INTRAMUSCULAR; INTRAVENOUS; SUBCUTANEOUS at 05:42

## 2024-07-19 RX ADMIN — FLUTICASONE PROPIONATE 2 SPRAY: 50 SPRAY, METERED NASAL at 07:00

## 2024-07-19 RX ADMIN — EZETIMIBE 10 MG: 10 TABLET ORAL at 18:27

## 2024-07-19 RX ADMIN — SODIUM CHLORIDE 25 ML: 9 INJECTION, SOLUTION INTRAVENOUS at 04:32

## 2024-07-19 RX ADMIN — ANTACID TABLETS 500 MG: 500 TABLET, CHEWABLE ORAL at 09:00

## 2024-07-19 RX ADMIN — CETIRIZINE HYDROCHLORIDE 5 MG: 10 TABLET, FILM COATED ORAL at 19:50

## 2024-07-19 RX ADMIN — TRAZODONE HYDROCHLORIDE 50 MG: 50 TABLET ORAL at 19:50

## 2024-07-19 RX ADMIN — Medication 4 ML: at 09:40

## 2024-07-19 RX ADMIN — MONTELUKAST 10 MG: 10 TABLET, FILM COATED ORAL at 09:01

## 2024-07-19 RX ADMIN — CEFTAZIDIME 2000 MG: 2 INJECTION, POWDER, FOR SOLUTION INTRAVENOUS at 04:33

## 2024-07-19 RX ADMIN — DILTIAZEM HYDROCHLORIDE 240 MG: 240 CAPSULE, EXTENDED RELEASE ORAL at 09:01

## 2024-07-19 RX ADMIN — SODIUM CHLORIDE, PRESERVATIVE FREE 10 ML: 5 INJECTION INTRAVENOUS at 19:50

## 2024-07-19 RX ADMIN — GUAIFENESIN 1200 MG: 600 TABLET ORAL at 19:50

## 2024-07-19 RX ADMIN — SODIUM CHLORIDE, PRESERVATIVE FREE 5 ML: 5 INJECTION INTRAVENOUS at 09:02

## 2024-07-19 RX ADMIN — FOLIC ACID 1 MG: 1 TABLET ORAL at 09:01

## 2024-07-19 RX ADMIN — CLOPIDOGREL BISULFATE 75 MG: 75 TABLET ORAL at 09:00

## 2024-07-19 RX ADMIN — ACETAMINOPHEN 650 MG: 325 TABLET ORAL at 09:01

## 2024-07-19 RX ADMIN — ALLOPURINOL 300 MG: 300 TABLET ORAL at 09:01

## 2024-07-19 RX ADMIN — GUAIFENESIN 1200 MG: 600 TABLET ORAL at 09:00

## 2024-07-19 ASSESSMENT — PAIN SCALES - GENERAL
PAINLEVEL_OUTOF10: 0
PAINLEVEL_OUTOF10: 3

## 2024-07-19 ASSESSMENT — PAIN DESCRIPTION - ORIENTATION: ORIENTATION: MID;ANTERIOR

## 2024-07-19 ASSESSMENT — PAIN DESCRIPTION - DESCRIPTORS: DESCRIPTORS: SORE

## 2024-07-19 ASSESSMENT — PAIN DESCRIPTION - LOCATION: LOCATION: HEAD

## 2024-07-19 NOTE — PROGRESS NOTES
1204 messaged attending Dr. Abdul before patient arrived on unit about telemetry order.     Dc'd telemetry per order from provider

## 2024-07-19 NOTE — PLAN OF CARE
Problem: Discharge Planning  Goal: Discharge to home or other facility with appropriate resources  Outcome: Progressing  Flowsheets (Taken 7/18/2024 2027)  Discharge to home or other facility with appropriate resources:   Identify barriers to discharge with patient and caregiver   Arrange for needed discharge resources and transportation as appropriate     Problem: Chronic Conditions and Co-morbidities  Goal: Patient's chronic conditions and co-morbidity symptoms are monitored and maintained or improved  Outcome: Progressing     Problem: Pain  Goal: Verbalizes/displays adequate comfort level or baseline comfort level  Outcome: Progressing     Problem: Respiratory - Adult  Goal: Achieves optimal ventilation and oxygenation  Outcome: Progressing  Flowsheets (Taken 7/18/2024 2029 by Carmen Quinonez RCP)  Achieves optimal ventilation and oxygenation:   Respiratory therapy support as indicated   Assess and instruct to report shortness of breath or any respiratory difficulty   Encourage broncho-pulmonary hygiene including cough, deep breathe, incentive spirometry   Oxygen supplementation based on oxygen saturation or arterial blood gases   Assess for changes in respiratory status   Assess for changes in mentation and behavior

## 2024-07-19 NOTE — PROGRESS NOTES
[facility]                                                                                          Hospitalist Progress Note  John Abdul MD  Office Phone: [phone]        Date of Service:  2024  NAME:  Neisha Guardado  :  1945  MRN:  [id number]       Admission Summary:     Neisha Guardado is a 78 y.o. female with past medical history of T2DM, HTN, GERD seen  Allergies-Pseudomonas pneumonia, BATSHEVA not on Asthma who presents with cough and shortness of breath.  She has had a cough for about 1 week and feels like she has a lot of phlegm in her chest but she cannot bring anything up.  She was started on Bactrim  On 2024 by pulmonology NP but her symptoms are continuing to worsen prompting return to the ER.  Patient has a history of multiple bronchoscopies for mucous plugging and has history of multidrug-resistant organisms and bronchoscopy  2024.       Interval history / Subjective:     Patient does not feel any symptom at this point  Patient to be DC in the am per pulmonary      Assessment & Plan:         Left lower lobe mucous plugging and segmental bronchi  Left lower lobe pneumonia  Noted on CT abdomen pelvis.  History of MDRO on Bronchoscopy. Follows with Dr. Parr.   -Pulmonology on board appreciate help  - on IV ceftazidime per pulmonoly  - IV steroids, duoneb desiree prn, Acetylcysteine nebs  - Sputum culture   - Contact isolation d/t MDRO        Hyperkalemia resolved after lokelma treatment   Likely due to Bactrim.  EKG shows normal sinus rhythm no acute ST-T wave changes.  -Discontinue Bactrim     CKD II   Suspect due to longstanding hypertension and diabetes.     - Strict I's and O's, avoid nephrotoxic medications, renally dose medications        Hypertension  - continue home meds     Normochromic normocytic anemia  - anemia panel        Code status: Full   Prophylaxis: SCD  Central Line:     Care Plan discussed with:   Anticipated  sodium chloride flush 0.9 % injection 5-40 mL  5-40 mL IntraVENous 2 times per day    sodium chloride flush 0.9 % injection 5-40 mL  5-40 mL IntraVENous PRN    0.9 % sodium chloride infusion   IntraVENous PRN    ondansetron (ZOFRAN-ODT) disintegrating tablet 4 mg  4 mg Oral Q8H PRN    Or    ondansetron (ZOFRAN) injection 4 mg  4 mg IntraVENous Q6H PRN    polyethylene glycol (GLYCOLAX) packet 17 g  17 g Oral Daily PRN    acetaminophen (TYLENOL) tablet 650 mg  650 mg Oral Q6H PRN    Or    acetaminophen (TYLENOL) suppository 650 mg  650 mg Rectal Q6H PRN    glucagon injection 1 mg  1 mg SubCUTAneous PRN    methylPREDNISolone sodium succ (SOLU-MEDROL) 40 mg in sterile water 1 mL injection  40 mg IntraVENous Q8H     ______________________________________________________________________  EXPECTED LENGTH OF STAY: 3  ACTUAL LENGTH OF STAY:          2                 John Abdul MD

## 2024-07-19 NOTE — PROGRESS NOTES
Consulting service: Hospitalist   Reason for consult: Asthma exacerbation      HPI: 78 year old female with severe persistent asthma, recurrent pneumonia/mucus plugging with history of MDR organisms, BATSHEVA, DM, GERD who presented to the ER yesterday with several days of worsening cough and dyspnea.     Patient has had a difficult course over the past couple of years after an episode of pneumonia. She has required several bronchoscopy for airway clearance and has more recently started to grow MDR organisms (bronch early June with achromobacter). She felt well after her bronch about 6 weeks ago until a couple of weeks later when cough returned. She was seen within the past two weeks in our office as a result and given a course of bactrim. Called back yesterday stating she still wasn't feeling better and advised to come in. Chest CT on arrival with left basilar mucus plugging and slightly increased LLL consolidation compared to prior.    Patient seen resting comfortably in bed on room air. States she is feeling better already; thinks stacked nebs and cefepime have helped. Cough is significantly improved. No real dyspnea or wheeze currently. Confirms she uses her chest PT vest and saline nebs at home BID and is compliant with all other home medications.     Interval history  Afebrile  BP stable  Sats 94% on RA    ROS: Didn't sleep well - thinks due to streoids.  Not feeling great today.  Has a HA.  Breathing seems okay.  Not coughing up anything.  Denies SOB currently.    PMH:  Severe persistent asthma  Pneumonia, recurrent LLL mucus plugging, MDR organisms  DM  HTN  GERD  BATSHEVA     SH: Nonsmoker    FH: Noncontributory to current presentation    ROS: 12 point review of systems completed and negative other than as noted in the HPI    Vitals:    07/19/24 0819   BP: (!) 162/71   Pulse: 92   Resp: 18   Temp: 97.7 °F (36.5 °C)   SpO2: 94%     Gen: Alert, well appearing, no distress  HEENT: NCAT  CV: Regular  Lungs: Few rhonci,  no wheeze, cough with deep inspiration, room air  GI: Soft  Ext: Moves all, no edema  Neuro: Alert, oriented, no focal deficits    Lab Results   Component Value Date    WBC 7.8 07/19/2024    HGB 10.9 (L) 07/19/2024    HCT 32.4 (L) 07/19/2024    .0 (H) 07/19/2024     07/19/2024     Lab Results   Component Value Date/Time     07/19/2024 04:37 AM    K 4.7 07/19/2024 04:37 AM     07/19/2024 04:37 AM    CO2 19 07/19/2024 04:37 AM    BUN 25 07/19/2024 04:37 AM    CREATININE 1.16 07/19/2024 04:37 AM    GLUCOSE 152 07/19/2024 04:37 AM    CALCIUM 9.4 07/19/2024 04:37 AM    LABGLOM 49 07/19/2024 04:37 AM    LABGLOM 48 03/13/2024 04:15 AM    LABGLOM >60 04/17/2023 04:58 AM      CT personally reviewed: LLL mucus plugging/consolidation, slightly worse than CT last month, right lung relatively clear    Impression:  -recurrent LLL mucus plug/pneumonia with history of MDR organisms  -severe persistent asthma with mild acute exacerbation  -DM, HTN, BATSHEVA, GERD    Plan:  -continue nebs and cefepime  -no need for bronch at this time, continue to treat with nebs/mucinex/antibiotics. Recurrent bronch is not an ideal strategy moving forward  -continue current neb regimen  -mucinex BID  -? If would benefit from addition of TIW azithro to outpatient regimen  -switch IVCS to pred taper  -plavix resumed, does not need to be held for BAL

## 2024-07-19 NOTE — PROGRESS NOTES
Physician Progress Note      PATIENT:               MELANIE POLK  CSN #:                  [id number]  :                       1945  ADMIT DATE:       2024 6:24 PM  DISCH DATE:  RESPONDING  PROVIDER #:        John Paz MD        QUERY TEXT:    Stage of Chronic Kidney Disease: Please provide further specificity, if known.    Clinical indicators include: ckd, bun  Options provided:  -- Chronic kidney disease stage 1  -- Chronic kidney disease stage 2  -- Chronic kidney disease stage 3  -- Chronic kidney disease stage 3a  -- Chronic kidney disease stage 3b  -- Chronic kidney disease stage 4  -- Chronic kidney disease stage 5  -- Chronic kidney disease stage 5, requiring dialysis  -- End stage renal disease  -- Other - I will add my own diagnosis  -- Disagree - Not applicable / Not valid  -- Disagree - Clinically Unable to determine / Unknown        PROVIDER RESPONSE TEXT:    The patient has chronic kidney disease stage 2.      Electronically signed by:  John Paz MD 2024 7:46 AM

## 2024-07-20 VITALS
DIASTOLIC BLOOD PRESSURE: 74 MMHG | OXYGEN SATURATION: 98 % | HEIGHT: 64 IN | WEIGHT: 155.6 LBS | TEMPERATURE: 97.9 F | SYSTOLIC BLOOD PRESSURE: 158 MMHG | RESPIRATION RATE: 18 BRPM | HEART RATE: 93 BPM | BODY MASS INDEX: 26.56 KG/M2

## 2024-07-20 LAB
ALBUMIN SERPL-MCNC: 3.5 G/DL (ref 3.5–5)
ALBUMIN/GLOB SERPL: 1.1 (ref 1.1–2.2)
ALP SERPL-CCNC: 87 U/L (ref 45–117)
ALT SERPL-CCNC: 71 U/L (ref 12–78)
ANION GAP SERPL CALC-SCNC: 6 MMOL/L (ref 5–15)
AST SERPL-CCNC: 48 U/L (ref 15–37)
BASOPHILS # BLD: 0 K/UL (ref 0–0.1)
BASOPHILS NFR BLD: 0 % (ref 0–1)
BILIRUB SERPL-MCNC: 0.3 MG/DL (ref 0.2–1)
BUN SERPL-MCNC: 31 MG/DL (ref 6–20)
BUN/CREAT SERPL: 26 (ref 12–20)
CALCIUM SERPL-MCNC: 9.6 MG/DL (ref 8.5–10.1)
CHLORIDE SERPL-SCNC: 112 MMOL/L (ref 97–108)
CO2 SERPL-SCNC: 20 MMOL/L (ref 21–32)
CREAT SERPL-MCNC: 1.17 MG/DL (ref 0.55–1.02)
DIFFERENTIAL METHOD BLD: ABNORMAL
EKG ATRIAL RATE: 113 BPM
EKG DIAGNOSIS: NORMAL
EKG P AXIS: 31 DEGREES
EKG P-R INTERVAL: 196 MS
EKG Q-T INTERVAL: 320 MS
EKG QRS DURATION: 106 MS
EKG QTC CALCULATION (BAZETT): 438 MS
EKG R AXIS: -25 DEGREES
EKG T AXIS: 72 DEGREES
EKG VENTRICULAR RATE: 113 BPM
EOSINOPHIL # BLD: 0 K/UL (ref 0–0.4)
EOSINOPHIL NFR BLD: 0 % (ref 0–7)
ERYTHROCYTE [DISTWIDTH] IN BLOOD BY AUTOMATED COUNT: 14.7 % (ref 11.5–14.5)
GLOBULIN SER CALC-MCNC: 3.2 G/DL (ref 2–4)
GLUCOSE BLD STRIP.AUTO-MCNC: 123 MG/DL (ref 65–117)
GLUCOSE BLD STRIP.AUTO-MCNC: 127 MG/DL (ref 65–117)
GLUCOSE BLD STRIP.AUTO-MCNC: 202 MG/DL (ref 65–117)
GLUCOSE SERPL-MCNC: 141 MG/DL (ref 65–100)
HCT VFR BLD AUTO: 34.7 % (ref 35–47)
HGB BLD-MCNC: 11.5 G/DL (ref 11.5–16)
IMM GRANULOCYTES # BLD AUTO: 0.1 K/UL (ref 0–0.04)
IMM GRANULOCYTES NFR BLD AUTO: 1 % (ref 0–0.5)
LYMPHOCYTES # BLD: 0.8 K/UL (ref 0.8–3.5)
LYMPHOCYTES NFR BLD: 7 % (ref 12–49)
MCH RBC QN AUTO: 33.5 PG (ref 26–34)
MCHC RBC AUTO-ENTMCNC: 33.1 G/DL (ref 30–36.5)
MCV RBC AUTO: 101.2 FL (ref 80–99)
MONOCYTES # BLD: 0.3 K/UL (ref 0–1)
MONOCYTES NFR BLD: 3 % (ref 5–13)
NEUTS SEG # BLD: 10.5 K/UL (ref 1.8–8)
NEUTS SEG NFR BLD: 89 % (ref 32–75)
NRBC # BLD: 0.02 K/UL (ref 0–0.01)
NRBC BLD-RTO: 0.2 PER 100 WBC
PLATELET # BLD AUTO: 301 K/UL (ref 150–400)
PMV BLD AUTO: 8.6 FL (ref 8.9–12.9)
POTASSIUM SERPL-SCNC: 4.5 MMOL/L (ref 3.5–5.1)
PROT SERPL-MCNC: 6.7 G/DL (ref 6.4–8.2)
RBC # BLD AUTO: 3.43 M/UL (ref 3.8–5.2)
SERVICE CMNT-IMP: ABNORMAL
SODIUM SERPL-SCNC: 138 MMOL/L (ref 136–145)
WBC # BLD AUTO: 11.7 K/UL (ref 3.6–11)

## 2024-07-20 PROCEDURE — 2580000003 HC RX 258: Performed by: STUDENT IN AN ORGANIZED HEALTH CARE EDUCATION/TRAINING PROGRAM

## 2024-07-20 PROCEDURE — 80053 COMPREHEN METABOLIC PANEL: CPT

## 2024-07-20 PROCEDURE — 6370000000 HC RX 637 (ALT 250 FOR IP): Performed by: STUDENT IN AN ORGANIZED HEALTH CARE EDUCATION/TRAINING PROGRAM

## 2024-07-20 PROCEDURE — 85025 COMPLETE CBC W/AUTO DIFF WBC: CPT

## 2024-07-20 PROCEDURE — 94761 N-INVAS EAR/PLS OXIMETRY MLT: CPT

## 2024-07-20 PROCEDURE — 6360000002 HC RX W HCPCS: Performed by: INTERNAL MEDICINE

## 2024-07-20 PROCEDURE — 36415 COLL VENOUS BLD VENIPUNCTURE: CPT

## 2024-07-20 PROCEDURE — 82962 GLUCOSE BLOOD TEST: CPT

## 2024-07-20 PROCEDURE — 94640 AIRWAY INHALATION TREATMENT: CPT

## 2024-07-20 PROCEDURE — 6370000000 HC RX 637 (ALT 250 FOR IP): Performed by: INTERNAL MEDICINE

## 2024-07-20 PROCEDURE — 93010 ELECTROCARDIOGRAM REPORT: CPT | Performed by: SPECIALIST

## 2024-07-20 PROCEDURE — 6370000000 HC RX 637 (ALT 250 FOR IP): Performed by: PHYSICIAN ASSISTANT

## 2024-07-20 PROCEDURE — 2580000003 HC RX 258: Performed by: INTERNAL MEDICINE

## 2024-07-20 RX ORDER — PREDNISONE 20 MG/1
20 TABLET ORAL DAILY
Qty: 3 TABLET | Refills: 0 | Status: SHIPPED | OUTPATIENT
Start: 2024-07-23 | End: 2024-07-26

## 2024-07-20 RX ORDER — CIPROFLOXACIN 500 MG/1
500 TABLET, FILM COATED ORAL 2 TIMES DAILY
Qty: 10 TABLET | Refills: 0 | Status: SHIPPED | OUTPATIENT
Start: 2024-07-20 | End: 2024-07-25

## 2024-07-20 RX ORDER — PREDNISONE 20 MG/1
40 TABLET ORAL DAILY
Qty: 4 TABLET | Refills: 0 | Status: SHIPPED | OUTPATIENT
Start: 2024-07-21 | End: 2024-07-23

## 2024-07-20 RX ORDER — GUAIFENESIN 600 MG/1
1200 TABLET, EXTENDED RELEASE ORAL 2 TIMES DAILY
Qty: 120 TABLET | Refills: 0 | Status: SHIPPED | OUTPATIENT
Start: 2024-07-20

## 2024-07-20 RX ORDER — PREDNISONE 10 MG/1
10 TABLET ORAL DAILY
Qty: 3 TABLET | Refills: 0 | Status: SHIPPED | OUTPATIENT
Start: 2024-07-26 | End: 2024-07-29

## 2024-07-20 RX ADMIN — SODIUM CHLORIDE, PRESERVATIVE FREE 10 ML: 5 INJECTION INTRAVENOUS at 08:44

## 2024-07-20 RX ADMIN — FLUTICASONE PROPIONATE 2 SPRAY: 50 SPRAY, METERED NASAL at 08:44

## 2024-07-20 RX ADMIN — GUAIFENESIN 1200 MG: 600 TABLET ORAL at 08:42

## 2024-07-20 RX ADMIN — IPRATROPIUM BROMIDE AND ALBUTEROL SULFATE 1 DOSE: .5; 3 SOLUTION RESPIRATORY (INHALATION) at 07:47

## 2024-07-20 RX ADMIN — ALLOPURINOL 300 MG: 300 TABLET ORAL at 08:42

## 2024-07-20 RX ADMIN — FOLIC ACID 1 MG: 1 TABLET ORAL at 08:43

## 2024-07-20 RX ADMIN — PANTOPRAZOLE SODIUM 40 MG: 40 TABLET, DELAYED RELEASE ORAL at 05:04

## 2024-07-20 RX ADMIN — Medication 4 ML: at 07:47

## 2024-07-20 RX ADMIN — PREDNISONE 40 MG: 20 TABLET ORAL at 08:42

## 2024-07-20 RX ADMIN — CLOPIDOGREL BISULFATE 75 MG: 75 TABLET ORAL at 08:43

## 2024-07-20 RX ADMIN — DILTIAZEM HYDROCHLORIDE 240 MG: 240 CAPSULE, EXTENDED RELEASE ORAL at 08:42

## 2024-07-20 RX ADMIN — MONTELUKAST 10 MG: 10 TABLET, FILM COATED ORAL at 08:42

## 2024-07-20 RX ADMIN — ANTACID TABLETS 500 MG: 500 TABLET, CHEWABLE ORAL at 08:43

## 2024-07-20 RX ADMIN — CEFTAZIDIME 2000 MG: 2 INJECTION, POWDER, FOR SOLUTION INTRAVENOUS at 03:17

## 2024-07-20 ASSESSMENT — PAIN SCALES - GENERAL
PAINLEVEL_OUTOF10: 0

## 2024-07-20 NOTE — PLAN OF CARE
Problem: Chronic Conditions and Co-morbidities  Goal: Patient's chronic conditions and co-morbidity symptoms are monitored and maintained or improved  Outcome: Progressing     Problem: Pain  Goal: Verbalizes/displays adequate comfort level or baseline comfort level  Outcome: Progressing     Problem: Respiratory - Adult  Goal: Achieves optimal ventilation and oxygenation  7/19/2024 2040 by Stephany Araujo, RN  Outcome: Progressing  7/19/2024 0943 by Svitlana Chavarria, RT  Outcome: Progressing     Problem: Safety - Adult  Goal: Free from fall injury  Outcome: Progressing

## 2024-07-20 NOTE — DISCHARGE INSTRUCTIONS
HOSPITALIST DISCHARGE INSTRUCTIONS  NAME:  Neisha Guardado   :  1945   MRN:  [id number]     Date/Time:  2024 7:17 AM    ADMIT DATE: 2024     DISCHARGE DATE: 2024     DISCHARGE DIAGNOSIS:  pneumonia    DISCHARGE INSTRUCTIONS:  Thank you for allowing us to participate in your care. Your discharging Hospitalist is Ceasar Pino MD. You were admitted for evaluation and treatment of the above.       MEDICATIONS:    It is important that you take the medication exactly as they are prescribed.   Keep your medication in the bottles provided by the pharmacist and keep a list of the medication names, dosages, and times to be taken in your wallet.   Do not take other medications without consulting your doctor.             If you experience any of the following symptoms then please call your primary care physician or return to the emergency room if you cannot get hold of your doctor:  Fever, chills, nausea, vomiting, diarrhea, change in mentation, falling, bleeding, shortness of breath    Follow Up:  Please call the below provider to arrange hospital follow up appointment      Gonzalo Raygoza MD  [address] [facility] [address] [phone]    Schedule an appointment as soon as possible for a visit      Asya Rao, DO  [address]  [address]  [facility] [address] [phone]    Schedule an appointment as soon as possible for a visit          Information obtained by :  I understand that if any problems occur once I am at home I am to contact my physician.    I understand and acknowledge receipt of the instructions indicated above.                                                                                                                                           Physician's or R.N.'s Signature                                                                  Date/Time

## 2024-07-20 NOTE — PROGRESS NOTES
Patients is alert and oriented   Family at bedside. Patient agreed and understood discharge instructions.

## 2024-07-20 NOTE — DISCHARGE SUMMARY
Hospitalist Discharge Summary     Patient ID:  Neisha Guardado  [id number]  78 y.o.  1945    Admit date: 7/17/2024    Discharge date and time: 7/20/2024    Admission Diagnoses: Hyperkalemia [E87.5]  Pneumonia due to organism [J18.9]  Multifocal pneumonia [J18.9]    Discharge Diagnoses:    Principal Problem:    Pneumonia due to organism  Resolved Problems:    * No resolved hospital problems. *         Hospital Course:     Neisha Guardado is a 78 y.o. female with past medical history of T2DM, HTN, GERD seen  Allergies-Pseudomonas pneumonia, BATSHEVA not on Asthma who presents with cough and shortness of breath.  She has had a cough for about 1 week and feels like she has a lot of phlegm in her chest but she cannot bring anything up.  She was started on Bactrim    On 7/12/2024 by pulmonology NP but her symptoms are continuing to worsen prompting return to the ER.  Patient has a history of multiple bronchoscopies for mucous plugging and has history of multidrug-resistant organisms and bronchoscopy  June 2024.    Patient was treated with IV ceftazidime with improvement in her symptoms per pulmonary recommendations.  She was also given IV steroids and duonebs.  On day of discharge, she was significantly improved and no longer required supplemental O2.  She was discharged with ciprofloxacin for a 5 day course, a steroid taper, and mucinex.  She will follow up with pulmonary outpatient and possibly will start azithromycin three times a week.      By problem:   Left lower lobe mucous plugging and segmental bronchi  Left lower lobe pneumonia  Noted on CT abdomen pelvis.  History of MDRO on Bronchoscopy. Follows with Dr. Parr.   -Pulmonology on board appreciate help  - on IV ceftazidime per pulmonary  - IV steroids, duoneb desiree prn, Acetylcysteine nebs  - Contact isolation d/t MDRO        Hyperkalemia resolved after lokelma treatment   Likely due to Bactrim.  EKG shows normal sinus rhythm no acute ST-T wave  mouth every eveningHistorical Med      fluticasone (FLONASE) 50 MCG/ACT nasal spray 2 sprays by Nasal route dailyHistorical Med      montelukast (SINGULAIR) 10 MG tablet Take 1 tablet by mouth dailyHistorical Med      omeprazole (PRILOSEC OTC) 20 MG tablet Take 1 tablet by mouth dailyHistorical Med      sitaGLIPtan-metFORMIN (JANUMET)  MG per tablet Take 1 tablet by mouth dailyHistorical Med           STOP taking these medications       sulfamethoxazole-trimethoprim (BACTRIM DS;SEPTRA DS) 800-160 MG per tablet Comments:   Reason for Stopping:             Activity: activity as tolerated  Diet: regular diet  Wound Care: none needed    Follow-up with Gonzalo Raygoza MD in a few days.  Follow-up tests/labs none    Approximate time spent in patient care on day of discharge: 36 min    Signed:  Ceasar Pino MD  7/20/2024  3:05 PM

## 2024-07-20 NOTE — PROGRESS NOTES
Consulting service: Hospitalist   Reason for consult: Asthma exacerbation      HPI: 78 year old female with severe persistent asthma, recurrent pneumonia/mucus plugging with history of MDR organisms, BATSHEVA, DM, GERD who presented to the ER yesterday with several days of worsening cough and dyspnea.     Patient has had a difficult course over the past couple of years after an episode of pneumonia. She has required several bronchoscopy for airway clearance and has more recently started to grow MDR organisms (bronch early June with achromobacter). She felt well after her bronch about 6 weeks ago until a couple of weeks later when cough returned. She was seen within the past two weeks in our office as a result and given a course of bactrim. Called back yesterday stating she still wasn't feeling better and advised to come in. Chest CT on arrival with left basilar mucus plugging and slightly increased LLL consolidation compared to prior.    Patient seen resting comfortably in bed on room air. States she is feeling better already; thinks stacked nebs and cefepime have helped. Cough is significantly improved. No real dyspnea or wheeze currently. Confirms she uses her chest PT vest and saline nebs at home BID and is compliant with all other home medications.     Interval history  Afebrile  BP stable  Sats 95% on RA    ROS: Overall feeling much better. Still with cough and intermittent wheezing, but improved. Denies shortness of breath.    PMH:  Severe persistent asthma  Pneumonia, recurrent LLL mucus plugging, MDR organisms  DM  HTN  GERD  BATSHEVA     SH: Nonsmoker    FH: Noncontributory to current presentation    Vitals:    07/20/24 0839   BP: (!) 158/74   Pulse: 93   Resp:    Temp:    SpO2:      Gen: Alert, well appearing, no distress  HEENT: NCAT  CV: Regular  Lungs: Few rhonci, no wheeze, cough with deep inspiration, room air  GI: Soft  Ext: Moves all, no edema  Neuro: Alert, oriented, no focal deficits    Lab Results    Component Value Date    WBC 11.7 (H) 07/20/2024    HGB 11.5 07/20/2024    HCT 34.7 (L) 07/20/2024    .2 (H) 07/20/2024     07/20/2024     Lab Results   Component Value Date/Time     07/20/2024 05:08 AM    K 4.5 07/20/2024 05:08 AM     07/20/2024 05:08 AM    CO2 20 07/20/2024 05:08 AM    BUN 31 07/20/2024 05:08 AM    CREATININE 1.17 07/20/2024 05:08 AM    GLUCOSE 141 07/20/2024 05:08 AM    CALCIUM 9.6 07/20/2024 05:08 AM    LABGLOM 48 07/20/2024 05:08 AM    LABGLOM 48 03/13/2024 04:15 AM    LABGLOM >60 04/17/2023 04:58 AM      CT personally reviewed: LLL mucus plugging/consolidation, slightly worse than CT last month, right lung relatively clear    Impression:  -recurrent LLL mucus plug/pneumonia with history of MDR organisms  -severe persistent asthma with mild acute exacerbation  -DM, HTN, BATSHEVA, GERD    Plan:  -continue nebs and cefepime; ok to discharge on ciprofloxacin x5 days  -no need for bronch at this time, continue to treat with nebs/mucinex/antibiotics. Recurrent bronch is not an ideal strategy moving forward  -continue current neb regimen  -mucinex BID  -? If would benefit from addition of TIW azithro to outpatient regimen, will plan on doing a trial of this as an outpatient and will send this in from our office  -pred taper  -plavix resumed, does not need to be held for BAL   -will arrange sooner outpatient follow-up    Stable for discharge from a pulmonary standpoint.

## 2024-07-22 LAB
ACID FAST STN SPEC: NEGATIVE
ACID FAST STN SPEC: NEGATIVE
MYCOBACTERIUM SPEC QL CULT: NEGATIVE
MYCOBACTERIUM SPEC QL CULT: NEGATIVE
SPECIMEN PREPARATION: NORMAL
SPECIMEN PREPARATION: NORMAL
SPECIMEN SOURCE: NORMAL
SPECIMEN SOURCE: NORMAL

## 2024-08-14 ENCOUNTER — APPOINTMENT (OUTPATIENT)
Facility: HOSPITAL | Age: 79
End: 2024-08-14
Payer: MEDICARE

## 2024-08-14 ENCOUNTER — HOSPITAL ENCOUNTER (EMERGENCY)
Facility: HOSPITAL | Age: 79
Discharge: HOME | End: 2024-08-14
Attending: STUDENT IN AN ORGANIZED HEALTH CARE EDUCATION/TRAINING PROGRAM
Payer: MEDICARE

## 2024-08-14 VITALS
HEIGHT: 64 IN | BODY MASS INDEX: 27.49 KG/M2 | RESPIRATION RATE: 16 BRPM | DIASTOLIC BLOOD PRESSURE: 65 MMHG | WEIGHT: 161 LBS | OXYGEN SATURATION: 98 % | HEART RATE: 83 BPM | TEMPERATURE: 98.6 F | SYSTOLIC BLOOD PRESSURE: 151 MMHG

## 2024-08-14 DIAGNOSIS — J18.9 RECURRENT PNEUMONIA: Primary | ICD-10-CM

## 2024-08-14 LAB
ALBUMIN SERPL-MCNC: 3.4 G/DL (ref 3.5–5)
ALBUMIN/GLOB SERPL: 1 (ref 1.1–2.2)
ALP SERPL-CCNC: 74 U/L (ref 45–117)
ALT SERPL-CCNC: 18 U/L (ref 12–78)
ANION GAP SERPL CALC-SCNC: 10 MMOL/L (ref 5–15)
AST SERPL-CCNC: 17 U/L (ref 15–37)
BASOPHILS # BLD: 0.1 K/UL (ref 0–0.1)
BASOPHILS NFR BLD: 1 % (ref 0–1)
BILIRUB SERPL-MCNC: 0.6 MG/DL (ref 0.2–1)
BUN SERPL-MCNC: 16 MG/DL (ref 6–20)
BUN/CREAT SERPL: 15 (ref 12–20)
CALCIUM SERPL-MCNC: 9.4 MG/DL (ref 8.5–10.1)
CHLORIDE SERPL-SCNC: 106 MMOL/L (ref 97–108)
CO2 SERPL-SCNC: 24 MMOL/L (ref 21–32)
COMMENT:: NORMAL
CREAT SERPL-MCNC: 1.09 MG/DL (ref 0.55–1.02)
DIFFERENTIAL METHOD BLD: ABNORMAL
EOSINOPHIL # BLD: 0.2 K/UL (ref 0–0.4)
EOSINOPHIL NFR BLD: 2 % (ref 0–7)
ERYTHROCYTE [DISTWIDTH] IN BLOOD BY AUTOMATED COUNT: 15.1 % (ref 11.5–14.5)
GLOBULIN SER CALC-MCNC: 3.4 G/DL (ref 2–4)
GLUCOSE SERPL-MCNC: 123 MG/DL (ref 65–100)
HCT VFR BLD AUTO: 32.7 % (ref 35–47)
HGB BLD-MCNC: 11 G/DL (ref 11.5–16)
IMM GRANULOCYTES # BLD AUTO: 0 K/UL (ref 0–0.04)
IMM GRANULOCYTES NFR BLD AUTO: 0 % (ref 0–0.5)
LIPASE SERPL-CCNC: 32 U/L (ref 13–75)
LYMPHOCYTES # BLD: 1.1 K/UL (ref 0.8–3.5)
LYMPHOCYTES NFR BLD: 12 % (ref 12–49)
MCH RBC QN AUTO: 33.4 PG (ref 26–34)
MCHC RBC AUTO-ENTMCNC: 33.6 G/DL (ref 30–36.5)
MCV RBC AUTO: 99.4 FL (ref 80–99)
MONOCYTES # BLD: 0.7 K/UL (ref 0–1)
MONOCYTES NFR BLD: 8 % (ref 5–13)
NEUTS SEG # BLD: 7.5 K/UL (ref 1.8–8)
NEUTS SEG NFR BLD: 77 % (ref 32–75)
NRBC # BLD: 0 K/UL (ref 0–0.01)
NRBC BLD-RTO: 0 PER 100 WBC
PLATELET # BLD AUTO: 272 K/UL (ref 150–400)
PMV BLD AUTO: 9.5 FL (ref 8.9–12.9)
POTASSIUM SERPL-SCNC: 3.8 MMOL/L (ref 3.5–5.1)
PROT SERPL-MCNC: 6.8 G/DL (ref 6.4–8.2)
RBC # BLD AUTO: 3.29 M/UL (ref 3.8–5.2)
SODIUM SERPL-SCNC: 140 MMOL/L (ref 136–145)
SPECIMEN HOLD: NORMAL
TROPONIN I SERPL HS-MCNC: 7 NG/L (ref 0–51)
TROPONIN I SERPL HS-MCNC: 7 NG/L (ref 0–51)
WBC # BLD AUTO: 9.6 K/UL (ref 3.6–11)

## 2024-08-14 PROCEDURE — 83690 ASSAY OF LIPASE: CPT

## 2024-08-14 PROCEDURE — 36415 COLL VENOUS BLD VENIPUNCTURE: CPT

## 2024-08-14 PROCEDURE — 84484 ASSAY OF TROPONIN QUANT: CPT

## 2024-08-14 PROCEDURE — 6370000000 HC RX 637 (ALT 250 FOR IP): Performed by: STUDENT IN AN ORGANIZED HEALTH CARE EDUCATION/TRAINING PROGRAM

## 2024-08-14 PROCEDURE — 99285 EMERGENCY DEPT VISIT HI MDM: CPT

## 2024-08-14 PROCEDURE — 71045 X-RAY EXAM CHEST 1 VIEW: CPT

## 2024-08-14 PROCEDURE — 80053 COMPREHEN METABOLIC PANEL: CPT

## 2024-08-14 PROCEDURE — 93005 ELECTROCARDIOGRAM TRACING: CPT | Performed by: STUDENT IN AN ORGANIZED HEALTH CARE EDUCATION/TRAINING PROGRAM

## 2024-08-14 PROCEDURE — 85025 COMPLETE CBC W/AUTO DIFF WBC: CPT

## 2024-08-14 RX ORDER — DOXYCYCLINE HYCLATE 100 MG
100 TABLET ORAL
Status: COMPLETED | OUTPATIENT
Start: 2024-08-14 | End: 2024-08-14

## 2024-08-14 RX ORDER — LEVOFLOXACIN 750 MG/1
750 TABLET, FILM COATED ORAL DAILY
Qty: 5 TABLET | Refills: 0 | Status: ON HOLD | OUTPATIENT
Start: 2024-08-14 | End: 2024-08-21 | Stop reason: HOSPADM

## 2024-08-14 RX ADMIN — DOXYCYCLINE HYCLATE 100 MG: 100 TABLET, COATED ORAL at 22:44

## 2024-08-14 ASSESSMENT — PAIN DESCRIPTION - ORIENTATION
ORIENTATION: LEFT
ORIENTATION: RIGHT;UPPER

## 2024-08-14 ASSESSMENT — PAIN - FUNCTIONAL ASSESSMENT
PAIN_FUNCTIONAL_ASSESSMENT: 0-10
PAIN_FUNCTIONAL_ASSESSMENT: PREVENTS OR INTERFERES SOME ACTIVE ACTIVITIES AND ADLS

## 2024-08-14 ASSESSMENT — PAIN DESCRIPTION - DESCRIPTORS
DESCRIPTORS: ACHING
DESCRIPTORS: ACHING

## 2024-08-14 ASSESSMENT — PAIN DESCRIPTION - ONSET: ONSET: GRADUAL

## 2024-08-14 ASSESSMENT — PAIN SCALES - GENERAL
PAINLEVEL_OUTOF10: 2
PAINLEVEL_OUTOF10: 8
PAINLEVEL_OUTOF10: 2

## 2024-08-14 ASSESSMENT — PAIN DESCRIPTION - LOCATION
LOCATION: SHOULDER
LOCATION: CHEST;BACK

## 2024-08-14 ASSESSMENT — PAIN DESCRIPTION - FREQUENCY: FREQUENCY: INTERMITTENT

## 2024-08-14 ASSESSMENT — PAIN DESCRIPTION - PAIN TYPE: TYPE: ACUTE PAIN

## 2024-08-14 NOTE — ED TRIAGE NOTES
Patient in to ED with c/o right upper chest and back pain for a couple days. Reports when she leans forward, pain is wore. Has taken tylenol with some relief. Reports her son made her come in. Denies recent illness, cough, fever, chills.

## 2024-08-15 LAB
EKG ATRIAL RATE: 94 BPM
EKG DIAGNOSIS: NORMAL
EKG P AXIS: 25 DEGREES
EKG P-R INTERVAL: 214 MS
EKG Q-T INTERVAL: 346 MS
EKG QRS DURATION: 98 MS
EKG QTC CALCULATION (BAZETT): 432 MS
EKG R AXIS: -20 DEGREES
EKG T AXIS: 47 DEGREES
EKG VENTRICULAR RATE: 94 BPM

## 2024-08-15 PROCEDURE — 93010 ELECTROCARDIOGRAM REPORT: CPT | Performed by: STUDENT IN AN ORGANIZED HEALTH CARE EDUCATION/TRAINING PROGRAM

## 2024-08-15 NOTE — FLOWSHEET NOTE
Discharge instructions given to pt by RN. Pt educated on prescribed medications in teach back method and verbalizes understanding. Opportunity for questions provided. Pt ambulatory out of unit, in no acute distress and taken home by family.

## 2024-08-15 NOTE — DISCHARGE INSTRUCTIONS
You were evaluated in the Emergency Department today for chest pain. Your evaluation has shown no signs of medical conditions requiring emergent intervention at this time, however we recommend that you follow up with your primary care physician or your pulmonologist as soon as possible for further testing as an outpatient.    Return to the Emergency Department if you experience worsening or uncontrolled chest pain, shortness of breath, light headedness, feeling faint, nausea, vomiting, or any other concerning symptoms.    Thank you for choosing us for your care.

## 2024-08-16 NOTE — ED PROVIDER NOTES
[facility] EMERGENCY DEPT  EMERGENCY DEPARTMENT ENCOUNTER      Pt Name: Neisha Guardado  MRN: [id number]  Birthdate 1945  Date of evaluation: 8/14/2024  Provider: Tish Young MD    CHIEF COMPLAINT       Chief Complaint   Patient presents with    Chest Pain    Back Pain         HISTORY OF PRESENT ILLNESS    Nursing Triage Notes were reviewed.    HPI    Neisha Guardado is a 78 y.o. female with a history of hypertension, diabetes, GERD, BATSHEVA, asthma, multiple bronchoscopies for mucous plugging and history of multidrug-resistant organisms who presents to the emergency department for evaluation of increased cough, congestion, right upper chest and back pain.  Patient states that her left lower lobe of her lung was damaged due to pneumonia that she has had problems chronically with congestion.  States that she has a chronic cough but that it has been increased with more sputum production for the last 2 days.  Complains of right-sided chest pain that feels \"like when I had pneumonia on the left lower lobe\".  Patient complains of associated decreased appetite, myalgias, mild nausea on Monday that she thought may be related to the mucus. Has had some associated dyspnea with cough and congestion, but denies any shortness of breath currently. States she has been taking her prescribed Trelegy as well as using albuterol and sodium chloride nebs a couple times a day.  States she is also using over-the-counter Mucinex.  States that she has had some associated chills without fever.  Denies leg swelling, hemoptysis, immobility, recent surgery, recent travel, use of estrogens.  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      PAST MEDICAL HISTORY     Past Medical History:   Diagnosis Date    Asthma     Diabetes (HCC)     type 2    GERD  of discharge [LT]      ED Course User Index  [LT] Tish Young MD           CONSULTS:  None    PROCEDURES:  Unless otherwise noted below, none     Procedures      FINAL IMPRESSION      1. Recurrent pneumonia          DISPOSITION/PLAN   DISPOSITION Decision To Discharge 08/14/2024 10:48:12 PM    Discharge home with outpatient follow up with PCP and pulmonology      PLAN    PATIENT REFERRED TO:   Gonzalo Raygoza MD  [address] [facility] [address] [phone]    Schedule an appointment as soon as possible for a visit       [facility] EMERGENCY DEPT  [address]  [facility] [address] [phone]    As needed, If symptoms worsen    DISCHARGE MEDICATIONS:  Discharge Medication List as of 8/14/2024 10:54 PM        START taking these medications    Details   levoFLOXacin (LEVAQUIN) 750 MG tablet Take 1 tablet by mouth daily for 5 days, Disp-5 tablet, R-0Normal             (Please note that portions of this note were completed with a voice recognition program.  Efforts were made to edit the dictations but occasionally words are mis-transcribed.)    Tish Young MD (electronically signed)  Emergency Attending Physician       Tish Young MD  08/16/24 8218

## 2024-08-17 ENCOUNTER — APPOINTMENT (OUTPATIENT)
Facility: HOSPITAL | Age: 79
DRG: 178 | End: 2024-08-17
Payer: MEDICARE

## 2024-08-17 ENCOUNTER — HOSPITAL ENCOUNTER (INPATIENT)
Facility: HOSPITAL | Age: 79
LOS: 2 days | Discharge: HOME | DRG: 178 | End: 2024-08-21
Attending: EMERGENCY MEDICINE | Admitting: STUDENT IN AN ORGANIZED HEALTH CARE EDUCATION/TRAINING PROGRAM
Payer: MEDICARE

## 2024-08-17 DIAGNOSIS — R04.2 HEMOPTYSIS: ICD-10-CM

## 2024-08-17 DIAGNOSIS — J45.901 ASTHMA WITH ACUTE EXACERBATION, UNSPECIFIED ASTHMA SEVERITY, UNSPECIFIED WHETHER PERSISTENT: ICD-10-CM

## 2024-08-17 DIAGNOSIS — J18.9 MULTIFOCAL PNEUMONIA: Primary | ICD-10-CM

## 2024-08-17 LAB
ANION GAP SERPL CALC-SCNC: 14 MMOL/L (ref 5–15)
BASOPHILS # BLD: 0.1 K/UL (ref 0–0.1)
BASOPHILS NFR BLD: 1 % (ref 0–1)
BUN SERPL-MCNC: 21 MG/DL (ref 6–20)
BUN/CREAT SERPL: 18 (ref 12–20)
CALCIUM SERPL-MCNC: 9.2 MG/DL (ref 8.5–10.1)
CHLORIDE SERPL-SCNC: 106 MMOL/L (ref 97–108)
CO2 SERPL-SCNC: 20 MMOL/L (ref 21–32)
COMMENT:: NORMAL
CREAT SERPL-MCNC: 1.19 MG/DL (ref 0.55–1.02)
D DIMER PPP FEU-MCNC: 1.76 MG/L FEU (ref 0–0.65)
DIFFERENTIAL METHOD BLD: ABNORMAL
EKG ATRIAL RATE: 90 BPM
EKG DIAGNOSIS: NORMAL
EKG P AXIS: 19 DEGREES
EKG P-R INTERVAL: 174 MS
EKG Q-T INTERVAL: 362 MS
EKG QRS DURATION: 100 MS
EKG QTC CALCULATION (BAZETT): 442 MS
EKG R AXIS: -29 DEGREES
EKG T AXIS: 54 DEGREES
EKG VENTRICULAR RATE: 90 BPM
EOSINOPHIL # BLD: 0.1 K/UL (ref 0–0.4)
EOSINOPHIL NFR BLD: 1 % (ref 0–7)
ERYTHROCYTE [DISTWIDTH] IN BLOOD BY AUTOMATED COUNT: 14.6 % (ref 11.5–14.5)
FLUAV RNA SPEC QL NAA+PROBE: NOT DETECTED
FLUBV RNA SPEC QL NAA+PROBE: NOT DETECTED
GLUCOSE BLD STRIP.AUTO-MCNC: 263 MG/DL (ref 65–117)
GLUCOSE SERPL-MCNC: 99 MG/DL (ref 65–100)
HCT VFR BLD AUTO: 31.8 % (ref 35–47)
HGB BLD-MCNC: 10.5 G/DL (ref 11.5–16)
IMM GRANULOCYTES # BLD AUTO: 0 K/UL (ref 0–0.04)
IMM GRANULOCYTES NFR BLD AUTO: 0 % (ref 0–0.5)
LYMPHOCYTES # BLD: 0.8 K/UL (ref 0.8–3.5)
LYMPHOCYTES NFR BLD: 9 % (ref 12–49)
MCH RBC QN AUTO: 33.1 PG (ref 26–34)
MCHC RBC AUTO-ENTMCNC: 33 G/DL (ref 30–36.5)
MCV RBC AUTO: 100.3 FL (ref 80–99)
MONOCYTES # BLD: 0.9 K/UL (ref 0–1)
MONOCYTES NFR BLD: 10 % (ref 5–13)
NEUTS SEG # BLD: 7.6 K/UL (ref 1.8–8)
NEUTS SEG NFR BLD: 79 % (ref 32–75)
NRBC # BLD: 0 K/UL (ref 0–0.01)
NRBC BLD-RTO: 0 PER 100 WBC
PLATELET # BLD AUTO: 269 K/UL (ref 150–400)
PMV BLD AUTO: 9.3 FL (ref 8.9–12.9)
POTASSIUM SERPL-SCNC: 3.9 MMOL/L (ref 3.5–5.1)
PROCALCITONIN SERPL-MCNC: 0.09 NG/ML
RBC # BLD AUTO: 3.17 M/UL (ref 3.8–5.2)
SARS-COV-2 RNA RESP QL NAA+PROBE: NOT DETECTED
SERVICE CMNT-IMP: ABNORMAL
SODIUM SERPL-SCNC: 140 MMOL/L (ref 136–145)
SPECIMEN HOLD: NORMAL
TROPONIN I SERPL HS-MCNC: 6 NG/L (ref 0–51)
WBC # BLD AUTO: 9.6 K/UL (ref 3.6–11)

## 2024-08-17 PROCEDURE — 96366 THER/PROPH/DIAG IV INF ADDON: CPT

## 2024-08-17 PROCEDURE — 6360000002 HC RX W HCPCS: Performed by: INTERNAL MEDICINE

## 2024-08-17 PROCEDURE — 83036 HEMOGLOBIN GLYCOSYLATED A1C: CPT

## 2024-08-17 PROCEDURE — G0378 HOSPITAL OBSERVATION PER HR: HCPCS

## 2024-08-17 PROCEDURE — 6370000000 HC RX 637 (ALT 250 FOR IP): Performed by: INTERNAL MEDICINE

## 2024-08-17 PROCEDURE — 87449 NOS EACH ORGANISM AG IA: CPT

## 2024-08-17 PROCEDURE — 84145 PROCALCITONIN (PCT): CPT

## 2024-08-17 PROCEDURE — 94640 AIRWAY INHALATION TREATMENT: CPT

## 2024-08-17 PROCEDURE — 96375 TX/PRO/DX INJ NEW DRUG ADDON: CPT

## 2024-08-17 PROCEDURE — 80048 BASIC METABOLIC PNL TOTAL CA: CPT

## 2024-08-17 PROCEDURE — 96376 TX/PRO/DX INJ SAME DRUG ADON: CPT

## 2024-08-17 PROCEDURE — 36415 COLL VENOUS BLD VENIPUNCTURE: CPT

## 2024-08-17 PROCEDURE — 99285 EMERGENCY DEPT VISIT HI MDM: CPT

## 2024-08-17 PROCEDURE — 84484 ASSAY OF TROPONIN QUANT: CPT

## 2024-08-17 PROCEDURE — 85379 FIBRIN DEGRADATION QUANT: CPT

## 2024-08-17 PROCEDURE — 2580000003 HC RX 258: Performed by: INTERNAL MEDICINE

## 2024-08-17 PROCEDURE — 93005 ELECTROCARDIOGRAM TRACING: CPT | Performed by: EMERGENCY MEDICINE

## 2024-08-17 PROCEDURE — 2500000003 HC RX 250 WO HCPCS: Performed by: EMERGENCY MEDICINE

## 2024-08-17 PROCEDURE — 93010 ELECTROCARDIOGRAM REPORT: CPT | Performed by: INTERNAL MEDICINE

## 2024-08-17 PROCEDURE — 96365 THER/PROPH/DIAG IV INF INIT: CPT

## 2024-08-17 PROCEDURE — 6360000002 HC RX W HCPCS: Performed by: EMERGENCY MEDICINE

## 2024-08-17 PROCEDURE — 82962 GLUCOSE BLOOD TEST: CPT

## 2024-08-17 PROCEDURE — 2580000003 HC RX 258: Performed by: EMERGENCY MEDICINE

## 2024-08-17 PROCEDURE — 71275 CT ANGIOGRAPHY CHEST: CPT

## 2024-08-17 PROCEDURE — 87636 SARSCOV2 & INF A&B AMP PRB: CPT

## 2024-08-17 PROCEDURE — 96372 THER/PROPH/DIAG INJ SC/IM: CPT

## 2024-08-17 PROCEDURE — 71045 X-RAY EXAM CHEST 1 VIEW: CPT

## 2024-08-17 PROCEDURE — 6370000000 HC RX 637 (ALT 250 FOR IP): Performed by: EMERGENCY MEDICINE

## 2024-08-17 PROCEDURE — 94761 N-INVAS EAR/PLS OXIMETRY MLT: CPT

## 2024-08-17 PROCEDURE — 85025 COMPLETE CBC W/AUTO DIFF WBC: CPT

## 2024-08-17 PROCEDURE — 96368 THER/DIAG CONCURRENT INF: CPT

## 2024-08-17 PROCEDURE — 96367 TX/PROPH/DG ADDL SEQ IV INF: CPT

## 2024-08-17 RX ORDER — SODIUM CHLORIDE FOR INHALATION 7 %
4 VIAL, NEBULIZER (ML) INHALATION
Status: DISCONTINUED | OUTPATIENT
Start: 2024-08-17 | End: 2024-08-18 | Stop reason: SDUPTHER

## 2024-08-17 RX ORDER — SODIUM CHLORIDE 0.9 % (FLUSH) 0.9 %
5-40 SYRINGE (ML) INJECTION PRN
Status: DISCONTINUED | OUTPATIENT
Start: 2024-08-17 | End: 2024-08-21 | Stop reason: HOSPADM

## 2024-08-17 RX ORDER — BUDESONIDE 0.5 MG/2ML
0.5 INHALANT ORAL
Status: DISCONTINUED | OUTPATIENT
Start: 2024-08-17 | End: 2024-08-21 | Stop reason: HOSPADM

## 2024-08-17 RX ORDER — ALLOPURINOL 100 MG/1
300 TABLET ORAL DAILY
Status: DISCONTINUED | OUTPATIENT
Start: 2024-08-18 | End: 2024-08-21 | Stop reason: HOSPADM

## 2024-08-17 RX ORDER — MONTELUKAST SODIUM 10 MG/1
10 TABLET ORAL
Status: DISCONTINUED | OUTPATIENT
Start: 2024-08-17 | End: 2024-08-21 | Stop reason: HOSPADM

## 2024-08-17 RX ORDER — FOLIC ACID 1 MG/1
1 TABLET ORAL DAILY
Status: DISCONTINUED | OUTPATIENT
Start: 2024-08-18 | End: 2024-08-21 | Stop reason: HOSPADM

## 2024-08-17 RX ORDER — IPRATROPIUM BROMIDE AND ALBUTEROL SULFATE 2.5; .5 MG/3ML; MG/3ML
2 SOLUTION RESPIRATORY (INHALATION)
Status: COMPLETED | OUTPATIENT
Start: 2024-08-17 | End: 2024-08-17

## 2024-08-17 RX ORDER — EZETIMIBE 10 MG/1
10 TABLET ORAL EVERY EVENING
Status: DISCONTINUED | OUTPATIENT
Start: 2024-08-17 | End: 2024-08-21 | Stop reason: HOSPADM

## 2024-08-17 RX ORDER — ACETYLCYSTEINE 100 MG/ML
4 SOLUTION ORAL; RESPIRATORY (INHALATION) EVERY 4 HOURS
Status: DISCONTINUED | OUTPATIENT
Start: 2024-08-17 | End: 2024-08-17

## 2024-08-17 RX ORDER — ONDANSETRON 4 MG/1
4 TABLET, ORALLY DISINTEGRATING ORAL EVERY 8 HOURS PRN
Status: DISCONTINUED | OUTPATIENT
Start: 2024-08-17 | End: 2024-08-21 | Stop reason: HOSPADM

## 2024-08-17 RX ORDER — TRAZODONE HYDROCHLORIDE 50 MG/1
50 TABLET ORAL NIGHTLY PRN
Status: DISCONTINUED | OUTPATIENT
Start: 2024-08-17 | End: 2024-08-21 | Stop reason: HOSPADM

## 2024-08-17 RX ORDER — POTASSIUM CHLORIDE 750 MG/1
40 TABLET, FILM COATED, EXTENDED RELEASE ORAL PRN
Status: DISCONTINUED | OUTPATIENT
Start: 2024-08-17 | End: 2024-08-21 | Stop reason: HOSPADM

## 2024-08-17 RX ORDER — IPRATROPIUM BROMIDE AND ALBUTEROL SULFATE 2.5; .5 MG/3ML; MG/3ML
1 SOLUTION RESPIRATORY (INHALATION)
Status: DISCONTINUED | OUTPATIENT
Start: 2024-08-17 | End: 2024-08-18

## 2024-08-17 RX ORDER — DEXTROSE MONOHYDRATE 100 MG/ML
INJECTION, SOLUTION INTRAVENOUS CONTINUOUS PRN
Status: DISCONTINUED | OUTPATIENT
Start: 2024-08-17 | End: 2024-08-21 | Stop reason: HOSPADM

## 2024-08-17 RX ORDER — SODIUM CHLORIDE 9 MG/ML
INJECTION, SOLUTION INTRAVENOUS PRN
Status: DISCONTINUED | OUTPATIENT
Start: 2024-08-17 | End: 2024-08-21 | Stop reason: HOSPADM

## 2024-08-17 RX ORDER — ACETAMINOPHEN 325 MG/1
650 TABLET ORAL EVERY 6 HOURS PRN
Status: DISCONTINUED | OUTPATIENT
Start: 2024-08-17 | End: 2024-08-21 | Stop reason: HOSPADM

## 2024-08-17 RX ORDER — SODIUM CHLORIDE 0.9 % (FLUSH) 0.9 %
5-40 SYRINGE (ML) INJECTION EVERY 12 HOURS SCHEDULED
Status: DISCONTINUED | OUTPATIENT
Start: 2024-08-17 | End: 2024-08-21 | Stop reason: HOSPADM

## 2024-08-17 RX ORDER — DILTIAZEM HYDROCHLORIDE 120 MG/1
240 CAPSULE, COATED, EXTENDED RELEASE ORAL DAILY
Status: DISCONTINUED | OUTPATIENT
Start: 2024-08-17 | End: 2024-08-21 | Stop reason: HOSPADM

## 2024-08-17 RX ORDER — GUAIFENESIN 600 MG/1
1200 TABLET, EXTENDED RELEASE ORAL 2 TIMES DAILY
Status: DISCONTINUED | OUTPATIENT
Start: 2024-08-17 | End: 2024-08-21 | Stop reason: HOSPADM

## 2024-08-17 RX ORDER — ENOXAPARIN SODIUM 100 MG/ML
40 INJECTION SUBCUTANEOUS DAILY
Status: DISCONTINUED | OUTPATIENT
Start: 2024-08-17 | End: 2024-08-21 | Stop reason: HOSPADM

## 2024-08-17 RX ORDER — IPRATROPIUM BROMIDE AND ALBUTEROL SULFATE 2.5; .5 MG/3ML; MG/3ML
1 SOLUTION RESPIRATORY (INHALATION)
Status: DISCONTINUED | OUTPATIENT
Start: 2024-08-17 | End: 2024-08-17

## 2024-08-17 RX ORDER — INSULIN LISPRO 100 [IU]/ML
0-8 INJECTION, SOLUTION INTRAVENOUS; SUBCUTANEOUS
Status: DISCONTINUED | OUTPATIENT
Start: 2024-08-17 | End: 2024-08-21 | Stop reason: HOSPADM

## 2024-08-17 RX ORDER — POLYETHYLENE GLYCOL 3350 17 G/17G
17 POWDER, FOR SOLUTION ORAL DAILY PRN
Status: DISCONTINUED | OUTPATIENT
Start: 2024-08-17 | End: 2024-08-21 | Stop reason: HOSPADM

## 2024-08-17 RX ORDER — CLOPIDOGREL BISULFATE 75 MG/1
75 TABLET ORAL DAILY
Status: DISCONTINUED | OUTPATIENT
Start: 2024-08-17 | End: 2024-08-21 | Stop reason: HOSPADM

## 2024-08-17 RX ORDER — ONDANSETRON 2 MG/ML
4 INJECTION INTRAMUSCULAR; INTRAVENOUS EVERY 6 HOURS PRN
Status: DISCONTINUED | OUTPATIENT
Start: 2024-08-17 | End: 2024-08-21 | Stop reason: HOSPADM

## 2024-08-17 RX ORDER — MAGNESIUM SULFATE IN WATER 40 MG/ML
2000 INJECTION, SOLUTION INTRAVENOUS PRN
Status: DISCONTINUED | OUTPATIENT
Start: 2024-08-17 | End: 2024-08-21 | Stop reason: HOSPADM

## 2024-08-17 RX ORDER — POTASSIUM CHLORIDE 7.45 MG/ML
10 INJECTION INTRAVENOUS PRN
Status: DISCONTINUED | OUTPATIENT
Start: 2024-08-17 | End: 2024-08-21 | Stop reason: HOSPADM

## 2024-08-17 RX ORDER — ACETYLCYSTEINE 100 MG/ML
4 SOLUTION ORAL; RESPIRATORY (INHALATION)
Status: DISCONTINUED | OUTPATIENT
Start: 2024-08-17 | End: 2024-08-18

## 2024-08-17 RX ORDER — INSULIN LISPRO 100 [IU]/ML
0-4 INJECTION, SOLUTION INTRAVENOUS; SUBCUTANEOUS NIGHTLY
Status: DISCONTINUED | OUTPATIENT
Start: 2024-08-17 | End: 2024-08-21 | Stop reason: HOSPADM

## 2024-08-17 RX ORDER — ACETAMINOPHEN 650 MG/1
650 SUPPOSITORY RECTAL EVERY 6 HOURS PRN
Status: DISCONTINUED | OUTPATIENT
Start: 2024-08-17 | End: 2024-08-21 | Stop reason: HOSPADM

## 2024-08-17 RX ADMIN — ENOXAPARIN SODIUM 40 MG: 100 INJECTION SUBCUTANEOUS at 14:10

## 2024-08-17 RX ADMIN — BUDESONIDE 500 MCG: 0.5 INHALANT RESPIRATORY (INHALATION) at 19:56

## 2024-08-17 RX ADMIN — METHYLPREDNISOLONE SODIUM SUCCINATE 125 MG: 125 INJECTION, POWDER, FOR SOLUTION INTRAMUSCULAR; INTRAVENOUS at 14:10

## 2024-08-17 RX ADMIN — MONTELUKAST 10 MG: 10 TABLET, FILM COATED ORAL at 22:17

## 2024-08-17 RX ADMIN — GUAIFENESIN 1200 MG: 600 TABLET ORAL at 22:17

## 2024-08-17 RX ADMIN — SODIUM CHLORIDE, PRESERVATIVE FREE 10 ML: 5 INJECTION INTRAVENOUS at 22:18

## 2024-08-17 RX ADMIN — DOXYCYCLINE 100 MG: 100 INJECTION, POWDER, LYOPHILIZED, FOR SOLUTION INTRAVENOUS at 14:13

## 2024-08-17 RX ADMIN — Medication 4 ML: at 19:56

## 2024-08-17 RX ADMIN — WATER 2000 MG: 1 INJECTION INTRAMUSCULAR; INTRAVENOUS; SUBCUTANEOUS at 14:09

## 2024-08-17 RX ADMIN — CEFTAZIDIME 2000 MG: 2 INJECTION, POWDER, FOR SOLUTION INTRAVENOUS at 17:18

## 2024-08-17 RX ADMIN — VANCOMYCIN HYDROCHLORIDE 1750 MG: 10 INJECTION, POWDER, LYOPHILIZED, FOR SOLUTION INTRAVENOUS at 19:03

## 2024-08-17 RX ADMIN — AZITHROMYCIN DIHYDRATE 500 MG: 500 INJECTION, POWDER, LYOPHILIZED, FOR SOLUTION INTRAVENOUS at 17:49

## 2024-08-17 RX ADMIN — WATER 40 MG: 1 INJECTION INTRAMUSCULAR; INTRAVENOUS; SUBCUTANEOUS at 22:17

## 2024-08-17 RX ADMIN — DILTIAZEM HYDROCHLORIDE 240 MG: 120 CAPSULE, EXTENDED RELEASE ORAL at 18:57

## 2024-08-17 RX ADMIN — IPRATROPIUM BROMIDE AND ALBUTEROL SULFATE 2 DOSE: .5; 3 SOLUTION RESPIRATORY (INHALATION) at 13:24

## 2024-08-17 RX ADMIN — EZETIMIBE 10 MG: 10 TABLET ORAL at 19:03

## 2024-08-17 RX ADMIN — IPRATROPIUM BROMIDE AND ALBUTEROL SULFATE 1 DOSE: 2.5; .5 SOLUTION RESPIRATORY (INHALATION) at 19:56

## 2024-08-17 ASSESSMENT — PAIN SCALES - GENERAL: PAINLEVEL_OUTOF10: 0

## 2024-08-17 NOTE — H&P
tablet Take 1 tablet by mouth daily    Valentine Ramirez MD   levocetirizine (XYZAL) 5 MG tablet Take 1 tablet by mouth nightly    Valentine Ramirez MD   calcium carbonate 600 MG TABS tablet Take 1 tablet by mouth daily    Valentine Ramirez MD   vitamin B-12 (CYANOCOBALAMIN) 1000 MCG tablet Take 1 tablet by mouth daily    Valentine Ramirez MD   folic acid (FOLVITE) 1 MG tablet Take 1 tablet by mouth daily    Valentine Ramirez MD   albuterol (PROVENTIL) (5 MG/ML) 0.5% nebulizer solution Inhale into the lungs every 4 hours as needed    Automatic Reconciliation, Ar   dilTIAZem (TIAZAC) 240 MG extended release capsule Take 1 capsule by mouth daily    Automatic Reconciliation, Ar   Evolocumab (REPATHA SURECLICK) 140 MG/ML SOAJ Inject 140 mg into the skin every 14 days    Automatic Reconciliation, Ar   ezetimibe (ZETIA) 10 MG tablet Take 1 tablet by mouth every evening    Automatic Reconciliation, Ar   fluticasone (FLONASE) 50 MCG/ACT nasal spray 2 sprays by Nasal route daily    Automatic Reconciliation, Ar   montelukast (SINGULAIR) 10 MG tablet Take 1 tablet by mouth daily    Automatic Reconciliation, Ar   omeprazole (PRILOSEC OTC) 20 MG tablet Take 1 tablet by mouth daily    Automatic Reconciliation, Ar   sitaGLIPtan-metFORMIN (JANUMET)  MG per tablet Take 1 tablet by mouth daily    Automatic Reconciliation, Ar       REVIEW OF SYSTEMS:  See HPI for details  General: negative for fever, chills, sweats, weakness, weight loss  Eyes: negative for blurred vision, eye pain, loss of vision, diplopia  Ear Nose and Throat: negative for rhinorrhea, pharyngitis, otalgia, tinnitus, speech or swallowing difficulties  Respiratory: + for pleuritic pain,+cough,+sputum production,+wheezing, SOB, EMERSON  Cardiology:  negative for chest pain, palpitations, orthopnea, PND, edema, syncope   Gastrointestinal: negative for abdominal pain, N/V, dysphagia, change in bowel habits, bleeding  Genitourinary: negative for

## 2024-08-17 NOTE — ED TRIAGE NOTES
Pt states she was here Wednesday last night and was given an antibiotic. Pt states her R lung is hurting. Pt states she has been coughing up blood tinged mucus which is new since the last time she was here. Denies fevers.

## 2024-08-17 NOTE — PROGRESS NOTES
[facility] Dosing Services: Antimicrobial Stewardship Daily Doc  Consult for antibiotic dosing of Vancomycin by Dr. Prince  Indication: Nosocomial Pneumonia  Day of Therapy: 1    Ht Readings from Last 1 Encounters:   08/17/24 1.626 m (5' 4\")        Wt Readings from Last 1 Encounters:   08/17/24 71.7 kg (158 lb)      Vancomycin therapy:  Loading dose: Vancomycin 1750 mg x1 dose now  Maintenance dose: Vancomycin 1000 mg IV every 24 hours   Dose calculated to approximate a           a. Target AUC/CAROLYN of 400-600          b. Trough of 15-20 mcg/mL   Last level: Will order a level within 24-48hrs of 1st maintenance dose  Plan: Continue same  Dose administration notes: Doses given appropriately as scheduled    Non-Kinetic Antimicrobial Dosing Regimen:   Current Regimen:  Ceftazidime 2gm IV q12hr  Recommendation: continue same  Dose administration notes: Doses given appropriately as scheduled    Other Antimicrobial   (not dosed by pharmacist) Zithromax 500 mg IV q24hr   Cultures pending   Serum Creatinine Lab Results   Component Value Date/Time    CREATININE 1.19 08/17/2024 11:36 AM          Creatinine Clearance Estimated Creatinine Clearance: 38 mL/min (A) (based on SCr of 1.19 mg/dL (H)).     Temp Temp: 98 °F (36.7 °C) (Oral)       WBC Lab Results   Component Value Date/Time    WBC 9.6 08/17/2024 11:36 AM          Procalcitonin Lab Results   Component Value Date/Time    PROCAL <0.05 03/10/2024 12:56 PM      For Antifungals, Metronidazole and Nafcillin: Lab Results   Component Value Date/Time    ALT 18 08/14/2024 08:03 PM    AST 17 08/14/2024 08:03 PM        Pharmacist  Christiane Chang, PharmD   [phone]

## 2024-08-17 NOTE — ED PROVIDER NOTES
[facility] B3 INTERMEDIATE CARE UNIT  EMERGENCY DEPARTMENT ENCOUNTER      Pt Name: Neisha Guardado  MRN: [id number]  Birthdate 1945  Date of evaluation: 8/17/2024  Provider: Jay Posada MD    CHIEF COMPLAINT       Chief Complaint   Patient presents with    Cough         HISTORY OF PRESENT ILLNESS   (Location/Symptom, Timing/Onset, Context/Setting, Quality, Duration, Modifying Factors, Severity)  Note limiting factors.   78F w/ hx DM, asthma, gout, CAD, HLD p/w 3d cough. Pt reports 3d right sided intermittent chest pain and cough w/ hemoptysis over last 1-2d. Seen here 3d ago and dx w/ PNA for which started on levofloxacin. Now taking w/o improvement. Also reports intermittent N/V/D. No dizziness or syncope. NO leg pain or swelling. Distant hx of DVT after hospitalization many years ago. No recent travel or surgeries.            Review of External Medical Records:     Nursing Notes were reviewed.    REVIEW OF SYSTEMS    (2-9 systems for level 4, 10 or more for level 5)     Review of Systems   Constitutional:  Negative for diaphoresis and fever.   HENT:  Negative for nosebleeds.    Eyes:  Negative for visual disturbance.   Respiratory:  Positive for cough and shortness of breath.    Cardiovascular:  Positive for chest pain. Negative for palpitations and leg swelling.   Gastrointestinal:  Negative for abdominal distention, abdominal pain, anal bleeding, blood in stool, diarrhea, nausea and vomiting.   Endocrine: Negative for polyuria.   Genitourinary:  Negative for difficulty urinating, dysuria, frequency and hematuria.   Musculoskeletal:  Negative for joint swelling.   Skin:  Negative for wound.   Allergic/Immunologic: Negative for immunocompromised state.   Neurological:  Negative for seizures and syncope.   Hematological:  Does not bruise/bleed easily.   Psychiatric/Behavioral:  Negative for confusion.        Except as noted above the remainder of the review of systems was reviewed and negative.       PAST

## 2024-08-17 NOTE — ED NOTES
TRANSFER - OUT REPORT:    Verbal report given to Janelle on Neisha Guardado  being transferred to [facility] for routine progression of patient care       Report consisted of patient's Situation, Background, Assessment and   Recommendations(SBAR).     Information from the following report(s) ED SBAR, MAR, and Recent Results was reviewed with the receiving nurse.    [place] Fall Assessment:    Presents to emergency department  because of falls (Syncope, seizure, or loss of consciousness): No  Age > 70: Yes  Altered Mental Status, Intoxication with alcohol or substance confusion (Disorientation, impaired judgment, poor safety awaremess, or inability to follow instructions): No  Impaired Mobility: Ambulates or transfers with assistive devices or assistance; Unable to ambulate or transer.: No  Nursing Judgement: Yes          Lines:   Peripheral IV 08/17/24 Right Antecubital (Active)        Opportunity for questions and clarification was provided.      Patient transported with:  Tech  [facility]

## 2024-08-18 LAB
ANION GAP SERPL CALC-SCNC: 10 MMOL/L (ref 5–15)
B PERT DNA SPEC QL NAA+PROBE: NOT DETECTED
BASOPHILS # BLD: 0 K/UL (ref 0–0.1)
BASOPHILS NFR BLD: 0 % (ref 0–1)
BORDETELLA PARAPERTUSSIS BY PCR: NOT DETECTED
BUN SERPL-MCNC: 26 MG/DL (ref 6–20)
BUN/CREAT SERPL: 25 (ref 12–20)
C PNEUM DNA SPEC QL NAA+PROBE: NOT DETECTED
CALCIUM SERPL-MCNC: 9.6 MG/DL (ref 8.5–10.1)
CHLORIDE SERPL-SCNC: 109 MMOL/L (ref 97–108)
CO2 SERPL-SCNC: 19 MMOL/L (ref 21–32)
CREAT SERPL-MCNC: 1.02 MG/DL (ref 0.55–1.02)
DIFFERENTIAL METHOD BLD: ABNORMAL
EOSINOPHIL # BLD: 0 K/UL (ref 0–0.4)
EOSINOPHIL NFR BLD: 0 % (ref 0–7)
ERYTHROCYTE [DISTWIDTH] IN BLOOD BY AUTOMATED COUNT: 14.2 % (ref 11.5–14.5)
EST. AVERAGE GLUCOSE BLD GHB EST-MCNC: 88 MG/DL
FLUAV SUBTYP SPEC NAA+PROBE: NOT DETECTED
FLUBV RNA SPEC QL NAA+PROBE: NOT DETECTED
GLUCOSE BLD STRIP.AUTO-MCNC: 138 MG/DL (ref 65–117)
GLUCOSE BLD STRIP.AUTO-MCNC: 156 MG/DL (ref 65–117)
GLUCOSE BLD STRIP.AUTO-MCNC: 191 MG/DL (ref 65–117)
GLUCOSE BLD STRIP.AUTO-MCNC: 278 MG/DL (ref 65–117)
GLUCOSE SERPL-MCNC: 150 MG/DL (ref 65–100)
HADV DNA SPEC QL NAA+PROBE: NOT DETECTED
HBA1C MFR BLD: 4.7 % (ref 4–5.6)
HCOV 229E RNA SPEC QL NAA+PROBE: NOT DETECTED
HCOV HKU1 RNA SPEC QL NAA+PROBE: NOT DETECTED
HCOV NL63 RNA SPEC QL NAA+PROBE: NOT DETECTED
HCOV OC43 RNA SPEC QL NAA+PROBE: NOT DETECTED
HCT VFR BLD AUTO: 32.4 % (ref 35–47)
HGB BLD-MCNC: 11 G/DL (ref 11.5–16)
HMPV RNA SPEC QL NAA+PROBE: NOT DETECTED
HPIV1 RNA SPEC QL NAA+PROBE: NOT DETECTED
HPIV2 RNA SPEC QL NAA+PROBE: NOT DETECTED
HPIV3 RNA SPEC QL NAA+PROBE: NOT DETECTED
HPIV4 RNA SPEC QL NAA+PROBE: NOT DETECTED
IMM GRANULOCYTES # BLD AUTO: 0.1 K/UL (ref 0–0.04)
IMM GRANULOCYTES NFR BLD AUTO: 1 % (ref 0–0.5)
LYMPHOCYTES # BLD: 0.6 K/UL (ref 0.8–3.5)
LYMPHOCYTES NFR BLD: 11 % (ref 12–49)
M PNEUMO DNA SPEC QL NAA+PROBE: NOT DETECTED
MCH RBC QN AUTO: 33.6 PG (ref 26–34)
MCHC RBC AUTO-ENTMCNC: 34 G/DL (ref 30–36.5)
MCV RBC AUTO: 99.1 FL (ref 80–99)
MONOCYTES # BLD: 0.1 K/UL (ref 0–1)
MONOCYTES NFR BLD: 2 % (ref 5–13)
NEUTS SEG # BLD: 4.3 K/UL (ref 1.8–8)
NEUTS SEG NFR BLD: 86 % (ref 32–75)
NRBC # BLD: 0 K/UL (ref 0–0.01)
NRBC BLD-RTO: 0 PER 100 WBC
PLATELET # BLD AUTO: 305 K/UL (ref 150–400)
PMV BLD AUTO: 9.3 FL (ref 8.9–12.9)
POTASSIUM SERPL-SCNC: 3.9 MMOL/L (ref 3.5–5.1)
RBC # BLD AUTO: 3.27 M/UL (ref 3.8–5.2)
RBC MORPH BLD: ABNORMAL
RSV RNA SPEC QL NAA+PROBE: NOT DETECTED
RV+EV RNA SPEC QL NAA+PROBE: NOT DETECTED
SARS-COV-2 RNA RESP QL NAA+PROBE: NOT DETECTED
SERVICE CMNT-IMP: ABNORMAL
SODIUM SERPL-SCNC: 138 MMOL/L (ref 136–145)
SPECIMEN HOLD: NORMAL
WBC # BLD AUTO: 5.1 K/UL (ref 3.6–11)

## 2024-08-18 PROCEDURE — 94668 MNPJ CHEST WALL SBSQ: CPT

## 2024-08-18 PROCEDURE — 96368 THER/DIAG CONCURRENT INF: CPT

## 2024-08-18 PROCEDURE — G0378 HOSPITAL OBSERVATION PER HR: HCPCS

## 2024-08-18 PROCEDURE — 6360000002 HC RX W HCPCS: Performed by: INTERNAL MEDICINE

## 2024-08-18 PROCEDURE — 0202U NFCT DS 22 TRGT SARS-COV-2: CPT

## 2024-08-18 PROCEDURE — 86738 MYCOPLASMA ANTIBODY: CPT

## 2024-08-18 PROCEDURE — 96366 THER/PROPH/DIAG IV INF ADDON: CPT

## 2024-08-18 PROCEDURE — 82962 GLUCOSE BLOOD TEST: CPT

## 2024-08-18 PROCEDURE — 6370000000 HC RX 637 (ALT 250 FOR IP): Performed by: INTERNAL MEDICINE

## 2024-08-18 PROCEDURE — 2580000003 HC RX 258: Performed by: PHYSICIAN ASSISTANT

## 2024-08-18 PROCEDURE — 2580000003 HC RX 258: Performed by: INTERNAL MEDICINE

## 2024-08-18 PROCEDURE — 94761 N-INVAS EAR/PLS OXIMETRY MLT: CPT

## 2024-08-18 PROCEDURE — 80048 BASIC METABOLIC PNL TOTAL CA: CPT

## 2024-08-18 PROCEDURE — 94667 MNPJ CHEST WALL 1ST: CPT

## 2024-08-18 PROCEDURE — 96376 TX/PRO/DX INJ SAME DRUG ADON: CPT

## 2024-08-18 PROCEDURE — 6360000002 HC RX W HCPCS: Performed by: PHYSICIAN ASSISTANT

## 2024-08-18 PROCEDURE — 94640 AIRWAY INHALATION TREATMENT: CPT

## 2024-08-18 PROCEDURE — 36415 COLL VENOUS BLD VENIPUNCTURE: CPT

## 2024-08-18 PROCEDURE — 96372 THER/PROPH/DIAG INJ SC/IM: CPT

## 2024-08-18 PROCEDURE — 85025 COMPLETE CBC W/AUTO DIFF WBC: CPT

## 2024-08-18 PROCEDURE — 94669 MECHANICAL CHEST WALL OSCILL: CPT

## 2024-08-18 PROCEDURE — 6370000000 HC RX 637 (ALT 250 FOR IP): Performed by: STUDENT IN AN ORGANIZED HEALTH CARE EDUCATION/TRAINING PROGRAM

## 2024-08-18 RX ORDER — IPRATROPIUM BROMIDE AND ALBUTEROL SULFATE 2.5; .5 MG/3ML; MG/3ML
1 SOLUTION RESPIRATORY (INHALATION)
Status: DISCONTINUED | OUTPATIENT
Start: 2024-08-18 | End: 2024-08-20

## 2024-08-18 RX ORDER — SODIUM CHLORIDE FOR INHALATION 7 %
4 VIAL, NEBULIZER (ML) INHALATION
Status: DISCONTINUED | OUTPATIENT
Start: 2024-08-18 | End: 2024-08-21 | Stop reason: HOSPADM

## 2024-08-18 RX ORDER — PREDNISONE 20 MG/1
40 TABLET ORAL DAILY
Status: DISCONTINUED | OUTPATIENT
Start: 2024-08-19 | End: 2024-08-21

## 2024-08-18 RX ADMIN — IPRATROPIUM BROMIDE AND ALBUTEROL SULFATE 1 DOSE: 2.5; .5 SOLUTION RESPIRATORY (INHALATION) at 19:34

## 2024-08-18 RX ADMIN — IPRATROPIUM BROMIDE AND ALBUTEROL SULFATE 1 DOSE: 2.5; .5 SOLUTION RESPIRATORY (INHALATION) at 15:11

## 2024-08-18 RX ADMIN — EZETIMIBE 10 MG: 10 TABLET ORAL at 17:48

## 2024-08-18 RX ADMIN — VANCOMYCIN HYDROCHLORIDE 1000 MG: 1 INJECTION, POWDER, LYOPHILIZED, FOR SOLUTION INTRAVENOUS at 17:58

## 2024-08-18 RX ADMIN — BUDESONIDE 500 MCG: 0.5 INHALANT RESPIRATORY (INHALATION) at 19:42

## 2024-08-18 RX ADMIN — FOLIC ACID 1 MG: 1 TABLET ORAL at 09:22

## 2024-08-18 RX ADMIN — WATER 40 MG: 1 INJECTION INTRAMUSCULAR; INTRAVENOUS; SUBCUTANEOUS at 21:12

## 2024-08-18 RX ADMIN — TRAZODONE HYDROCHLORIDE 50 MG: 50 TABLET ORAL at 21:17

## 2024-08-18 RX ADMIN — CEFTAZIDIME 2000 MG: 2 INJECTION, POWDER, FOR SOLUTION INTRAVENOUS at 05:16

## 2024-08-18 RX ADMIN — ALLOPURINOL 300 MG: 100 TABLET ORAL at 09:21

## 2024-08-18 RX ADMIN — Medication 4 ML: at 19:42

## 2024-08-18 RX ADMIN — Medication 4 ML: at 07:19

## 2024-08-18 RX ADMIN — MONTELUKAST 10 MG: 10 TABLET, FILM COATED ORAL at 21:12

## 2024-08-18 RX ADMIN — CEFTAZIDIME 2000 MG: 2 INJECTION, POWDER, FOR SOLUTION INTRAVENOUS at 19:22

## 2024-08-18 RX ADMIN — GUAIFENESIN 1200 MG: 600 TABLET ORAL at 09:22

## 2024-08-18 RX ADMIN — GUAIFENESIN 1200 MG: 600 TABLET ORAL at 21:12

## 2024-08-18 RX ADMIN — WATER 40 MG: 1 INJECTION INTRAMUSCULAR; INTRAVENOUS; SUBCUTANEOUS at 14:56

## 2024-08-18 RX ADMIN — IPRATROPIUM BROMIDE AND ALBUTEROL SULFATE 1 DOSE: 2.5; .5 SOLUTION RESPIRATORY (INHALATION) at 07:13

## 2024-08-18 RX ADMIN — SODIUM CHLORIDE, PRESERVATIVE FREE 10 ML: 5 INJECTION INTRAVENOUS at 09:22

## 2024-08-18 RX ADMIN — DILTIAZEM HYDROCHLORIDE 240 MG: 120 CAPSULE, EXTENDED RELEASE ORAL at 09:21

## 2024-08-18 RX ADMIN — ACETAMINOPHEN 650 MG: 325 TABLET ORAL at 09:25

## 2024-08-18 RX ADMIN — INSULIN LISPRO 4 UNITS: 100 INJECTION, SOLUTION INTRAVENOUS; SUBCUTANEOUS at 12:01

## 2024-08-18 RX ADMIN — WATER 40 MG: 1 INJECTION INTRAMUSCULAR; INTRAVENOUS; SUBCUTANEOUS at 05:18

## 2024-08-18 RX ADMIN — AZITHROMYCIN DIHYDRATE 500 MG: 500 INJECTION, POWDER, LYOPHILIZED, FOR SOLUTION INTRAVENOUS at 17:50

## 2024-08-18 RX ADMIN — SODIUM CHLORIDE 50 ML: 9 INJECTION, SOLUTION INTRAVENOUS at 05:16

## 2024-08-18 RX ADMIN — CLOPIDOGREL BISULFATE 75 MG: 75 TABLET ORAL at 09:22

## 2024-08-18 RX ADMIN — ENOXAPARIN SODIUM 40 MG: 100 INJECTION SUBCUTANEOUS at 09:22

## 2024-08-18 ASSESSMENT — PAIN SCALES - GENERAL
PAINLEVEL_OUTOF10: 0
PAINLEVEL_OUTOF10: 0
PAINLEVEL_OUTOF10: 3
PAINLEVEL_OUTOF10: 2
PAINLEVEL_OUTOF10: 0

## 2024-08-18 ASSESSMENT — PAIN DESCRIPTION - LOCATION: LOCATION: HEAD

## 2024-08-18 ASSESSMENT — PAIN DESCRIPTION - DESCRIPTORS: DESCRIPTORS: ACHING

## 2024-08-18 NOTE — CONSULTS
Pulmonary Note  Name: Neisha Guardado     : 1945  Hospital: [facility]    Date: 2024 9:42 AM Date of Admission: 2024       Impression:   Plan:   -- Acute exacerbation bronchiectasis, CT multifocal airspace disease  -- Hx MDR organisms  -- Hx asthma, BATSHEVA  -- DM, HTN, GERD -- O2 if needed  -- Abx  -- Mobilize as able  -- Flutter  -- Sputum culture - if able to cough up adequate sample, may preclude need for bronch  -- Taper steroids  -- Hypertonic saline nebs per her usual  -- DVT ppx    Recommended she resume her percussive vest at home - may not need twice per day, but certainly needs to be using with at least some consistency based on this admission    Thanks for the consult!     CC:   Chief Complaint   Patient presents with    Cough      Interval History:      Initial HPI:   -  Known patient of Dr. Parr, bronchiectasis on HFCWO device at home - stopped using one month ago as she didn't think it was doing anything.  Presented with 3 days of dyspnea/cough/congestion/sputum, no fevers or chest pain, refractory to 2 days levaquin that had been given to her by the ED.  A couple days ago had some blood in the sputum but none so farm today.  She is on plavix.  She does report feeling a bit better today.  Denies s/sx aspiration.    Exam Findings:  General: Awake, alert, no acute distress   HEENT: NC/AT, EOMI, moist mucous membranes   Neck: Supple, no meningismus, no masses or swelling   HEART: RRR, no murmurs/rubs/gallops   Lungs: No deformities, normal chest rise and fall, no increased work of breathing   Lung auscultation: Good air movement bilaterally, occasional ronchi, basilar rales  Abdomen: Soft/NT  Extremities: No cyanosis/clubbing, normal peripheral pulses, no edema   Skin: Dry, normal temperature   Neuro: A&O x 3, normal speech   Psych: Normal affect, normal mood     History: includes:  Past Medical History:   Diagnosis Date    Asthma     Diabetes (HCC)     type 2     GERD (gastroesophageal reflux disease)     Hypertension     Ill-defined condition     seasonal allergies    Ill-defined condition     gout    Ill-defined condition     high cholesterol      Past Surgical History:   Procedure Laterality Date    APPENDECTOMY      BRONCHOSCOPY N/A 3/12/2024    BRONCHOSCOPY performed by Rosalinda Parr MD at [facility] ENDOSCOPY    BRONCHOSCOPY N/A 6/6/2024    BRONCHOSCOPY (CON SED) performed by Rosalinda Parr MD at [facility] ENDOSCOPY    BRONCHOSCOPY Left 6/6/2024    BRONCHOSCOPY ALVEOLAR LAVAGE performed by Rosalinda Parr MD at [facility] ENDOSCOPY    BRONCHOSCOPY  6/6/2024    BRONCHOSCOPY DIAGNOSTIC OR CELL WASH ONLY performed by Rosalinda Parr MD at [facility] ENDOSCOPY    CHOLECYSTECTOMY, LAPAROSCOPIC  1985    HEENT  2010    cataract     HYSTERECTOMY (CERVIX STATUS UNKNOWN)  1975    MEDICATION INJECTION N/A 3/12/2024    INJECTION MEDICATION performed by Rosalinda Parr MD at [facility] ENDOSCOPY    OTHER SURGICAL HISTORY  1993    bladder tack    OVARY REMOVAL  1996    benign ovarian tumor    ROTATOR CUFF REPAIR  2015    right    TONSILLECTOMY  1964      Prior to Admission medications    Medication Sig Start Date End Date Taking? Authorizing Provider   guaiFENesin (MUCINEX) 600 MG extended release tablet Take 2 tablets by mouth 2 times daily 7/20/24  Yes Ceasar Pino MD   TRELEGY ELLIPTA 200-62.5-25 MCG/ACT AEPB inhaler TAKE 1 PUFF BY MOUTH EVERY DAY 5/10/24  Yes ProviderValentine MD   albuterol sulfate HFA (PROVENTIL;VENTOLIN;PROAIR) 108 (90 Base) MCG/ACT inhaler INHALE 2 PUFFS EVERY 4 HOURS BY INHALATION ROUTE AS NEEDED 4/8/24  Yes ProviderValentine MD   traZODone (DESYREL) 50 MG tablet trazodone 50 mg tablet   TAKE 1 TABLET BY MOUTH EVERYDAY AT BEDTIME   Yes Provider, MD Valentine   clopidogrel (PLAVIX) 75 MG tablet Take 1 tablet by mouth daily   Yes Provider, MD Valentine   olmesartan (BENICAR) 40 MG tablet Take 0.5 tablets by mouth daily   Yes ProviderValentine MD   allopurinol

## 2024-08-18 NOTE — PROGRESS NOTES
[facility]                                                                               Hospitalist Progress Note  XAVI MOHAMUD MD          Date of Service:  2024  NAME:  Neisha Guardado  :  1945  MRN:  [id number]    Please note that this dictation was completed with [employer], the computer voice recognition software.  Quite often unanticipated grammatical, syntax, homophones, and other interpretive errors are inadvertently transcribed by the computer software.  Please disregard these errors.  Please excuse any errors that have escaped final proofreading.    Admission Summary:   78 y.o.  F hx Dm2, HTN, GERD, recurrent PNA/mucus plugging with hx MDR organisms on bronch p/w cough and shortness of breath.        Interval history / Subjective:   Continues to have cough, expectoration has decreased      Assessment & Plan:     Anticipated discharge date :    Anticipated disposition : Home   Barriers to discharge : medical stability      Multifocal/complicated Pneumonia / Hx MDRO / Asthma exacerbation/Hx of bronchiectasis   Previously has grown achromobacter. Flu/Covid neg    - Cont ceftaz, vanc, azith  - Resp PCR pending  - Pulm c/s, may need another bronchoscopy  - Sputum culture   - Flutter valve  - Methylpred 40mv IV q8h; duonebs  - Mucomyst TID  - Guaifenesin  - Flutter, IS  - scheduled clair/valentina  -Pulmonology consulted        CKD3: Appears stable  - Renally dose meds, avoid nephrotoxins     DM2  Steroid induced hyperglycemia   Hemoglobin A1C   Date Value Ref Range Status   2024 4.7 4.0 - 5.6 % Final     Comment:     (NOTE)  HbA1C Interpretive Ranges  <5.7              Normal  5.7 - 6.4         Consider Prediabetes  >6.5              Consider Diabetes         - Lispro correctional scale, FSG AC HS  - Consistent carb diet, hypoglycemia protocol.        Gout: Allopurinol    ?CAD?  Holding clopidogrel for possible bronchoscopy     BMI (Calculated):

## 2024-08-18 NOTE — PROGRESS NOTES
Flutter value ordered for airway clearance. Pt is familiar with flutter valve and had no questions. RT set flutter valve on 3 and instructed pt to utilize flutter valve 10/hour, or 3/tv commercial break.

## 2024-08-18 NOTE — PLAN OF CARE
Problem: Discharge Planning  Goal: Discharge to home or other facility with appropriate resources  8/18/2024 1010 by Luis Woods RN  Outcome: Progressing  8/18/2024 0642 by Mary Montelongo RN  Outcome: Progressing     Problem: Pain  Goal: Verbalizes/displays adequate comfort level or baseline comfort level  8/18/2024 1010 by Luis Woods RN  Outcome: Progressing  8/18/2024 0642 by Mary Montelongo RN  Outcome: Progressing     Problem: Chronic Conditions and Co-morbidities  Goal: Patient's chronic conditions and co-morbidity symptoms are monitored and maintained or improved  8/18/2024 1010 by Luis Woods RN  Outcome: Progressing  8/18/2024 0642 by Mary Montelongo RN  Outcome: Progressing     Problem: Respiratory - Adult  Goal: Achieves optimal ventilation and oxygenation  8/18/2024 1010 by Luis Woods RN  Outcome: Progressing  8/18/2024 0715 by Olga Lang, RT  Outcome: Progressing

## 2024-08-19 LAB
ANION GAP SERPL CALC-SCNC: 6 MMOL/L (ref 5–15)
BACTERIA SPEC CULT: NORMAL
BACTERIA SPEC CULT: NORMAL
BASOPHILS # BLD: 0 K/UL (ref 0–0.1)
BASOPHILS NFR BLD: 0 % (ref 0–1)
BUN SERPL-MCNC: 32 MG/DL (ref 6–20)
BUN/CREAT SERPL: 30 (ref 12–20)
CALCIUM SERPL-MCNC: 9.3 MG/DL (ref 8.5–10.1)
CHLORIDE SERPL-SCNC: 114 MMOL/L (ref 97–108)
CO2 SERPL-SCNC: 20 MMOL/L (ref 21–32)
CREAT SERPL-MCNC: 1.05 MG/DL (ref 0.55–1.02)
DIFFERENTIAL METHOD BLD: ABNORMAL
EOSINOPHIL # BLD: 0 K/UL (ref 0–0.4)
EOSINOPHIL NFR BLD: 0 % (ref 0–7)
ERYTHROCYTE [DISTWIDTH] IN BLOOD BY AUTOMATED COUNT: 14.3 % (ref 11.5–14.5)
GLUCOSE BLD STRIP.AUTO-MCNC: 118 MG/DL (ref 65–117)
GLUCOSE BLD STRIP.AUTO-MCNC: 150 MG/DL (ref 65–117)
GLUCOSE BLD STRIP.AUTO-MCNC: 170 MG/DL (ref 65–117)
GLUCOSE BLD STRIP.AUTO-MCNC: 206 MG/DL (ref 65–117)
GLUCOSE SERPL-MCNC: 176 MG/DL (ref 65–100)
HCT VFR BLD AUTO: 29.3 % (ref 35–47)
HGB BLD-MCNC: 9.8 G/DL (ref 11.5–16)
IMM GRANULOCYTES # BLD AUTO: 0.1 K/UL (ref 0–0.04)
IMM GRANULOCYTES NFR BLD AUTO: 1 % (ref 0–0.5)
LYMPHOCYTES # BLD: 0.7 K/UL (ref 0.8–3.5)
LYMPHOCYTES NFR BLD: 7 % (ref 12–49)
M PNEUMO IGG SER IA-ACNC: 387 U/ML (ref 0–99)
M PNEUMO IGM SER IA-ACNC: <770 U/ML (ref 0–769)
MAGNESIUM SERPL-MCNC: 1.4 MG/DL (ref 1.6–2.4)
MCH RBC QN AUTO: 33.3 PG (ref 26–34)
MCHC RBC AUTO-ENTMCNC: 33.4 G/DL (ref 30–36.5)
MCV RBC AUTO: 99.7 FL (ref 80–99)
MONOCYTES # BLD: 0.3 K/UL (ref 0–1)
MONOCYTES NFR BLD: 3 % (ref 5–13)
NEUTS SEG # BLD: 9.4 K/UL (ref 1.8–8)
NEUTS SEG NFR BLD: 89 % (ref 32–75)
NRBC # BLD: 0 K/UL (ref 0–0.01)
NRBC BLD-RTO: 0 PER 100 WBC
PHOSPHATE SERPL-MCNC: 2.9 MG/DL (ref 2.6–4.7)
PLATELET # BLD AUTO: 285 K/UL (ref 150–400)
PMV BLD AUTO: 9.7 FL (ref 8.9–12.9)
POTASSIUM SERPL-SCNC: 3.6 MMOL/L (ref 3.5–5.1)
RBC # BLD AUTO: 2.94 M/UL (ref 3.8–5.2)
SERVICE CMNT-IMP: ABNORMAL
SERVICE CMNT-IMP: NORMAL
SODIUM SERPL-SCNC: 140 MMOL/L (ref 136–145)
VANCOMYCIN SERPL-MCNC: 17.7 UG/ML
WBC # BLD AUTO: 10.5 K/UL (ref 3.6–11)

## 2024-08-19 PROCEDURE — 36415 COLL VENOUS BLD VENIPUNCTURE: CPT

## 2024-08-19 PROCEDURE — 85025 COMPLETE CBC W/AUTO DIFF WBC: CPT

## 2024-08-19 PROCEDURE — 2580000003 HC RX 258: Performed by: PHYSICIAN ASSISTANT

## 2024-08-19 PROCEDURE — 6370000000 HC RX 637 (ALT 250 FOR IP)

## 2024-08-19 PROCEDURE — 6370000000 HC RX 637 (ALT 250 FOR IP): Performed by: STUDENT IN AN ORGANIZED HEALTH CARE EDUCATION/TRAINING PROGRAM

## 2024-08-19 PROCEDURE — 6370000000 HC RX 637 (ALT 250 FOR IP): Performed by: INTERNAL MEDICINE

## 2024-08-19 PROCEDURE — 2580000003 HC RX 258: Performed by: INTERNAL MEDICINE

## 2024-08-19 PROCEDURE — 94761 N-INVAS EAR/PLS OXIMETRY MLT: CPT

## 2024-08-19 PROCEDURE — 1100000000 HC RM PRIVATE

## 2024-08-19 PROCEDURE — 84100 ASSAY OF PHOSPHORUS: CPT

## 2024-08-19 PROCEDURE — 6360000002 HC RX W HCPCS: Performed by: INTERNAL MEDICINE

## 2024-08-19 PROCEDURE — 80048 BASIC METABOLIC PNL TOTAL CA: CPT

## 2024-08-19 PROCEDURE — 83735 ASSAY OF MAGNESIUM: CPT

## 2024-08-19 PROCEDURE — 96366 THER/PROPH/DIAG IV INF ADDON: CPT

## 2024-08-19 PROCEDURE — 6360000002 HC RX W HCPCS: Performed by: PHYSICIAN ASSISTANT

## 2024-08-19 PROCEDURE — 94640 AIRWAY INHALATION TREATMENT: CPT

## 2024-08-19 PROCEDURE — 94669 MECHANICAL CHEST WALL OSCILL: CPT

## 2024-08-19 PROCEDURE — G0378 HOSPITAL OBSERVATION PER HR: HCPCS

## 2024-08-19 PROCEDURE — 80202 ASSAY OF VANCOMYCIN: CPT

## 2024-08-19 PROCEDURE — 96372 THER/PROPH/DIAG INJ SC/IM: CPT

## 2024-08-19 PROCEDURE — 6370000000 HC RX 637 (ALT 250 FOR IP): Performed by: PHYSICIAN ASSISTANT

## 2024-08-19 PROCEDURE — 82962 GLUCOSE BLOOD TEST: CPT

## 2024-08-19 RX ORDER — ARFORMOTEROL TARTRATE 15 UG/2ML
15 SOLUTION RESPIRATORY (INHALATION)
Status: DISCONTINUED | OUTPATIENT
Start: 2024-08-19 | End: 2024-08-21 | Stop reason: HOSPADM

## 2024-08-19 RX ORDER — CALCIUM CARBONATE 500 MG/1
500 TABLET, CHEWABLE ORAL 3 TIMES DAILY PRN
Status: DISCONTINUED | OUTPATIENT
Start: 2024-08-19 | End: 2024-08-21 | Stop reason: HOSPADM

## 2024-08-19 RX ADMIN — AZITHROMYCIN DIHYDRATE 500 MG: 500 INJECTION, POWDER, LYOPHILIZED, FOR SOLUTION INTRAVENOUS at 17:29

## 2024-08-19 RX ADMIN — MAGNESIUM SULFATE HEPTAHYDRATE 2000 MG: 40 INJECTION, SOLUTION INTRAVENOUS at 06:11

## 2024-08-19 RX ADMIN — CEFTAZIDIME 2000 MG: 2 INJECTION, POWDER, FOR SOLUTION INTRAVENOUS at 17:47

## 2024-08-19 RX ADMIN — FOLIC ACID 1 MG: 1 TABLET ORAL at 08:38

## 2024-08-19 RX ADMIN — DILTIAZEM HYDROCHLORIDE 240 MG: 120 CAPSULE, EXTENDED RELEASE ORAL at 08:38

## 2024-08-19 RX ADMIN — SODIUM CHLORIDE, PRESERVATIVE FREE 10 ML: 5 INJECTION INTRAVENOUS at 08:38

## 2024-08-19 RX ADMIN — GUAIFENESIN 1200 MG: 600 TABLET ORAL at 20:07

## 2024-08-19 RX ADMIN — IPRATROPIUM BROMIDE AND ALBUTEROL SULFATE 1 DOSE: 2.5; .5 SOLUTION RESPIRATORY (INHALATION) at 19:25

## 2024-08-19 RX ADMIN — INSULIN LISPRO 2 UNITS: 100 INJECTION, SOLUTION INTRAVENOUS; SUBCUTANEOUS at 17:30

## 2024-08-19 RX ADMIN — GUAIFENESIN 1200 MG: 600 TABLET ORAL at 08:37

## 2024-08-19 RX ADMIN — Medication 4 ML: at 07:05

## 2024-08-19 RX ADMIN — BUDESONIDE 500 MCG: 0.5 INHALANT RESPIRATORY (INHALATION) at 19:30

## 2024-08-19 RX ADMIN — BUDESONIDE 500 MCG: 0.5 INHALANT RESPIRATORY (INHALATION) at 07:00

## 2024-08-19 RX ADMIN — ANTACID TABLETS 500 MG: 500 TABLET, CHEWABLE ORAL at 20:07

## 2024-08-19 RX ADMIN — EZETIMIBE 10 MG: 10 TABLET ORAL at 17:30

## 2024-08-19 RX ADMIN — ARFORMOTEROL TARTRATE 15 MCG: 15 SOLUTION RESPIRATORY (INHALATION) at 13:27

## 2024-08-19 RX ADMIN — ARFORMOTEROL TARTRATE 15 MCG: 15 SOLUTION RESPIRATORY (INHALATION) at 19:30

## 2024-08-19 RX ADMIN — CEFTAZIDIME 2000 MG: 2 INJECTION, POWDER, FOR SOLUTION INTRAVENOUS at 05:25

## 2024-08-19 RX ADMIN — ALLOPURINOL 300 MG: 100 TABLET ORAL at 08:37

## 2024-08-19 RX ADMIN — TRAZODONE HYDROCHLORIDE 50 MG: 50 TABLET ORAL at 20:07

## 2024-08-19 RX ADMIN — Medication 4 ML: at 19:30

## 2024-08-19 RX ADMIN — MONTELUKAST 10 MG: 10 TABLET, FILM COATED ORAL at 20:07

## 2024-08-19 RX ADMIN — ENOXAPARIN SODIUM 40 MG: 100 INJECTION SUBCUTANEOUS at 08:38

## 2024-08-19 RX ADMIN — PREDNISONE 40 MG: 20 TABLET ORAL at 08:37

## 2024-08-19 RX ADMIN — IPRATROPIUM BROMIDE AND ALBUTEROL SULFATE 1 DOSE: 2.5; .5 SOLUTION RESPIRATORY (INHALATION) at 13:27

## 2024-08-19 RX ADMIN — IPRATROPIUM BROMIDE AND ALBUTEROL SULFATE 1 DOSE: 2.5; .5 SOLUTION RESPIRATORY (INHALATION) at 07:05

## 2024-08-19 ASSESSMENT — PAIN SCALES - GENERAL
PAINLEVEL_OUTOF10: 0

## 2024-08-19 NOTE — PROGRESS NOTES
[facility]                                                                               Hospitalist Progress Note  XAVI MOHAMUD MD          Date of Service:  2024  NAME:  Neisha Guardado  :  1945  MRN:  [id number]    Please note that this dictation was completed with [email or web address], the computer voice recognition software.  Quite often unanticipated grammatical, syntax, homophones, and other interpretive errors are inadvertently transcribed by the computer software.  Please disregard these errors.  Please excuse any errors that have escaped final proofreading.    Admission Summary:   78 y.o.  F hx Dm2, HTN, GERD, recurrent PNA/mucus plugging with hx MDR organisms on bronch p/w cough and shortness of breath.           Interval history / Subjective:    Minimal expectoration, continues to cough but Improving, no fevers or chills.      Assessment & Plan:       Anticipated discharge date :    Anticipated disposition : Home   Barriers to discharge : medical stability      Multifocal/complicated Pneumonia / Hx MDRO / Asthma exacerbation/Hx of bronchiectasis  Previously has grown achromobacter. Flu/Covid neg. Resp PCR negative     - Cont ceftaz, vanc, azith  - Pulmonology following, holding off bronchoscop. Immune work up ordered by pulmonology to evaluate for autoimmune process.   - Sputum culture when able to provide sample  - Flutter valve  - Methylpred 40mv IV q8h; duonebs  - Mucomyst TID  - Guaifenesin  - Flutter, IS  - scheduled clair/valentina          CKD3: Appears stable  - Renally dose meds, avoid nephrotoxins     DM2  Steroid induced hyperglycemia   Hemoglobin A1C  2024 4.7  - Lispro correctional scale, FSG AC HS  - Consistent carb diet, hypoglycemia protocol.         Gout: Allopurinol     ?CAD?  Holding clopidogrel for possible bronchoscopy     BMI (Calculated): 27.11        Code status: Full   Prophylaxis: Enoxaparin sc   Care Plan discussed with:  my ability given all available resources at the time of admission. Route is PO if not otherwise noted.      Admission Status:[id number]:::1}      Medications Reviewed:     Current Facility-Administered Medications   Medication Dose Route Frequency    ipratropium 0.5 mg-albuterol 2.5 mg (DUONEB) nebulizer solution 1 Dose  1 Dose Inhalation Q6H WA RT    predniSONE (DELTASONE) tablet 40 mg  40 mg Oral Daily    sodium chloride (Inhalant) 7 % nebulizer solution 4 mL  4 mL Nebulization BID RT    iopamidol (ISOVUE-370) 76 % injection 100 mL  100 mL IntraVENous ONCE PRN    sodium chloride flush 0.9 % injection 5-40 mL  5-40 mL IntraVENous 2 times per day    sodium chloride flush 0.9 % injection 5-40 mL  5-40 mL IntraVENous PRN    0.9 % sodium chloride infusion   IntraVENous PRN    potassium chloride (KLOR-CON) extended release tablet 40 mEq  40 mEq Oral PRN    Or    potassium bicarb-citric acid (EFFER-K) effervescent tablet 40 mEq  40 mEq Oral PRN    Or    potassium chloride 10 mEq/100 mL IVPB (Peripheral Line)  10 mEq IntraVENous PRN    magnesium sulfate 2000 mg in 50 mL IVPB premix  2,000 mg IntraVENous PRN    enoxaparin (LOVENOX) injection 40 mg  40 mg SubCUTAneous Daily    ondansetron (ZOFRAN-ODT) disintegrating tablet 4 mg  4 mg Oral Q8H PRN    Or    ondansetron (ZOFRAN) injection 4 mg  4 mg IntraVENous Q6H PRN    polyethylene glycol (GLYCOLAX) packet 17 g  17 g Oral Daily PRN    acetaminophen (TYLENOL) tablet 650 mg  650 mg Oral Q6H PRN    Or    acetaminophen (TYLENOL) suppository 650 mg  650 mg Rectal Q6H PRN    azithromycin (ZITHROMAX) 500 mg in sodium chloride 0.9 % 250 mL IVPB ([id number])  500 mg IntraVENous Q24H    cefTAZidime (FORTAZ) 2,000 mg in sodium chloride 0.9 % 50 mL IVPB (mini-bag)  2,000 mg IntraVENous Q12H    allopurinol (ZYLOPRIM) tablet 300 mg  300 mg Oral Daily    [Held by provider] clopidogrel (PLAVIX) tablet 75 mg  75 mg Oral Daily    dilTIAZem (CARDIZEM CD) extended release capsule 240 mg  240 mg

## 2024-08-19 NOTE — PLAN OF CARE
Problem: Discharge Planning  Goal: Discharge to home or other facility with appropriate resources  Outcome: Progressing     Problem: Pain  Goal: Verbalizes/displays adequate comfort level or baseline comfort level  Outcome: Progressing     Problem: Chronic Conditions and Co-morbidities  Goal: Patient's chronic conditions and co-morbidity symptoms are monitored and maintained or improved  Outcome: Progressing     Problem: Respiratory - Adult  Goal: Achieves optimal ventilation and oxygenation  Outcome: Progressing

## 2024-08-19 NOTE — PROGRESS NOTES
Vancomycin level was 17.7 mcg/ml. This predicts an AUC of 480 and is within goal. Will continue same dosing.

## 2024-08-19 NOTE — PLAN OF CARE
Problem: Discharge Planning  Goal: Discharge to home or other facility with appropriate resources  8/19/2024 0457 by Rosa Parra RN  Outcome: Progressing     Problem: Pain  Goal: Verbalizes/displays adequate comfort level or baseline comfort level  8/19/2024 1153 by Sole Yo RN  Outcome: Progressing  8/19/2024 0457 by Rosa Parra RN  Outcome: Progressing     Problem: Chronic Conditions and Co-morbidities  Goal: Patient's chronic conditions and co-morbidity symptoms are monitored and maintained or improved  8/19/2024 0457 by Rosa Parra RN  Outcome: Progressing     Problem: Respiratory - Adult  Goal: Achieves optimal ventilation and oxygenation  8/19/2024 1153 by Sole Yo RN  Outcome: Progressing  8/19/2024 0836 by Yanira Mei, RT  Outcome: Progressing  8/19/2024 0836 by Yanira Mei, RT  Outcome: Progressing  8/19/2024 0541 by Aleah Cunha RT  Outcome: Progressing  8/19/2024 0457 by Rosa Parra RN  Outcome: Progressing

## 2024-08-19 NOTE — CARE COORDINATION
8/19/2024  4:18 PM      Care Management Initial Assessment  8/19/2024 4:18 PM  If patient is discharged prior to next notation, then this note serves as note for discharge by case management.    Reason for Admission:   Hemoptysis [R04.2]  Multifocal pneumonia [J18.9]  Asthma with acute exacerbation, unspecified asthma severity, unspecified whether persistent [J45.901]  Pneumonia due to organism [J18.9]         Patient Admission Status: Inpatient  RUR: Readmission Risk Score: 20.6    Hospitalization in the last 30 days (Readmission):  Yes        Advance Care Planning:  Code Status: Full Code  Primary Healthcare Decision Maker: Named in Scanned ACP Document  Primary Decision Maker: Amol Guardado - Child - [phone]   Advance Directive: has an advanced directive - a copy has been provided     __________________________________________________________________________  Assessment:      08/19/24 1617   Service Assessment   Patient Orientation Alert and Oriented   Cognition Alert   History Provided By Patient   Primary Caregiver Self   Support Systems Children   Patient's Healthcare Decision Maker is: Named in Scanned ACP Document   PCP Verified by CM Yes   Last Visit to PCP Within last 3 months   Prior Functional Level Independent in ADLs/IADLs   Current Functional Level Independent in ADLs/IADLs   Can patient return to prior living arrangement Yes   Ability to make needs known: Good   Family able to assist with home care needs: Yes   Would you like for me to discuss the discharge plan with any other family members/significant others, and if so, who? Yes  (As needed)   Financial Resources Medicare;Other (Comment)   Community Resources None   Social/Functional History   Lives With Son   Type of Home House   Home Layout One level   Home Access Ramped entrance   Home Equipment None   ADL Assistance Independent   Homemaking Assistance Independent   Ambulation Assistance Independent   Transfer Assistance Independent   Active

## 2024-08-20 LAB
ANION GAP SERPL CALC-SCNC: 8 MMOL/L (ref 5–15)
BASOPHILS # BLD: 0 K/UL (ref 0–0.1)
BASOPHILS NFR BLD: 0 % (ref 0–1)
BUN SERPL-MCNC: 31 MG/DL (ref 6–20)
BUN/CREAT SERPL: 27 (ref 12–20)
CALCIUM SERPL-MCNC: 8.7 MG/DL (ref 8.5–10.1)
CHLORIDE SERPL-SCNC: 114 MMOL/L (ref 97–108)
CO2 SERPL-SCNC: 19 MMOL/L (ref 21–32)
CREAT SERPL-MCNC: 1.16 MG/DL (ref 0.55–1.02)
DIFFERENTIAL METHOD BLD: ABNORMAL
EOSINOPHIL # BLD: 0 K/UL (ref 0–0.4)
EOSINOPHIL NFR BLD: 0 % (ref 0–7)
ERYTHROCYTE [DISTWIDTH] IN BLOOD BY AUTOMATED COUNT: 14.8 % (ref 11.5–14.5)
GLUCOSE BLD STRIP.AUTO-MCNC: 144 MG/DL (ref 65–117)
GLUCOSE BLD STRIP.AUTO-MCNC: 167 MG/DL (ref 65–117)
GLUCOSE BLD STRIP.AUTO-MCNC: 183 MG/DL (ref 65–117)
GLUCOSE BLD STRIP.AUTO-MCNC: 93 MG/DL (ref 65–117)
GLUCOSE SERPL-MCNC: 129 MG/DL (ref 65–100)
HCT VFR BLD AUTO: 27 % (ref 35–47)
HGB BLD-MCNC: 9.2 G/DL (ref 11.5–16)
IMM GRANULOCYTES # BLD AUTO: 0.2 K/UL (ref 0–0.04)
IMM GRANULOCYTES NFR BLD AUTO: 2 % (ref 0–0.5)
L PNEUMO1 AG UR QL IA: NEGATIVE
LYMPHOCYTES # BLD: 1.3 K/UL (ref 0.8–3.5)
LYMPHOCYTES NFR BLD: 12 % (ref 12–49)
MAGNESIUM SERPL-MCNC: 2 MG/DL (ref 1.6–2.4)
MCH RBC QN AUTO: 33.8 PG (ref 26–34)
MCHC RBC AUTO-ENTMCNC: 34.1 G/DL (ref 30–36.5)
MCV RBC AUTO: 99.3 FL (ref 80–99)
MONOCYTES # BLD: 0.8 K/UL (ref 0–1)
MONOCYTES NFR BLD: 7 % (ref 5–13)
NEUTS SEG # BLD: 8.5 K/UL (ref 1.8–8)
NEUTS SEG NFR BLD: 79 % (ref 32–75)
NRBC # BLD: 0 K/UL (ref 0–0.01)
NRBC BLD-RTO: 0 PER 100 WBC
PHOSPHATE SERPL-MCNC: 2.9 MG/DL (ref 2.6–4.7)
PLATELET # BLD AUTO: 260 K/UL (ref 150–400)
PMV BLD AUTO: 8.9 FL (ref 8.9–12.9)
POTASSIUM SERPL-SCNC: 3.4 MMOL/L (ref 3.5–5.1)
RBC # BLD AUTO: 2.72 M/UL (ref 3.8–5.2)
SERVICE CMNT-IMP: ABNORMAL
SERVICE CMNT-IMP: NORMAL
SODIUM SERPL-SCNC: 141 MMOL/L (ref 136–145)
SPECIMEN SOURCE: NORMAL
WBC # BLD AUTO: 10.8 K/UL (ref 3.6–11)

## 2024-08-20 PROCEDURE — 6360000002 HC RX W HCPCS: Performed by: INTERNAL MEDICINE

## 2024-08-20 PROCEDURE — 2580000003 HC RX 258: Performed by: INTERNAL MEDICINE

## 2024-08-20 PROCEDURE — 6370000000 HC RX 637 (ALT 250 FOR IP): Performed by: STUDENT IN AN ORGANIZED HEALTH CARE EDUCATION/TRAINING PROGRAM

## 2024-08-20 PROCEDURE — 6370000000 HC RX 637 (ALT 250 FOR IP): Performed by: NURSE PRACTITIONER

## 2024-08-20 PROCEDURE — 6370000000 HC RX 637 (ALT 250 FOR IP): Performed by: INTERNAL MEDICINE

## 2024-08-20 PROCEDURE — 94669 MECHANICAL CHEST WALL OSCILL: CPT

## 2024-08-20 PROCEDURE — 94761 N-INVAS EAR/PLS OXIMETRY MLT: CPT

## 2024-08-20 PROCEDURE — 2580000003 HC RX 258: Performed by: PHYSICIAN ASSISTANT

## 2024-08-20 PROCEDURE — 80048 BASIC METABOLIC PNL TOTAL CA: CPT

## 2024-08-20 PROCEDURE — 6370000000 HC RX 637 (ALT 250 FOR IP): Performed by: PHYSICIAN ASSISTANT

## 2024-08-20 PROCEDURE — 94640 AIRWAY INHALATION TREATMENT: CPT

## 2024-08-20 PROCEDURE — 83735 ASSAY OF MAGNESIUM: CPT

## 2024-08-20 PROCEDURE — 36415 COLL VENOUS BLD VENIPUNCTURE: CPT

## 2024-08-20 PROCEDURE — 94668 MNPJ CHEST WALL SBSQ: CPT

## 2024-08-20 PROCEDURE — 85025 COMPLETE CBC W/AUTO DIFF WBC: CPT

## 2024-08-20 PROCEDURE — 6360000002 HC RX W HCPCS: Performed by: PHYSICIAN ASSISTANT

## 2024-08-20 PROCEDURE — 1100000000 HC RM PRIVATE

## 2024-08-20 PROCEDURE — 84100 ASSAY OF PHOSPHORUS: CPT

## 2024-08-20 PROCEDURE — 82962 GLUCOSE BLOOD TEST: CPT

## 2024-08-20 RX ORDER — POTASSIUM CHLORIDE 750 MG/1
40 TABLET, FILM COATED, EXTENDED RELEASE ORAL ONCE
Status: COMPLETED | OUTPATIENT
Start: 2024-08-20 | End: 2024-08-20

## 2024-08-20 RX ORDER — IPRATROPIUM BROMIDE AND ALBUTEROL SULFATE 2.5; .5 MG/3ML; MG/3ML
1 SOLUTION RESPIRATORY (INHALATION)
Status: DISCONTINUED | OUTPATIENT
Start: 2024-08-20 | End: 2024-08-21 | Stop reason: HOSPADM

## 2024-08-20 RX ORDER — AZITHROMYCIN 250 MG/1
500 TABLET, FILM COATED ORAL
Status: DISCONTINUED | OUTPATIENT
Start: 2024-08-21 | End: 2024-08-21 | Stop reason: HOSPADM

## 2024-08-20 RX ADMIN — CEFTAZIDIME 2000 MG: 2 INJECTION, POWDER, FOR SOLUTION INTRAVENOUS at 17:54

## 2024-08-20 RX ADMIN — GUAIFENESIN 1200 MG: 600 TABLET ORAL at 19:40

## 2024-08-20 RX ADMIN — FOLIC ACID 1 MG: 1 TABLET ORAL at 08:07

## 2024-08-20 RX ADMIN — BUDESONIDE 500 MCG: 0.5 INHALANT RESPIRATORY (INHALATION) at 20:18

## 2024-08-20 RX ADMIN — ENOXAPARIN SODIUM 40 MG: 100 INJECTION SUBCUTANEOUS at 08:04

## 2024-08-20 RX ADMIN — CLOPIDOGREL BISULFATE 75 MG: 75 TABLET ORAL at 08:04

## 2024-08-20 RX ADMIN — ARFORMOTEROL TARTRATE 15 MCG: 15 SOLUTION RESPIRATORY (INHALATION) at 06:55

## 2024-08-20 RX ADMIN — IPRATROPIUM BROMIDE AND ALBUTEROL SULFATE 1 DOSE: 2.5; .5 SOLUTION RESPIRATORY (INHALATION) at 20:18

## 2024-08-20 RX ADMIN — PREDNISONE 40 MG: 20 TABLET ORAL at 08:06

## 2024-08-20 RX ADMIN — ARFORMOTEROL TARTRATE 15 MCG: 15 SOLUTION RESPIRATORY (INHALATION) at 20:18

## 2024-08-20 RX ADMIN — Medication 4 ML: at 20:19

## 2024-08-20 RX ADMIN — BUDESONIDE 500 MCG: 0.5 INHALANT RESPIRATORY (INHALATION) at 06:55

## 2024-08-20 RX ADMIN — EZETIMIBE 10 MG: 10 TABLET ORAL at 17:54

## 2024-08-20 RX ADMIN — ALLOPURINOL 300 MG: 100 TABLET ORAL at 08:07

## 2024-08-20 RX ADMIN — SODIUM CHLORIDE, PRESERVATIVE FREE 10 ML: 5 INJECTION INTRAVENOUS at 19:40

## 2024-08-20 RX ADMIN — POTASSIUM CHLORIDE 40 MEQ: 750 TABLET, FILM COATED, EXTENDED RELEASE ORAL at 05:21

## 2024-08-20 RX ADMIN — DILTIAZEM HYDROCHLORIDE 240 MG: 120 CAPSULE, EXTENDED RELEASE ORAL at 08:08

## 2024-08-20 RX ADMIN — SODIUM CHLORIDE, PRESERVATIVE FREE 10 ML: 5 INJECTION INTRAVENOUS at 08:09

## 2024-08-20 RX ADMIN — IPRATROPIUM BROMIDE AND ALBUTEROL SULFATE 1 DOSE: 2.5; .5 SOLUTION RESPIRATORY (INHALATION) at 11:08

## 2024-08-20 RX ADMIN — GUAIFENESIN 1200 MG: 600 TABLET ORAL at 08:06

## 2024-08-20 RX ADMIN — IPRATROPIUM BROMIDE AND ALBUTEROL SULFATE 1 DOSE: 2.5; .5 SOLUTION RESPIRATORY (INHALATION) at 07:02

## 2024-08-20 RX ADMIN — CEFTAZIDIME 2000 MG: 2 INJECTION, POWDER, FOR SOLUTION INTRAVENOUS at 05:23

## 2024-08-20 RX ADMIN — Medication 4 ML: at 07:02

## 2024-08-20 RX ADMIN — IPRATROPIUM BROMIDE AND ALBUTEROL SULFATE 1 DOSE: 2.5; .5 SOLUTION RESPIRATORY (INHALATION) at 15:15

## 2024-08-20 RX ADMIN — POTASSIUM CHLORIDE 40 MEQ: 750 TABLET, FILM COATED, EXTENDED RELEASE ORAL at 08:30

## 2024-08-20 RX ADMIN — MONTELUKAST 10 MG: 10 TABLET, FILM COATED ORAL at 19:40

## 2024-08-20 ASSESSMENT — PAIN SCALES - GENERAL: PAINLEVEL_OUTOF10: 0

## 2024-08-20 NOTE — PROGRESS NOTES
Spiritual Health Assessment/Progress Note  [facility]    Attempted Encounter,  ,  ,      Name: Neisha Guardado MRN: [id number]    Age: 78 y.o.     Sex: female   Language: English   Rastafari: Zoroastrianism   Multifocal pneumonia     Date: 8/20/2024            Total Time Calculated: 15 min              Spiritual Assessment began in [facility] MULTI-SPECIALTY ORTHOPEDICS 1        Referral/Consult From: Multi-disciplinary team   Encounter Overview/Reason: Attempted Encounter  Service Provided For: Patient    Codie, Belief, Meaning:   Patient Other: Unknown, Pt appeared to be resting comfortably  Family/Friends No family/friends present      Importance and Influence:  Patient Other: Unknown, Pt appeared to be resting comfortably   Family/Friends no family/friends present    Community:  Patient Other: unknown  Family/Friends no family present    Assessment and Plan of Care:     Patient Interventions include: Other: Chart indicates pt is Zoroastrianism. Offered silent prayer for healing and wholeness on pt's behalf  Family/Friends Interventions include: Other: no family present    Patient Plan of Care: Spiritual Care available upon further referral  Family/Friends Plan of Care: Spiritual Care available upon further referral    Electronically signed by KATHRINE Miguel on 8/20/2024 at 2:31 PM

## 2024-08-20 NOTE — PLAN OF CARE
Problem: Discharge Planning  Goal: Discharge to home or other facility with appropriate resources  Outcome: Progressing     Problem: Pain  Goal: Verbalizes/displays adequate comfort level or baseline comfort level  8/20/2024 0149 by Rosa Parra RN  Outcome: Progressing  8/19/2024 1153 by Sole Yo RN  Outcome: Progressing     Problem: Chronic Conditions and Co-morbidities  Goal: Patient's chronic conditions and co-morbidity symptoms are monitored and maintained or improved  Outcome: Progressing     Problem: Respiratory - Adult  Goal: Achieves optimal ventilation and oxygenation  8/20/2024 0149 by Rosa Parra RN  Outcome: Progressing  8/19/2024 1153 by Sole Yo RN  Outcome: Progressing

## 2024-08-20 NOTE — PLAN OF CARE
Problem: Discharge Planning  Goal: Discharge to home or other facility with appropriate resources  8/20/2024 0947 by Sonali Courtney LPN  Outcome: Progressing  8/20/2024 0149 by Rosa Parra RN  Outcome: Progressing     Problem: Pain  Goal: Verbalizes/displays adequate comfort level or baseline comfort level  8/20/2024 0947 by Sonali Courtney LPN  Outcome: Progressing  8/20/2024 0149 by Rosa Parra RN  Outcome: Progressing     Problem: Chronic Conditions and Co-morbidities  Goal: Patient's chronic conditions and co-morbidity symptoms are monitored and maintained or improved  8/20/2024 0947 by Sonali Courtney LPN  Outcome: Progressing  8/20/2024 0149 by Rosa Parra RN  Outcome: Progressing     Problem: Respiratory - Adult  Goal: Achieves optimal ventilation and oxygenation  8/20/2024 0947 by Sonali Courtney LPN  Outcome: Progressing  8/20/2024 0149 by Rosa Parra, RN  Outcome: Progressing

## 2024-08-20 NOTE — PROGRESS NOTES
Pulmonary Note  Name: Neisha Guardado     : 1945  Hospital: [facility]    Date: 2024 8:14 AM Date of Admission: 2024       Impression:   Plan:   -- Acute exacerbation bronchiectasis, CT multifocal airspace disease  -- Hx MDR organisms  -- Hx asthma, BATSHEVA  -- DM, HTN, GERD -- O2 if needed, currently on RA  -- Continue azithro, ceftazidime. Can stop vanc.  -- Mobilize as able  -- Flutter  -- Sputum culture if able  -- Consider bronch is she fails to improve as expected   -- Prednisone taper- will not taper today  --Replete K  -- Brovana/Pulmicort/Duonebs (increased frequency of Duo-Nebs today)  -- Hypertonic saline nebs per her usual  -- Chest PT- appreciate RT assistance  -- DVT ppx  --  Recommended she resume her percussive vest at home         CC:   Chief Complaint   Patient presents with    Cough      Interval History:    Afebrile  BP stable  Sats 96% on RA  Mag 2.0--better  Creat 1.16--stable  K 3.4  WBC 10.8  RVP neg  MRSA cx neg    ROS: Reports feeling a little worse this morning. Feels slightly tighter in her chest. She hasn't coughed up sputum since this weekend.  No fever/chills     Initial HPI:   -  Known patient of Dr. Parr, bronchiectasis on HFCWO device at home - stopped using one month ago as she didn't think it was doing anything.  Presented with 3 days of dyspnea/cough/congestion/sputum, no fevers or chest pain, refractory to 2 days levaquin that had been given to her by the ED.  A couple days ago had some blood in the sputum but none so farm today.  She is on plavix.  She does report feeling a bit better today.  Denies s/sx aspiration.    Exam Findings:  General: Awake, alert, no acute distress   HEENT: NC/AT, EOMI, moist mucous membranes   Neck: Supple, no meningismus, no masses or swelling   HEART: RRR, no murmurs/rubs/gallops   Lungs: No deformities, normal chest rise and fall, no increased work of breathing   Lung auscultation: Expiratory wheeze with  MIP reconstructed images were obtained in the coronal plane.     FINDINGS:  PE: This is a good quality study for the evaluation of pulmonary embolism to the  subsegmental arterial level. There is no pulmonary embolism to this level.  CORONARY VASCULAR CALCIFICATIONS:  Present  There is no aortic aneurysm or dissection.    Scattered groundglass opacities are present in the right and left lower lobe  less so in the left upper lobe. Cardiomegaly. Coronary vascular calcifications.  Hepatic steatosis and cholecystectomy. There is no mediastinal, axillary or  hilar lymphadenopathy. The proximal pulmonary arteries are without evidence of  filling defects. No acute fracture is identified. The remainder of the upper  abdomen visualized is unremarkable.  Impression: There is no pulmonary embolism.  There is no aortic aneurysm or dissection.    Multifocal scattered groundglass opacities most likely related to a multifocal  pneumonia..    Cardiomegaly and coronary vascular calcification.    Incidental findings are as described above.        Electronically signed by ZEINAB ZIMMERMAN I personally reviewed laboratory testing, pulmonary imaging, radiology reports, and pulse oximetry data.    Signature:   ARLEEN Sullivan - AMBREEN   8/20/2024

## 2024-08-20 NOTE — CARE COORDINATION
8/20/2024  12:28 PM  Care Management Progress Note    Reason for Admission:   Hemoptysis [R04.2]  Multifocal pneumonia [J18.9]  Asthma with acute exacerbation, unspecified asthma severity, unspecified whether persistent [J45.901]  Pneumonia due to organism [J18.9]         Patient Admission Status: Inpatient  RUR: Readmission Risk Score: 20.7    Hospitalization in the last 30 days (Readmission):  Yes        Transition of care plan:  Awaiting medical clearance and DC order. Pulm following. Pt up ad antwan. Pt on RA.   Home. No CM needs indicated.   Outpatient follow-up.  Pt's family to transport.

## 2024-08-20 NOTE — PROGRESS NOTES
medical distress and unable to provide information           Total of 12 systems reviewed as follows:       POSITIVE= underlined text  Negative = text not underlined  General:  fever, chills, sweats, generalized weakness, weight loss/gain,      loss of appetite   Eyes:    blurred vision, eye pain, loss of vision, double vision  ENT:    rhinorrhea, pharyngitis   Respiratory:   cough, sputum production, SOB, EMERSON, wheezing, pleuritic pain   Cardiology:   chest pain, palpitations, orthopnea, PND, edema, syncope   Gastrointestinal:  abdominal pain , N/V, diarrhea, dysphagia, constipation, bleeding   Genitourinary:  frequency, urgency, dysuria, hematuria, incontinence   Muskuloskeletal :  arthralgia, myalgia, back pain  Hematology:  easy bruising, nose or gum bleeding, lymphadenopathy   Dermatological: rash, ulceration, pruritis, color change / jaundice  Endocrine:   hot flashes or polydipsia   Neurological:  headache, dizziness, confusion, focal weakness, paresthesia,     Speech difficulties, memory loss, gait difficulty  Psychological: Feelings of anxiety, depression, agitation      Vital Signs:    Last 24hrs VS reviewed since prior progress note. Most recent are:  Vitals:    08/20/24 0702   BP:    Pulse: 77   Resp: 18   Temp:    SpO2: 96%         Intake/Output Summary (Last 24 hours) at 8/20/2024 0739  Last data filed at 8/19/2024 1952  Gross per 24 hour   Intake 240 ml   Output --   Net 240 ml        Physical Examination:     I had a face to face encounter with this patient and independently examined them on 8/20/2024 as outlined below:          General : alert x 3, awake, no acute distress,   HEENT: PEERL, EOMI, moist mucus membrane  Neck: supple, no JVD  Chest: decreased on left base  CVS: S1 S2 heard, Capillary refill less than 2 seconds  Abd: soft/ non tender, non distended, BS physiological,   Ext: no clubbing, no cyanosis, no edema, brisk 2+ DP pulses  Neuro/Psych: pleasant mood and affect, moving all  available resources at the time of admission. Route is PO if not otherwise noted.      Admission Status:[id number]:::1}      Medications Reviewed:     Current Facility-Administered Medications   Medication Dose Route Frequency    arformoterol tartrate (BROVANA) nebulizer solution 15 mcg  15 mcg Nebulization BID RT    calcium carbonate (TUMS) chewable tablet 500 mg  500 mg Oral TID PRN    ipratropium 0.5 mg-albuterol 2.5 mg (DUONEB) nebulizer solution 1 Dose  1 Dose Inhalation Q6H WA RT    predniSONE (DELTASONE) tablet 40 mg  40 mg Oral Daily    sodium chloride (Inhalant) 7 % nebulizer solution 4 mL  4 mL Nebulization BID RT    iopamidol (ISOVUE-370) 76 % injection 100 mL  100 mL IntraVENous ONCE PRN    sodium chloride flush 0.9 % injection 5-40 mL  5-40 mL IntraVENous 2 times per day    sodium chloride flush 0.9 % injection 5-40 mL  5-40 mL IntraVENous PRN    0.9 % sodium chloride infusion   IntraVENous PRN    potassium chloride (KLOR-CON) extended release tablet 40 mEq  40 mEq Oral PRN    Or    potassium bicarb-citric acid (EFFER-K) effervescent tablet 40 mEq  40 mEq Oral PRN    Or    potassium chloride 10 mEq/100 mL IVPB (Peripheral Line)  10 mEq IntraVENous PRN    magnesium sulfate 2000 mg in 50 mL IVPB premix  2,000 mg IntraVENous PRN    enoxaparin (LOVENOX) injection 40 mg  40 mg SubCUTAneous Daily    ondansetron (ZOFRAN-ODT) disintegrating tablet 4 mg  4 mg Oral Q8H PRN    Or    ondansetron (ZOFRAN) injection 4 mg  4 mg IntraVENous Q6H PRN    polyethylene glycol (GLYCOLAX) packet 17 g  17 g Oral Daily PRN    acetaminophen (TYLENOL) tablet 650 mg  650 mg Oral Q6H PRN    Or    acetaminophen (TYLENOL) suppository 650 mg  650 mg Rectal Q6H PRN    cefTAZidime (FORTAZ) 2,000 mg in sodium chloride 0.9 % 50 mL IVPB (mini-bag)  2,000 mg IntraVENous Q12H    allopurinol (ZYLOPRIM) tablet 300 mg  300 mg Oral Daily    clopidogrel (PLAVIX) tablet 75 mg  75 mg Oral Daily    dilTIAZem (CARDIZEM CD) extended release capsule 240

## 2024-08-20 NOTE — PROGRESS NOTES
Called multiple times to schedule patient a pcp Hospital follow up and couldn't reach the office, left two voicemail's.

## 2024-08-21 VITALS
WEIGHT: 158 LBS | HEIGHT: 64 IN | RESPIRATION RATE: 16 BRPM | TEMPERATURE: 98.2 F | SYSTOLIC BLOOD PRESSURE: 142 MMHG | BODY MASS INDEX: 26.98 KG/M2 | DIASTOLIC BLOOD PRESSURE: 70 MMHG | OXYGEN SATURATION: 97 % | HEART RATE: 101 BPM

## 2024-08-21 LAB
ANION GAP SERPL CALC-SCNC: 6 MMOL/L (ref 5–15)
BASOPHILS # BLD: 0 K/UL (ref 0–0.1)
BASOPHILS NFR BLD: 0 % (ref 0–1)
BUN SERPL-MCNC: 25 MG/DL (ref 6–20)
BUN/CREAT SERPL: 30 (ref 12–20)
CALCIUM SERPL-MCNC: 9 MG/DL (ref 8.5–10.1)
CHLORIDE SERPL-SCNC: 115 MMOL/L (ref 97–108)
CO2 SERPL-SCNC: 20 MMOL/L (ref 21–32)
CREAT SERPL-MCNC: 0.82 MG/DL (ref 0.55–1.02)
DIFFERENTIAL METHOD BLD: ABNORMAL
EOSINOPHIL # BLD: 0 K/UL (ref 0–0.4)
EOSINOPHIL NFR BLD: 0 % (ref 0–7)
ERYTHROCYTE [DISTWIDTH] IN BLOOD BY AUTOMATED COUNT: 15.2 % (ref 11.5–14.5)
GLUCOSE BLD STRIP.AUTO-MCNC: 180 MG/DL (ref 65–117)
GLUCOSE BLD STRIP.AUTO-MCNC: 87 MG/DL (ref 65–117)
GLUCOSE SERPL-MCNC: 105 MG/DL (ref 65–100)
HCT VFR BLD AUTO: 27 % (ref 35–47)
HGB BLD-MCNC: 9.2 G/DL (ref 11.5–16)
IMM GRANULOCYTES # BLD AUTO: 0 K/UL
IMM GRANULOCYTES NFR BLD AUTO: 0 %
LYMPHOCYTES # BLD: 2.4 K/UL (ref 0.8–3.5)
LYMPHOCYTES NFR BLD: 26 % (ref 12–49)
MAGNESIUM SERPL-MCNC: 1.9 MG/DL (ref 1.6–2.4)
MCH RBC QN AUTO: 33.5 PG (ref 26–34)
MCHC RBC AUTO-ENTMCNC: 34.1 G/DL (ref 30–36.5)
MCV RBC AUTO: 98.2 FL (ref 80–99)
MONOCYTES # BLD: 0.5 K/UL (ref 0–1)
MONOCYTES NFR BLD: 5 % (ref 5–13)
MYELOCYTES NFR BLD MANUAL: 1 %
NEUTS SEG # BLD: 6.3 K/UL (ref 1.8–8)
NEUTS SEG NFR BLD: 68 % (ref 32–75)
NRBC # BLD: 0 K/UL (ref 0–0.01)
NRBC BLD-RTO: 0 PER 100 WBC
PHOSPHATE SERPL-MCNC: 2.2 MG/DL (ref 2.6–4.7)
PLATELET # BLD AUTO: 268 K/UL (ref 150–400)
PMV BLD AUTO: 9.7 FL (ref 8.9–12.9)
POTASSIUM SERPL-SCNC: 4.4 MMOL/L (ref 3.5–5.1)
RBC # BLD AUTO: 2.75 M/UL (ref 3.8–5.2)
RBC MORPH BLD: ABNORMAL
SERVICE CMNT-IMP: ABNORMAL
SERVICE CMNT-IMP: NORMAL
SODIUM SERPL-SCNC: 141 MMOL/L (ref 136–145)
WBC # BLD AUTO: 9.3 K/UL (ref 3.6–11)

## 2024-08-21 PROCEDURE — 6360000002 HC RX W HCPCS: Performed by: PHYSICIAN ASSISTANT

## 2024-08-21 PROCEDURE — 85025 COMPLETE CBC W/AUTO DIFF WBC: CPT

## 2024-08-21 PROCEDURE — 6370000000 HC RX 637 (ALT 250 FOR IP): Performed by: INTERNAL MEDICINE

## 2024-08-21 PROCEDURE — 94640 AIRWAY INHALATION TREATMENT: CPT

## 2024-08-21 PROCEDURE — 6370000000 HC RX 637 (ALT 250 FOR IP): Performed by: NURSE PRACTITIONER

## 2024-08-21 PROCEDURE — 36415 COLL VENOUS BLD VENIPUNCTURE: CPT

## 2024-08-21 PROCEDURE — 6370000000 HC RX 637 (ALT 250 FOR IP): Performed by: STUDENT IN AN ORGANIZED HEALTH CARE EDUCATION/TRAINING PROGRAM

## 2024-08-21 PROCEDURE — 6360000002 HC RX W HCPCS: Performed by: INTERNAL MEDICINE

## 2024-08-21 PROCEDURE — 94668 MNPJ CHEST WALL SBSQ: CPT

## 2024-08-21 PROCEDURE — 94761 N-INVAS EAR/PLS OXIMETRY MLT: CPT

## 2024-08-21 PROCEDURE — 2580000003 HC RX 258: Performed by: INTERNAL MEDICINE

## 2024-08-21 PROCEDURE — 80048 BASIC METABOLIC PNL TOTAL CA: CPT

## 2024-08-21 PROCEDURE — 83735 ASSAY OF MAGNESIUM: CPT

## 2024-08-21 PROCEDURE — 2580000003 HC RX 258: Performed by: PHYSICIAN ASSISTANT

## 2024-08-21 PROCEDURE — 6370000000 HC RX 637 (ALT 250 FOR IP): Performed by: PHYSICIAN ASSISTANT

## 2024-08-21 PROCEDURE — 84100 ASSAY OF PHOSPHORUS: CPT

## 2024-08-21 PROCEDURE — 82962 GLUCOSE BLOOD TEST: CPT

## 2024-08-21 RX ORDER — FLUTICASONE PROPIONATE 50 MCG
2 SPRAY, SUSPENSION (ML) NASAL DAILY
Qty: 16 G | Refills: 3 | Status: SHIPPED | OUTPATIENT
Start: 2024-08-21

## 2024-08-21 RX ORDER — PREDNISONE 10 MG/1
TABLET ORAL
Qty: 12 TABLET | Refills: 0 | Status: SHIPPED | OUTPATIENT
Start: 2024-08-22 | End: 2024-08-28

## 2024-08-21 RX ORDER — AMOXICILLIN AND CLAVULANATE POTASSIUM 875; 125 MG/1; MG/1
1 TABLET, FILM COATED ORAL 2 TIMES DAILY
Qty: 14 TABLET | Refills: 0 | Status: SHIPPED | OUTPATIENT
Start: 2024-08-21 | End: 2024-08-28

## 2024-08-21 RX ORDER — AZITHROMYCIN 500 MG/1
500 TABLET, FILM COATED ORAL
COMMUNITY
Start: 2024-07-22

## 2024-08-21 RX ADMIN — ALLOPURINOL 300 MG: 100 TABLET ORAL at 08:37

## 2024-08-21 RX ADMIN — AZITHROMYCIN DIHYDRATE 500 MG: 250 TABLET ORAL at 08:39

## 2024-08-21 RX ADMIN — SODIUM CHLORIDE, PRESERVATIVE FREE 10 ML: 5 INJECTION INTRAVENOUS at 08:40

## 2024-08-21 RX ADMIN — CLOPIDOGREL BISULFATE 75 MG: 75 TABLET ORAL at 08:38

## 2024-08-21 RX ADMIN — PREDNISONE 40 MG: 20 TABLET ORAL at 08:36

## 2024-08-21 RX ADMIN — IPRATROPIUM BROMIDE AND ALBUTEROL SULFATE 1 DOSE: 2.5; .5 SOLUTION RESPIRATORY (INHALATION) at 07:42

## 2024-08-21 RX ADMIN — DILTIAZEM HYDROCHLORIDE 240 MG: 120 CAPSULE, EXTENDED RELEASE ORAL at 08:38

## 2024-08-21 RX ADMIN — FOLIC ACID 1 MG: 1 TABLET ORAL at 08:36

## 2024-08-21 RX ADMIN — CEFTAZIDIME 2000 MG: 2 INJECTION, POWDER, FOR SOLUTION INTRAVENOUS at 07:05

## 2024-08-21 RX ADMIN — IPRATROPIUM BROMIDE AND ALBUTEROL SULFATE 1 DOSE: 2.5; .5 SOLUTION RESPIRATORY (INHALATION) at 11:43

## 2024-08-21 RX ADMIN — ARFORMOTEROL TARTRATE 15 MCG: 15 SOLUTION RESPIRATORY (INHALATION) at 07:48

## 2024-08-21 RX ADMIN — ENOXAPARIN SODIUM 40 MG: 100 INJECTION SUBCUTANEOUS at 08:39

## 2024-08-21 RX ADMIN — BUDESONIDE 500 MCG: 0.5 INHALANT RESPIRATORY (INHALATION) at 07:48

## 2024-08-21 RX ADMIN — Medication 4 ML: at 07:42

## 2024-08-21 RX ADMIN — GUAIFENESIN 1200 MG: 600 TABLET ORAL at 08:38

## 2024-08-21 NOTE — DISCHARGE SUMMARY
Discharge Summary   Please note that this dictation was completed with [employer], the computer voice recognition software.  Quite often unanticipated grammatical, syntax, homophones, and other interpretive errors are inadvertently transcribed by the computer software.  Please disregard these errors.  Please excuse any errors that have escaped final proofreading.    PATIENT ID: Neisha Guardado  MRN: [id number]   YOB: 1945    DATE OF ADMISSION: 8/17/2024 11:06 AM    DATE OF DISCHARGE: 8/21/2024  PRIMARY CARE PROVIDER: Gonzalo Raygoza MD         ATTENDING PHYSICIAN: XAVI MOHAMUD MD  DISCHARGING PROVIDER: XAVI MOHAMUD MD       CONSULTATIONS: IP CONSULT TO PULMONOLOGY    PROCEDURES/SURGERIES: * No surgery found *    ADMITTING HPI from excerpted H&P    78 y.o.  F hx Dm2, HTN, GERD, recurrent PNA/mucus plugging with hx MDR organisms on bronch p/w cough and shortness of breath. She was started on levofloxacin after presenting to Er 3 days ago, but presents again with worsening symptoms. She tells me she has been coughing up dark, alex sputum with blood in it, but nothing bright red. She has been compliant with home meds and mucolytic (uses NaCl)     Imaging reveals multifocal pneumonia. She has no sepsis criteria and is not hypoxic, but given her significant lung hx and resistances, she will be admitted for broad spectrum IV abx and pulm consult, for which she may need bronchoscopy.                  HOSPITAL COURSE & DISCHARGE DIAGNOSIS/ PLAN:     Multifocal/complicated Pneumonia / Hx MDRO / Asthma exacerbation/Hx of bronchiectasis  Previously has grown achromobacter. Flu/Covid neg. Resp PCR negative  Pulmonology was consulted. Immune work up ordered by pulmonology to evaluate for autoimmune process.   Treated with ceftazidime, Azithromycin. Vancomycin as inpatient, discharged on augmentin.    - Flutter valve  -Prednisone taper  - duonebs  - Mucomyst TID  - Guaifenesin  - Flutter, IS  - scheduled

## 2024-08-21 NOTE — DISCHARGE INSTRUCTIONS
You came to the hospital with pneumonia of both lungs in multiple areas called multifocal pneumonia. You were seen by lung doctor and received IV antibiotics, breathing treatments, steroids and flutter valve treatments. You are being discharged on oral antibiotics. Please follow up with pulmonology as outpatient. Please continue preventative breathing therapies and antibiotics at home.

## 2024-08-21 NOTE — PROGRESS NOTES
Bedside and Verbal shift change report given to GORDY PLUMMER (oncoming nurse) by marisabel Becerra (offgoing nurse). Report included the following information Nurse Handoff Report, Index, ED Encounter Summary, ED SBAR, Adult Overview, Surgery Report, Intake/Output, MAR, Recent Results, Med Rec Status, Cardiac Rhythm and, and Alarm Parameters.

## 2024-08-21 NOTE — PROGRESS NOTES
Pulmonary Note  Name: Neisha Guardado     : 1945  Hospital: [facility]    Date: 2024 8:18 AM Date of Admission: 2024       Impression:   Plan:   -- Acute exacerbation bronchiectasis, CT multifocal airspace disease  -- Hx MDR organisms  -- Hx asthma, BATSHEVA  -- DM, HTN, GERD -- O2 if needed, currently on RA  -- Continue  ceftazidime. Switch to Augmentin to complete 7 day course at discharge.  -- Mobilize as able  -- Flutter  -- Sputum culture if able  -- Prednisone taper- taper slightly today and complete over the next 5 days  --Replete Phos  -- Brovana/Pulmicort/Duonebs (increased frequency of Duo-Nebs today)  -- Hypertonic saline nebs per her usual  -- Chest PT- appreciate RT assistance  -- DVT ppx  --  Recommended she resume her percussive vest at home     is stable from a pulmonary standpoint. We will sign off and arrange for outpatient follow-up in 2-3 weeks. Please call with questions.       CC:   Chief Complaint   Patient presents with    Cough      Interval History:    Afebrile  BP stable  Sats 96% on RA  Phos 2.2  K 4.4--increased  WBC 9.3--better  RVP neg  MRSA cx neg    ROS: Feeling better today. Coughed up more this morning. Does not tolerate hospital vest well.     Initial HPI:   -  Known patient of Dr. Parr, bronchiectasis on HFCWO device at home - stopped using one month ago as she didn't think it was doing anything.  Presented with 3 days of dyspnea/cough/congestion/sputum, no fevers or chest pain, refractory to 2 days levaquin that had been given to her by the ED.  A couple days ago had some blood in the sputum but none so farm today.  She is on plavix.  She does report feeling a bit better today.  Denies s/sx aspiration.    Exam Findings:  General: Awake, alert, no acute distress   HEENT: NC/AT, EOMI, moist mucous membranes   Neck: Supple, no meningismus, no masses or swelling   HEART: RRR, no murmurs/rubs/gallops   Lungs: No deformities, normal chest  aortic aneurysm or dissection.    Scattered groundglass opacities are present in the right and left lower lobe  less so in the left upper lobe. Cardiomegaly. Coronary vascular calcifications.  Hepatic steatosis and cholecystectomy. There is no mediastinal, axillary or  hilar lymphadenopathy. The proximal pulmonary arteries are without evidence of  filling defects. No acute fracture is identified. The remainder of the upper  abdomen visualized is unremarkable.  Impression: There is no pulmonary embolism.  There is no aortic aneurysm or dissection.    Multifocal scattered groundglass opacities most likely related to a multifocal  pneumonia..    Cardiomegaly and coronary vascular calcification.    Incidental findings are as described above.        Electronically signed by ZEINAB ZIMMERMAN I personally reviewed laboratory testing, pulmonary imaging, radiology reports, and pulse oximetry data.    Signature:   ARLEEN Sullivan - NP   8/21/2024

## 2024-08-21 NOTE — CARE COORDINATION
8/21/2024  9:09 AM  Care Management Progress Note    Reason for Admission:   Hemoptysis [R04.2]  Multifocal pneumonia [J18.9]  Asthma with acute exacerbation, unspecified asthma severity, unspecified whether persistent [J45.901]  Pneumonia due to organism [J18.9]         Patient Admission Status: Inpatient  RUR: Readmission Risk Score: 19.2    Hospitalization in the last 30 days (Readmission):  Yes        Transition of care plan:  Discharge pending Pulm clearance. Pt on RA. Pt up ad antwan in room.  Home. No CM needs indicated.   Outpatient follow-up.  Pt's family to transport.       08/19/24 3760   Service Assessment   Patient Orientation Alert and Oriented   Cognition Alert   History Provided By Patient   Primary Caregiver Self   Support Systems Children   Patient's Healthcare Decision Maker is: Named in Scanned ACP Document   PCP Verified by CM Yes   Last Visit to PCP Within last 3 months   Prior Functional Level Independent in ADLs/IADLs   Current Functional Level Independent in ADLs/IADLs   Can patient return to prior living arrangement Yes   Ability to make needs known: Good   Family able to assist with home care needs: Yes   Would you like for me to discuss the discharge plan with any other family members/significant others, and if so, who? Yes  (As needed)   Financial Resources Medicare;Other (Comment)   Community Resources None   Social/Functional History   Lives With Son   Type of Home House   Home Layout One level   Home Access Ramped entrance   Home Equipment None   ADL Assistance Independent   Homemaking Assistance Independent   Ambulation Assistance Independent   Transfer Assistance Independent   Active  Yes   Discharge Planning   Type of Residence House   Living Arrangements Children   Current Services Prior To Admission None   DME Ordered? No   Potential Assistance Purchasing Medications No   Type of Home Care Services None   Patient expects to be discharged to: House   Services At/After Discharge    Services At/After Discharge None   Mode of Transport at Discharge Other (see comment)   Confirm Follow Up Transport Family

## 2024-08-21 NOTE — PLAN OF CARE
Problem: Discharge Planning  Goal: Discharge to home or other facility with appropriate resources  8/21/2024 1015 by Sonali Courtney LPN  Outcome: Progressing  8/21/2024 0533 by Mariam Khalil RN  Outcome: Progressing     Problem: Pain  Goal: Verbalizes/displays adequate comfort level or baseline comfort level  8/21/2024 1015 by Sonali Courtney LPN  Outcome: Progressing  8/21/2024 0533 by Mariam Khalil RN  Outcome: Progressing     Problem: Chronic Conditions and Co-morbidities  Goal: Patient's chronic conditions and co-morbidity symptoms are monitored and maintained or improved  8/21/2024 1015 by Sonali Courtney LPN  Outcome: Progressing  8/21/2024 0533 by Mariam Khalil RN  Outcome: Progressing     Problem: Respiratory - Adult  Goal: Achieves optimal ventilation and oxygenation  8/21/2024 1015 by Sonali Courtney LPN  Outcome: Progressing  8/21/2024 0747 by Svitlana Chavarria RT  Outcome: Progressing

## 2024-08-21 NOTE — PLAN OF CARE
D/c paperwork reviewed with pt in detail. VSS. PIV removed. No questions or concerned. Pt wheeled out via w/c.           Problem: Discharge Planning  Goal: Discharge to home or other facility with appropriate resources  8/21/2024 1222 by Sonali Courtney LPN  Outcome: Completed  8/21/2024 1015 by Sonali Courtney LPN  Outcome: Progressing  8/21/2024 0533 by Mariam Khalil RN  Outcome: Progressing     Problem: Pain  Goal: Verbalizes/displays adequate comfort level or baseline comfort level  8/21/2024 1222 by Sonali Courtney LPN  Outcome: Completed  8/21/2024 1015 by Sonali Courtney LPN  Outcome: Progressing  8/21/2024 0533 by Mariam Khalil RN  Outcome: Progressing     Problem: Chronic Conditions and Co-morbidities  Goal: Patient's chronic conditions and co-morbidity symptoms are monitored and maintained or improved  8/21/2024 1222 by Sonali Courtney LPN  Outcome: Completed  8/21/2024 1015 by Sonali Courtney LPN  Outcome: Progressing  8/21/2024 0533 by Mariam Khalil RN  Outcome: Progressing     Problem: Respiratory - Adult  Goal: Achieves optimal ventilation and oxygenation  8/21/2024 1222 by Sonali Courtney LPN  Outcome: Completed  8/21/2024 1015 by Sonali Courtney LPN  Outcome: Progressing  8/21/2024 0747 by Svitlana Chavarria RT  Outcome: Progressing

## 2024-08-30 NOTE — PROGRESS NOTES
Physician Progress Note      PATIENT:               MELANIE POLK  [facility] #:                  [id number]  :                       1945  ADMIT DATE:       2024 11:06 AM  DISCH DATE:        2024 1:11 PM  RESPONDING  PROVIDER #:        Garret Lema MD          QUERY TEXT:    Pt admitted with PNA.  Pt noted to have bronchiectasis with previous cultures   positive for achromobacter. If possible, please document in the progress notes   and discharge summary if you are evaluating and/or treating any of the   following:    Note: CAP and HCAP indicate where the pneumonia was acquired, not a specific   type.    The medical record reflects the following:  Risk Factors: Mutifocal PNA, recurrent. Previously grown achromobacter. Asthma   AE, Acute bronchiectasis/mucus plugging    Clinical Indicators:  H&P -    #Multifocal/complicated Pneumonia / Hx MDRO / Asthma exacerbation: Previously   has grown achromobacter. She has needed ceftaz, which we will order now as   well as vancomycin. Flu/Covid neg but other viral process possible.  - Cont ceftaz, vanc, azith  - Resp PCR  - Pulm c/s, may need another bronch  - Methylpred 40mv IV q8h; duonebs    #Hx Bronchiectasis/mucus plugging: May need bronch this admission. For now,   will focus on airway clearance  - Mucomyst TID  - Guaifenesin  - Flutter, IS  - ICS, scheduled clair/valentina  - Pulm to see, appreciated      Pulm Consult-  -- Acute exacerbation bronchiectasis, CT multifocal airspace disease  -- Hx MDR organisms  -- Hx asthma, BATSHEVA  -- DM, HTN, GERD    Flu/Covid neg. Resp PCR negative      Treatment:  IV Fortz, Vanc and Zithromax and discharged on Augmentin.      Thank you,  Nona Gonzalez, FRIEDAN, RN  Clinical Documentation  [email or web address]  Ph: [phone]  Options provided:  -- Gram negative pneumonia  -- Other - I will add my own diagnosis  -- Disagree - Not applicable / Not valid  -- Disagree - Clinically unable to determine / Unknown  -- Refer to

## 2024-10-07 ENCOUNTER — HOSPITAL ENCOUNTER (OUTPATIENT)
Facility: HOSPITAL | Age: 79
Discharge: HOME | End: 2024-10-10
Payer: MEDICARE

## 2024-10-07 DIAGNOSIS — T17.500A MUCUS PLUGGING OF BRONCHI: ICD-10-CM

## 2024-10-07 PROCEDURE — 71250 CT THORAX DX C-: CPT

## 2025-02-10 ENCOUNTER — HOSPITAL ENCOUNTER (OUTPATIENT)
Facility: HOSPITAL | Age: 80
Discharge: HOME | End: 2025-02-13
Attending: OBSTETRICS & GYNECOLOGY
Payer: MEDICARE

## 2025-02-10 VITALS — HEIGHT: 64 IN | BODY MASS INDEX: 26.98 KG/M2 | WEIGHT: 158 LBS

## 2025-02-10 DIAGNOSIS — Z12.31 ENCOUNTER FOR SCREENING MAMMOGRAM FOR MALIGNANT NEOPLASM OF BREAST: ICD-10-CM

## 2025-02-10 PROCEDURE — 77063 BREAST TOMOSYNTHESIS BI: CPT

## 2025-04-29 ENCOUNTER — TRANSCRIBE ORDERS (OUTPATIENT)
Facility: HOSPITAL | Age: 80
End: 2025-04-29

## 2025-04-29 ENCOUNTER — HOSPITAL ENCOUNTER (OUTPATIENT)
Facility: HOSPITAL | Age: 80
Discharge: HOME | End: 2025-05-02
Payer: MEDICARE

## 2025-04-29 DIAGNOSIS — M84.374A STRESS FRACTURE OF RIGHT FOOT, INITIAL ENCOUNTER: ICD-10-CM

## 2025-04-29 DIAGNOSIS — M84.374A STRESS FRACTURE OF RIGHT FOOT, INITIAL ENCOUNTER: Primary | ICD-10-CM

## 2025-04-29 PROCEDURE — 73630 X-RAY EXAM OF FOOT: CPT

## 2025-05-07 NOTE — PRE-PROCEDURE INSTRUCTIONS
5/7/2025  3:43 p.m.  Spoke to patient and confirmed arrival time of 1:45 p.m. for appointment scheduled on 5/9/2025; instructions given.

## 2025-05-09 ENCOUNTER — HOSPITAL ENCOUNTER (OUTPATIENT)
Facility: HOSPITAL | Age: 80
Discharge: HOME | End: 2025-05-12
Attending: FAMILY MEDICINE
Payer: MEDICARE

## 2025-05-09 DIAGNOSIS — M81.0 AGE-RELATED OSTEOPOROSIS WITHOUT CURRENT PATHOLOGICAL FRACTURE: ICD-10-CM

## 2025-05-09 PROCEDURE — 77080 DXA BONE DENSITY AXIAL: CPT

## 2025-08-25 ENCOUNTER — APPOINTMENT (OUTPATIENT)
Facility: HOSPITAL | Age: 80
End: 2025-08-25
Payer: MEDICARE

## 2025-08-25 ENCOUNTER — HOSPITAL ENCOUNTER (EMERGENCY)
Facility: HOSPITAL | Age: 80
Discharge: HOME | End: 2025-08-25
Attending: EMERGENCY MEDICINE
Payer: MEDICARE

## 2025-08-25 VITALS
HEART RATE: 68 BPM | WEIGHT: 178.79 LBS | DIASTOLIC BLOOD PRESSURE: 59 MMHG | BODY MASS INDEX: 30.69 KG/M2 | SYSTOLIC BLOOD PRESSURE: 188 MMHG | OXYGEN SATURATION: 98 % | TEMPERATURE: 97.8 F | RESPIRATION RATE: 16 BRPM

## 2025-08-25 DIAGNOSIS — S29.012A RHOMBOID MUSCLE STRAIN, INITIAL ENCOUNTER: Primary | ICD-10-CM

## 2025-08-25 PROCEDURE — 96372 THER/PROPH/DIAG INJ SC/IM: CPT

## 2025-08-25 PROCEDURE — 71046 X-RAY EXAM CHEST 2 VIEWS: CPT

## 2025-08-25 PROCEDURE — 6360000002 HC RX W HCPCS: Performed by: EMERGENCY MEDICINE

## 2025-08-25 PROCEDURE — 99284 EMERGENCY DEPT VISIT MOD MDM: CPT

## 2025-08-25 PROCEDURE — 6370000000 HC RX 637 (ALT 250 FOR IP): Performed by: EMERGENCY MEDICINE

## 2025-08-25 RX ORDER — METHOCARBAMOL 750 MG/1
750 TABLET, FILM COATED ORAL 4 TIMES DAILY PRN
Qty: 20 TABLET | Refills: 0 | Status: SHIPPED | OUTPATIENT
Start: 2025-08-25 | End: 2025-09-04

## 2025-08-25 RX ORDER — KETOROLAC TROMETHAMINE 30 MG/ML
30 INJECTION, SOLUTION INTRAMUSCULAR; INTRAVENOUS
Status: COMPLETED | OUTPATIENT
Start: 2025-08-25 | End: 2025-08-25

## 2025-08-25 RX ORDER — LIDOCAINE 50 MG/G
1 PATCH TOPICAL DAILY
Qty: 30 PATCH | Refills: 0 | Status: SHIPPED | OUTPATIENT
Start: 2025-08-25 | End: 2025-09-24

## 2025-08-25 RX ORDER — LIDOCAINE 4 G/G
1 PATCH TOPICAL
Status: DISCONTINUED | OUTPATIENT
Start: 2025-08-25 | End: 2025-08-25 | Stop reason: HOSPADM

## 2025-08-25 RX ORDER — KETOROLAC TROMETHAMINE 10 MG/1
10 TABLET, FILM COATED ORAL EVERY 6 HOURS PRN
Qty: 20 TABLET | Refills: 0 | Status: SHIPPED | OUTPATIENT
Start: 2025-08-25

## 2025-08-25 RX ADMIN — KETOROLAC TROMETHAMINE 30 MG: 30 INJECTION, SOLUTION INTRAMUSCULAR at 20:49

## 2025-08-25 ASSESSMENT — PAIN DESCRIPTION - PAIN TYPE: TYPE: ACUTE PAIN

## 2025-08-25 ASSESSMENT — PAIN SCALES - GENERAL
PAINLEVEL_OUTOF10: 8
PAINLEVEL_OUTOF10: 6

## 2025-08-25 ASSESSMENT — PAIN DESCRIPTION - LOCATION: LOCATION: BACK

## 2025-08-25 ASSESSMENT — PAIN DESCRIPTION - DESCRIPTORS: DESCRIPTORS: ACHING;SHARP

## 2025-08-25 ASSESSMENT — PAIN DESCRIPTION - ORIENTATION: ORIENTATION: RIGHT;POSTERIOR;UPPER

## (undated) DEVICE — BLUNTFILL: Brand: MONOJECT

## (undated) DEVICE — AIRLIFE™ CORRUGATED FLEXIBLE EVA TUBING FOR AEROSOL AND IPPB USE, SEGMENTED, 6 FEET (1.8 M) LENGTH, 22 MM I.D.: Brand: AIRLIFE™

## (undated) DEVICE — SYR 3ML LL TIP 1/10ML GRAD --

## (undated) DEVICE — ELECTRODE,RADIOTRANSLUCENT,FOAM,3PK: Brand: MEDLINE

## (undated) DEVICE — SET ADMIN 16ML TBNG L100IN 2 Y INJ SITE IV PIGGY BK DISP (ORDER IN MULIPLES OF 48)

## (undated) DEVICE — ESOPHAGEAL BALLOON DILATATION CATHETER: Brand: CRE FIXED WIRE

## (undated) DEVICE — CANN NASAL O2 CAPNOGRAPHY AD -- FILTERLINE

## (undated) DEVICE — Device

## (undated) DEVICE — BITEBLOCK ENDOSCP 60FR MAXI WHT POLYETH STURDY W/ VELC WVN

## (undated) DEVICE — SYRINGE MED 5ML STD CLR PLAS LUERLOCK TIP N CTRL DISP

## (undated) DEVICE — FORCEPS BX L100CM DIA1.8MM WRK CHN 2MM PULM S STL RAD JAW 4

## (undated) DEVICE — KIT COLON W/ 1.1OZ LUB AND 2 END

## (undated) DEVICE — BASIN EMSIS 16OZ GRAPHITE PLAS KID SHP MOLD GRAD FOR ORAL

## (undated) DEVICE — 1200 GUARD II KIT W/5MM TUBE W/O VAC TUBE: Brand: GUARDIAN

## (undated) DEVICE — SET GRAV CK VLV NEEDLESS ST 3 GANGED 4WAY STPCOCK HI FLO 10

## (undated) DEVICE — SOLIDIFIER MEDC 1200ML -- CONVERT TO 356117

## (undated) DEVICE — SYR 10ML ECC TIP LF --

## (undated) DEVICE — 3M™ CUROS™ DISINFECTING CAP FOR NEEDLELESS CONNECTORS 270/CARTON 20 CARTONS/CASE CFF1-270: Brand: CUROS™

## (undated) DEVICE — FCPS BIOP PULM RAD JAW 100CML -- BX/10 M00515180

## (undated) DEVICE — TRAP,MUCUS SPECIMEN, 80CC: Brand: MEDLINE

## (undated) DEVICE — BAG SPEC BIOHZRD 10 X 10 IN --

## (undated) DEVICE — SYRINGE MED 10CC ECC TIP W/O NDL

## (undated) DEVICE — (D)SENSOR RMFG 02 PULS OXMTR -- DISC BY MFR USE ITEM 133445

## (undated) DEVICE — SYR 5ML 1/5 GRAD LL NSAF LF --

## (undated) DEVICE — CUFF RMFG BP INF SZ 11 DISP -- LAWSON OEM ITEM 238915

## (undated) DEVICE — BAG BELONG PT PERS CLEAR HANDL

## (undated) DEVICE — SYRINGE MEDICAL 3ML CLEAR PLASTIC STANDARD NON CONTROL LUERLOCK TIP DISPOSABLE

## (undated) DEVICE — CATHETER IV 22GA L1IN OD0.8382-0.9144MM ID0.6096-0.6858MM 382523

## (undated) DEVICE — BRUSH CYTO L140CM DIA1.5MM WRK CHN 2MM BRIST SHTH RNG HNDL

## (undated) DEVICE — FORCEPS BX L240CM JAW DIA2.8MM L CAP W/ NDL MIC MESH TOOTH

## (undated) DEVICE — SYR 10ML LUER LOK 1/5ML GRAD --

## (undated) DEVICE — BLUNTFILL WITH FILTER: Brand: MONOJECT

## (undated) DEVICE — SIMPLICITY FLUFF UNDERPAD 23X36, MODERATE: Brand: SIMPLICITY

## (undated) DEVICE — BLOCK BTE ENDOSCP AD 60FR 20MM -- MAXI BITE LF STRAP

## (undated) DEVICE — SOLIDIFIER FLUID 1.3 OZ GEL 1200CC NS

## (undated) DEVICE — NEEDLE SCLERO 23GA L4MM CATH L240CM CNTRST SHTH DIA1.8MM

## (undated) DEVICE — CATH IV AUTOGRD BC PNK 20GA 25 -- INSYTE

## (undated) DEVICE — SYR ASSEMB INFL BLLN 60ML --

## (undated) DEVICE — CONTAINER SPEC 20 ML LID NEUT BUFF FORMALIN 10 % POLYPR STS

## (undated) DEVICE — ADULT SPO2 SENSOR,REMANUFACTURED,REPROCESSED DEVICE FOR SINGLE USE; REPROCESSED BY COVIDIEN LLC: Brand: NELLCOR

## (undated) DEVICE — AIRLIFE™ ADULT OXYGEN MASK VINYL, UNDER-THE-CHIN STYLE, 3 IN 1 MASK WITH 7 FEET (2.1 M) CRUSH-RESISTANT TUBING AND U/CONNECT-IT ADAPTER: Brand: AIRLIFE™

## (undated) DEVICE — IV STRT KT 3282] LSL INDUSTRIES INC]

## (undated) DEVICE — KIT COLON DUAL END BRSH W/LUBE -- CUSTOM BX/20 FORMERLY KS-18-20